# Patient Record
Sex: MALE | Race: BLACK OR AFRICAN AMERICAN | NOT HISPANIC OR LATINO | Employment: UNEMPLOYED | URBAN - METROPOLITAN AREA
[De-identification: names, ages, dates, MRNs, and addresses within clinical notes are randomized per-mention and may not be internally consistent; named-entity substitution may affect disease eponyms.]

---

## 2023-04-11 LAB
ALANINE AMINOTRANSFERASE (SGPT) (U/L) IN SER/PLAS: 18 U/L (ref 10–52)
ALBUMIN (G/DL) IN SER/PLAS: 4.4 G/DL (ref 3.4–5)
ALKALINE PHOSPHATASE (U/L) IN SER/PLAS: 49 U/L (ref 33–120)
ANION GAP IN SER/PLAS: 13 MMOL/L (ref 10–20)
ASPARTATE AMINOTRANSFERASE (SGOT) (U/L) IN SER/PLAS: 21 U/L (ref 9–39)
BASOPHILS (10*3/UL) IN BLOOD BY AUTOMATED COUNT: 0.03 X10E9/L (ref 0–0.1)
BASOPHILS/100 LEUKOCYTES IN BLOOD BY AUTOMATED COUNT: 0.5 % (ref 0–2)
BILIRUBIN TOTAL (MG/DL) IN SER/PLAS: 0.6 MG/DL (ref 0–1.2)
CALCIUM (MG/DL) IN SER/PLAS: 8.9 MG/DL (ref 8.6–10.3)
CARBON DIOXIDE, TOTAL (MMOL/L) IN SER/PLAS: 26 MMOL/L (ref 21–32)
CHLORIDE (MMOL/L) IN SER/PLAS: 108 MMOL/L (ref 98–107)
CHOLESTEROL (MG/DL) IN SER/PLAS: 152 MG/DL (ref 0–199)
CHOLESTEROL IN HDL (MG/DL) IN SER/PLAS: 33.6 MG/DL
CHOLESTEROL/HDL RATIO: 4.5
CREATININE (MG/DL) IN SER/PLAS: 2.65 MG/DL (ref 0.5–1.3)
ERYTHROCYTE DISTRIBUTION WIDTH (RATIO) BY AUTOMATED COUNT: 14.3 % (ref 11.5–14.5)
ERYTHROCYTE MEAN CORPUSCULAR HEMOGLOBIN CONCENTRATION (G/DL) BY AUTOMATED: 31.8 G/DL (ref 32–36)
ERYTHROCYTE MEAN CORPUSCULAR VOLUME (FL) BY AUTOMATED COUNT: 88 FL (ref 80–100)
ERYTHROCYTES (10*6/UL) IN BLOOD BY AUTOMATED COUNT: 3.73 X10E12/L (ref 4.5–5.9)
GFR MALE: 28 ML/MIN/1.73M2
GLUCOSE (MG/DL) IN SER/PLAS: 115 MG/DL (ref 74–99)
HEMATOCRIT (%) IN BLOOD BY AUTOMATED COUNT: 32.7 % (ref 41–52)
HEMOGLOBIN (G/DL) IN BLOOD: 10.4 G/DL (ref 13.5–17.5)
IMMATURE GRANULOCYTES/100 LEUKOCYTES IN BLOOD BY AUTOMATED COUNT: 0.5 % (ref 0–0.9)
LDL: 64 MG/DL (ref 0–99)
LEUKOCYTES (10*3/UL) IN BLOOD BY AUTOMATED COUNT: 5.6 X10E9/L (ref 4.4–11.3)
LYMPHOCYTES (10*3/UL) IN BLOOD BY AUTOMATED COUNT: 1.48 X10E9/L (ref 1.2–4.8)
LYMPHOCYTES/100 LEUKOCYTES IN BLOOD BY AUTOMATED COUNT: 26.3 % (ref 13–44)
MONOCYTES (10*3/UL) IN BLOOD BY AUTOMATED COUNT: 0.53 X10E9/L (ref 0.1–1)
MONOCYTES/100 LEUKOCYTES IN BLOOD BY AUTOMATED COUNT: 9.4 % (ref 2–10)
NEUTROPHILS (10*3/UL) IN BLOOD BY AUTOMATED COUNT: 3.56 X10E9/L (ref 1.2–7.7)
NEUTROPHILS/100 LEUKOCYTES IN BLOOD BY AUTOMATED COUNT: 63.3 % (ref 40–80)
NON HDL CHOLESTEROL: 118 MG/DL
PLATELETS (10*3/UL) IN BLOOD AUTOMATED COUNT: 232 X10E9/L (ref 150–450)
POTASSIUM (MMOL/L) IN SER/PLAS: 4 MMOL/L (ref 3.5–5.3)
PROTEIN TOTAL: 7.6 G/DL (ref 6.4–8.2)
SODIUM (MMOL/L) IN SER/PLAS: 143 MMOL/L (ref 136–145)
TRIGLYCERIDE (MG/DL) IN SER/PLAS: 270 MG/DL (ref 0–149)
UREA NITROGEN (MG/DL) IN SER/PLAS: 18 MG/DL (ref 6–23)
VLDL: 54 MG/DL (ref 0–40)

## 2023-04-14 LAB — FECAL OCCULT BLD IMMUNOASSAY: NEGATIVE

## 2023-08-25 LAB
ALANINE AMINOTRANSFERASE (SGPT) (U/L) IN SER/PLAS: 23 U/L (ref 10–52)
ALBUMIN (G/DL) IN SER/PLAS: 4.2 G/DL (ref 3.4–5)
ALBUMIN (MG/L) IN URINE: 525.8 MG/L
ALBUMIN (MG/L) IN URINE: NORMAL
ALBUMIN/CREATININE (UG/MG) IN URINE: 389.5 UG/MG CRT (ref 0–30)
ALBUMIN/CREATININE (UG/MG) IN URINE: NORMAL
ALKALINE PHOSPHATASE (U/L) IN SER/PLAS: 65 U/L (ref 33–120)
ANION GAP IN SER/PLAS: 13 MMOL/L (ref 10–20)
APPEARANCE, URINE: CLEAR
APPEARANCE, URINE: NORMAL
ASCORBIC ACID: NORMAL MG/DL
ASPARTATE AMINOTRANSFERASE (SGOT) (U/L) IN SER/PLAS: 25 U/L (ref 9–39)
BACTERIA, URINE: ABNORMAL /HPF
BILIRUBIN TOTAL (MG/DL) IN SER/PLAS: 0.5 MG/DL (ref 0–1.2)
BILIRUBIN, URINE: NEGATIVE
BILIRUBIN, URINE: NORMAL
BLOOD, URINE: NEGATIVE
BLOOD, URINE: NORMAL
CALCIDIOL (25 OH VITAMIN D3) (NG/ML) IN SER/PLAS: 31 NG/ML
CALCIUM (MG/DL) IN SER/PLAS: 8.9 MG/DL (ref 8.6–10.3)
CARBON DIOXIDE, TOTAL (MMOL/L) IN SER/PLAS: 21 MMOL/L (ref 21–32)
CHLORIDE (MMOL/L) IN SER/PLAS: 111 MMOL/L (ref 98–107)
COLOR, URINE: NORMAL
COLOR, URINE: YELLOW
CREATININE (MG/DL) IN SER/PLAS: 2.81 MG/DL (ref 0.5–1.3)
CREATININE (MG/DL) IN URINE: 135 MG/DL (ref 20–370)
CREATININE (MG/DL) IN URINE: 135 MG/DL (ref 20–370)
CREATININE (MG/DL) IN URINE: NORMAL
CREATININE (MG/DL) IN URINE: NORMAL
GFR MALE: 26 ML/MIN/1.73M2
GLUCOSE (MG/DL) IN SER/PLAS: 165 MG/DL (ref 74–99)
GLUCOSE, URINE: NEGATIVE MG/DL
GLUCOSE, URINE: NORMAL
HYALINE CASTS, URINE: ABNORMAL /LPF
KETONES, URINE: NEGATIVE MG/DL
KETONES, URINE: NORMAL
LEUKOCYTE ESTERASE, URINE: NEGATIVE
LEUKOCYTE ESTERASE, URINE: NORMAL
MUCUS, URINE: ABNORMAL /LPF
NITRITE, URINE: NEGATIVE
NITRITE, URINE: NORMAL
PARATHYRIN INTACT (PG/ML) IN SER/PLAS: 78.9 PG/ML (ref 18.5–88)
PH, URINE: 5 (ref 5–8)
PH, URINE: NORMAL
PHOSPHATE (MG/DL) IN SER/PLAS: 3.7 MG/DL (ref 2.5–4.9)
POTASSIUM (MMOL/L) IN SER/PLAS: 4 MMOL/L (ref 3.5–5.3)
PROTEIN (MG/DL) IN URINE: 140 MG/DL (ref 5–25)
PROTEIN (MG/DL) IN URINE: NORMAL
PROTEIN TOTAL: 7.1 G/DL (ref 6.4–8.2)
PROTEIN, URINE: ABNORMAL MG/DL
PROTEIN, URINE: NORMAL
PROTEIN/CREATININE (MG/MG) IN URINE: 1.04 MG/MG CREAT (ref 0–0.17)
PROTEIN/CREATININE (MG/MG) IN URINE: NORMAL
RBC, URINE: <1 /HPF (ref 0–5)
SODIUM (MMOL/L) IN SER/PLAS: 141 MMOL/L (ref 136–145)
SPECIFIC GRAVITY, URINE: 1.01 (ref 1–1.03)
SPECIFIC GRAVITY, URINE: NORMAL
SQUAMOUS EPITHELIAL CELLS, URINE: <1 /HPF
URATE (MG/DL) IN SER/PLAS: 5.7 MG/DL (ref 4–7.5)
UREA NITROGEN (MG/DL) IN SER/PLAS: 24 MG/DL (ref 6–23)
UROBILINOGEN, URINE: <2 MG/DL (ref 0–1.9)
UROBILINOGEN, URINE: NORMAL
WBC, URINE: 2 /HPF (ref 0–5)

## 2023-09-25 PROBLEM — M1A.0520: Status: ACTIVE | Noted: 2023-09-25

## 2023-09-25 PROBLEM — E11.69 HYPERLIPIDEMIA ASSOCIATED WITH TYPE 2 DIABETES MELLITUS (MULTI): Status: ACTIVE | Noted: 2023-09-25

## 2023-09-25 PROBLEM — J45.909 ASTHMA (HHS-HCC): Status: ACTIVE | Noted: 2023-09-25

## 2023-09-25 PROBLEM — Z91.199 NONCOMPLIANCE WITH TREATMENT: Status: ACTIVE | Noted: 2023-09-25

## 2023-09-25 PROBLEM — E53.8 VITAMIN B12 DEFICIENCY: Status: ACTIVE | Noted: 2023-09-25

## 2023-09-25 PROBLEM — E78.5 HYPERLIPIDEMIA ASSOCIATED WITH TYPE 2 DIABETES MELLITUS (MULTI): Status: ACTIVE | Noted: 2023-09-25

## 2023-09-25 PROBLEM — N28.1 CYST OF LEFT KIDNEY: Status: ACTIVE | Noted: 2023-09-25

## 2023-09-25 PROBLEM — G56.00 CTS (CARPAL TUNNEL SYNDROME): Status: ACTIVE | Noted: 2023-09-25

## 2023-09-25 PROBLEM — E55.9 VITAMIN D DEFICIENCY: Status: ACTIVE | Noted: 2023-09-25

## 2023-09-25 PROBLEM — D64.9 ANEMIA: Status: ACTIVE | Noted: 2023-09-25

## 2023-09-25 PROBLEM — E11.69 TYPE 2 DIABETES MELLITUS WITH OTHER SPECIFIED COMPLICATION (MULTI): Status: ACTIVE | Noted: 2023-09-25

## 2023-09-25 PROBLEM — N18.5 CKD (CHRONIC KIDNEY DISEASE), STAGE V (MULTI): Status: ACTIVE | Noted: 2023-09-25

## 2023-09-25 PROBLEM — I15.2 HYPERTENSION ASSOCIATED WITH DIABETES (MULTI): Status: ACTIVE | Noted: 2023-09-25

## 2023-09-25 PROBLEM — E11.59 HYPERTENSION ASSOCIATED WITH DIABETES (MULTI): Status: ACTIVE | Noted: 2023-09-25

## 2023-09-25 PROBLEM — F43.10 PTSD (POST-TRAUMATIC STRESS DISORDER): Status: ACTIVE | Noted: 2023-09-25

## 2023-09-25 RX ORDER — TERAZOSIN 5 MG/1
5 CAPSULE ORAL ONCE
COMMUNITY
Start: 2019-09-13 | End: 2024-05-30

## 2023-09-25 RX ORDER — HYDROXYZINE PAMOATE 25 MG/1
50 CAPSULE ORAL 2 TIMES DAILY PRN
COMMUNITY

## 2023-09-25 RX ORDER — ACETAMINOPHEN 500 MG
50 TABLET ORAL DAILY
COMMUNITY
Start: 2022-05-11 | End: 2023-11-02

## 2023-09-25 RX ORDER — CLONIDINE HYDROCHLORIDE 0.1 MG/1
0.1 TABLET ORAL DAILY
COMMUNITY
Start: 2022-11-18 | End: 2023-11-21 | Stop reason: SDUPTHER

## 2023-09-25 RX ORDER — CLOBETASOL PROPIONATE 0.5 MG/G
1 CREAM TOPICAL 2 TIMES DAILY PRN
COMMUNITY
Start: 2020-06-24 | End: 2023-11-02

## 2023-09-25 RX ORDER — SERTRALINE HYDROCHLORIDE 100 MG/1
1 TABLET, FILM COATED ORAL DAILY
COMMUNITY
Start: 2019-09-30

## 2023-09-25 RX ORDER — HYDROCORTISONE 25 MG/G
1 OINTMENT TOPICAL 2 TIMES DAILY
COMMUNITY

## 2023-09-25 RX ORDER — CLOTRIMAZOLE AND BETAMETHASONE DIPROPIONATE 10; .64 MG/G; MG/G
1 CREAM TOPICAL 2 TIMES DAILY
COMMUNITY
Start: 2019-10-14 | End: 2024-01-12

## 2023-09-25 RX ORDER — LOSARTAN POTASSIUM 25 MG/1
25 TABLET ORAL 2 TIMES DAILY
COMMUNITY
End: 2023-11-21 | Stop reason: SDUPTHER

## 2023-09-25 RX ORDER — POTASSIUM CHLORIDE 20 MEQ/1
1 TABLET, EXTENDED RELEASE ORAL DAILY
COMMUNITY
Start: 2022-02-23 | End: 2023-11-21 | Stop reason: SDUPTHER

## 2023-09-25 RX ORDER — BUSPIRONE HYDROCHLORIDE 15 MG/1
1 TABLET ORAL 3 TIMES DAILY
COMMUNITY
Start: 2019-12-22 | End: 2023-12-29 | Stop reason: HOSPADM

## 2023-09-25 RX ORDER — ALBUTEROL SULFATE 90 UG/1
2 AEROSOL, METERED RESPIRATORY (INHALATION) EVERY 4 HOURS PRN
COMMUNITY
Start: 2019-09-25 | End: 2023-11-21 | Stop reason: SDUPTHER

## 2023-09-25 RX ORDER — METOPROLOL TARTRATE 50 MG/1
1 TABLET ORAL 2 TIMES DAILY
COMMUNITY
Start: 2019-09-13 | End: 2023-11-21 | Stop reason: SDUPTHER

## 2023-09-25 RX ORDER — ASPIRIN 81 MG/1
1 TABLET ORAL DAILY
COMMUNITY

## 2023-09-25 RX ORDER — CALCIUM CARBONATE 300MG(750)
1 TABLET,CHEWABLE ORAL DAILY
COMMUNITY
Start: 2022-02-23 | End: 2023-11-21 | Stop reason: SDUPTHER

## 2023-09-25 RX ORDER — AMLODIPINE BESYLATE 10 MG/1
1 TABLET ORAL DAILY
COMMUNITY
Start: 2019-09-13 | End: 2023-11-21 | Stop reason: SDUPTHER

## 2023-09-25 RX ORDER — LANOLIN ALCOHOL/MO/W.PET/CERES
1 CREAM (GRAM) TOPICAL DAILY
COMMUNITY
Start: 2022-05-11 | End: 2023-11-21 | Stop reason: SDUPTHER

## 2023-09-25 RX ORDER — TRIAMCINOLONE ACETONIDE 1 MG/G
1 CREAM TOPICAL 2 TIMES DAILY
COMMUNITY

## 2023-09-25 RX ORDER — MOMETASONE FUROATE AND FORMOTEROL FUMARATE DIHYDRATE 200; 5 UG/1; UG/1
1 AEROSOL RESPIRATORY (INHALATION)
COMMUNITY
Start: 2019-09-13 | End: 2023-11-21 | Stop reason: SDUPTHER

## 2023-09-25 RX ORDER — GLIPIZIDE 5 MG/1
0.5 TABLET ORAL DAILY
COMMUNITY
Start: 2019-09-13 | End: 2023-11-21 | Stop reason: SDUPTHER

## 2023-09-25 RX ORDER — SIMVASTATIN 40 MG/1
1 TABLET, FILM COATED ORAL NIGHTLY
COMMUNITY
Start: 2019-09-13 | End: 2023-11-21 | Stop reason: SDUPTHER

## 2023-09-25 RX ORDER — ALLOPURINOL 300 MG/1
1 TABLET ORAL DAILY
COMMUNITY
Start: 2019-09-13 | End: 2023-11-21 | Stop reason: SDUPTHER

## 2023-09-29 PROBLEM — E66.812 CLASS 2 OBESITY WITH BODY MASS INDEX (BMI) OF 38.0 TO 38.9 IN ADULT: Status: ACTIVE | Noted: 2023-09-29

## 2023-09-29 PROBLEM — E66.9 CLASS 2 OBESITY WITH BODY MASS INDEX (BMI) OF 38.0 TO 38.9 IN ADULT: Status: ACTIVE | Noted: 2023-09-29

## 2023-09-29 PROBLEM — Z78.9 NEVER SMOKED ANY SUBSTANCE: Status: ACTIVE | Noted: 2023-09-29

## 2023-09-29 RX ORDER — SIMVASTATIN 20 MG/1
1 TABLET, FILM COATED ORAL NIGHTLY
COMMUNITY
Start: 2022-10-11 | End: 2023-11-21 | Stop reason: WASHOUT

## 2023-09-29 RX ORDER — TRAMADOL HYDROCHLORIDE 50 MG/1
50 TABLET ORAL EVERY 6 HOURS
COMMUNITY
Start: 2020-08-18 | End: 2023-12-29 | Stop reason: HOSPADM

## 2023-09-29 RX ORDER — HYDROCHLOROTHIAZIDE 25 MG/1
1 TABLET ORAL DAILY
COMMUNITY
Start: 2019-09-13 | End: 2023-11-21 | Stop reason: SDUPTHER

## 2023-09-29 RX ORDER — NABUMETONE 750 MG/1
1 TABLET, FILM COATED ORAL EVERY 12 HOURS
COMMUNITY
Start: 2020-09-22 | End: 2023-12-29 | Stop reason: HOSPADM

## 2023-09-29 RX ORDER — QUETIAPINE FUMARATE 50 MG/1
1 TABLET, FILM COATED ORAL NIGHTLY
COMMUNITY
Start: 2019-10-01

## 2023-10-03 ENCOUNTER — APPOINTMENT (OUTPATIENT)
Dept: PRIMARY CARE | Facility: CLINIC | Age: 53
End: 2023-10-03
Payer: COMMERCIAL

## 2023-10-31 DIAGNOSIS — E55.9 VITAMIN D DEFICIENCY, UNSPECIFIED: ICD-10-CM

## 2023-10-31 DIAGNOSIS — L30.9 DERMATITIS: ICD-10-CM

## 2023-11-02 RX ORDER — CLOBETASOL PROPIONATE 0.5 MG/G
CREAM TOPICAL
Qty: 60 G | Refills: 1 | Status: SHIPPED | OUTPATIENT
Start: 2023-11-02 | End: 2024-01-12

## 2023-11-02 RX ORDER — NICOTINE 11MG/24HR
2000 PATCH, TRANSDERMAL 24 HOURS TRANSDERMAL DAILY
Qty: 90 CAPSULE | Refills: 1 | Status: SHIPPED | OUTPATIENT
Start: 2023-11-02 | End: 2023-11-21 | Stop reason: SDUPTHER

## 2023-11-21 ENCOUNTER — OFFICE VISIT (OUTPATIENT)
Dept: PRIMARY CARE | Facility: CLINIC | Age: 53
End: 2023-11-21
Payer: COMMERCIAL

## 2023-11-21 VITALS
BODY MASS INDEX: 37.48 KG/M2 | OXYGEN SATURATION: 97 % | WEIGHT: 238.8 LBS | HEIGHT: 67 IN | TEMPERATURE: 98.3 F | HEART RATE: 63 BPM | DIASTOLIC BLOOD PRESSURE: 78 MMHG | SYSTOLIC BLOOD PRESSURE: 118 MMHG

## 2023-11-21 DIAGNOSIS — E53.8 VITAMIN B12 DEFICIENCY: ICD-10-CM

## 2023-11-21 DIAGNOSIS — E11.59 HYPERTENSION ASSOCIATED WITH DIABETES (MULTI): ICD-10-CM

## 2023-11-21 DIAGNOSIS — E66.01 CLASS 2 SEVERE OBESITY DUE TO EXCESS CALORIES WITH SERIOUS COMORBIDITY AND BODY MASS INDEX (BMI) OF 38.0 TO 38.9 IN ADULT (MULTI): Primary | ICD-10-CM

## 2023-11-21 DIAGNOSIS — N18.5 CKD (CHRONIC KIDNEY DISEASE), STAGE V (MULTI): ICD-10-CM

## 2023-11-21 DIAGNOSIS — E11.69 TYPE 2 DIABETES MELLITUS WITH OTHER SPECIFIED COMPLICATION, UNSPECIFIED WHETHER LONG TERM INSULIN USE (MULTI): ICD-10-CM

## 2023-11-21 DIAGNOSIS — E11.69 HYPERLIPIDEMIA ASSOCIATED WITH TYPE 2 DIABETES MELLITUS (MULTI): ICD-10-CM

## 2023-11-21 DIAGNOSIS — K21.9 GASTROESOPHAGEAL REFLUX DISEASE WITHOUT ESOPHAGITIS: ICD-10-CM

## 2023-11-21 DIAGNOSIS — E55.9 VITAMIN D DEFICIENCY, UNSPECIFIED: ICD-10-CM

## 2023-11-21 DIAGNOSIS — I15.2 HYPERTENSION ASSOCIATED WITH DIABETES (MULTI): ICD-10-CM

## 2023-11-21 DIAGNOSIS — E78.5 HYPERLIPIDEMIA ASSOCIATED WITH TYPE 2 DIABETES MELLITUS (MULTI): ICD-10-CM

## 2023-11-21 DIAGNOSIS — D50.9 IRON DEFICIENCY ANEMIA, UNSPECIFIED IRON DEFICIENCY ANEMIA TYPE: ICD-10-CM

## 2023-11-21 DIAGNOSIS — J45.909 ASTHMA, UNSPECIFIED ASTHMA SEVERITY, UNSPECIFIED WHETHER COMPLICATED, UNSPECIFIED WHETHER PERSISTENT (HHS-HCC): ICD-10-CM

## 2023-11-21 DIAGNOSIS — F43.10 PTSD (POST-TRAUMATIC STRESS DISORDER): ICD-10-CM

## 2023-11-21 PROCEDURE — 4010F ACE/ARB THERAPY RXD/TAKEN: CPT | Performed by: INTERNAL MEDICINE

## 2023-11-21 PROCEDURE — 1036F TOBACCO NON-USER: CPT | Performed by: INTERNAL MEDICINE

## 2023-11-21 PROCEDURE — 99214 OFFICE O/P EST MOD 30 MIN: CPT | Performed by: INTERNAL MEDICINE

## 2023-11-21 PROCEDURE — 3008F BODY MASS INDEX DOCD: CPT | Performed by: INTERNAL MEDICINE

## 2023-11-21 PROCEDURE — 3074F SYST BP LT 130 MM HG: CPT | Performed by: INTERNAL MEDICINE

## 2023-11-21 PROCEDURE — 3078F DIAST BP <80 MM HG: CPT | Performed by: INTERNAL MEDICINE

## 2023-11-21 RX ORDER — GLIPIZIDE 5 MG/1
2.5 TABLET ORAL DAILY
Qty: 90 TABLET | Refills: 1 | Status: SHIPPED | OUTPATIENT
Start: 2023-11-21 | End: 2023-12-29 | Stop reason: HOSPADM

## 2023-11-21 RX ORDER — BUSPIRONE HYDROCHLORIDE 15 MG/1
15 TABLET ORAL 3 TIMES DAILY
Qty: 90 TABLET | Refills: 1 | Status: CANCELLED | OUTPATIENT
Start: 2023-11-21

## 2023-11-21 RX ORDER — MOMETASONE FUROATE AND FORMOTEROL FUMARATE DIHYDRATE 200; 5 UG/1; UG/1
1 AEROSOL RESPIRATORY (INHALATION)
Qty: 13 G | Refills: 1 | Status: SHIPPED | OUTPATIENT
Start: 2023-11-21

## 2023-11-21 RX ORDER — TRAMADOL HYDROCHLORIDE 50 MG/1
50 TABLET ORAL EVERY 6 HOURS
Qty: 15 TABLET | Refills: 0 | Status: CANCELLED | OUTPATIENT
Start: 2023-11-21

## 2023-11-21 RX ORDER — SERTRALINE HYDROCHLORIDE 100 MG/1
100 TABLET, FILM COATED ORAL DAILY
Qty: 90 TABLET | Refills: 1 | Status: CANCELLED | OUTPATIENT
Start: 2023-11-21

## 2023-11-21 RX ORDER — HYDROXYZINE PAMOATE 25 MG/1
50 CAPSULE ORAL 2 TIMES DAILY PRN
Qty: 30 CAPSULE | Refills: 1 | Status: CANCELLED | OUTPATIENT
Start: 2023-11-21

## 2023-11-21 RX ORDER — CLONIDINE HYDROCHLORIDE 0.1 MG/1
0.1 TABLET ORAL DAILY
Qty: 90 TABLET | Refills: 1 | Status: SHIPPED | OUTPATIENT
Start: 2023-11-21 | End: 2023-12-29 | Stop reason: HOSPADM

## 2023-11-21 RX ORDER — LANOLIN ALCOHOL/MO/W.PET/CERES
1000 CREAM (GRAM) TOPICAL DAILY
Qty: 90 TABLET | Refills: 1 | Status: SHIPPED | OUTPATIENT
Start: 2023-11-21 | End: 2024-05-14

## 2023-11-21 RX ORDER — TERAZOSIN 5 MG/1
5 CAPSULE ORAL ONCE
Qty: 1 CAPSULE | Refills: 0 | Status: CANCELLED | OUTPATIENT
Start: 2023-11-21 | End: 2023-11-21

## 2023-11-21 RX ORDER — HYDROCHLOROTHIAZIDE 25 MG/1
25 TABLET ORAL DAILY
Qty: 90 TABLET | Refills: 1 | Status: SHIPPED | OUTPATIENT
Start: 2023-11-21 | End: 2023-12-29 | Stop reason: HOSPADM

## 2023-11-21 RX ORDER — SIMVASTATIN 40 MG/1
40 TABLET, FILM COATED ORAL NIGHTLY
Qty: 90 TABLET | Refills: 1 | Status: SHIPPED | OUTPATIENT
Start: 2023-11-21 | End: 2023-12-29 | Stop reason: HOSPADM

## 2023-11-21 RX ORDER — METOPROLOL TARTRATE 50 MG/1
50 TABLET ORAL 2 TIMES DAILY
Qty: 90 TABLET | Refills: 1 | Status: SHIPPED | OUTPATIENT
Start: 2023-11-21 | End: 2024-04-03

## 2023-11-21 RX ORDER — ALBUTEROL SULFATE 90 UG/1
2 AEROSOL, METERED RESPIRATORY (INHALATION) EVERY 4 HOURS PRN
Qty: 18 G | Refills: 1 | Status: SHIPPED | OUTPATIENT
Start: 2023-11-21 | End: 2024-01-12

## 2023-11-21 RX ORDER — LOSARTAN POTASSIUM 25 MG/1
25 TABLET ORAL 2 TIMES DAILY
Qty: 90 TABLET | Refills: 1 | Status: SHIPPED | OUTPATIENT
Start: 2023-11-21 | End: 2024-01-04

## 2023-11-21 RX ORDER — CALCIUM CARBONATE 300MG(750)
1 TABLET,CHEWABLE ORAL DAILY
Qty: 90 TABLET | Refills: 1 | Status: SHIPPED | OUTPATIENT
Start: 2023-11-21 | End: 2023-12-29 | Stop reason: HOSPADM

## 2023-11-21 RX ORDER — PANTOPRAZOLE SODIUM 40 MG/1
40 TABLET, DELAYED RELEASE ORAL DAILY
Qty: 30 TABLET | Refills: 1 | Status: SHIPPED | OUTPATIENT
Start: 2023-11-21 | End: 2023-12-05 | Stop reason: SDUPTHER

## 2023-11-21 RX ORDER — ACETAMINOPHEN 500 MG
2000 TABLET ORAL DAILY
Qty: 90 CAPSULE | Refills: 1 | Status: SHIPPED | OUTPATIENT
Start: 2023-11-21

## 2023-11-21 RX ORDER — AMLODIPINE BESYLATE 10 MG/1
10 TABLET ORAL DAILY
Qty: 90 TABLET | Refills: 1 | Status: SHIPPED | OUTPATIENT
Start: 2023-11-21

## 2023-11-21 RX ORDER — NABUMETONE 750 MG/1
750 TABLET, FILM COATED ORAL EVERY 12 HOURS
Qty: 90 TABLET | Refills: 1 | Status: CANCELLED | OUTPATIENT
Start: 2023-11-21

## 2023-11-21 RX ORDER — ALLOPURINOL 300 MG/1
300 TABLET ORAL DAILY
Qty: 90 TABLET | Refills: 1 | Status: SHIPPED | OUTPATIENT
Start: 2023-11-21 | End: 2023-12-29 | Stop reason: HOSPADM

## 2023-11-21 RX ORDER — QUETIAPINE FUMARATE 50 MG/1
50 TABLET, FILM COATED ORAL NIGHTLY
Qty: 90 TABLET | Refills: 1 | Status: CANCELLED | OUTPATIENT
Start: 2023-11-21

## 2023-11-21 RX ORDER — POTASSIUM CHLORIDE 20 MEQ/1
20 TABLET, EXTENDED RELEASE ORAL DAILY
Qty: 90 TABLET | Refills: 1 | Status: SHIPPED | OUTPATIENT
Start: 2023-11-21 | End: 2024-05-20

## 2023-11-21 ASSESSMENT — ENCOUNTER SYMPTOMS
LOSS OF SENSATION IN FEET: 0
OCCASIONAL FEELINGS OF UNSTEADINESS: 0
DEPRESSION: 0

## 2023-11-21 ASSESSMENT — PATIENT HEALTH QUESTIONNAIRE - PHQ9
1. LITTLE INTEREST OR PLEASURE IN DOING THINGS: NOT AT ALL
SUM OF ALL RESPONSES TO PHQ9 QUESTIONS 1 AND 2: 0
2. FEELING DOWN, DEPRESSED OR HOPELESS: NOT AT ALL

## 2023-11-21 NOTE — PROGRESS NOTES
"Subjective   Patient ID: Amanuel Riojas is a 53 y.o. male who presents for Med Management (Patient is in office today for a med refill) and GI Problem (Patient advises that he has been having bloating issues for the last few weeks. Patient advises that its been getting worse. Patient has been taking Pep-to-bismol, he advises that it works for a minute then it starts to bother him again.  ).    HPI   53-year-old male with a past medical history of diabetes chronic kidney disease hypertension asthma hyperlipidemia COPD here for refill on medication complaining of bloating sensation he is taking Pepto-Bismol for the last couple weeks no nausea vomiting he has heartburn  Review of Systems  REVIEW OF SYSTEMS:  General:  Denies significant weight changes, fever, chills or weakness.  SKIN: Denies any rash or change in moles.  HEENT:  No vision or hearing changes. No headache. No vertigo, No tinnitus.   GI:  No loss of appetite. No change in bowel habit. No abdominal pain. No blood in stool.  GUR: No dysuria. No hematoma, No fever. No incontinence.  Respiratory:  No cough or shortness of breath.  CNS:  No memory or mood changes. No gait disturbance. No focal weakness. No tremors. No tingling or number of extremities.   ENDO: No cold intolerance. No fatigue.  Bloating sensation  Objective   /78 (BP Location: Right arm, Patient Position: Sitting, BP Cuff Size: Large adult)   Pulse 63   Temp 36.8 °C (98.3 °F) (Temporal)   Ht 1.702 m (5' 7\")   Wt 108 kg (238 lb 12.8 oz)   SpO2 97% Comment: RA  BMI 37.40 kg/m²     Physical Exam  PHYSICAL EXAM LONG:  Vitals:  Per [employer].  General Appearance:  Normal-built, well-nourished  with no apparent distress.  Skin:  Normal turgor.  No rash.  Head:  Normocephalic, atraumatic.  Eyes:  Pupils are equal, round, and reactive to light and accommodation.  Extraocular movements are intact.  No pallor of conjunctivae.  Mouth has moist oral mucosa.  Pharynx appears normal.  No " erythema.  Nose:  Nasal mucosa normal.  Turbinates are within normal limits.  Ears:  Bilateral auditory ear canals are normal.  Bilateral tympanic membranes are normal and visible.  Neck:  Supple.  No JVD.  No carotid bruit.  No thyromegaly. No cervical lymphadenopathy.   Chest:  Bilaterally good air entry and bilaterally clear to auscultation.  No wheezing.  No crackles.  Heart:  Regular rate and rhythm.  S1, S2 positive.  No murmur.  Abdomen:  Soft and nontender.  Bowel sounds are positive.  No organomegaly.  Extremities:  Bilaterally no pedal pitting edema.  Bilaterally 2+ dorsalis pedis pulses.  Neuro Exam:  Cranial nerves from II to XII intact.  No facial droop.  Tongue at midline.  Facial sensation intact to light touch and pain sensation.  Motor strength 5/5 in upper and lower extremities.  Sensation is grossly intact to light touch and pain sensation.  Deep tendon reflexes are bilaterally symmetric in upper and lower extremities and within normal limits, 2+.  No cerebellar signs.  Finger-to-nose intact.        Labs reviewed:    Results for orders placed or performed in visit on 08/25/23   Parathyroid Hormone, Intact   Result Value Ref Range    PTH 78.9 18.5 - 88.0 pg/mL   Vitamin D, Total   Result Value Ref Range    Vitamin D, 25-Hydroxy 31 ng/mL   Uric Acid   Result Value Ref Range    Uric Acid 5.7 4.0 - 7.5 mg/dL   Phosphorus   Result Value Ref Range    Phosphorus 3.7 2.5 - 4.9 mg/dL   Comprehensive Metabolic Panel   Result Value Ref Range    Glucose 165 (H) 74 - 99 mg/dL    Sodium 141 136 - 145 mmol/L    Potassium 4.0 3.5 - 5.3 mmol/L    Chloride 111 (H) 98 - 107 mmol/L    Bicarbonate 21 21 - 32 mmol/L    Anion Gap 13 10 - 20 mmol/L    Urea Nitrogen 24 (H) 6 - 23 mg/dL    Creatinine 2.81 (H) 0.50 - 1.30 mg/dL    GFR MALE 26 (A) >90 mL/min/1.73m2    Calcium 8.9 8.6 - 10.3 mg/dL    Albumin 4.2 3.4 - 5.0 g/dL    Alkaline Phosphatase 65 33 - 120 U/L    Total Protein 7.1 6.4 - 8.2 g/dL    AST 25 9 - 39 U/L     Total Bilirubin 0.5 0.0 - 1.2 mg/dL    ALT (SGPT) 23 10 - 52 U/L   Albumin , Urine Random   Result Value Ref Range    ALBUMIN (MG/L) IN URINE CANCELED     Albumin/Creatine Ratio CANCELED     Creatinine, Urine CANCELED    Protein, Urine Random   Result Value Ref Range    Protein, Ur CANCELED     Creatinine, Urine CANCELED     Prot/Creat, Ur CANCELED    Urinalysis with Reflex Microscopic   Result Value Ref Range    Color, Urine CANCELED     Appearance, Urine CANCELED     Specific Gravity, Urine CANCELED     pH, Urine CANCELED     Protein, Urine CANCELED     Glucose, Urine CANCELED     Blood, Urine CANCELED     Ketones, Urine CANCELED     Bilirubin, Urine CANCELED     Urobilinogen, Urine CANCELED     Nitrite, Urine CANCELED     Leukocyte Esterase, Urine CANCELED     Ascorbic Acid CANCELED mg/dL   Albumin , Urine Random   Result Value Ref Range    ALBUMIN (MG/L) IN URINE 525.8 Not Established mg/L    Albumin/Creatine Ratio 389.5 (H) 0.0 - 30.0 ug/mg crt    Creatinine, Urine 135.0 20.0 - 370.0 mg/dL   Urinalysis with Reflex Microscopic   Result Value Ref Range    Color, Urine YELLOW STRAW,YELLOW    Appearance, Urine CLEAR CLEAR    Specific Gravity, Urine 1.015 1.005 - 1.035    pH, Urine 5.0 5.0 - 8.0    Protein, Urine 100 (2+) (A) NEGATIVE mg/dL    Glucose, Urine NEGATIVE NEGATIVE mg/dL    Blood, Urine NEGATIVE NEGATIVE    Ketones, Urine NEGATIVE NEGATIVE mg/dL    Bilirubin, Urine NEGATIVE NEGATIVE    Urobilinogen, Urine <2.0 0.0 - 1.9 mg/dL    Nitrite, Urine NEGATIVE NEGATIVE    Leukocyte Esterase, Urine NEGATIVE NEGATIVE   Protein, Urine Random   Result Value Ref Range    Protein, Ur 140 (H) 5 - 25 mg/dL    Creatinine, Urine 135.0 20.0 - 370.0 mg/dL    Prot/Creat, Ur 1.04 (H) 0.00 - 0.17 mg/mg Creat   Urinalysis Microscopic Only   Result Value Ref Range    WBC, Urine 2 0 - 5 /HPF    RBC, Urine <1 0 - 5 /HPF    Squamous Epithelial Cells, Urine <1 /HPF    Bacteria, Urine 1+ (A) /HPF    Mucus, Urine 2+ /LPF    Hyaline  Casts, Urine OCC (A) /LPF      Assessment/Plan        Bloating sensation and GERD I put him on pantoprazole 40 daily I will see him back in 2 weeks    Diabetes stable strongly advised lifestyle medicine diet exercise continue medication gave complete blood work to be done in April    Hypertension stable advised DASH diet continue medication    Chronic kidney disease follow-up with nephrology refill all of his medications

## 2023-11-21 NOTE — PATIENT INSTRUCTIONS
How can I help Amanuel do this?  ---------------------------------------------  -BE PATIENT WITH Amanuel, remember it may take 10 times before they start to like new food. So, start with small bites and just keep trying.  -Serve at least one vegetable or fruit at every meal. Even try two. Remember, portions do not have to be as big as you think.  -Encourage eating fruits and vegetables instead of drinking them..it's a better way to get fiber and vitamins..so limit the amount of juice to 1/2 cup per day for children 1-6 years and one cup per day for children 7-18 years of age. Try using 1/2 part water and 1/2 part juice.    Spend less than two hours per day watching television and other screen media. Screen media includes video games, movies and computer use for entertainment.    How can I help Amanuel do this?  -Turn off the TV at dinner. Dinner is the best time to hang out with your kids and just talk, learn about their day, and tell them about your day. Your kids have a lot to learn from you and dinner is a great time to share.

## 2023-12-05 ENCOUNTER — OFFICE VISIT (OUTPATIENT)
Dept: PRIMARY CARE | Facility: CLINIC | Age: 53
End: 2023-12-05
Payer: COMMERCIAL

## 2023-12-05 VITALS
WEIGHT: 234.2 LBS | TEMPERATURE: 98.4 F | HEART RATE: 102 BPM | HEIGHT: 67 IN | BODY MASS INDEX: 36.76 KG/M2 | SYSTOLIC BLOOD PRESSURE: 164 MMHG | DIASTOLIC BLOOD PRESSURE: 94 MMHG | OXYGEN SATURATION: 97 %

## 2023-12-05 DIAGNOSIS — E11.59 HYPERTENSION ASSOCIATED WITH DIABETES (MULTI): Primary | ICD-10-CM

## 2023-12-05 DIAGNOSIS — I15.2 HYPERTENSION ASSOCIATED WITH DIABETES (MULTI): Primary | ICD-10-CM

## 2023-12-05 DIAGNOSIS — K21.9 GASTROESOPHAGEAL REFLUX DISEASE WITHOUT ESOPHAGITIS: ICD-10-CM

## 2023-12-05 PROCEDURE — 1036F TOBACCO NON-USER: CPT | Performed by: INTERNAL MEDICINE

## 2023-12-05 PROCEDURE — 4010F ACE/ARB THERAPY RXD/TAKEN: CPT | Performed by: INTERNAL MEDICINE

## 2023-12-05 PROCEDURE — 3008F BODY MASS INDEX DOCD: CPT | Performed by: INTERNAL MEDICINE

## 2023-12-05 PROCEDURE — 3080F DIAST BP >= 90 MM HG: CPT | Performed by: INTERNAL MEDICINE

## 2023-12-05 PROCEDURE — 99213 OFFICE O/P EST LOW 20 MIN: CPT | Performed by: INTERNAL MEDICINE

## 2023-12-05 PROCEDURE — 3077F SYST BP >= 140 MM HG: CPT | Performed by: INTERNAL MEDICINE

## 2023-12-05 RX ORDER — PANTOPRAZOLE SODIUM 40 MG/1
40 TABLET, DELAYED RELEASE ORAL DAILY
Qty: 30 TABLET | Refills: 1 | Status: SHIPPED | OUTPATIENT
Start: 2023-12-05 | End: 2024-01-31 | Stop reason: ALTCHOICE

## 2023-12-05 ASSESSMENT — PATIENT HEALTH QUESTIONNAIRE - PHQ9
SUM OF ALL RESPONSES TO PHQ9 QUESTIONS 1 AND 2: 0
1. LITTLE INTEREST OR PLEASURE IN DOING THINGS: NOT AT ALL
2. FEELING DOWN, DEPRESSED OR HOPELESS: NOT AT ALL

## 2023-12-05 ASSESSMENT — ENCOUNTER SYMPTOMS
LOSS OF SENSATION IN FEET: 0
DEPRESSION: 0
OCCASIONAL FEELINGS OF UNSTEADINESS: 0

## 2023-12-05 NOTE — PROGRESS NOTES
"Subjective   Patient ID: Amanuel Riojas is a 53 y.o. male who presents for Follow-up (Patient is in office today for a 2 week follow-up on stomach medication. Patient advises that it is helping, ).    HPI   53-year-old male with a past medical history of hypertension GERD was complaining of bloating sensation I will put him on pantoprazole here for follow-up he feels much better  Review of Systems  REVIEW OF SYSTEMS:  General:  Denies significant weight changes, fever, chills or weakness.  SKIN: Denies any rash or change in moles.  HEENT:  No vision or hearing changes. No headache. No vertigo, No tinnitus.   GI:  No loss of appetite. No change in bowel habit. No abdominal pain. No blood in stool.  GUR: No dysuria. No hematoma, No fever. No incontinence.  Respiratory:  No cough or shortness of breath.  CNS:  No memory or mood changes. No gait disturbance. No focal weakness. No tremors. No tingling or number of extremities.   ENDO: No cold intolerance. No fatigue.    Objective   BP (!) 164/94 (BP Location: Right arm, Patient Position: Sitting, BP Cuff Size: Large adult)   Pulse 102   Temp 36.9 °C (98.4 °F) (Temporal)   Ht 1.702 m (5' 7\")   Wt 106 kg (234 lb 3.2 oz)   SpO2 97% Comment: RA  BMI 36.68 kg/m²     Physical Exam  OBJECTIVE:  Vital Signs:  Per [employer].  Alert, oriented x3, not in distress.  Neck:  Supple.  No JVD.  Respiratory System:  Diminished breath sounds.  No wheezing and no rales.  Cardiovascular:  S1 and S2 positive.  No murmur.  Regular rate and rhythm.  Abdomen:  Soft.  Bowel sounds positive.  Liver and spleen not palpable.  Extremities:  Peripheral pulses present.  No edema.    Assessment/Plan     GERD better advised to continue for 8 weeks I will see him back in 1 month    Hypertension stable advised DASH diet continue medications refill his medications     "

## 2023-12-21 ENCOUNTER — TELEPHONE (OUTPATIENT)
Dept: PRIMARY CARE | Facility: CLINIC | Age: 53
End: 2023-12-21
Payer: COMMERCIAL

## 2023-12-21 NOTE — TELEPHONE ENCOUNTER
Patient called office requesting an antibiotic for a possible tooth infection. I called patient back and left message advising that Dr. Hanson is currently out of the office and he will need to schedule an appointment with a covering physician to receive an antibiotic. Advised patient to call the office at [phone] option #3 to schedule.

## 2023-12-22 ENCOUNTER — APPOINTMENT (OUTPATIENT)
Dept: RADIOLOGY | Facility: HOSPITAL | Age: 53
End: 2023-12-22
Payer: COMMERCIAL

## 2023-12-22 ENCOUNTER — APPOINTMENT (OUTPATIENT)
Dept: CARDIOLOGY | Facility: HOSPITAL | Age: 53
End: 2023-12-22
Payer: COMMERCIAL

## 2023-12-22 ENCOUNTER — HOSPITAL ENCOUNTER (INPATIENT)
Facility: HOSPITAL | Age: 53
LOS: 7 days | Discharge: HOME | End: 2023-12-29
Attending: EMERGENCY MEDICINE | Admitting: INTERNAL MEDICINE
Payer: COMMERCIAL

## 2023-12-22 DIAGNOSIS — R79.89 PSEUDOHYPONATREMIA: ICD-10-CM

## 2023-12-22 DIAGNOSIS — E11.69 TYPE 2 DIABETES MELLITUS WITH OTHER SPECIFIED COMPLICATION, WITH LONG-TERM CURRENT USE OF INSULIN (MULTI): ICD-10-CM

## 2023-12-22 DIAGNOSIS — R42 DIZZINESS: ICD-10-CM

## 2023-12-22 DIAGNOSIS — N17.9 AKI (ACUTE KIDNEY INJURY) (CMS-HCC): ICD-10-CM

## 2023-12-22 DIAGNOSIS — E78.5 HYPERLIPIDEMIA ASSOCIATED WITH TYPE 2 DIABETES MELLITUS (MULTI): ICD-10-CM

## 2023-12-22 DIAGNOSIS — E79.0 HYPERURICEMIA WITHOUT SIGNS INFLAMMATORY ARTHRITIS/TOPHACEOUS DISEASE: ICD-10-CM

## 2023-12-22 DIAGNOSIS — Z79.4 TYPE 2 DIABETES MELLITUS WITH OTHER SPECIFIED COMPLICATION, WITH LONG-TERM CURRENT USE OF INSULIN (MULTI): ICD-10-CM

## 2023-12-22 DIAGNOSIS — R07.9 CHEST PAIN, UNSPECIFIED: ICD-10-CM

## 2023-12-22 DIAGNOSIS — N17.9 ACUTE RENAL FAILURE, UNSPECIFIED ACUTE RENAL FAILURE TYPE (CMS-HCC): ICD-10-CM

## 2023-12-22 DIAGNOSIS — E11.69 HYPERLIPIDEMIA ASSOCIATED WITH TYPE 2 DIABETES MELLITUS (MULTI): ICD-10-CM

## 2023-12-22 DIAGNOSIS — R56.9 SEIZURE-LIKE ACTIVITY (MULTI): ICD-10-CM

## 2023-12-22 DIAGNOSIS — E11.01: ICD-10-CM

## 2023-12-22 DIAGNOSIS — E11.01: Primary | ICD-10-CM

## 2023-12-22 DIAGNOSIS — I25.119 CHEST PAIN DUE TO CORONARY ARTERY DISEASE (CMS-HCC): ICD-10-CM

## 2023-12-22 LAB
ALBUMIN SERPL BCP-MCNC: 4.7 G/DL (ref 3.4–5)
ALBUMIN SERPL BCP-MCNC: 5 G/DL (ref 3.4–5)
ALP SERPL-CCNC: 101 U/L (ref 33–120)
ALP SERPL-CCNC: 92 U/L (ref 33–120)
ALT SERPL W P-5'-P-CCNC: 10 U/L (ref 10–52)
ALT SERPL W P-5'-P-CCNC: 8 U/L (ref 10–52)
ANION GAP BLDV CALCULATED.4IONS-SCNC: 16 MMOL/L (ref 10–25)
ANION GAP SERPL CALC-SCNC: 20 MMOL/L (ref 10–20)
ANION GAP SERPL CALC-SCNC: 22 MMOL/L (ref 10–20)
ANION GAP SERPL CALC-SCNC: 23 MMOL/L (ref 10–20)
APTT PPP: 27 SECONDS (ref 27–38)
AST SERPL W P-5'-P-CCNC: 10 U/L (ref 9–39)
AST SERPL W P-5'-P-CCNC: 22 U/L (ref 9–39)
B-OH-BUTYR SERPL-SCNC: 0.39 MMOL/L (ref 0.02–0.27)
BASE EXCESS BLDV CALC-SCNC: 0.1 MMOL/L (ref -2–3)
BASOPHILS # BLD AUTO: 0.04 X10*3/UL (ref 0–0.1)
BASOPHILS NFR BLD AUTO: 0.4 %
BILIRUB SERPL-MCNC: 0.8 MG/DL (ref 0–1.2)
BILIRUB SERPL-MCNC: 0.9 MG/DL (ref 0–1.2)
BNP SERPL-MCNC: 17 PG/ML (ref 0–99)
BODY TEMPERATURE: ABNORMAL
BUN SERPL-MCNC: 71 MG/DL (ref 6–23)
BUN SERPL-MCNC: 71 MG/DL (ref 6–23)
BUN SERPL-MCNC: 73 MG/DL (ref 6–23)
CA-I BLDV-SCNC: 1.2 MMOL/L (ref 1.1–1.33)
CALCIUM SERPL-MCNC: 8.6 MG/DL (ref 8.6–10.3)
CALCIUM SERPL-MCNC: 9.3 MG/DL (ref 8.6–10.3)
CALCIUM SERPL-MCNC: 9.5 MG/DL (ref 8.6–10.3)
CARDIAC TROPONIN I PNL SERPL HS: 10 NG/L (ref 0–20)
CARDIAC TROPONIN I PNL SERPL HS: 11 NG/L (ref 0–20)
CHLORIDE BLDV-SCNC: 90 MMOL/L (ref 98–107)
CHLORIDE SERPL-SCNC: 79 MMOL/L (ref 98–107)
CHLORIDE SERPL-SCNC: 83 MMOL/L (ref 98–107)
CHLORIDE SERPL-SCNC: 88 MMOL/L (ref 98–107)
CO2 SERPL-SCNC: 22 MMOL/L (ref 21–32)
CO2 SERPL-SCNC: 23 MMOL/L (ref 21–32)
CO2 SERPL-SCNC: 24 MMOL/L (ref 21–32)
CREAT SERPL-MCNC: 5.99 MG/DL (ref 0.5–1.3)
CREAT SERPL-MCNC: 6.51 MG/DL (ref 0.5–1.3)
CREAT SERPL-MCNC: 6.62 MG/DL (ref 0.5–1.3)
EOSINOPHIL # BLD AUTO: 0 X10*3/UL (ref 0–0.7)
EOSINOPHIL NFR BLD AUTO: 0 %
ERYTHROCYTE [DISTWIDTH] IN BLOOD BY AUTOMATED COUNT: 12.1 % (ref 11.5–14.5)
GFR SERPL CREATININE-BSD FRML MDRD: 10 ML/MIN/1.73M*2
GFR SERPL CREATININE-BSD FRML MDRD: 11 ML/MIN/1.73M*2
GFR SERPL CREATININE-BSD FRML MDRD: 9 ML/MIN/1.73M*2
GLUCOSE BLD MANUAL STRIP-MCNC: 522 MG/DL (ref 74–99)
GLUCOSE BLD MANUAL STRIP-MCNC: >600 MG/DL (ref 74–99)
GLUCOSE BLDV-MCNC: ABNORMAL MG/DL
GLUCOSE SERPL-MCNC: 749 MG/DL (ref 74–99)
GLUCOSE SERPL-MCNC: 898 MG/DL (ref 74–99)
GLUCOSE SERPL-MCNC: 932 MG/DL (ref 74–99)
HCO3 BLDV-SCNC: 27.6 MMOL/L (ref 22–26)
HCT VFR BLD AUTO: 36.2 % (ref 41–52)
HCT VFR BLD EST: 39 % (ref 41–52)
HGB BLD-MCNC: 12.6 G/DL (ref 13.5–17.5)
HGB BLDV-MCNC: 13.1 G/DL (ref 13.5–17.5)
HOLD SPECIMEN: NORMAL
HOLD SPECIMEN: NORMAL
IMM GRANULOCYTES # BLD AUTO: 0.07 X10*3/UL (ref 0–0.7)
IMM GRANULOCYTES NFR BLD AUTO: 0.6 % (ref 0–0.9)
INHALED O2 CONCENTRATION: 21 %
INR PPP: 1 (ref 0.9–1.1)
LACTATE BLDV-SCNC: 2.9 MMOL/L (ref 0.4–2)
LYMPHOCYTES # BLD AUTO: 1.33 X10*3/UL (ref 1.2–4.8)
LYMPHOCYTES NFR BLD AUTO: 12.2 %
MAGNESIUM SERPL-MCNC: 2.96 MG/DL (ref 1.6–2.4)
MAGNESIUM SERPL-MCNC: 3.05 MG/DL (ref 1.6–2.4)
MAGNESIUM SERPL-MCNC: 3.4 MG/DL (ref 1.6–2.4)
MCH RBC QN AUTO: 28 PG (ref 26–34)
MCHC RBC AUTO-ENTMCNC: 34.8 G/DL (ref 32–36)
MCV RBC AUTO: 80 FL (ref 80–100)
MONOCYTES # BLD AUTO: 0.69 X10*3/UL (ref 0.1–1)
MONOCYTES NFR BLD AUTO: 6.3 %
NEUTROPHILS # BLD AUTO: 8.75 X10*3/UL (ref 1.2–7.7)
NEUTROPHILS NFR BLD AUTO: 80.5 %
NRBC BLD-RTO: 0 /100 WBCS (ref 0–0)
OXYHGB MFR BLDV: 46.1 % (ref 45–75)
PCO2 BLDV: 56 MM HG (ref 41–51)
PH BLDV: 7.3 PH (ref 7.33–7.43)
PHOSPHATE SERPL-MCNC: 6.6 MG/DL (ref 2.5–4.9)
PLATELET # BLD AUTO: 284 X10*3/UL (ref 150–450)
PO2 BLDV: 30 MM HG (ref 35–45)
POTASSIUM BLDV-SCNC: 4.2 MMOL/L (ref 3.5–5.3)
POTASSIUM SERPL-SCNC: 4.5 MMOL/L (ref 3.5–5.3)
POTASSIUM SERPL-SCNC: 4.7 MMOL/L (ref 3.5–5.3)
POTASSIUM SERPL-SCNC: 6.9 MMOL/L (ref 3.5–5.3)
PROT SERPL-MCNC: 7.8 G/DL (ref 6.4–8.2)
PROT SERPL-MCNC: 8.5 G/DL (ref 6.4–8.2)
PROTHROMBIN TIME: 11.3 SECONDS (ref 9.8–12.8)
RBC # BLD AUTO: 4.5 X10*6/UL (ref 4.5–5.9)
SAO2 % BLDV: 47 % (ref 45–75)
SODIUM BLDV-SCNC: 129 MMOL/L (ref 136–145)
SODIUM SERPL-SCNC: 119 MMOL/L (ref 136–145)
SODIUM SERPL-SCNC: 123 MMOL/L (ref 136–145)
SODIUM SERPL-SCNC: 125 MMOL/L (ref 136–145)
WBC # BLD AUTO: 10.9 X10*3/UL (ref 4.4–11.3)

## 2023-12-22 PROCEDURE — 2500000004 HC RX 250 GENERAL PHARMACY W/ HCPCS (ALT 636 FOR OP/ED): Performed by: HOSPITALIST

## 2023-12-22 PROCEDURE — 36415 COLL VENOUS BLD VENIPUNCTURE: CPT

## 2023-12-22 PROCEDURE — 96374 THER/PROPH/DIAG INJ IV PUSH: CPT | Mod: 59

## 2023-12-22 PROCEDURE — 82947 ASSAY GLUCOSE BLOOD QUANT: CPT

## 2023-12-22 PROCEDURE — 84132 ASSAY OF SERUM POTASSIUM: CPT

## 2023-12-22 PROCEDURE — 83880 ASSAY OF NATRIURETIC PEPTIDE: CPT

## 2023-12-22 PROCEDURE — 76770 US EXAM ABDO BACK WALL COMP: CPT | Performed by: RADIOLOGY

## 2023-12-22 PROCEDURE — 85025 COMPLETE CBC W/AUTO DIFF WBC: CPT

## 2023-12-22 PROCEDURE — 71045 X-RAY EXAM CHEST 1 VIEW: CPT | Performed by: STUDENT IN AN ORGANIZED HEALTH CARE EDUCATION/TRAINING PROGRAM

## 2023-12-22 PROCEDURE — 83735 ASSAY OF MAGNESIUM: CPT

## 2023-12-22 PROCEDURE — 2500000001 HC RX 250 WO HCPCS SELF ADMINISTERED DRUGS (ALT 637 FOR MEDICARE OP)

## 2023-12-22 PROCEDURE — 2020000001 HC ICU ROOM DAILY

## 2023-12-22 PROCEDURE — 2500000004 HC RX 250 GENERAL PHARMACY W/ HCPCS (ALT 636 FOR OP/ED)

## 2023-12-22 PROCEDURE — 84484 ASSAY OF TROPONIN QUANT: CPT

## 2023-12-22 PROCEDURE — 84075 ASSAY ALKALINE PHOSPHATASE: CPT

## 2023-12-22 PROCEDURE — 96361 HYDRATE IV INFUSION ADD-ON: CPT

## 2023-12-22 PROCEDURE — 96372 THER/PROPH/DIAG INJ SC/IM: CPT

## 2023-12-22 PROCEDURE — 76770 US EXAM ABDO BACK WALL COMP: CPT

## 2023-12-22 PROCEDURE — 93005 ELECTROCARDIOGRAM TRACING: CPT

## 2023-12-22 PROCEDURE — 82010 KETONE BODYS QUAN: CPT | Performed by: EMERGENCY MEDICINE

## 2023-12-22 PROCEDURE — 2500000001 HC RX 250 WO HCPCS SELF ADMINISTERED DRUGS (ALT 637 FOR MEDICARE OP): Performed by: HOSPITALIST

## 2023-12-22 PROCEDURE — 70450 CT HEAD/BRAIN W/O DYE: CPT

## 2023-12-22 PROCEDURE — 84132 ASSAY OF SERUM POTASSIUM: CPT | Performed by: EMERGENCY MEDICINE

## 2023-12-22 PROCEDURE — 85610 PROTHROMBIN TIME: CPT

## 2023-12-22 PROCEDURE — 99291 CRITICAL CARE FIRST HOUR: CPT | Mod: 25 | Performed by: EMERGENCY MEDICINE

## 2023-12-22 PROCEDURE — 96375 TX/PRO/DX INJ NEW DRUG ADDON: CPT

## 2023-12-22 PROCEDURE — 70450 CT HEAD/BRAIN W/O DYE: CPT | Performed by: RADIOLOGY

## 2023-12-22 PROCEDURE — 99291 CRITICAL CARE FIRST HOUR: CPT

## 2023-12-22 PROCEDURE — 93010 ELECTROCARDIOGRAM REPORT: CPT | Performed by: INTERNAL MEDICINE

## 2023-12-22 PROCEDURE — 2500000004 HC RX 250 GENERAL PHARMACY W/ HCPCS (ALT 636 FOR OP/ED): Performed by: EMERGENCY MEDICINE

## 2023-12-22 PROCEDURE — 84100 ASSAY OF PHOSPHORUS: CPT | Performed by: HOSPITALIST

## 2023-12-22 PROCEDURE — 71045 X-RAY EXAM CHEST 1 VIEW: CPT

## 2023-12-22 RX ORDER — GLIPIZIDE 5 MG/1
2.5 TABLET ORAL DAILY
Status: DISCONTINUED | OUTPATIENT
Start: 2023-12-22 | End: 2023-12-26

## 2023-12-22 RX ORDER — SERTRALINE HYDROCHLORIDE 50 MG/1
100 TABLET, FILM COATED ORAL DAILY
Status: DISCONTINUED | OUTPATIENT
Start: 2023-12-22 | End: 2023-12-29 | Stop reason: HOSPADM

## 2023-12-22 RX ORDER — ONDANSETRON HYDROCHLORIDE 2 MG/ML
4 INJECTION, SOLUTION INTRAVENOUS EVERY 8 HOURS PRN
Status: DISCONTINUED | OUTPATIENT
Start: 2023-12-22 | End: 2023-12-29 | Stop reason: HOSPADM

## 2023-12-22 RX ORDER — MECLIZINE HCL 12.5 MG 12.5 MG/1
25 TABLET ORAL EVERY 6 HOURS PRN
Status: DISCONTINUED | OUTPATIENT
Start: 2023-12-22 | End: 2023-12-29 | Stop reason: HOSPADM

## 2023-12-22 RX ORDER — DEXTROSE 50 % IN WATER (D50W) INTRAVENOUS SYRINGE
50 AS NEEDED
Status: DISCONTINUED | OUTPATIENT
Start: 2023-12-22 | End: 2023-12-23

## 2023-12-22 RX ORDER — NAPROXEN SODIUM 220 MG/1
324 TABLET, FILM COATED ORAL ONCE
Status: DISCONTINUED | OUTPATIENT
Start: 2023-12-22 | End: 2023-12-25

## 2023-12-22 RX ORDER — NITROGLYCERIN 0.4 MG/1
0.4 TABLET SUBLINGUAL ONCE
Status: DISCONTINUED | OUTPATIENT
Start: 2023-12-22 | End: 2023-12-23

## 2023-12-22 RX ORDER — QUETIAPINE FUMARATE 25 MG/1
50 TABLET, FILM COATED ORAL NIGHTLY
Status: DISCONTINUED | OUTPATIENT
Start: 2023-12-22 | End: 2023-12-29 | Stop reason: HOSPADM

## 2023-12-22 RX ORDER — DEXTROSE MONOHYDRATE 100 MG/ML
150 INJECTION, SOLUTION INTRAVENOUS CONTINUOUS
Status: DISCONTINUED | OUTPATIENT
Start: 2023-12-22 | End: 2023-12-23

## 2023-12-22 RX ORDER — PANTOPRAZOLE SODIUM 40 MG/1
40 TABLET, DELAYED RELEASE ORAL DAILY
Status: DISCONTINUED | OUTPATIENT
Start: 2023-12-22 | End: 2023-12-29 | Stop reason: HOSPADM

## 2023-12-22 RX ORDER — AMOXICILLIN AND CLAVULANATE POTASSIUM 875; 125 MG/1; MG/1
1 TABLET, FILM COATED ORAL ONCE
Status: COMPLETED | OUTPATIENT
Start: 2023-12-22 | End: 2023-12-22

## 2023-12-22 RX ORDER — DEXTROSE MONOHYDRATE AND SODIUM CHLORIDE 5; .9 G/100ML; G/100ML
150 INJECTION, SOLUTION INTRAVENOUS CONTINUOUS
Status: DISCONTINUED | OUTPATIENT
Start: 2023-12-22 | End: 2023-12-22

## 2023-12-22 RX ORDER — ONDANSETRON HYDROCHLORIDE 2 MG/ML
4 INJECTION, SOLUTION INTRAVENOUS ONCE
Status: COMPLETED | OUTPATIENT
Start: 2023-12-22 | End: 2023-12-22

## 2023-12-22 RX ORDER — PANTOPRAZOLE SODIUM 40 MG/10ML
40 INJECTION, POWDER, LYOPHILIZED, FOR SOLUTION INTRAVENOUS ONCE
Status: DISCONTINUED | OUTPATIENT
Start: 2023-12-22 | End: 2023-12-23

## 2023-12-22 RX ORDER — CHOLECALCIFEROL (VITAMIN D3) 25 MCG
2000 TABLET ORAL DAILY
Status: DISCONTINUED | OUTPATIENT
Start: 2023-12-22 | End: 2023-12-24

## 2023-12-22 RX ORDER — LOSARTAN POTASSIUM 25 MG/1
25 TABLET ORAL 2 TIMES DAILY
Status: DISCONTINUED | OUTPATIENT
Start: 2023-12-22 | End: 2023-12-29 | Stop reason: HOSPADM

## 2023-12-22 RX ORDER — METOPROLOL TARTRATE 50 MG/1
50 TABLET ORAL 2 TIMES DAILY
Status: DISCONTINUED | OUTPATIENT
Start: 2023-12-22 | End: 2023-12-29 | Stop reason: HOSPADM

## 2023-12-22 RX ORDER — AMLODIPINE BESYLATE 5 MG/1
10 TABLET ORAL DAILY
Status: DISCONTINUED | OUTPATIENT
Start: 2023-12-22 | End: 2023-12-29 | Stop reason: HOSPADM

## 2023-12-22 RX ORDER — HYDROXYZINE PAMOATE 25 MG/1
50 CAPSULE ORAL 2 TIMES DAILY PRN
Status: DISCONTINUED | OUTPATIENT
Start: 2023-12-22 | End: 2023-12-29 | Stop reason: HOSPADM

## 2023-12-22 RX ORDER — AMOXICILLIN AND CLAVULANATE POTASSIUM 500; 125 MG/1; MG/1
500 TABLET, FILM COATED ORAL EVERY 12 HOURS SCHEDULED
Status: DISCONTINUED | OUTPATIENT
Start: 2023-12-22 | End: 2023-12-25

## 2023-12-22 RX ORDER — NITROGLYCERIN 0.4 MG/1
0.4 TABLET SUBLINGUAL EVERY 5 MIN PRN
Status: DISCONTINUED | OUTPATIENT
Start: 2023-12-22 | End: 2023-12-23

## 2023-12-22 RX ORDER — SODIUM CHLORIDE 450 MG/100ML
250 INJECTION, SOLUTION INTRAVENOUS CONTINUOUS
Status: DISCONTINUED | OUTPATIENT
Start: 2023-12-22 | End: 2023-12-23

## 2023-12-22 RX ORDER — CLONIDINE HYDROCHLORIDE 0.1 MG/1
0.1 TABLET ORAL DAILY
Status: DISCONTINUED | OUTPATIENT
Start: 2023-12-22 | End: 2023-12-28

## 2023-12-22 RX ORDER — MORPHINE SULFATE 4 MG/ML
4 INJECTION, SOLUTION INTRAMUSCULAR; INTRAVENOUS ONCE
Status: COMPLETED | OUTPATIENT
Start: 2023-12-22 | End: 2023-12-22

## 2023-12-22 RX ORDER — ASPIRIN 81 MG/1
81 TABLET ORAL DAILY
Status: DISCONTINUED | OUTPATIENT
Start: 2023-12-22 | End: 2023-12-29 | Stop reason: HOSPADM

## 2023-12-22 RX ORDER — TERAZOSIN 5 MG/1
5 CAPSULE ORAL ONCE
Status: DISCONTINUED | OUTPATIENT
Start: 2023-12-22 | End: 2023-12-29 | Stop reason: HOSPADM

## 2023-12-22 RX ORDER — MECLIZINE HCL 12.5 MG 12.5 MG/1
25 TABLET ORAL ONCE
Status: COMPLETED | OUTPATIENT
Start: 2023-12-22 | End: 2023-12-22

## 2023-12-22 RX ORDER — BUSPIRONE HYDROCHLORIDE 5 MG/1
15 TABLET ORAL 3 TIMES DAILY
Status: DISCONTINUED | OUTPATIENT
Start: 2023-12-22 | End: 2023-12-29 | Stop reason: HOSPADM

## 2023-12-22 RX ORDER — ACETAMINOPHEN 325 MG/1
650 TABLET ORAL EVERY 4 HOURS PRN
Status: DISCONTINUED | OUTPATIENT
Start: 2023-12-22 | End: 2023-12-25

## 2023-12-22 RX ORDER — CLINDAMYCIN HYDROCHLORIDE 150 MG/1
450 CAPSULE ORAL 4 TIMES DAILY
Status: DISCONTINUED | OUTPATIENT
Start: 2023-12-22 | End: 2023-12-22

## 2023-12-22 RX ORDER — DEXTROSE MONOHYDRATE 100 MG/ML
150 INJECTION, SOLUTION INTRAVENOUS CONTINUOUS
Status: DISCONTINUED | OUTPATIENT
Start: 2023-12-22 | End: 2023-12-22

## 2023-12-22 RX ORDER — LANOLIN ALCOHOL/MO/W.PET/CERES
400 CREAM (GRAM) TOPICAL DAILY
Status: DISCONTINUED | OUTPATIENT
Start: 2023-12-22 | End: 2023-12-29 | Stop reason: HOSPADM

## 2023-12-22 RX ORDER — ALBUTEROL SULFATE 90 UG/1
2 AEROSOL, METERED RESPIRATORY (INHALATION) EVERY 4 HOURS PRN
Status: DISCONTINUED | OUTPATIENT
Start: 2023-12-22 | End: 2023-12-29 | Stop reason: HOSPADM

## 2023-12-22 RX ORDER — SIMVASTATIN 40 MG/1
40 TABLET, FILM COATED ORAL NIGHTLY
Status: DISCONTINUED | OUTPATIENT
Start: 2023-12-22 | End: 2023-12-27

## 2023-12-22 RX ORDER — TRAMADOL HYDROCHLORIDE 50 MG/1
50 TABLET ORAL EVERY 6 HOURS
Status: DISCONTINUED | OUTPATIENT
Start: 2023-12-22 | End: 2023-12-26

## 2023-12-22 RX ORDER — ALLOPURINOL 300 MG/1
300 TABLET ORAL DAILY
Status: DISCONTINUED | OUTPATIENT
Start: 2023-12-22 | End: 2023-12-26

## 2023-12-22 RX ORDER — HEPARIN SODIUM 5000 [USP'U]/ML
5000 INJECTION, SOLUTION INTRAVENOUS; SUBCUTANEOUS EVERY 8 HOURS
Status: DISCONTINUED | OUTPATIENT
Start: 2023-12-22 | End: 2023-12-29 | Stop reason: HOSPADM

## 2023-12-22 RX ORDER — HYDROCHLOROTHIAZIDE 25 MG/1
25 TABLET ORAL DAILY
Status: DISCONTINUED | OUTPATIENT
Start: 2023-12-22 | End: 2023-12-26

## 2023-12-22 RX ORDER — UBIDECARENONE 75 MG
1000 CAPSULE ORAL DAILY
Status: DISCONTINUED | OUTPATIENT
Start: 2023-12-22 | End: 2023-12-29 | Stop reason: HOSPADM

## 2023-12-22 RX ORDER — ONDANSETRON 4 MG/1
4 TABLET, FILM COATED ORAL EVERY 8 HOURS PRN
Status: DISCONTINUED | OUTPATIENT
Start: 2023-12-22 | End: 2023-12-29 | Stop reason: HOSPADM

## 2023-12-22 RX ORDER — SODIUM CHLORIDE, SODIUM LACTATE, POTASSIUM CHLORIDE, CALCIUM CHLORIDE 600; 310; 30; 20 MG/100ML; MG/100ML; MG/100ML; MG/100ML
75 INJECTION, SOLUTION INTRAVENOUS CONTINUOUS
Status: DISCONTINUED | OUTPATIENT
Start: 2023-12-22 | End: 2023-12-22

## 2023-12-22 RX ORDER — NABUMETONE 500 MG/1
750 TABLET, FILM COATED ORAL EVERY 12 HOURS
Status: DISCONTINUED | OUTPATIENT
Start: 2023-12-22 | End: 2023-12-26

## 2023-12-22 RX ADMIN — SIMVASTATIN 40 MG: 40 TABLET, FILM COATED ORAL at 21:53

## 2023-12-22 RX ADMIN — ACETAMINOPHEN 650 MG: 325 TABLET ORAL at 22:33

## 2023-12-22 RX ADMIN — MECLIZINE HCL 12.5 MG 25 MG: 12.5 TABLET ORAL at 15:53

## 2023-12-22 RX ADMIN — INSULIN HUMAN 10 UNITS/HR: 1 INJECTION, SOLUTION INTRAVENOUS at 22:16

## 2023-12-22 RX ADMIN — ONDANSETRON 4 MG: 2 INJECTION INTRAMUSCULAR; INTRAVENOUS at 15:42

## 2023-12-22 RX ADMIN — INSULIN HUMAN 0.5 UNITS/HR: 1 INJECTION, SOLUTION INTRAVENOUS at 17:56

## 2023-12-22 RX ADMIN — HEPARIN SODIUM 5000 UNITS: 5000 INJECTION INTRAVENOUS; SUBCUTANEOUS at 21:54

## 2023-12-22 RX ADMIN — AMOXICILLIN AND CLAVULANATE POTASSIUM 875 MG: 875; 125 TABLET, FILM COATED ORAL at 15:53

## 2023-12-22 RX ADMIN — SODIUM CHLORIDE 1000 ML: 9 INJECTION, SOLUTION INTRAVENOUS at 15:41

## 2023-12-22 RX ADMIN — SODIUM CHLORIDE 250 ML/HR: 4.5 INJECTION, SOLUTION INTRAVENOUS at 22:01

## 2023-12-22 RX ADMIN — DEXTROSE AND SODIUM CHLORIDE 150 ML/HR: 5; 900 INJECTION, SOLUTION INTRAVENOUS at 18:28

## 2023-12-22 RX ADMIN — SODIUM CHLORIDE 250 ML/HR: 4.5 INJECTION, SOLUTION INTRAVENOUS at 18:17

## 2023-12-22 RX ADMIN — QUETIAPINE FUMARATE 50 MG: 100 TABLET, FILM COATED ORAL at 21:53

## 2023-12-22 RX ADMIN — NITROGLYCERIN 0.4 MG: 0.4 TABLET SUBLINGUAL at 22:33

## 2023-12-22 RX ADMIN — METOPROLOL TARTRATE 50 MG: 50 TABLET, FILM COATED ORAL at 21:53

## 2023-12-22 RX ADMIN — AMOXICILLIN AND CLAVULANATE POTASSIUM 500 MG: 500; 125 TABLET, FILM COATED ORAL at 21:53

## 2023-12-22 RX ADMIN — MORPHINE SULFATE 4 MG: 4 INJECTION, SOLUTION INTRAMUSCULAR; INTRAVENOUS at 17:58

## 2023-12-22 SDOH — SOCIAL STABILITY: SOCIAL INSECURITY: ARE THERE ANY APPARENT SIGNS OF INJURIES/BEHAVIORS THAT COULD BE RELATED TO ABUSE/NEGLECT?: NO

## 2023-12-22 SDOH — HEALTH STABILITY: MENTAL HEALTH: HOW OFTEN DO YOU HAVE 6 OR MORE DRINKS ON ONE OCCASION?: NEVER

## 2023-12-22 SDOH — SOCIAL STABILITY: SOCIAL INSECURITY: ABUSE: ADULT

## 2023-12-22 SDOH — HEALTH STABILITY: MENTAL HEALTH: HOW OFTEN DO YOU HAVE A DRINK CONTAINING ALCOHOL?: NEVER

## 2023-12-22 SDOH — SOCIAL STABILITY: SOCIAL INSECURITY: DOES ANYONE TRY TO KEEP YOU FROM HAVING/CONTACTING OTHER FRIENDS OR DOING THINGS OUTSIDE YOUR HOME?: NO

## 2023-12-22 SDOH — SOCIAL STABILITY: SOCIAL INSECURITY: DO YOU FEEL UNSAFE GOING BACK TO THE PLACE WHERE YOU ARE LIVING?: NO

## 2023-12-22 SDOH — HEALTH STABILITY: MENTAL HEALTH: HOW MANY STANDARD DRINKS CONTAINING ALCOHOL DO YOU HAVE ON A TYPICAL DAY?: PATIENT DOES NOT DRINK

## 2023-12-22 SDOH — SOCIAL STABILITY: SOCIAL INSECURITY: HAS ANYONE EVER THREATENED TO HURT YOUR FAMILY OR YOUR PETS?: NO

## 2023-12-22 SDOH — SOCIAL STABILITY: SOCIAL INSECURITY: ARE YOU OR HAVE YOU BEEN THREATENED OR ABUSED PHYSICALLY, EMOTIONALLY, OR SEXUALLY BY ANYONE?: NO

## 2023-12-22 SDOH — SOCIAL STABILITY: SOCIAL INSECURITY: HAVE YOU HAD THOUGHTS OF HARMING ANYONE ELSE?: NO

## 2023-12-22 SDOH — SOCIAL STABILITY: SOCIAL INSECURITY: DO YOU FEEL ANYONE HAS EXPLOITED OR TAKEN ADVANTAGE OF YOU FINANCIALLY OR OF YOUR PERSONAL PROPERTY?: NO

## 2023-12-22 ASSESSMENT — LIFESTYLE VARIABLES
HOW OFTEN DO YOU HAVE A DRINK CONTAINING ALCOHOL: NEVER
HOW OFTEN DO YOU HAVE 6 OR MORE DRINKS ON ONE OCCASION: NEVER
HOW MANY STANDARD DRINKS CONTAINING ALCOHOL DO YOU HAVE ON A TYPICAL DAY: PATIENT DOES NOT DRINK
AUDIT-C TOTAL SCORE: 0
AUDIT-C TOTAL SCORE: 0
HAVE PEOPLE ANNOYED YOU BY CRITICIZING YOUR DRINKING: NO
SKIP TO QUESTIONS 9-10: 1
HAVE YOU EVER FELT YOU SHOULD CUT DOWN ON YOUR DRINKING: NO
AUDIT-C TOTAL SCORE: 0
EVER HAD A DRINK FIRST THING IN THE MORNING TO STEADY YOUR NERVES TO GET RID OF A HANGOVER: NO
EVER FELT BAD OR GUILTY ABOUT YOUR DRINKING: NO
SKIP TO QUESTIONS 9-10: 1
REASON UNABLE TO ASSESS: NO

## 2023-12-22 ASSESSMENT — COLUMBIA-SUICIDE SEVERITY RATING SCALE - C-SSRS
1. IN THE PAST MONTH, HAVE YOU WISHED YOU WERE DEAD OR WISHED YOU COULD GO TO SLEEP AND NOT WAKE UP?: NO
2. HAVE YOU ACTUALLY HAD ANY THOUGHTS OF KILLING YOURSELF?: NO
6. HAVE YOU EVER DONE ANYTHING, STARTED TO DO ANYTHING, OR PREPARED TO DO ANYTHING TO END YOUR LIFE?: NO

## 2023-12-22 ASSESSMENT — PAIN SCALES - GENERAL
PAINLEVEL_OUTOF10: 10 - WORST POSSIBLE PAIN
PAINLEVEL_OUTOF10: 10 - WORST POSSIBLE PAIN

## 2023-12-22 ASSESSMENT — COGNITIVE AND FUNCTIONAL STATUS - GENERAL
MOBILITY SCORE: 24
PATIENT BASELINE BEDBOUND: NO
DAILY ACTIVITIY SCORE: 24

## 2023-12-22 ASSESSMENT — PAIN - FUNCTIONAL ASSESSMENT
PAIN_FUNCTIONAL_ASSESSMENT: 0-10
PAIN_FUNCTIONAL_ASSESSMENT: 0-10

## 2023-12-22 ASSESSMENT — ACTIVITIES OF DAILY LIVING (ADL)
DRESSING YOURSELF: INDEPENDENT
WALKS IN HOME: INDEPENDENT
PATIENT'S MEMORY ADEQUATE TO SAFELY COMPLETE DAILY ACTIVITIES?: YES
HEARING - LEFT EAR: FUNCTIONAL
FEEDING YOURSELF: INDEPENDENT
LACK_OF_TRANSPORTATION: YES
GROOMING: INDEPENDENT
HEARING - RIGHT EAR: FUNCTIONAL
JUDGMENT_ADEQUATE_SAFELY_COMPLETE_DAILY_ACTIVITIES: YES
ADEQUATE_TO_COMPLETE_ADL: NO
TOILETING: INDEPENDENT
ASSISTIVE_DEVICE: OTHER (COMMENT)
BATHING: INDEPENDENT

## 2023-12-22 ASSESSMENT — PAIN DESCRIPTION - LOCATION
LOCATION: TEETH
LOCATION: TEETH

## 2023-12-22 ASSESSMENT — PATIENT HEALTH QUESTIONNAIRE - PHQ9
2. FEELING DOWN, DEPRESSED OR HOPELESS: SEVERAL DAYS
SUM OF ALL RESPONSES TO PHQ9 QUESTIONS 1 & 2: 1
1. LITTLE INTEREST OR PLEASURE IN DOING THINGS: NOT AT ALL

## 2023-12-22 ASSESSMENT — PAIN DESCRIPTION - ORIENTATION: ORIENTATION: RIGHT

## 2023-12-22 NOTE — ED PROCEDURE NOTE
Procedure  Critical Care    Performed by: Hunter ROWLAND MD  Authorized by: Hunter ROWLAND MD    Critical care provider statement:     Critical care time (minutes):  32    Critical care time was exclusive of:  Separately billable procedures and treating other patients    Critical care was necessary to treat or prevent imminent or life-threatening deterioration of the following conditions:  Endocrine crisis    Critical care was time spent personally by me on the following activities:  Blood draw for specimens, discussions with primary provider, evaluation of patient's response to treatment, examination of patient, ordering and performing treatments and interventions, ordering and review of laboratory studies, ordering and review of radiographic studies, pulse oximetry, re-evaluation of patient's condition and review of old charts    Care discussed with: admitting provider                 Hunter ROWLAND MD  12/22/23 7873

## 2023-12-22 NOTE — ED PROVIDER NOTES
HPI   Chief Complaint   Patient presents with    Dizziness    Dental Pain       History provided by: Patient    Saunders none itations to history: None    CC: Dizziness, toothache    HPI: 53-year-old male presents emergency department to be evaluated for multiple medical complaints.  He is primarily here to be seen for dizziness.  He says that this is been present for almost a week.  Says it is constant and he does report it as a sensation that the room is spinning.  States that he feels off balance like he is on a boat and that it is causing him to have difficulty walking.  Does report bilateral blurred vision.  States that it is a complete eye and is not split in the halves or quarters.  He denies pain with extraocular movements or eye irritation or redness.  Denies any injuries, falls, head trauma.  Denies use of anticoagulants or history of intracranial hemorrhage.  He denies history of TIA or stroke.  States that dizziness is also associate with nausea and nonbloody vomiting.  He denies tinnitus.  He does have a history of CAD where he had 1 cardiac stent, is not sure when his last stress test was.  Denies history of heart failure, blood clots, arrhythmias, COPD/asthma.  He does have a history of CKD but it has not progressed to a point where he needs dialysis.  Patient is also here to be seen for dental pain.  States that he was at a [employer] party last night when he chewed on something hard causing pain in one of his right upper teeth.  He has not seen a dentist in quite some time but he does have an appointment to follow-up with one of the end of this month.  He denies taking anything for his pain prior to arrival.  He denies face, lip, neck, or tongue swelling.  Denies fever and chills.  Denies weakness and fatigue.  Denies all other systemic symptoms including shortness of breath, cough, mopped assist, pleuritic pain, headache, neck pain, nausea vomiting, diarrhea constipation, hematuria or blood in the stool,  urgency, frequency, dysuria.  Patient does report 1 brief episode of chest pain that occurred last night.  He is unable to describe the pain but states that it is near his sternum, completely resolved after 1 nitro.  Denies history of blood clots and denies recent plane flights, surgeries, history of cancer.    ROS: Negative unless mentioned in HPI    Social Hx: Former denies history of alcohol, tobacco, drug use.    Medical Hx: Medical history significant for CAD, obesity, gout, hypertension, hyperlipidemia, CKD.  Allergy to Bactrim.  Immunizations are up-to-date.    Physical exam:    Constitutional: Patient is well-nourished and well-developed.  Sitting comfortably in the room and in no distress.  Oriented to person, place, time, and situation.    HEENT: Head is normocephalic, atraumatic. Patient's airway is patent.  Tympanic membranes are clear bilaterally.  Nasal mucosa clear.  Mouth with normal mucosa.  Patient has overall fair dentition but he does have several dental caries.  Patient has a partially cracked right upper canine.  No obvious dental abscess development.  No face or neck swelling.  Throat is not erythematous and there are no oropharyngeal exudates, uvula is midline.  No obvious facial deformities.    Eyes: Clear bilaterally.  Pupils are equal round and reactive to light and accommodation.  Extraocular movements intact.      Cardiac: Regular rate, regular rhythm.  Heart sounds S1, S2.  No murmurs, rubs, or gallops.  PMI nondisplaced.  No JVD.    Respiratory: 96% on room air.  Regular respiratory rate and effort.  Breath sounds are clear and equal bilaterally, no adventitious lung sounds.  Patient is speaking in full sentences and is in no apparent respiratory distress. No use of accessory muscles.      Gastrointestinal: Abdomen is soft, nondistended, and nontender.  There are no obvious deformities.  No rebound tenderness or guarding.  Bowel sounds are normal active.    Genitourinary: No CVA or  flank tenderness.    Musculoskeletal: No reproducible tenderness.  No obvious skin or bony deformities.  Patient has equal range of motion in all extremities and no strength deficiencies.  No muscle or joint tenderness. No back or neck tenderness.  Capillary refill less than 3 seconds.  Strong peripheral pulses.  No sensory deficits.    Neurological: Patient is alert and oriented.  No focal deficits.  5/5 strength in all extremities.  Cranial nerves II through XII intact. GCS15.  No slurred speech or facial drooping.  Upper and lower extremity coordination intact.  No neurological deficits.  NIH is 0.    Skin: Skin is normal color for race and is warm, dry, and intact.  No evidence of trauma.  No lesions, rashes, bruising, jaundice, or masses.    Psych: Appropriate mood and affect.  No apparent risk to self or others.    Heme/lymph: No adenopathy, lymphadenopathy, or splenomegaly    Physical exam is otherwise negative unless stated above or in history of present illness.                              No data recorded                Patient History   Past Medical History:   Diagnosis Date    Cellulitis of unspecified finger 06/30/2022    Paronychia of thumb, unspecified laterality    COVID-19 01/17/2022    COVID-19 virus infection    Encounter for follow-up examination after completed treatment for conditions other than malignant neoplasm 01/17/2022    Hospital discharge follow-up    Encounter for general adult medical examination without abnormal findings 08/15/2022    Wellness examination    Encounter for screening for malignant neoplasm of colon 03/15/2022    Screen for colon cancer    Encounter for screening for malignant neoplasm of prostate 03/15/2022    Encounter for screening for malignant neoplasm of prostate    Obesity, unspecified 06/14/2022    Class 2 obesity with body mass index (BMI) of 36.0 to 36.9 in adult    Pain in unspecified hand 10/04/2022    Hand pain    Personal history of diseases of the skin  and subcutaneous tissue 06/15/2022    History of eczema    Personal history of other diseases of the musculoskeletal system and connective tissue     History of gout    Personal history of other endocrine, nutritional and metabolic disease     History of hypokalemia    Personal history of other endocrine, nutritional and metabolic disease     History of hypokalemia    Rash and other nonspecific skin eruption 03/15/2022    Rash    Stiffness of right hand, not elsewhere classified 07/06/2021    Stiffness of finger joint of right hand    Unspecified fracture of other metacarpal bone, initial encounter for closed fracture 07/06/2021    Fracture of third metacarpal bone    Unspecified fracture of third metacarpal bone, right hand, initial encounter for closed fracture 07/06/2021    Fracture of third metacarpal bone of right hand     Past Surgical History:   Procedure Laterality Date    OTHER SURGICAL HISTORY  11/07/2021    Cardiac catheterization with stent placement     Family History   Problem Relation Name Age of Onset    Hypertension Mother      Stroke Father       Social History     Tobacco Use    Smoking status: Never    Smokeless tobacco: Never   Substance Use Topics    Alcohol use: Not Currently    Drug use: Not Currently       Physical Exam   ED Triage Vitals [12/22/23 1454]   Temp Heart Rate Resp BP   36.4 °C (97.5 °F) 68 18 121/67      SpO2 Temp Source Heart Rate Source Patient Position   96 % Temporal -- --      BP Location FiO2 (%)     Right arm --       Physical Exam    ED Course & MDM   ED Course as of 12/22/23 1755   Fri Dec 22, 2023   1611 MAGNESIUM(!): 3.40 [[place]]      ED Course User Index  [[place]] Antwan Nolasco PA-C         Diagnoses as of 12/22/23 1755   Hyperosmolar (nonketotic) coma (CMS/HCC)   Pseudohyponatremia   LU (acute kidney injury) (CMS/HCC)   Dizziness   Acute renal failure, unspecified acute renal failure type (CMS/HCC)     Patient updated on plan for lab testing, IV insertion, radiology  imaging, and medications to be administered while in the ER (if indicated). Patient updated on expected wait times for testing and results. Patient provided my name and told to ask any staff member for questions or concerns if they should arise. Electronic medical record reviewed.     MDM    Upon initial assessment, patient was healthy non-toxic appearing and in no apparent distress.     Patient presented to the emergency department with the chief complaint of dizziness and dental pain.patient has no neurological deficits on his exam and his NIH is 0 however his history is concerning for central vertigo given that it is constant with no exacerbating factors with vision changes and nausea and vomiting.  Patient has a partially cracked right upper canine without signs of dental abscess.  Examination of his heart and lungs unremarkable.  On arrival to the emergency department, vital signs were within normal limits    Will get basic blood work, EKG and troponin, chest x-ray, BNP, coagulation screen, magnesium, CT of the head.  Will also give the patient IV normal saline and start him on Augmentin for his dental pain.  I staffed this patient my attending, agreeable plan of care.  Low suspicion for PE at this time.  Patient is not hypoxic or tachycardic.  He has no history of blood clots in his history physical exam is not consistent with a blood clot at this time.  Wells criteria 0.    My suspicion that the patient's dental pain is related to his dizziness is low given that the dizziness has been present for almost a week when the dental pain occurred last night after an injury.    This note was dictated using a speech recognition program.  While an attempt was made at proof-reading to minimize errors, minor errors in transcription may be present    Medical Decision Making    Patient's EKG was performed at 1542.  Normal sinus rhythm 60 beats minute.  Normal axis.  Patient has T wave inversion in lead III and aVF which is  seen in previous.  Beta Droxia butyrate is 0.39 magnesium is 3.0.  CMP shows hyperglycemia 932 with pseudohyponatremia 123.  Potassium was hemolyzed and repeat is within normal limits.  Patient is in acute renal failure with a BUN of 71, creatinine of 6.51, GFR of 10.  Patient was started on maintenance fluids and an insulin drip.  There are no findings of just DKA.  Troponin is 10.  BNP is 17.  CBC shows a normocytic anemia.  CT of head reveals no intracranial mass or hemorrhage and chest x-ray reveals no acute cardiopulmonary process.  At this time, I do believe the patient would benefit from ICU admission for uncontrolled hyperglycemia with acute renal failure and to get an MRI for potential posterior stroke given his persistent dizziness.  I spoke to the intensivist nurse practitioner and intensivist who is agreeable with this plan.  Recommended getting a ultrasound of the bilateral kidneys due to the patient's acute renal failure and then placing a Hernandez catheter so this will be done here in the ER.  He remained hemodynamically stable and he verbalized understanding and agreement with this plan.    7:37 PM: Patient is now complaining of left-sided chest pain.  Will give him sublingual nitro since his blood pressure is stable on oral aspirin.  Will get another troponin and give him get an EKG. patient's EKG was performed at 1942 interpreted by me.  It looks very similar to his previous.  Is a normal sinus rhythm 77 bpm.  No ST elevation or depression.          Shared JANETH Attestation:    This patient was seen by the advanced practice provider.  I personally saw the patient and made/approved the management plan and take responsibility for the patient management.    History: 53-year-old male presents with dizziness that he describes as room spinning.  He does also present have some issues with being off balance.    Exam: Regular rate and rhythm cardiac exam with clear breath sounds bilaterally.  Neurological exam  is grossly intact with NIH stroke scale at 0.  Abdomen is soft and nontender.  Negative Homans' sign bilaterally.      MDM: Stroke, intracranial bleed, vertigo, electrolyte normality, ACS, arrhythmia, Infection    Diagnostic evaluation was performed.  It is suspected the patient is likely suffering from central vertigo.  CT of the brain did not show any evidence of obvious stroke or intracranial bleed.  Patient's electrolytes showed a significantly elevated glucose and low sodium which is likely pseudohyponatremia.  Patient was treated with IV fluids.  Patient was also found to have acute on chronic kidney injury.  Potassium was in the normal range.  He will be hospitalized for further workup and evaluation.    I have seen and examined the patient, agree with the workup, evaluation, medical decision making, management and diagnosis.  The care plan has been discussed.    Hunter Adams MD        Procedure  Procedures     Antwan Nolasco PA-C  12/22/23 1753       Antwan Nolasco PA-C  12/22/23 1947       Hunter ROWLAND MD  12/23/23 1123       Antwan Nolasco PA-C  01/05/24 [id number]

## 2023-12-22 NOTE — H&P
"[facility] Critical Care Medicine       Date:  12/22/2023  Patient:  Amanuel Riojas  YOB: 1970  MRN:  [id number]   Admit Date:  12/22/2023  ========================================================================================================    Chief Complaint   Patient presents with    Dizziness    Dental Pain         History of Present Illness:  Amanuel Riojas is a 53 y.o. year old male patient with Past Medical History of  HTN, CAD s/p stents, T2DM, CKD IV presented to the ED endorsing a few days of general malaise, fevers, nausea, vomiting and dizziness. He states that he felt he was \"on a boat\" and the room was spinning and also had episode of chest pain that relieved with sublingual nitroglycerin. He also reports that he was at a rashaun party last night when he was chewing on something hard and now endorses 7/10 pain in right upper teeth.     ED course: CMP showed hyperglycemia 932 with pseudohyponatremia 123.  Beta hydroxy .39, Repeat potassium was WNL. BUN of 71 and CR of 6.51, GFR of 10. EKG shows NSR with rate of 60- had t wave inversion in lead III and AVF in which was present on previous.  Troponin is 10, BNP 17. CBC shows normocytic anemia. Patient was started on maintence fluids and an insulin gtt per protocol. CT of head was benign, CXR shows no cardiopulmonary process. Hernandez catheter placed for acurate Is and Os. Patient was admitted to ICU at this time for further medical management and monitoring.       Interval ICU Events:  12/22: Patient was admitted to ICU for management of HHS vs DKA and LU on CKD. He is endorsing general malaise, nausea and dizziness. He denies chest pain, shortness of breath, abd pain, flank pain, dysuria.     Medical History:  Past Medical History:   Diagnosis Date    Cellulitis of unspecified finger 06/30/2022    Paronychia of thumb, unspecified laterality    COVID-19 01/17/2022    COVID-19 virus infection    Encounter for follow-up examination after " completed treatment for conditions other than malignant neoplasm 01/17/2022    Hospital discharge follow-up    Encounter for general adult medical examination without abnormal findings 08/15/2022    Wellness examination    Encounter for screening for malignant neoplasm of colon 03/15/2022    Screen for colon cancer    Encounter for screening for malignant neoplasm of prostate 03/15/2022    Encounter for screening for malignant neoplasm of prostate    Obesity, unspecified 06/14/2022    Class 2 obesity with body mass index (BMI) of 36.0 to 36.9 in adult    Pain in unspecified hand 10/04/2022    Hand pain    Personal history of diseases of the skin and subcutaneous tissue 06/15/2022    History of eczema    Personal history of other diseases of the musculoskeletal system and connective tissue     History of gout    Personal history of other endocrine, nutritional and metabolic disease     History of hypokalemia    Personal history of other endocrine, nutritional and metabolic disease     History of hypokalemia    Rash and other nonspecific skin eruption 03/15/2022    Rash    Stiffness of right hand, not elsewhere classified 07/06/2021    Stiffness of finger joint of right hand    Unspecified fracture of other metacarpal bone, initial encounter for closed fracture 07/06/2021    Fracture of third metacarpal bone    Unspecified fracture of third metacarpal bone, right hand, initial encounter for closed fracture 07/06/2021    Fracture of third metacarpal bone of right hand     Past Surgical History:   Procedure Laterality Date    OTHER SURGICAL HISTORY  11/07/2021    Cardiac catheterization with stent placement     (Not in a hospital admission)    Sulfamethoxazole-trimethoprim  Social History     Tobacco Use    Smoking status: Never    Smokeless tobacco: Never   Substance Use Topics    Alcohol use: Not Currently    Drug use: Not Currently     Family History   Problem Relation Name Age of Onset    Hypertension Mother       Stroke [facility]         Hospital Medications:    D5 % and 0.9 % sodium chloride, 150 mL/hr  dextrose 10 % in water (D10W), 150 mL/hr  dextrose 10 % in water (D10W), 150 mL/hr  dextrose 10 % in water (D10W), 150 mL/hr  insulin regular, 0.5 Units/hr, Last Rate: 0.5 Units/hr (12/22/23 1756)  sodium chloride, 250 mL/hr          Current Facility-Administered Medications:     clindamycin (Cleocin) capsule 450 mg, 450 mg, oral, 4x daily, KENIA Baeza-CNP    D5 % and 0.9 % sodium chloride infusion, 150 mL/hr, intravenous, Continuous, Hunter ROWLAND MD    dextrose 10 % in water (D10W) infusion, 150 mL/hr, intravenous, Continuous, Hunter ROWLAND MD    dextrose 10 % in water (D10W) infusion, 150 mL/hr, intravenous, Continuous, Hunter ROWLAND MD    dextrose 10 % in water (D10W) infusion, 150 mL/hr, intravenous, Continuous, Hunter ROWLAND MD    dextrose 50 % injection 50 mL, 50 mL, intravenous, PRN, Hunter ROWLAND MD    insulin regular (HumuLIN, NovoLIN) bolus from bag 10 Units, 0.1 Units/kg, intravenous, PRN, Hunter ROWLAND MD, 10 Units at 12/22/23 1700    insulin regular 100 unit/100 mL (1 unit/mL) in 0.9 % NaCl infusion, 0.5 Units/hr, intravenous, Continuous, Hunter ROWLAND MD, Last Rate: 0.5 mL/hr at 12/22/23 1756, 0.5 Units/hr at 12/22/23 1756    sodium chloride 0.45 % infusion, 250 mL/hr, intravenous, Continuous, Hunter ROWLAND MD    sodium chloride 0.9 % bolus 1,000 mL, 1,000 mL, intravenous, Once, Hunter ROWLAND MD    Current Outpatient Medications:     albuterol 90 mcg/actuation inhaler, Inhale 2 puffs every 4 hours if needed for shortness of breath., Disp: 18 g, Rfl: 1    allopurinol (Zyloprim) 300 mg tablet, Take 1 tablet (300 mg) by mouth once daily., Disp: 90 tablet, Rfl: 1    amLODIPine (Norvasc) 10 mg tablet, Take 1 tablet (10 mg) by mouth once daily., Disp: 90 tablet, Rfl: 1    aspirin (Adult Low Dose Aspirin) 81 mg EC tablet, Take 1 tablet (81 mg) by mouth once daily.,  Disp: , Rfl:     busPIRone (Buspar) 15 mg tablet, Take 1 tablet (15 mg) by mouth 3 times a day., Disp: , Rfl:     cholecalciferol (Vitamin D3) 50 mcg (2,000 unit) capsule, Take 1 capsule (50 mcg) by mouth once daily., Disp: 90 capsule, Rfl: 1    clobetasol (Temovate) 0.05 % cream, APPLY SPARINGLY TO AFFECTED AREA(S) TWICE DAILY AS NEEDED, Disp: 60 g, Rfl: 1    cloNIDine (Catapres) 0.1 mg tablet, Take 1 tablet (0.1 mg) by mouth once daily., Disp: 90 tablet, Rfl: 1    clotrimazole-betamethasone (Lotrisone) cream, Apply 1 Application topically 2 times a day., Disp: , Rfl:     cyanocobalamin (Vitamin B-12) 1,000 mcg tablet, Take 1 tablet (1,000 mcg) by mouth once daily., Disp: 90 tablet, Rfl: 1    dupilumab (Dupixent) 300 mg/2 mL syringe injection, Inject 1 Syringe (300 mg) under the skin every 14 (fourteen) days., Disp: , Rfl:     glipiZIDE (Glucotrol) 5 mg tablet, Take 0.5 tablets (2.5 mg) by mouth once daily., Disp: 90 tablet, Rfl: 1    hydroCHLOROthiazide (HYDRODiuril) 25 mg tablet, Take 1 tablet (25 mg) by mouth once daily., Disp: 90 tablet, Rfl: 1    hydrocortisone 2.5 % ointment, Apply 1 Application topically 2 times a day., Disp: , Rfl:     hydrOXYzine pamoate (Vistaril) 25 mg capsule, Take 2 capsules (50 mg) by mouth 2 times a day as needed (at bedtime)., Disp: , Rfl:     losartan (Cozaar) 25 mg tablet, Take 1 tablet (25 mg) by mouth 2 times a day., Disp: 90 tablet, Rfl: 1    magnesium oxide (Mag-Ox) 400 mg tablet, Take 1 tablet (400 mg) by mouth once daily., Disp: 90 tablet, Rfl: 1    metoprolol tartrate (Lopressor) 50 mg tablet, Take 1 tablet by mouth 2 times a day., Disp: 90 tablet, Rfl: 1    mometasone-formoterol (Dulera) 200-5 mcg/actuation inhaler, Inhale 1 puff 2 times a day., Disp: 13 g, Rfl: 1    nabumetone (Relafen) 750 mg tablet, Take 1 tablet (750 mg) by mouth every 12 hours., Disp: , Rfl:     pantoprazole (ProtoNix) 40 mg EC tablet, Take 1 tablet (40 mg) by mouth once daily. Do not crush, chew, or  "split., Disp: 30 tablet, Rfl: 1    potassium chloride CR 20 mEq ER tablet, Take 1 tablet (20 mEq) by mouth once daily., Disp: 90 tablet, Rfl: 1    QUEtiapine (SEROquel) 50 mg tablet, Take 1 tablet (50 mg) by mouth once daily at bedtime., Disp: , Rfl:     sertraline (Zoloft) 100 mg tablet, Take 1 tablet (100 mg) by mouth once daily., Disp: , Rfl:     simvastatin (Zocor) 40 mg tablet, Take 1 tablet (40 mg) by mouth once daily at bedtime., Disp: 90 tablet, Rfl: 1    terazosin (Hytrin) 5 mg capsule, Take 1 capsule (5 mg) by mouth 1 time., Disp: , Rfl:     traMADol (Ultram) 50 mg tablet, Take 1 tablet (50 mg) by mouth every 6 hours., Disp: , Rfl:     triamcinolone (Kenalog) 0.1 % cream, Apply 1 Application topically 2 times a day., Disp: , Rfl:     Review of Systems:  14 point review of systems was completed and negative except for those specially mention in my HPI    Physical Exam:    Heart Rate:  [66-74]   Temp:  [36.4 °C (97.5 °F)]   Resp:  [18]   BP: (121-132)/(67-77)   Height:  [170.2 cm (5' 7\")]   Weight:  [104 kg (230 lb)]   SpO2:  [94 %-96 %]     Physical Exam  Vitals and nursing note reviewed.   Constitutional:       Appearance: He is obese.   HENT:      Head: Normocephalic and atraumatic.      Mouth/Throat:      Mouth: Mucous membranes are moist.   Cardiovascular:      Rate and Rhythm: Normal rate and regular rhythm.   Pulmonary:      Effort: Pulmonary effort is normal.   Abdominal:      General: Abdomen is flat. Bowel sounds are normal.   Musculoskeletal:         General: Normal range of motion.      Cervical back: Normal range of motion.   Skin:     Capillary Refill: Capillary refill takes less than 2 seconds.   Neurological:      General: No focal deficit present.      Mental Status: He is alert and oriented to person, place, and time.         Objective:    Results for orders placed or performed during the hospital encounter of 12/22/23 (from the past 24 hour(s))   CBC and Auto Differential   Result Value " Ref Range    WBC 10.9 4.4 - 11.3 x10*3/uL    nRBC 0.0 0.0 - 0.0 /100 WBCs    RBC 4.50 4.50 - 5.90 x10*6/uL    Hemoglobin 12.6 (L) 13.5 - 17.5 g/dL    Hematocrit 36.2 (L) 41.0 - 52.0 %    MCV 80 80 - 100 fL    MCH 28.0 26.0 - 34.0 pg    MCHC 34.8 32.0 - 36.0 g/dL    RDW 12.1 11.5 - 14.5 %    Platelets 284 150 - 450 x10*3/uL    Neutrophils % 80.5 40.0 - 80.0 %    Immature Granulocytes %, Automated 0.6 0.0 - 0.9 %    Lymphocytes % 12.2 13.0 - 44.0 %    Monocytes % 6.3 2.0 - 10.0 %    Eosinophils % 0.0 0.0 - 6.0 %    Basophils % 0.4 0.0 - 2.0 %    Neutrophils Absolute 8.75 (H) 1.20 - 7.70 x10*3/uL    Immature Granulocytes Absolute, Automated 0.07 0.00 - 0.70 x10*3/uL    Lymphocytes Absolute 1.33 1.20 - 4.80 x10*3/uL    Monocytes Absolute 0.69 0.10 - 1.00 x10*3/uL    Eosinophils Absolute 0.00 0.00 - 0.70 x10*3/uL    Basophils Absolute 0.04 0.00 - 0.10 x10*3/uL   Magnesium   Result Value Ref Range    Magnesium 3.40 (H) 1.60 - 2.40 mg/dL   Comprehensive metabolic panel   Result Value Ref Range    Glucose 898 (HH) 74 - 99 mg/dL    Sodium 119 (LL) 136 - 145 mmol/L    Potassium 6.9 (HH) 3.5 - 5.3 mmol/L    Chloride 79 (L) 98 - 107 mmol/L    Bicarbonate 24 21 - 32 mmol/L    Anion Gap 23 (H) 10 - 20 mmol/L    Urea Nitrogen 73 (H) 6 - 23 mg/dL    Creatinine 6.62 (H) 0.50 - 1.30 mg/dL    eGFR 9 (L) >60 mL/min/1.73m*2    Calcium 9.5 8.6 - 10.3 mg/dL    Albumin 5.0 3.4 - 5.0 g/dL    Alkaline Phosphatase 101 33 - 120 U/L    Total Protein 8.5 (H) 6.4 - 8.2 g/dL    AST 22 9 - 39 U/L    Bilirubin, Total 0.9 0.0 - 1.2 mg/dL    ALT 10 10 - 52 U/L   Troponin I, High Sensitivity   Result Value Ref Range    Troponin I, High Sensitivity 10 0 - 20 ng/L   B-Type Natriuretic Peptide   Result Value Ref Range    BNP 17 0 - 99 pg/mL   ECG 12 lead   Result Value Ref Range    Ventricular Rate 68 BPM    Atrial Rate 68 BPM    OR Interval 174 ms    QRS Duration 100 ms    QT Interval 438 ms    QTC Calculation(Bazett) 465 ms    P Axis 37 degrees    R  Axis 44 degrees    T Axis -16 degrees    QRS Count 11 beats    Q Onset 218 ms    P Onset 131 ms    P Offset 189 ms    T Offset 437 ms    QTC Fredericia 456 ms   POCT GLUCOSE   Result Value Ref Range    POCT Glucose >600 (H) 74 - 99 mg/dL   Coagulation Screen   Result Value Ref Range    Protime 11.3 9.8 - 12.8 seconds    INR 1.0 0.9 - 1.1    aPTT 27 27 - 38 seconds   Magnesium   Result Value Ref Range    Magnesium 3.05 (H) 1.60 - 2.40 mg/dL   Comprehensive metabolic panel   Result Value Ref Range    Glucose 932 (HH) 74 - 99 mg/dL    Sodium 123 (L) 136 - 145 mmol/L    Potassium 4.7 3.5 - 5.3 mmol/L    Chloride 83 (L) 98 - 107 mmol/L    Bicarbonate 23 21 - 32 mmol/L    Anion Gap 22 (H) 10 - 20 mmol/L    Urea Nitrogen 71 (H) 6 - 23 mg/dL    Creatinine 6.51 (H) 0.50 - 1.30 mg/dL    eGFR 10 (L) >60 mL/min/1.73m*2    Calcium 9.3 8.6 - 10.3 mg/dL    Albumin 4.7 3.4 - 5.0 g/dL    Alkaline Phosphatase 92 33 - 120 U/L    Total Protein 7.8 6.4 - 8.2 g/dL    AST 10 9 - 39 U/L    Bilirubin, Total 0.8 0.0 - 1.2 mg/dL    ALT 8 (L) 10 - 52 U/L   Light Blue Top   Result Value Ref Range    Extra Tube Hold for add-ons.    Beta Hydroxybutyrate   Result Value Ref Range    Beta-Hydroxybutyrate 0.39 (H) 0.02 - 0.27 mmol/L         CT head wo IV contrast    Result Date: 12/22/2023  Interpreted By:  Nilo Bates, STUDY: CT HEAD WO IV CONTRAST;  12/22/2023 3:51 pm   INDICATION: Signs/Symptoms:dizziness.   COMPARISON: 06/24/2019   ACCESSION NUMBER(S): [id number]   ORDERING CLINICIAN: ALEXANDER THOMPSON   TECHNIQUE: Noncontrast axial CT scan of head was performed. Angled reformats in brain and bone windows were generated. The images were reviewed in bone, brain, blood and soft tissue windows.   FINDINGS: There is no intra/extra-axial fluid collection, mass effect, or midline shift. The gray/white matter junction is preserved. The basal cisterns are patent. Visualized paranasal sinuses and mastoid air cells are clear. The calvarium is intact.        No acute intracranial finding. MRI may be obtained if clinically indicated   Signed by: Nilo Bates 12/22/2023 4:18 PM Dictation workstation:   [id number]    XR chest 1 view    Result Date: 12/22/2023  Interpreted By:  Reilly Gaaln, STUDY: XR CHEST 1 VIEW; 12/22/2023 3:37 pm   INDICATION: Dizziness   COMPARISON: Radiographs 01/07/2022.   ACCESSION NUMBER(S): [id number]   ORDERING CLINICIAN: ALEXANDER THOMPSON   TECHNIQUE: Single frontal view of the chest performed.   FINDINGS: LINES AND DEVICES: None.   LUNGS: Streaky bibasilar atelectasis. No focal consolidation, pulmonary edema, pleural effusion or pneumothorax.   CARDIOMEDIASTINAL SILHOUETTE: The cardiomediastinal silhouette is within normal limits.       No acute cardiopulmonary process.   MACRO None   Signed by: Reilly Galan 12/22/2023 3:53 PM Dictation workstation:   [id number]    ECG 12 lead    Result Date: 12/22/2023  Normal sinus rhythm ST & T wave abnormality, consider inferior ischemia Abnormal ECG When compared with ECG of 07-JAN-2022 10:24, No significant change was found See ED provider note for full interpretation and clinical correlation      No intake or output data in the 24 hours ending 12/22/23 1816      Assessment/Plan:    I am currently managing this critically ill patient for the following problems:    HHS vs DKA   Dizziness/Double vision  HX of T2DM  Hx of HTN  Hx of CAD s/p stents  LU on CKD IV  Psuedohyponatremia  Chronic Anemia      Neuro  CT of head negative, MRI pending    Continue home seroquel and zoloft  Delirium precautions  Sleep Wake Cycle  Neuro checks per ICU protocol      Respiratory/ENT:  NO Active issues, currently 98% SPO2 on RA  Keep SPO2 >92%  Continuous pulse ox  Pulm hygiene   CXR negative for acute cardiopulmonary processes    Cardiovascular:  Hx of CAD s/p stents  C/o chest pain in ED- EKG at baseline, trops WNL, relieved with sublingual nitroglycerin  Continue home ASA and statin   Continuous cardiac  monitoring per ICU protocol  maintain MAPS >65  daily EKG, EKG if chest pain or change in telemetry     GI:  Continue home Protonix   QTC WNL, PRN zofran for nausea  NPO for tonight-will advance diet tomorrow       Renal/Volume Status (Intra & Extravascular):  LU on CKD IV -prerenal vs intrarenal  Psuedohyponatremia  Baseline CR around 2.40, BUN 71, Cr 6.51  , corrected   Holding nephrotoxic medications (ARB, NSAID)  Hernandez for accurate Is and Os  Maintain urine output 0.5-1cc/kg/hr  LR 75/hr  Replete electrolytes as indicated  Q6 CMP  Renal ultrasound pending       Endocrine  HHS vs DKA  HX of T2DM  , beta hydroxy .39, anion gap of 22  Pending VBG and UA  Insulin gtt per protocol  Fluid gtt per protocol   Q6 CMP      Infectious Disease:  No leukocytosis (10.9)  Lactate of .8   Discontinued Augmentin due to CrCl, started clindamycin  Monitor for signs and sx of SIRS    Heme/Onc:  Chronic Anemia  HGB 12.6  Monitor CBC  Transfuse if HGB >7    OBGYN/MSK:  No active issues    Ethics/Code Status:  Full Code    :  DVT Prophylaxis: Heparin subcu  GI Prophylaxis: PPI home   Bowel Regimen: none  Diet: NPO  CVC: none  [place]: none  Hernandez: yes for Is and Os  Restraints: none  Dispo: Full Code     Critical Care Time:  ICU    Plan Discussed with Dr. Ata AQUINO, CNP  Critical Care Medicine   [facility]

## 2023-12-23 ENCOUNTER — APPOINTMENT (OUTPATIENT)
Dept: CARDIOLOGY | Facility: HOSPITAL | Age: 53
End: 2023-12-23
Payer: COMMERCIAL

## 2023-12-23 ENCOUNTER — APPOINTMENT (OUTPATIENT)
Dept: RADIOLOGY | Facility: HOSPITAL | Age: 53
End: 2023-12-23
Payer: COMMERCIAL

## 2023-12-23 LAB
ALBUMIN SERPL BCP-MCNC: 3.9 G/DL (ref 3.4–5)
ALBUMIN SERPL BCP-MCNC: 4 G/DL (ref 3.4–5)
ALBUMIN SERPL BCP-MCNC: 4.1 G/DL (ref 3.4–5)
ALP SERPL-CCNC: 69 U/L (ref 33–120)
ALP SERPL-CCNC: 75 U/L (ref 33–120)
ALP SERPL-CCNC: 80 U/L (ref 33–120)
ALT SERPL W P-5'-P-CCNC: 5 U/L (ref 10–52)
ALT SERPL W P-5'-P-CCNC: 6 U/L (ref 10–52)
ALT SERPL W P-5'-P-CCNC: 7 U/L (ref 10–52)
AMPHETAMINES UR QL SCN: ABNORMAL
ANION GAP SERPL CALC-SCNC: 16 MMOL/L (ref 10–20)
ANION GAP SERPL CALC-SCNC: 17 MMOL/L (ref 10–20)
ANION GAP SERPL CALC-SCNC: 18 MMOL/L (ref 10–20)
AORTIC VALVE MEAN GRADIENT: 5
AORTIC VALVE PEAK VELOCITY: 1.52
APPEARANCE UR: CLEAR
AST SERPL W P-5'-P-CCNC: 11 U/L (ref 9–39)
AST SERPL W P-5'-P-CCNC: 12 U/L (ref 9–39)
AST SERPL W P-5'-P-CCNC: 8 U/L (ref 9–39)
ATRIAL RATE: 68 BPM
AV PEAK GRADIENT: 9.2
AVA (PEAK VEL): 2.76
AVA (VTI): 2.69
B-OH-BUTYR SERPL-SCNC: 0.12 MMOL/L (ref 0.02–0.27)
BACTERIA #/AREA URNS AUTO: ABNORMAL /HPF
BARBITURATES UR QL SCN: ABNORMAL
BASOPHILS # BLD AUTO: 0.02 X10*3/UL (ref 0–0.1)
BASOPHILS NFR BLD AUTO: 0.3 %
BENZODIAZ UR QL SCN: ABNORMAL
BILIRUB SERPL-MCNC: 0.6 MG/DL (ref 0–1.2)
BILIRUB SERPL-MCNC: 0.7 MG/DL (ref 0–1.2)
BILIRUB SERPL-MCNC: 0.7 MG/DL (ref 0–1.2)
BILIRUB UR STRIP.AUTO-MCNC: NEGATIVE MG/DL
BUN SERPL-MCNC: 64 MG/DL (ref 6–23)
BUN SERPL-MCNC: 67 MG/DL (ref 6–23)
BUN SERPL-MCNC: 68 MG/DL (ref 6–23)
BUN SERPL-MCNC: 69 MG/DL (ref 6–23)
BUN SERPL-MCNC: 71 MG/DL (ref 6–23)
BZE UR QL SCN: ABNORMAL
CALCIUM SERPL-MCNC: 8.7 MG/DL (ref 8.6–10.3)
CALCIUM SERPL-MCNC: 8.8 MG/DL (ref 8.6–10.3)
CALCIUM SERPL-MCNC: 8.9 MG/DL (ref 8.6–10.3)
CANNABINOIDS UR QL SCN: ABNORMAL
CHLORIDE SERPL-SCNC: 93 MMOL/L (ref 98–107)
CHLORIDE SERPL-SCNC: 96 MMOL/L (ref 98–107)
CHLORIDE SERPL-SCNC: 97 MMOL/L (ref 98–107)
CO2 SERPL-SCNC: 21 MMOL/L (ref 21–32)
CO2 SERPL-SCNC: 22 MMOL/L (ref 21–32)
CO2 SERPL-SCNC: 23 MMOL/L (ref 21–32)
COLOR UR: YELLOW
CREAT SERPL-MCNC: 6.04 MG/DL (ref 0.5–1.3)
CREAT SERPL-MCNC: 6.12 MG/DL (ref 0.5–1.3)
CREAT SERPL-MCNC: 6.12 MG/DL (ref 0.5–1.3)
CREAT SERPL-MCNC: 6.32 MG/DL (ref 0.5–1.3)
CREAT SERPL-MCNC: 6.34 MG/DL (ref 0.5–1.3)
EJECTION FRACTION APICAL 4 CHAMBER: 53.2
EJECTION FRACTION: 56
EOSINOPHIL # BLD AUTO: 0 X10*3/UL (ref 0–0.7)
EOSINOPHIL NFR BLD AUTO: 0 %
EOSINOPHIL SMEAR: ABNORMAL
ERYTHROCYTE [DISTWIDTH] IN BLOOD BY AUTOMATED COUNT: 11.9 % (ref 11.5–14.5)
FENTANYL+NORFENTANYL UR QL SCN: ABNORMAL
GFR SERPL CREATININE-BSD FRML MDRD: 10 ML/MIN/1.73M*2
GLUCOSE BLD MANUAL STRIP-MCNC: 215 MG/DL (ref 74–99)
GLUCOSE BLD MANUAL STRIP-MCNC: 230 MG/DL (ref 74–99)
GLUCOSE BLD MANUAL STRIP-MCNC: 238 MG/DL (ref 74–99)
GLUCOSE BLD MANUAL STRIP-MCNC: 252 MG/DL (ref 74–99)
GLUCOSE BLD MANUAL STRIP-MCNC: 257 MG/DL (ref 74–99)
GLUCOSE BLD MANUAL STRIP-MCNC: 259 MG/DL (ref 74–99)
GLUCOSE BLD MANUAL STRIP-MCNC: 272 MG/DL (ref 74–99)
GLUCOSE BLD MANUAL STRIP-MCNC: 273 MG/DL (ref 74–99)
GLUCOSE BLD MANUAL STRIP-MCNC: 308 MG/DL (ref 74–99)
GLUCOSE BLD MANUAL STRIP-MCNC: 309 MG/DL (ref 74–99)
GLUCOSE BLD MANUAL STRIP-MCNC: 319 MG/DL (ref 74–99)
GLUCOSE BLD MANUAL STRIP-MCNC: 341 MG/DL (ref 74–99)
GLUCOSE BLD MANUAL STRIP-MCNC: 360 MG/DL (ref 74–99)
GLUCOSE BLD MANUAL STRIP-MCNC: 464 MG/DL (ref 74–99)
GLUCOSE SERPL-MCNC: 237 MG/DL (ref 74–99)
GLUCOSE SERPL-MCNC: 263 MG/DL (ref 74–99)
GLUCOSE SERPL-MCNC: 264 MG/DL (ref 74–99)
GLUCOSE SERPL-MCNC: 353 MG/DL (ref 74–99)
GLUCOSE SERPL-MCNC: 475 MG/DL (ref 74–99)
GLUCOSE UR STRIP.AUTO-MCNC: ABNORMAL MG/DL
HCT VFR BLD AUTO: 31.1 % (ref 41–52)
HGB BLD-MCNC: 10.7 G/DL (ref 13.5–17.5)
HOLD SPECIMEN: NORMAL
HYALINE CASTS #/AREA URNS AUTO: ABNORMAL /LPF
IMM GRANULOCYTES # BLD AUTO: 0.06 X10*3/UL (ref 0–0.7)
IMM GRANULOCYTES NFR BLD AUTO: 0.8 % (ref 0–0.9)
KETONES UR STRIP.AUTO-MCNC: NEGATIVE MG/DL
LACTATE SERPL-SCNC: 0.9 MMOL/L (ref 0.4–2)
LACTATE SERPL-SCNC: 1.9 MMOL/L (ref 0.4–2)
LDH SERPL L TO P-CCNC: 85 U/L (ref 84–246)
LEFT ATRIUM VOLUME AREA LENGTH INDEX BSA: 31.3
LEFT VENTRICULAR OUTFLOW TRACT DIAMETER: 2.1
LEUKOCYTE ESTERASE UR QL STRIP.AUTO: NEGATIVE
LYMPHOCYTES # BLD AUTO: 0.8 X10*3/UL (ref 1.2–4.8)
LYMPHOCYTES NFR BLD AUTO: 10.9 %
MAGNESIUM SERPL-MCNC: 2.59 MG/DL (ref 1.6–2.4)
MAGNESIUM SERPL-MCNC: 2.91 MG/DL (ref 1.6–2.4)
MAGNESIUM SERPL-MCNC: 2.96 MG/DL (ref 1.6–2.4)
MCH RBC QN AUTO: 27.9 PG (ref 26–34)
MCHC RBC AUTO-ENTMCNC: 34.4 G/DL (ref 32–36)
MCV RBC AUTO: 81 FL (ref 80–100)
MITRAL VALVE E/A RATIO: 1.2
MITRAL VALVE E/E' RATIO: 10.45
MONOCYTES # BLD AUTO: 0.72 X10*3/UL (ref 0.1–1)
MONOCYTES NFR BLD AUTO: 9.8 %
MUCOUS THREADS #/AREA URNS AUTO: ABNORMAL /LPF
NEUTROPHILS # BLD AUTO: 5.77 X10*3/UL (ref 1.2–7.7)
NEUTROPHILS NFR BLD AUTO: 78.2 %
NITRITE UR QL STRIP.AUTO: NEGATIVE
NRBC BLD-RTO: 0 /100 WBCS (ref 0–0)
OPIATES UR QL SCN: ABNORMAL
OXYCODONE+OXYMORPHONE UR QL SCN: ABNORMAL
P AXIS: 41 DEGREES
P OFFSET: 191 MS
P ONSET: 134 MS
PCP UR QL SCN: ABNORMAL
PH UR STRIP.AUTO: 5 [PH]
PHOSPHATE SERPL-MCNC: 4 MG/DL (ref 2.5–4.9)
PLATELET # BLD AUTO: 209 X10*3/UL (ref 150–450)
POTASSIUM SERPL-SCNC: 3.7 MMOL/L (ref 3.5–5.3)
POTASSIUM SERPL-SCNC: 3.8 MMOL/L (ref 3.5–5.3)
POTASSIUM SERPL-SCNC: 4 MMOL/L (ref 3.5–5.3)
POTASSIUM SERPL-SCNC: 4 MMOL/L (ref 3.5–5.3)
POTASSIUM SERPL-SCNC: 4.2 MMOL/L (ref 3.5–5.3)
PR INTERVAL: 166 MS
PROT SERPL-MCNC: 6.7 G/DL (ref 6.4–8.2)
PROT SERPL-MCNC: 6.8 G/DL (ref 6.4–8.2)
PROT SERPL-MCNC: 7 G/DL (ref 6.4–8.2)
PROT UR STRIP.AUTO-MCNC: ABNORMAL MG/DL
Q ONSET: 217 MS
QRS COUNT: 12 BEATS
QRS DURATION: 112 MS
QT INTERVAL: 456 MS
QTC CALCULATION(BAZETT): 484 MS
QTC FREDERICIA: 475 MS
R AXIS: 25 DEGREES
RBC # BLD AUTO: 3.84 X10*6/UL (ref 4.5–5.9)
RBC # UR STRIP.AUTO: ABNORMAL /UL
RBC #/AREA URNS AUTO: ABNORMAL /HPF
RIGHT VENTRICLE FREE WALL PEAK S': 15.2
RIGHT VENTRICLE PEAK SYSTOLIC PRESSURE: 16.5
SODIUM SERPL-SCNC: 129 MMOL/L (ref 136–145)
SODIUM SERPL-SCNC: 130 MMOL/L (ref 136–145)
SODIUM SERPL-SCNC: 132 MMOL/L (ref 136–145)
SODIUM SERPL-SCNC: 133 MMOL/L (ref 136–145)
SODIUM SERPL-SCNC: 133 MMOL/L (ref 136–145)
SP GR UR STRIP.AUTO: 1.02
SQUAMOUS #/AREA URNS AUTO: ABNORMAL /HPF
T AXIS: -10 DEGREES
T OFFSET: 445 MS
UROBILINOGEN UR STRIP.AUTO-MCNC: <2 MG/DL
VENTRICULAR RATE: 68 BPM
WBC # BLD AUTO: 7.4 X10*3/UL (ref 4.4–11.3)
WBC #/AREA URNS AUTO: ABNORMAL /HPF

## 2023-12-23 PROCEDURE — 80053 COMPREHEN METABOLIC PANEL: CPT

## 2023-12-23 PROCEDURE — 93306 TTE W/DOPPLER COMPLETE: CPT | Performed by: INTERNAL MEDICINE

## 2023-12-23 PROCEDURE — 83036 HEMOGLOBIN GLYCOSYLATED A1C: CPT | Mod: ELYLAB

## 2023-12-23 PROCEDURE — 2500000002 HC RX 250 W HCPCS SELF ADMINISTERED DRUGS (ALT 637 FOR MEDICARE OP, ALT 636 FOR OP/ED): Performed by: INTERNAL MEDICINE

## 2023-12-23 PROCEDURE — 85025 COMPLETE CBC W/AUTO DIFF WBC: CPT | Performed by: HOSPITALIST

## 2023-12-23 PROCEDURE — 2500000001 HC RX 250 WO HCPCS SELF ADMINISTERED DRUGS (ALT 637 FOR MEDICARE OP)

## 2023-12-23 PROCEDURE — 80048 BASIC METABOLIC PNL TOTAL CA: CPT | Mod: CCI

## 2023-12-23 PROCEDURE — 2500000004 HC RX 250 GENERAL PHARMACY W/ HCPCS (ALT 636 FOR OP/ED)

## 2023-12-23 PROCEDURE — 80307 DRUG TEST PRSMV CHEM ANLYZR: CPT

## 2023-12-23 PROCEDURE — 3E033XZ INTRODUCTION OF VASOPRESSOR INTO PERIPHERAL VEIN, PERCUTANEOUS APPROACH: ICD-10-PCS

## 2023-12-23 PROCEDURE — 36415 COLL VENOUS BLD VENIPUNCTURE: CPT | Performed by: HOSPITALIST

## 2023-12-23 PROCEDURE — 70551 MRI BRAIN STEM W/O DYE: CPT | Performed by: STUDENT IN AN ORGANIZED HEALTH CARE EDUCATION/TRAINING PROGRAM

## 2023-12-23 PROCEDURE — 2500000004 HC RX 250 GENERAL PHARMACY W/ HCPCS (ALT 636 FOR OP/ED): Performed by: HOSPITALIST

## 2023-12-23 PROCEDURE — 70551 MRI BRAIN STEM W/O DYE: CPT

## 2023-12-23 PROCEDURE — 83605 ASSAY OF LACTIC ACID: CPT

## 2023-12-23 PROCEDURE — 83615 LACTATE (LD) (LDH) ENZYME: CPT

## 2023-12-23 PROCEDURE — 93306 TTE W/DOPPLER COMPLETE: CPT

## 2023-12-23 PROCEDURE — 2020000001 HC ICU ROOM DAILY

## 2023-12-23 PROCEDURE — 2500000001 HC RX 250 WO HCPCS SELF ADMINISTERED DRUGS (ALT 637 FOR MEDICARE OP): Performed by: HOSPITALIST

## 2023-12-23 PROCEDURE — 2500000005 HC RX 250 GENERAL PHARMACY W/O HCPCS

## 2023-12-23 PROCEDURE — 84146 ASSAY OF PROLACTIN: CPT | Mod: ELYLAB

## 2023-12-23 PROCEDURE — 96372 THER/PROPH/DIAG INJ SC/IM: CPT

## 2023-12-23 PROCEDURE — 36415 COLL VENOUS BLD VENIPUNCTURE: CPT

## 2023-12-23 PROCEDURE — 93005 ELECTROCARDIOGRAM TRACING: CPT

## 2023-12-23 PROCEDURE — 84075 ASSAY ALKALINE PHOSPHATASE: CPT

## 2023-12-23 PROCEDURE — 83735 ASSAY OF MAGNESIUM: CPT

## 2023-12-23 PROCEDURE — 82947 ASSAY GLUCOSE BLOOD QUANT: CPT

## 2023-12-23 PROCEDURE — 2500000002 HC RX 250 W HCPCS SELF ADMINISTERED DRUGS (ALT 637 FOR MEDICARE OP, ALT 636 FOR OP/ED)

## 2023-12-23 PROCEDURE — 81001 URINALYSIS AUTO W/SCOPE: CPT

## 2023-12-23 PROCEDURE — 84100 ASSAY OF PHOSPHORUS: CPT | Performed by: HOSPITALIST

## 2023-12-23 PROCEDURE — 82010 KETONE BODYS QUAN: CPT

## 2023-12-23 PROCEDURE — 89190 NASAL SMEAR FOR EOSINOPHILS: CPT

## 2023-12-23 PROCEDURE — 99232 SBSQ HOSP IP/OBS MODERATE 35: CPT

## 2023-12-23 RX ORDER — LORAZEPAM 2 MG/ML
2 INJECTION INTRAMUSCULAR ONCE
Status: COMPLETED | OUTPATIENT
Start: 2023-12-23 | End: 2023-12-23

## 2023-12-23 RX ORDER — DEXTROSE MONOHYDRATE 100 MG/ML
0.3 INJECTION, SOLUTION INTRAVENOUS ONCE AS NEEDED
Status: DISCONTINUED | OUTPATIENT
Start: 2023-12-23 | End: 2023-12-29 | Stop reason: HOSPADM

## 2023-12-23 RX ORDER — DEXTROSE MONOHYDRATE 100 MG/ML
0.3 INJECTION, SOLUTION INTRAVENOUS ONCE AS NEEDED
Status: DISCONTINUED | OUTPATIENT
Start: 2023-12-23 | End: 2023-12-23

## 2023-12-23 RX ORDER — INSULIN GLARGINE 100 [IU]/ML
33 INJECTION, SOLUTION SUBCUTANEOUS NIGHTLY
Status: DISCONTINUED | OUTPATIENT
Start: 2023-12-23 | End: 2023-12-23

## 2023-12-23 RX ORDER — INSULIN GLARGINE 100 [IU]/ML
33 INJECTION, SOLUTION SUBCUTANEOUS DAILY
Status: DISCONTINUED | OUTPATIENT
Start: 2023-12-23 | End: 2023-12-23

## 2023-12-23 RX ORDER — DEXTROSE 50 % IN WATER (D50W) INTRAVENOUS SYRINGE
25
Status: DISCONTINUED | OUTPATIENT
Start: 2023-12-23 | End: 2023-12-29 | Stop reason: HOSPADM

## 2023-12-23 RX ORDER — NOREPINEPHRINE BITARTRATE/D5W 8 MG/250ML
.01-1 PLASTIC BAG, INJECTION (ML) INTRAVENOUS CONTINUOUS
Status: DISCONTINUED | OUTPATIENT
Start: 2023-12-23 | End: 2023-12-23

## 2023-12-23 RX ORDER — INSULIN LISPRO 100 [IU]/ML
0-15 INJECTION, SOLUTION INTRAVENOUS; SUBCUTANEOUS EVERY 4 HOURS
Status: DISCONTINUED | OUTPATIENT
Start: 2023-12-23 | End: 2023-12-24

## 2023-12-23 RX ORDER — LORAZEPAM 2 MG/ML
INJECTION INTRAMUSCULAR
Status: COMPLETED
Start: 2023-12-23 | End: 2023-12-23

## 2023-12-23 RX ORDER — INSULIN LISPRO 100 [IU]/ML
6 INJECTION, SOLUTION INTRAVENOUS; SUBCUTANEOUS
Status: DISCONTINUED | OUTPATIENT
Start: 2023-12-23 | End: 2023-12-24

## 2023-12-23 RX ORDER — NOREPINEPHRINE BITARTRATE/D5W 8 MG/250ML
PLASTIC BAG, INJECTION (ML) INTRAVENOUS
Status: COMPLETED
Start: 2023-12-23 | End: 2023-12-23

## 2023-12-23 RX ORDER — LORAZEPAM 2 MG/ML
2 INJECTION INTRAMUSCULAR EVERY 4 HOURS PRN
Status: DISCONTINUED | OUTPATIENT
Start: 2023-12-23 | End: 2023-12-24

## 2023-12-23 RX ORDER — INSULIN GLARGINE 100 [IU]/ML
33 INJECTION, SOLUTION SUBCUTANEOUS 2 TIMES DAILY
Status: DISCONTINUED | OUTPATIENT
Start: 2023-12-24 | End: 2023-12-24

## 2023-12-23 RX ORDER — DEXTROSE MONOHYDRATE AND SODIUM CHLORIDE 5; .45 G/100ML; G/100ML
150 INJECTION, SOLUTION INTRAVENOUS CONTINUOUS
Status: DISCONTINUED | OUTPATIENT
Start: 2023-12-23 | End: 2023-12-23

## 2023-12-23 RX ORDER — OXYCODONE HYDROCHLORIDE 5 MG/1
5 TABLET ORAL EVERY 6 HOURS PRN
Status: DISCONTINUED | OUTPATIENT
Start: 2023-12-23 | End: 2023-12-29 | Stop reason: HOSPADM

## 2023-12-23 RX ORDER — INSULIN LISPRO 100 [IU]/ML
0-15 INJECTION, SOLUTION INTRAVENOUS; SUBCUTANEOUS
Status: DISCONTINUED | OUTPATIENT
Start: 2023-12-23 | End: 2023-12-23

## 2023-12-23 RX ADMIN — INSULIN HUMAN 3 UNITS/HR: 1 INJECTION, SOLUTION INTRAVENOUS at 05:25

## 2023-12-23 RX ADMIN — SERTRALINE HYDROCHLORIDE 100 MG: 50 TABLET, FILM COATED ORAL at 09:01

## 2023-12-23 RX ADMIN — METOPROLOL TARTRATE 50 MG: 50 TABLET, FILM COATED ORAL at 09:01

## 2023-12-23 RX ADMIN — INSULIN LISPRO 15 UNITS: 100 INJECTION, SOLUTION INTRAVENOUS; SUBCUTANEOUS at 17:17

## 2023-12-23 RX ADMIN — ASPIRIN 81 MG: 81 TABLET, COATED ORAL at 09:02

## 2023-12-23 RX ADMIN — PANTOPRAZOLE SODIUM 40 MG: 40 TABLET, DELAYED RELEASE ORAL at 09:01

## 2023-12-23 RX ADMIN — ACETAMINOPHEN 650 MG: 325 TABLET ORAL at 09:08

## 2023-12-23 RX ADMIN — ACETAMINOPHEN 650 MG: 325 TABLET ORAL at 17:46

## 2023-12-23 RX ADMIN — MAGNESIUM OXIDE 400 MG (241.3 MG MAGNESIUM) TABLET 400 MG: TABLET at 09:01

## 2023-12-23 RX ADMIN — ACETAMINOPHEN 650 MG: 325 TABLET ORAL at 03:02

## 2023-12-23 RX ADMIN — Medication 0.01 MCG/KG/MIN: at 00:07

## 2023-12-23 RX ADMIN — CLONIDINE HYDROCHLORIDE 0.1 MG: 0.1 TABLET ORAL at 09:02

## 2023-12-23 RX ADMIN — METOPROLOL TARTRATE 50 MG: 50 TABLET, FILM COATED ORAL at 21:34

## 2023-12-23 RX ADMIN — NOREPINEPHRINE BITARTRATE 0.01 MCG/KG/MIN: 8 INJECTION, SOLUTION INTRAVENOUS at 00:07

## 2023-12-23 RX ADMIN — AMOXICILLIN AND CLAVULANATE POTASSIUM 500 MG: 500; 125 TABLET, FILM COATED ORAL at 21:34

## 2023-12-23 RX ADMIN — DEXTROSE AND SODIUM CHLORIDE 150 ML/HR: 5; 450 INJECTION, SOLUTION INTRAVENOUS at 09:03

## 2023-12-23 RX ADMIN — SIMVASTATIN 40 MG: 40 TABLET, FILM COATED ORAL at 21:34

## 2023-12-23 RX ADMIN — DEXTROSE AND SODIUM CHLORIDE 150 ML/HR: 5; 450 INJECTION, SOLUTION INTRAVENOUS at 03:28

## 2023-12-23 RX ADMIN — MECLIZINE HCL 12.5 MG 25 MG: 12.5 TABLET ORAL at 17:45

## 2023-12-23 RX ADMIN — AMLODIPINE BESYLATE 10 MG: 5 TABLET ORAL at 09:01

## 2023-12-23 RX ADMIN — SODIUM CHLORIDE 250 ML/HR: 4.5 INJECTION, SOLUTION INTRAVENOUS at 02:04

## 2023-12-23 RX ADMIN — INSULIN GLARGINE 33 UNITS: 100 INJECTION, SOLUTION SUBCUTANEOUS at 11:55

## 2023-12-23 RX ADMIN — CYANOCOBALAMIN TAB 500 MCG 1000 MCG: 500 TAB at 09:02

## 2023-12-23 RX ADMIN — Medication 2000 UNITS: at 09:01

## 2023-12-23 RX ADMIN — QUETIAPINE FUMARATE 50 MG: 100 TABLET, FILM COATED ORAL at 21:34

## 2023-12-23 RX ADMIN — HEPARIN SODIUM 5000 UNITS: 5000 INJECTION INTRAVENOUS; SUBCUTANEOUS at 07:24

## 2023-12-23 RX ADMIN — HEPARIN SODIUM 5000 UNITS: 5000 INJECTION INTRAVENOUS; SUBCUTANEOUS at 16:03

## 2023-12-23 RX ADMIN — LORAZEPAM 2 MG: 2 INJECTION INTRAMUSCULAR; INTRAVENOUS at 14:00

## 2023-12-23 RX ADMIN — SODIUM CHLORIDE, POTASSIUM CHLORIDE, SODIUM LACTATE AND CALCIUM CHLORIDE 1000 ML: 600; 310; 30; 20 INJECTION, SOLUTION INTRAVENOUS at 00:35

## 2023-12-23 RX ADMIN — LORAZEPAM 2 MG: 2 INJECTION INTRAMUSCULAR at 14:00

## 2023-12-23 RX ADMIN — HEPARIN SODIUM 5000 UNITS: 5000 INJECTION INTRAVENOUS; SUBCUTANEOUS at 23:56

## 2023-12-23 ASSESSMENT — PAIN - FUNCTIONAL ASSESSMENT
PAIN_FUNCTIONAL_ASSESSMENT: 0-10
PAIN_FUNCTIONAL_ASSESSMENT: 0-10

## 2023-12-23 ASSESSMENT — PAIN DESCRIPTION - LOCATION
LOCATION: JAW
LOCATION: HEAD

## 2023-12-23 ASSESSMENT — PAIN SCALES - GENERAL: PAINLEVEL_OUTOF10: 10 - WORST POSSIBLE PAIN

## 2023-12-23 NOTE — CONSULTS
[facility] Nephrology  Consult Note           Reason for Consult: LU on CKD  Requesting Physician:  Dr. Berumen    Chief Complaint:  dizziness, fevers, N/V  History Obtained From:  patient, electronic medical record    History of Present Ilness:    53 y.o. male with history s/f CKD stage IV, T2DM, CAD s/p SEAMUS who presented for malaise, fevers, N/V. Also had chest pain which improved w/ SL nitroglycerin. On presentation Bp stable however became hypotensive w/ BP as low as 52/33 after getting nitroglycerin. Need levophed and fluids to improve BP status. Now stable. Pt also found to be in DKA w/  and BHB  0.39. Na 123, now up to 133, Scr 6.5 now down to 6.2. BG now in the 200s. Nephrology consulted for LU on CKD. Baseline Scr ~2.5-2.8. On hydrochlorothiazide and losartan as outpatient.     Past Medical History:    Past Medical History:   Diagnosis Date    Cellulitis of unspecified finger 06/30/2022    Paronychia of thumb, unspecified laterality    COVID-19 01/17/2022    COVID-19 virus infection    Encounter for follow-up examination after completed treatment for conditions other than malignant neoplasm 01/17/2022    Hospital discharge follow-up    Encounter for general adult medical examination without abnormal findings 08/15/2022    Wellness examination    Encounter for screening for malignant neoplasm of colon 03/15/2022    Screen for colon cancer    Encounter for screening for malignant neoplasm of prostate 03/15/2022    Encounter for screening for malignant neoplasm of prostate    Obesity, unspecified 06/14/2022    Class 2 obesity with body mass index (BMI) of 36.0 to 36.9 in adult    Pain in unspecified hand 10/04/2022    Hand pain    Personal history of diseases of the skin and subcutaneous tissue 06/15/2022    History of eczema    Personal history of other diseases of the musculoskeletal system and connective tissue     History of gout    Personal history of other endocrine, nutritional and metabolic  disease     History of hypokalemia    Personal history of other endocrine, nutritional and metabolic disease     History of hypokalemia    Rash and other nonspecific skin eruption 03/15/2022    Rash    Stiffness of right hand, not elsewhere classified 07/06/2021    Stiffness of finger joint of right hand    Unspecified fracture of other metacarpal bone, initial encounter for closed fracture 07/06/2021    Fracture of third metacarpal bone    Unspecified fracture of third metacarpal bone, right hand, initial encounter for closed fracture 07/06/2021    Fracture of third metacarpal bone of right hand       Past Surgical History:    Past Surgical History:   Procedure Laterality Date    OTHER SURGICAL HISTORY  11/07/2021    Cardiac catheterization with stent placement       Home Medications:    No current facility-administered medications on file prior to encounter.     Current Outpatient Medications on File Prior to Encounter   Medication Sig Dispense Refill    albuterol 90 mcg/actuation inhaler Inhale 2 puffs every 4 hours if needed for shortness of breath. 18 g 1    allopurinol (Zyloprim) 300 mg tablet Take 1 tablet (300 mg) by mouth once daily. 90 tablet 1    amLODIPine (Norvasc) 10 mg tablet Take 1 tablet (10 mg) by mouth once daily. 90 tablet 1    aspirin (Adult Low Dose Aspirin) 81 mg EC tablet Take 1 tablet (81 mg) by mouth once daily.      busPIRone (Buspar) 15 mg tablet Take 1 tablet (15 mg) by mouth 3 times a day.      cholecalciferol (Vitamin D3) 50 mcg (2,000 unit) capsule Take 1 capsule (50 mcg) by mouth once daily. 90 capsule 1    clobetasol (Temovate) 0.05 % cream APPLY SPARINGLY TO AFFECTED AREA(S) TWICE DAILY AS NEEDED 60 g 1    cloNIDine (Catapres) 0.1 mg tablet Take 1 tablet (0.1 mg) by mouth once daily. 90 tablet 1    clotrimazole-betamethasone (Lotrisone) cream Apply 1 Application topically 2 times a day.      cyanocobalamin (Vitamin B-12) 1,000 mcg tablet Take 1 tablet (1,000 mcg) by mouth once  daily. 90 tablet 1    dupilumab (Dupixent) 300 mg/2 mL syringe injection Inject 1 Syringe (300 mg) under the skin every 14 (fourteen) days.      glipiZIDE (Glucotrol) 5 mg tablet Take 0.5 tablets (2.5 mg) by mouth once daily. 90 tablet 1    hydroCHLOROthiazide (HYDRODiuril) 25 mg tablet Take 1 tablet (25 mg) by mouth once daily. 90 tablet 1    hydrocortisone 2.5 % ointment Apply 1 Application topically 2 times a day.      hydrOXYzine pamoate (Vistaril) 25 mg capsule Take 2 capsules (50 mg) by mouth 2 times a day as needed (at bedtime).      losartan (Cozaar) 25 mg tablet Take 1 tablet (25 mg) by mouth 2 times a day. 90 tablet 1    magnesium oxide (Mag-Ox) 400 mg tablet Take 1 tablet (400 mg) by mouth once daily. 90 tablet 1    metoprolol tartrate (Lopressor) 50 mg tablet Take 1 tablet by mouth 2 times a day. 90 tablet 1    mometasone-formoterol (Dulera) 200-5 mcg/actuation inhaler Inhale 1 puff 2 times a day. 13 g 1    nabumetone (Relafen) 750 mg tablet Take 1 tablet (750 mg) by mouth every 12 hours.      pantoprazole (ProtoNix) 40 mg EC tablet Take 1 tablet (40 mg) by mouth once daily. Do not crush, chew, or split. 30 tablet 1    potassium chloride CR 20 mEq ER tablet Take 1 tablet (20 mEq) by mouth once daily. 90 tablet 1    QUEtiapine (SEROquel) 50 mg tablet Take 1 tablet (50 mg) by mouth once daily at bedtime.      sertraline (Zoloft) 100 mg tablet Take 1 tablet (100 mg) by mouth once daily.      simvastatin (Zocor) 40 mg tablet Take 1 tablet (40 mg) by mouth once daily at bedtime. 90 tablet 1    terazosin (Hytrin) 5 mg capsule Take 1 capsule (5 mg) by mouth 1 time.      traMADol (Ultram) 50 mg tablet Take 1 tablet (50 mg) by mouth every 6 hours.      triamcinolone (Kenalog) 0.1 % cream Apply 1 Application topically 2 times a day.         Allergies:  Sulfamethoxazole-trimethoprim    Social History:    Social History     Socioeconomic History    Marital status: Significant Other     Spouse name: Not on file     Number of children: Not on file    Years of education: Not on file    Highest education level: Not on file   Occupational History    Not on file   Tobacco Use    Smoking status: Never    Smokeless tobacco: Never   Substance and Sexual Activity    Alcohol use: Not Currently    Drug use: Not Currently    Sexual activity: Not on file   Other Topics Concern    Not on file   Social History Narrative    Not on file     Social Determinants of Health     Financial Resource Strain: Medium Risk (12/22/2023)    Overall Financial Resource Strain (CARDIA)     Difficulty of Paying Living Expenses: Somewhat hard   Food Insecurity: Not on file   Transportation Needs: Unmet Transportation Needs (12/22/2023)    PRAPARE - Transportation     Lack of Transportation (Medical): Yes     Lack of Transportation (Non-Medical): Yes   Physical Activity: Not on file   Stress: Not on file   Social Connections: Not on file   Intimate Partner Violence: Not on file   Housing Stability: High Risk (12/22/2023)    Housing Stability Vital Sign     Unable to Pay for Housing in the Last Year: Yes     Number of Places Lived in the Last Year: 1     Unstable Housing in the Last Year: No       Family History:   Family History   Problem Relation Name Age of Onset    Hypertension Mother      Stroke Father         Review of Systems:   Positives in bold  Constitutional: fever, chills, fatigue, malaise   HENT:  rhinorrhea, sinus pain, sore throat, epistaxis    Eyes:  photophobia, visual disturbance, eye redness  Respiratory: shortness of breath, cough, hemoptysis    Cardiovascular: chest pain, palpitations, orthopnea, leg swelling   Gastrointestinal: abdominal pain, nausea, vomiting, diarrhea, constipation   Endocrine: polydipsia, polyphagia, cold intolerance, heat intolerance  Genitourinary: dysuria, flank pain, frequency, urgency   Hematologic: easy bruising, easy bleeding  Musculoskeletal: back pain, neck pain, myalgias, arthalgias  Neurological: syncope,  "lightheadedness, dizziness, seizures, tremors, weakness  Psychiatric/Behavioral: anxiety, depression, hallucinations  Skin: rash, itching    Physical exam:   Constitutional:  VITALS:  /63   Pulse 76   Temp 36.4 °C (97.5 °F) (Temporal)   Resp 20   Ht 1.702 m (5' 7\")   Wt 96.3 kg (212 lb 4.9 oz)   SpO2 96%   BMI 33.25 kg/m²     General: alert, in no apparent distress  HEENT: normocephalic, atraumatic, anicteric  Neck: supple, no mass  Lungs: non-labored respirations, clear to auscultation bilaterally  Heart: regular rate and rhythm, no murmurs or rubs  Abdomen: soft, non-tender, non-distended  MSK: no joint swelling or tenderness  Ext: no cyanosis, no peripheral edema  Neuro: alert and oriented, no gross abnormalities  Psych: normal mood and affect    Data/  CBC:   Lab Results   Component Value Date    WBC 7.4 12/23/2023    RBC 3.84 (L) 12/23/2023    HGB 10.7 (L) 12/23/2023    HCT 31.1 (L) 12/23/2023    MCV 81 12/23/2023    MCH 27.9 12/23/2023    MCHC 34.4 12/23/2023    RDW 11.9 12/23/2023     12/23/2023     BMP:    Lab Results   Component Value Date     (L) 12/23/2023    K 3.8 12/23/2023    CL 97 (L) 12/23/2023    CO2 23 12/23/2023    BUN 67 (H) 12/23/2023    CREATININE 6.12 (H) 12/23/2023    CALCIUM 8.9 12/23/2023    GLUCOSE 264 (H) 12/23/2023     [unfilled]    Assessment:  53 y.o. male with history s/f CKD stage IV, T2DM, CAD s/p SEAMUS who presented for malaise, fevers, N/V.    LU on CKD stage IV: most likely 2/2 DKA, however ? Contribution of hypotension as well, Scr 6.5 on presentation, baseline 2.5-2.8, CKD risk factors T2DM, HTN, CAD  DKA  Hyponatremia: in setting of hyperglycemia, improved  Anemia  Hypotension     Plan:  - no indication for RRT   - monitor function and output, at risk for worsening however from hypotension   - no indication for FABIANA   - check bone mineral labs     Thank you for the consultation. Will continue to follow  Please do not hesitate to call with " questions.    Elizabeth La MD

## 2023-12-23 NOTE — CONSULTS
"Nutrition Initial Assessment:   Nutrition Assessment    Reason for Assessment: Provider consult order    Patient is a 53 y.o. male presenting with: hyperosmolar nonketotic coma. Family at bedside.       Nutrition History:  Energy Intake: Poor < 50 %  Food and Nutrient History: Pt with poor PO intake x 4-5 days. Appetite and intake were normal >5 days ago. Currently with breakfast and lunch tray at bedside, not much appetite for either. Does his own cooking, cooks mostly soul foods. Does not follow diabetic diet or check BG at home - does not have glucometer. Had some N/V PTA, reports it takes him a long time to have a BM. Pt admits he likely does not get enough fiber in his diet. Reports some issues swallowing (feeling like things are getting stuck in throat) that he attributes to possible heart burn.  Vitamin/Herbal Supplement Use: vitamin D3, vitamin B12, per home med list  Food Allergies/Intolerances:  None  GI Symptoms: Constipation, Nausea, and Vomiting  Oral Problems: Swallowing difficulty       Anthropometrics:  Height: 170.2 cm (5' 7\")   Weight: 96.3 kg (212 lb 4.9 oz)   BMI (Calculated): 33.24  IBW/kg (Dietitian Calculated): 67.3 kg          Weight History:   Wt Readings from Last 10 Encounters:   12/22/23 96.3 kg (212 lb 4.9 oz)   12/05/23 106 kg (234 lb 3.2 oz)   11/21/23 108 kg (238 lb 12.8 oz)   08/28/23 108 kg (238 lb 3.2 oz)   04/12/23 106 kg (234 lb 6 oz)   04/05/23 106 kg (234 lb)   12/05/22 108 kg (237 lb)   11/18/22 109 kg (240 lb)   08/15/22 101 kg (223 lb 4 oz)   06/30/22 104 kg (229 lb)     Weight Change %:  Weight History / % Weight Change: Pt reports 5# wt loss x 1 week. States UBW is 230#. Based on documented wt hx, pt with ~10 kg (9.4%) wt loss x 2-3 weeks - suspect partially related to hydration status as pt had been vomiting PTA.  Significant Weight Loss: Yes  Interpretation of Weight Loss: >5% in 1 month    Nutrition Focused Physical Exam Findings:  defer: Pt eating meal - will reattempt " at follow up.   Subcutaneous Fat Loss:   Orbital Fat Pads: Defer  Muscle Wasting:  Temporalis: Defer  Edema:  Edema: none  Physical Findings:  Skin: Negative    Nutrition Significant Labs:  A1C:  Lab Results   Component Value Date    HGBA1C 6.6 (A) 07/27/2022   , BG POCT trend:   Results from last 7 days   Lab Units 12/23/23  1031 12/23/23  0855 12/23/23  0752 12/23/23  0703 12/23/23  0615   POCT GLUCOSE mg/dL 215* 230* 252* 309* 259*      Nutrition Specific Medications:  Reviewed     I/O:    ;          Dietary Orders (From admission, onward)       Start     Ordered    12/23/23 0849  Adult diet Carb Controlled; 60 gram carb/meal, 30 gram Carb evening snack  Diet effective now        Question Answer Comment   Diet type Carb Controlled    Carb diet selection: 60 gram carb/meal, 30 gram Carb evening snack        12/23/23 0850    12/23/23 0658  May Participate in Room Service  Once        Question:  .  Answer:  Yes    12/23/23 0657                     Estimated Needs:   Total Energy Estimated Needs (kCal): 1926 kCal  Method for Estimating Needs: 20 kcal/kg ABW  Total Protein Estimated Needs (g): 58 g  Method for Estimating Needs: 0.6 g/kg ABW  Total Fluid Estimated Needs (mL): 1926 mL  Method for Estimating Needs: 20 ml/kg ABW        Nutrition Diagnosis   Malnutrition Diagnosis  Patient has Malnutrition Diagnosis:  (unable to determine at this time)    Nutrition Diagnosis  Patient has Nutrition Diagnosis: Yes  Diagnosis Status (1): New  Nutrition Diagnosis 1: Altered nutrition related to laboratory values  Related to (1): lack of diabetic diet, not checking BG levels at home  As Evidenced by (1): BG >200 mg/dl throughout admission       Nutrition Interventions/Recommendations         Nutrition Prescription:  Individualized Nutrition Prescription Provided for : 60 g CHO/meal diabetic diet        Nutrition Interventions:   Food and/or Nutrient Delivery Interventions  Interventions: Meals and snacks  Meals and Snacks:  Carbohydrate-modified diet  Goal: Consumes 3 meals per day  Additional Interventions: Obtain Hemoglobin A1c    Coordination of Nutrition Care by a Nutrition Professional  Collaboration and Referral of Nutrition Care: Collaboration by nutrition professional with other providers  Goal: Spoke with YUNG West.    Nutrition Education:   Education Documentation  Nutrition Care Manual, taught by Rufina Sanchez RD at 12/23/2023  1:06 PM.  Learner: Family, Patient  Readiness: Acceptance  Method: Explanation, Handout  Response: Verbalizes Understanding  Comment: Handout: CHO counting for diabetes; label reading. Discussed where to find carbohydrate content of foods on labels. Went over types of foods containing carbohydrates and recommended amt CHO per meal. Addressed specific questions and gave contact info.    Education Comments  No comments found.    Provided pt with glucometer - YUNG West, to complete education on using glucometer.   Also briefly discussed increasing fiber intake (and subsequently fluid intake) to help with constipation.        Nutrition Monitoring and Evaluation   Food/Nutrient Related History Monitoring  Monitoring and Evaluation Plan: Energy intake, Amount of food  Energy Intake: Estimated energy intake  Criteria: Pt meets >75% of estimated energy needs  Amount of Food: Estimated amout of food, Medical food intake  Criteria: Pt consumes >75% of meals and supplements    Body Composition/Growth/Weight History  Monitoring and Evaluation Plan: Weight  Weight: Measured weight  Criteria: Maintains stable weight    Biochemical Data, Medical Tests and Procedures  Monitoring and Evaluation Plan: Glucose/endocrine profile  Glucose/Endocrine Profile: Glucose, casual, Hemoglobin A1c (HgbA1c)  Criteria: BG within desirable range; HgbA1c WNL    Nutrition Focused Physical Findings  Monitoring and Evaluation Plan: Digestive System  Digestive System: Nausea, Vomiting, Constipation  Criteria: Improvement in GI  symptoms       Time Spent/Follow-up Reminder:   Time Spent (min): 60 minutes  Follow up: Provided information on outpatient nutrition therapy services, Provided inpatient RDN contact information  Last Date of Nutrition Visit: 12/23/23  Nutrition Follow-Up Needed?: 3-5 days

## 2023-12-23 NOTE — PROGRESS NOTES
"[facility]       Date:  12/23/2023  Patient:  Amanuel Riojas  YOB: 1970  MRN:  [id number]   Admit Date:  12/22/2023  ========================================================================================================    Chief Complaint   Patient presents with    Dizziness    Dental Pain         History of Present Illness:  Amanuel Riojas is a 53 y.o. year old male patient with Past Medical History of HTN, CAD s/p stents, T2DM, CKD IV presented to the ED endorsing a few days of general malaise, fevers, nausea, vomiting and dizziness. He states that he felt he was \"on a boat\" and the room was spinning and also had episode of chest pain that relieved with sublingual nitroglycerin. He also reports that he was at a rashaun party last night when he was chewing on something hard and now endorses 7/10 pain in right upper teeth.      ED course: CMP showed hyperglycemia 932 with pseudohyponatremia 123.  Beta hydroxy .39, Repeat potassium was WNL. BUN of 71 and CR of 6.51, GFR of 10. EKG shows NSR with rate of 60- had t wave inversion in lead III and AVF in which was present on previous.  Troponin is 10, BNP 17. CBC shows normocytic anemia. Patient was started on maintence fluids and an insulin gtt per protocol. CT of head was benign, CXR shows no cardiopulmonary process. Hernandez catheter placed for acurate Is and Os. Patient was admitted to ICU at this time for further medical management and monitoring.           Interval ICU Events:  12/22: Patient was admitted to ICU for management of HHS vs DKA and LU on CKD. He is endorsing general malaise, nausea and dizziness. He denies chest pain, shortness of breath, abd pain, flank pain, dysuria.     12/23: Overnight, patient arrived to ICU on .5units/hr of insulin and on a D5 gtt. On arrival to unit he was given an addition 10 unit bolus of insulin and started on LR gtt 75/hr. His BS improved after initiation of proper dose of insulin. He " also had developed hypotension in which he was started on levophed after he was given nitroglycerin. This was discontinued this am. He is now hemodynamically stable, NSR with no ectopy and 96% on RA. He is still endorsing nausea and vomiting. Denies flank pain, abd pain, chest pain and shortness of breath.         Medical History:  Past Medical History:   Diagnosis Date    Cellulitis of unspecified finger 06/30/2022    Paronychia of thumb, unspecified laterality    COVID-19 01/17/2022    COVID-19 virus infection    Encounter for follow-up examination after completed treatment for conditions other than malignant neoplasm 01/17/2022    Hospital discharge follow-up    Encounter for general adult medical examination without abnormal findings 08/15/2022    Wellness examination    Encounter for screening for malignant neoplasm of colon 03/15/2022    Screen for colon cancer    Encounter for screening for malignant neoplasm of prostate 03/15/2022    Encounter for screening for malignant neoplasm of prostate    Obesity, unspecified 06/14/2022    Class 2 obesity with body mass index (BMI) of 36.0 to 36.9 in adult    Pain in unspecified hand 10/04/2022    Hand pain    Personal history of diseases of the skin and subcutaneous tissue 06/15/2022    History of eczema    Personal history of other diseases of the musculoskeletal system and connective tissue     History of gout    Personal history of other endocrine, nutritional and metabolic disease     History of hypokalemia    Personal history of other endocrine, nutritional and metabolic disease     History of hypokalemia    Rash and other nonspecific skin eruption 03/15/2022    Rash    Stiffness of right hand, not elsewhere classified 07/06/2021    Stiffness of finger joint of right hand    Unspecified fracture of other metacarpal bone, initial encounter for closed fracture 07/06/2021    Fracture of third metacarpal bone    Unspecified fracture of third metacarpal bone, right  hand, initial encounter for closed fracture 07/06/2021    Fracture of third metacarpal bone of right hand     Past Surgical History:   Procedure Laterality Date    OTHER SURGICAL HISTORY  11/07/2021    Cardiac catheterization with stent placement     Medications Prior to Admission   Medication Sig Dispense Refill Last Dose    albuterol 90 mcg/actuation inhaler Inhale 2 puffs every 4 hours if needed for shortness of breath. 18 g 1 12/22/2023    allopurinol (Zyloprim) 300 mg tablet Take 1 tablet (300 mg) by mouth once daily. 90 tablet 1 12/22/2023    amLODIPine (Norvasc) 10 mg tablet Take 1 tablet (10 mg) by mouth once daily. 90 tablet 1 12/22/2023    aspirin (Adult Low Dose Aspirin) 81 mg EC tablet Take 1 tablet (81 mg) by mouth once daily.   12/22/2023    busPIRone (Buspar) 15 mg tablet Take 1 tablet (15 mg) by mouth 3 times a day.   12/22/2023    cholecalciferol (Vitamin D3) 50 mcg (2,000 unit) capsule Take 1 capsule (50 mcg) by mouth once daily. 90 capsule 1 12/22/2023    clobetasol (Temovate) 0.05 % cream APPLY SPARINGLY TO AFFECTED AREA(S) TWICE DAILY AS NEEDED 60 g 1 12/22/2023    cloNIDine (Catapres) 0.1 mg tablet Take 1 tablet (0.1 mg) by mouth once daily. 90 tablet 1 12/22/2023    clotrimazole-betamethasone (Lotrisone) cream Apply 1 Application topically 2 times a day.   12/22/2023    cyanocobalamin (Vitamin B-12) 1,000 mcg tablet Take 1 tablet (1,000 mcg) by mouth once daily. 90 tablet 1 12/22/2023    dupilumab (Dupixent) 300 mg/2 mL syringe injection Inject 1 Syringe (300 mg) under the skin every 14 (fourteen) days.   Unknown    glipiZIDE (Glucotrol) 5 mg tablet Take 0.5 tablets (2.5 mg) by mouth once daily. 90 tablet 1 12/22/2023    hydroCHLOROthiazide (HYDRODiuril) 25 mg tablet Take 1 tablet (25 mg) by mouth once daily. 90 tablet 1 12/22/2023    hydrocortisone 2.5 % ointment Apply 1 Application topically 2 times a day.   12/22/2023    hydrOXYzine pamoate (Vistaril) 25 mg capsule Take 2 capsules (50 mg)  by mouth 2 times a day as needed (at bedtime).   12/22/2023    losartan (Cozaar) 25 mg tablet Take 1 tablet (25 mg) by mouth 2 times a day. 90 tablet 1 12/22/2023    magnesium oxide (Mag-Ox) 400 mg tablet Take 1 tablet (400 mg) by mouth once daily. 90 tablet 1 12/22/2023    metoprolol tartrate (Lopressor) 50 mg tablet Take 1 tablet by mouth 2 times a day. 90 tablet 1 12/22/2023    mometasone-formoterol (Dulera) 200-5 mcg/actuation inhaler Inhale 1 puff 2 times a day. 13 g 1 12/22/2023    nabumetone (Relafen) 750 mg tablet Take 1 tablet (750 mg) by mouth every 12 hours.   12/22/2023    pantoprazole (ProtoNix) 40 mg EC tablet Take 1 tablet (40 mg) by mouth once daily. Do not crush, chew, or split. 30 tablet 1 12/22/2023    potassium chloride CR 20 mEq ER tablet Take 1 tablet (20 mEq) by mouth once daily. 90 tablet 1 12/22/2023    QUEtiapine (SEROquel) 50 mg tablet Take 1 tablet (50 mg) by mouth once daily at bedtime.   12/21/2023    sertraline (Zoloft) 100 mg tablet Take 1 tablet (100 mg) by mouth once daily.   12/22/2023    simvastatin (Zocor) 40 mg tablet Take 1 tablet (40 mg) by mouth once daily at bedtime. 90 tablet 1 12/21/2023    terazosin (Hytrin) 5 mg capsule Take 1 capsule (5 mg) by mouth 1 time.   12/22/2023    traMADol (Ultram) 50 mg tablet Take 1 tablet (50 mg) by mouth every 6 hours.   12/22/2023    triamcinolone (Kenalog) 0.1 % cream Apply 1 Application topically 2 times a day.   12/22/2023     Sulfamethoxazole-trimethoprim  Social History     Tobacco Use    Smoking status: Never    Smokeless tobacco: Never   Substance Use Topics    Alcohol use: Not Currently    Drug use: Not Currently     Family History   Problem Relation Name Age of Onset    Hypertension Mother      Stroke Father         Hospital Medications:    dextrose 10 % in water (D10W), 150 mL/hr  dextrose 10 % in water (D10W), 150 mL/hr  dextrose 5%-0.45 % sodium chloride, 150 mL/hr, Last Rate: 150 mL/hr (12/23/23 0328)  insulin regular  infusion, 0-20 Units/hr, Last Rate: 5.65 Units/hr (12/23/23 0722)          Current Facility-Administered Medications:     acetaminophen (Tylenol) tablet 650 mg, 650 mg, oral, q4h PRN, Gareth Ritter PA-C, 650 mg at 12/23/23 0302    albuterol 90 mcg/actuation inhaler 2 puff, 2 puff, inhalation, q4h PRN, Vicki Brewer APRN-CNP    [Held by provider] allopurinol (Zyloprim) tablet 300 mg, 300 mg, oral, Daily, Vicki R Daniella, APRN-CNP    amLODIPine (Norvasc) tablet 10 mg, 10 mg, oral, Daily, Vickialisha Brewer APRN-CNP    amoxicillin-pot clavulanate (Augmentin) 500-125 mg per tablet 500 mg, 500 mg, oral, q12h EILEEN, Shavonne Locke APRN-CNP, 500 mg at 12/22/23 2153    aspirin chewable tablet 324 mg, 324 mg, oral, Once, Antwan Nolasco PA-C    aspirin EC tablet 81 mg, 81 mg, oral, Daily, Vicki Brewer APRN-CNP    [Held by provider] busPIRone (Buspar) tablet 15 mg, 15 mg, oral, TID, Vicki Brewer, APRN-CNP    cholecalciferol (Vitamin D-3) tablet 2,000 Units, 2,000 Units, oral, Daily, Vicki Brewer, APRN-CNP    cloNIDine (Catapres) tablet 0.1 mg, 0.1 mg, oral, Daily, Vickialisha Brewer, APRN-CNP    cyanocobalamin (Vitamin B-12) tablet 1,000 mcg, 1,000 mcg, oral, Daily, Vicki Brewer, APRN-CNP    dextrose 10 % in water (D10W) infusion, 150 mL/hr, intravenous, Continuous, Vicki R Brewer, APRN-CNP    dextrose 10 % in water (D10W) infusion, 150 mL/hr, intravenous, Continuous, Vicki R Brewer, APRN-CNP    dextrose 10 % in water (D10W) infusion, 0.3 g/kg/hr, intravenous, Once PRN, Vicki Brewer, APRN-CNP    dextrose 5%-0.45 % sodium chloride infusion, 150 mL/hr, intravenous, Continuous, Gareth Ritter PA-C, Last Rate: 150 mL/hr at 12/23/23 0328, 150 mL/hr at 12/23/23 0328    dextrose 50 % injection 50 mL, 50 mL, intravenous, PRN, KINA Baeza    [Held by provider] glipiZIDE (Glucotrol) tablet 2.5 mg, 2.5 mg, oral, Daily, KENIA Baeza-CNP    glucagon (Glucagen) injection 1 mg, 1 mg,  intramuscular, q15 min PRN, Vicki Brewer APRN-CNP    heparin (porcine) injection 5,000 Units, 5,000 Units, subcutaneous, q8h, Vicki Brewer APRN-CNP, 5,000 Units at 12/23/23 0724    [Held by provider] hydroCHLOROthiazide (HYDRODiuril) tablet 25 mg, 25 mg, oral, Daily, Vicki Brewer APRN-CNP    [Held by provider] hydrOXYzine pamoate (Vistaril) capsule 50 mg, 50 mg, oral, BID PRN, Vicki Brewer APRN-CNP    insulin glargine (Lantus) injection 33 Units, 33 Units, subcutaneous, Nightly, KENIA Baeza-CNP    insulin regular (HumuLIN, NovoLIN) bolus from bag 10 Units, 0.1 Units/kg, intravenous, PRN, KENIA Baeza-CNP, 10 Units at 12/22/23 1700    insulin regular 100 unit/100 mL (1 unit/mL) in 0.9 % NaCl infusion, 0-20 Units/hr, intravenous, Continuous, Shavonne Locke, KENIA-CNP, Last Rate: 5.65 mL/hr at 12/23/23 0722, 5.65 Units/hr at 12/23/23 0722    [Held by provider] losartan (Cozaar) tablet 25 mg, 25 mg, oral, BID, Vicki Brewer APRN-CNP    magnesium oxide (Mag-Ox) tablet 400 mg, 400 mg, oral, Daily, Vicki Brewer APRN-CNP    meclizine (Antivert) tablet 25 mg, 25 mg, oral, q6h PRN, Gareth Ritter PA-C    metoprolol tartrate (Lopressor) tablet 50 mg, 50 mg, oral, BID, KENIA Baeza-CNP, 50 mg at 12/22/23 2153    [Held by provider] nabumetone (Relafen) tablet 750 mg, 750 mg, oral, q12h, Vicki Brewer APRN-CNP    ondansetron (Zofran) tablet 4 mg, 4 mg, oral, q8h PRN **OR** ondansetron (Zofran) injection 4 mg, 4 mg, intravenous, q8h PRN, KINA Baeza    pantoprazole (ProtoNix) EC tablet 40 mg, 40 mg, oral, Daily, KINA Baeza    pantoprazole (ProtoNix) injection 40 mg, 40 mg, intravenous, Once, Antwan Nolasco PA-C    QUEtiapine (SEROquel) tablet 50 mg, 50 mg, oral, Nightly, KINA Baeza, 50 mg at 12/22/23 2153    sertraline (Zoloft) tablet 100 mg, 100 mg, oral, Daily, KINA Baeza    simvastatin (Zocor)  "tablet 40 mg, 40 mg, oral, Nightly, KINA Baeza, 40 mg at 12/22/23 2153    terazosin (Hytrin) capsule 5 mg, 5 mg, oral, Once, KINA Baeza    [Held by provider] traMADol (Ultram) tablet 50 mg, 50 mg, oral, q6h, KINA Baeza    Review of Systems:  14 point review of systems was completed and negative except for those specially mention in my HPI    Physical Exam:    Heart Rate:  [54-76]   Temp:  [36.4 °C (97.5 °F)-36.5 °C (97.7 °F)]   Resp:  [15-22]   BP: ()/(33-86)   Height:  [170.2 cm (5' 7\")]   Weight:  [96.3 kg (212 lb 4.9 oz)-104 kg (230 lb)]   SpO2:  [94 %-100 %]     Physical Exam  Vitals and nursing note reviewed.   Constitutional:       Appearance: Normal appearance. He is obese.   HENT:      Head: Normocephalic and atraumatic.   Cardiovascular:      Rate and Rhythm: Normal rate and regular rhythm.      Pulses: Normal pulses.      Heart sounds: Normal heart sounds.   Pulmonary:      Effort: Pulmonary effort is normal.      Breath sounds: Normal breath sounds.   Abdominal:      General: Abdomen is flat. Bowel sounds are normal.      Palpations: Abdomen is soft.   Musculoskeletal:      Cervical back: Full passive range of motion without pain.   Skin:     General: Skin is warm.      Capillary Refill: Capillary refill takes less than 2 seconds.   Neurological:      Mental Status: He is alert and oriented to person, place, and time.   Psychiatric:         Attention and Perception: Attention and perception normal.         Mood and Affect: Mood normal.         Speech: Speech normal.         Objective:    Results for orders placed or performed during the hospital encounter of 12/22/23 (from the past 24 hour(s))   CBC and Auto Differential   Result Value Ref Range    WBC 10.9 4.4 - 11.3 x10*3/uL    nRBC 0.0 0.0 - 0.0 /100 WBCs    RBC 4.50 4.50 - 5.90 x10*6/uL    Hemoglobin 12.6 (L) 13.5 - 17.5 g/dL    Hematocrit 36.2 (L) 41.0 - 52.0 %    MCV 80 80 - 100 fL    MCH 28.0 " 26.0 - 34.0 pg    MCHC 34.8 32.0 - 36.0 g/dL    RDW 12.1 11.5 - 14.5 %    Platelets 284 150 - 450 x10*3/uL    Neutrophils % 80.5 40.0 - 80.0 %    Immature Granulocytes %, Automated 0.6 0.0 - 0.9 %    Lymphocytes % 12.2 13.0 - 44.0 %    Monocytes % 6.3 2.0 - 10.0 %    Eosinophils % 0.0 0.0 - 6.0 %    Basophils % 0.4 0.0 - 2.0 %    Neutrophils Absolute 8.75 (H) 1.20 - 7.70 x10*3/uL    Immature Granulocytes Absolute, Automated 0.07 0.00 - 0.70 x10*3/uL    Lymphocytes Absolute 1.33 1.20 - 4.80 x10*3/uL    Monocytes Absolute 0.69 0.10 - 1.00 x10*3/uL    Eosinophils Absolute 0.00 0.00 - 0.70 x10*3/uL    Basophils Absolute 0.04 0.00 - 0.10 x10*3/uL   Magnesium   Result Value Ref Range    Magnesium 3.40 (H) 1.60 - 2.40 mg/dL   Comprehensive metabolic panel   Result Value Ref Range    Glucose 898 (HH) 74 - 99 mg/dL    Sodium 119 (LL) 136 - 145 mmol/L    Potassium 6.9 (HH) 3.5 - 5.3 mmol/L    Chloride 79 (L) 98 - 107 mmol/L    Bicarbonate 24 21 - 32 mmol/L    Anion Gap 23 (H) 10 - 20 mmol/L    Urea Nitrogen 73 (H) 6 - 23 mg/dL    Creatinine 6.62 (H) 0.50 - 1.30 mg/dL    eGFR 9 (L) >60 mL/min/1.73m*2    Calcium 9.5 8.6 - 10.3 mg/dL    Albumin 5.0 3.4 - 5.0 g/dL    Alkaline Phosphatase 101 33 - 120 U/L    Total Protein 8.5 (H) 6.4 - 8.2 g/dL    AST 22 9 - 39 U/L    Bilirubin, Total 0.9 0.0 - 1.2 mg/dL    ALT 10 10 - 52 U/L   Troponin I, High Sensitivity   Result Value Ref Range    Troponin I, High Sensitivity 10 0 - 20 ng/L   B-Type Natriuretic Peptide   Result Value Ref Range    BNP 17 0 - 99 pg/mL   ECG 12 lead   Result Value Ref Range    Ventricular Rate 68 BPM    Atrial Rate 68 BPM    NE Interval 174 ms    QRS Duration 100 ms    QT Interval 438 ms    QTC Calculation(Bazett) 465 ms    P Axis 37 degrees    R Axis 44 degrees    T Axis -16 degrees    QRS Count 11 beats    Q Onset 218 ms    P Onset 131 ms    P Offset 189 ms    T Offset 437 ms    QTC Fredericia 456 ms   POCT GLUCOSE   Result Value Ref Range    POCT Glucose >600  (H) 74 - 99 mg/dL   Coagulation Screen   Result Value Ref Range    Protime 11.3 9.8 - 12.8 seconds    INR 1.0 0.9 - 1.1    aPTT 27 27 - 38 seconds   Magnesium   Result Value Ref Range    Magnesium 3.05 (H) 1.60 - 2.40 mg/dL   Comprehensive metabolic panel   Result Value Ref Range    Glucose 932 (HH) 74 - 99 mg/dL    Sodium 123 (L) 136 - 145 mmol/L    Potassium 4.7 3.5 - 5.3 mmol/L    Chloride 83 (L) 98 - 107 mmol/L    Bicarbonate 23 21 - 32 mmol/L    Anion Gap 22 (H) 10 - 20 mmol/L    Urea Nitrogen 71 (H) 6 - 23 mg/dL    Creatinine 6.51 (H) 0.50 - 1.30 mg/dL    eGFR 10 (L) >60 mL/min/1.73m*2    Calcium 9.3 8.6 - 10.3 mg/dL    Albumin 4.7 3.4 - 5.0 g/dL    Alkaline Phosphatase 92 33 - 120 U/L    Total Protein 7.8 6.4 - 8.2 g/dL    AST 10 9 - 39 U/L    Bilirubin, Total 0.8 0.0 - 1.2 mg/dL    ALT 8 (L) 10 - 52 U/L   Light Blue Top   Result Value Ref Range    Extra Tube Hold for add-ons.    Beta Hydroxybutyrate   Result Value Ref Range    Beta-Hydroxybutyrate 0.39 (H) 0.02 - 0.27 mmol/L   Phosphorus   Result Value Ref Range    Phosphorus 6.6 (H) 2.5 - 4.9 mg/dL   BLOOD GAS VENOUS FULL PANEL   Result Value Ref Range    POCT pH, Venous 7.30 (L) 7.33 - 7.43 pH    POCT pCO2, Venous 56 (H) 41 - 51 mm Hg    POCT pO2, Venous 30 (L) 35 - 45 mm Hg    POCT SO2, Venous 47 45 - 75 %    POCT Oxy Hemoglobin, Venous 46.1 45.0 - 75.0 %    POCT Hematocrit Calculated, Venous 39.0 (L) 41.0 - 52.0 %    POCT Sodium, Venous 129 (L) 136 - 145 mmol/L    POCT Potassium, Venous 4.2 3.5 - 5.3 mmol/L    POCT Chloride, Venous 90 (L) 98 - 107 mmol/L    POCT Ionized Calicum, Venous 1.20 1.10 - 1.33 mmol/L    POCT Glucose, Venous      POCT Lactate, Venous 2.9 (H) 0.4 - 2.0 mmol/L    POCT Base Excess, Venous 0.1 -2.0 - 3.0 mmol/L    POCT HCO3 Calculated, Venous 27.6 (H) 22.0 - 26.0 mmol/L    POCT Hemoglobin, Venous 13.1 (L) 13.5 - 17.5 g/dL    POCT Anion Gap, Venous 16.0 10.0 - 25.0 mmol/L    Patient Temperature      FiO2 21 %   POCT GLUCOSE   Result  Value Ref Range    POCT Glucose >600 (H) 74 - 99 mg/dL   POCT GLUCOSE   Result Value Ref Range    POCT Glucose >600 (H) 74 - 99 mg/dL   Basic metabolic panel   Result Value Ref Range    Glucose 749 (HH) 74 - 99 mg/dL    Sodium 125 (L) 136 - 145 mmol/L    Potassium 4.5 3.5 - 5.3 mmol/L    Chloride 88 (L) 98 - 107 mmol/L    Bicarbonate 22 21 - 32 mmol/L    Anion Gap 20 10 - 20 mmol/L    Urea Nitrogen 71 (H) 6 - 23 mg/dL    Creatinine 5.99 (H) 0.50 - 1.30 mg/dL    eGFR 11 (L) >60 mL/min/1.73m*2    Calcium 8.6 8.6 - 10.3 mg/dL   Magnesium   Result Value Ref Range    Magnesium 2.96 (H) 1.60 - 2.40 mg/dL   Troponin I, High Sensitivity   Result Value Ref Range    Troponin I, High Sensitivity 11 0 - 20 ng/L   SST TOP   Result Value Ref Range    Extra Tube Hold for add-ons.    POCT GLUCOSE   Result Value Ref Range    POCT Glucose >600 (H) 74 - 99 mg/dL   POCT GLUCOSE   Result Value Ref Range    POCT Glucose 522 (H) 74 - 99 mg/dL   POCT GLUCOSE   Result Value Ref Range    POCT Glucose 464 (H) 74 - 99 mg/dL   Comprehensive metabolic panel   Result Value Ref Range    Glucose 475 (HH) 74 - 99 mg/dL    Sodium 129 (L) 136 - 145 mmol/L    Potassium 4.0 3.5 - 5.3 mmol/L    Chloride 93 (L) 98 - 107 mmol/L    Bicarbonate 22 21 - 32 mmol/L    Anion Gap 18 10 - 20 mmol/L    Urea Nitrogen 71 (H) 6 - 23 mg/dL    Creatinine 6.12 (H) 0.50 - 1.30 mg/dL    eGFR 10 (L) >60 mL/min/1.73m*2    Calcium 8.8 8.6 - 10.3 mg/dL    Albumin 4.1 3.4 - 5.0 g/dL    Alkaline Phosphatase 80 33 - 120 U/L    Total Protein 7.0 6.4 - 8.2 g/dL    AST 12 9 - 39 U/L    Bilirubin, Total 0.6 0.0 - 1.2 mg/dL    ALT 5 (L) 10 - 52 U/L   Magnesium   Result Value Ref Range    Magnesium 2.96 (H) 1.60 - 2.40 mg/dL   Beta Hydroxybutyrate   Result Value Ref Range    Beta-Hydroxybutyrate 0.12 0.02 - 0.27 mmol/L   POCT GLUCOSE   Result Value Ref Range    POCT Glucose 341 (H) 74 - 99 mg/dL   POCT GLUCOSE   Result Value Ref Range    POCT Glucose 308 (H) 74 - 99 mg/dL   Urinalysis  with Reflex Microscopic   Result Value Ref Range    Color, Urine Yellow Straw, Yellow    Appearance, Urine Clear Clear    Specific Gravity, Urine 1.016 1.005 - 1.035    pH, Urine 5.0 5.0, 5.5, 6.0, 6.5, 7.0, 7.5, 8.0    Protein, Urine 100 (2+) (N) NEGATIVE mg/dL    Glucose, Urine >=500 (3+) (A) NEGATIVE mg/dL    Blood, Urine SMALL (1+) (A) NEGATIVE    Ketones, Urine NEGATIVE NEGATIVE mg/dL    Bilirubin, Urine NEGATIVE NEGATIVE    Urobilinogen, Urine <2.0 <2.0 mg/dL    Nitrite, Urine NEGATIVE NEGATIVE    Leukocyte Esterase, Urine NEGATIVE NEGATIVE   Eosinophil smear   Result Value Ref Range    Eosinophils None (N) (none)   Drug Screen, Urine   Result Value Ref Range    Amphetamine Screen, Urine Presumptive Negative Presumptive Negative    Barbiturate Screen, Urine Presumptive Negative Presumptive Negative    Benzodiazepines Screen, Urine Presumptive Negative Presumptive Negative    Cannabinoid Screen, Urine Presumptive Negative Presumptive Negative    Cocaine Metabolite Screen, Urine Presumptive Negative Presumptive Negative    Fentanyl Screen, Urine Presumptive Negative Presumptive Negative    Opiate Screen, Urine Presumptive Positive (A) Presumptive Negative    Oxycodone Screen, Urine Presumptive Negative Presumptive Negative    PCP Screen, Urine Presumptive Negative Presumptive Negative   Microscopic Only, Urine   Result Value Ref Range    WBC, Urine 1-5 1-5, NONE /HPF    RBC, Urine 1-2 NONE, 1-2, 3-5 /HPF    Squamous Epithelial Cells, Urine 1-9 (SPARSE) Reference range not established. /HPF    Bacteria, Urine 1+ (A) NONE SEEN /HPF    Mucus, Urine 1+ Reference range not established. /LPF    Hyaline Casts, Urine 2+ (A) NONE /LPF   POCT GLUCOSE   Result Value Ref Range    POCT Glucose 238 (H) 74 - 99 mg/dL   CBC and Auto Differential   Result Value Ref Range    WBC 7.4 4.4 - 11.3 x10*3/uL    nRBC 0.0 0.0 - 0.0 /100 WBCs    RBC 3.84 (L) 4.50 - 5.90 x10*6/uL    Hemoglobin 10.7 (L) 13.5 - 17.5 g/dL    Hematocrit 31.1  (L) 41.0 - 52.0 %    MCV 81 80 - 100 fL    MCH 27.9 26.0 - 34.0 pg    MCHC 34.4 32.0 - 36.0 g/dL    RDW 11.9 11.5 - 14.5 %    Platelets 209 150 - 450 x10*3/uL    Neutrophils % 78.2 40.0 - 80.0 %    Immature Granulocytes %, Automated 0.8 0.0 - 0.9 %    Lymphocytes % 10.9 13.0 - 44.0 %    Monocytes % 9.8 2.0 - 10.0 %    Eosinophils % 0.0 0.0 - 6.0 %    Basophils % 0.3 0.0 - 2.0 %    Neutrophils Absolute 5.77 1.20 - 7.70 x10*3/uL    Immature Granulocytes Absolute, Automated 0.06 0.00 - 0.70 x10*3/uL    Lymphocytes Absolute 0.80 (L) 1.20 - 4.80 x10*3/uL    Monocytes Absolute 0.72 0.10 - 1.00 x10*3/uL    Eosinophils Absolute 0.00 0.00 - 0.70 x10*3/uL    Basophils Absolute 0.02 0.00 - 0.10 x10*3/uL   Basic metabolic panel   Result Value Ref Range    Glucose 237 (H) 74 - 99 mg/dL    Sodium 132 (L) 136 - 145 mmol/L    Potassium 4.0 3.5 - 5.3 mmol/L    Chloride 96 (L) 98 - 107 mmol/L    Bicarbonate 23 21 - 32 mmol/L    Anion Gap 17 10 - 20 mmol/L    Urea Nitrogen 69 (H) 6 - 23 mg/dL    Creatinine 6.34 (H) 0.50 - 1.30 mg/dL    eGFR 10 (L) >60 mL/min/1.73m*2    Calcium 8.8 8.6 - 10.3 mg/dL   Magnesium   Result Value Ref Range    Magnesium 2.91 (H) 1.60 - 2.40 mg/dL   Phosphorus   Result Value Ref Range    Phosphorus 4.0 2.5 - 4.9 mg/dL   POCT GLUCOSE   Result Value Ref Range    POCT Glucose 257 (H) 74 - 99 mg/dL   Comprehensive metabolic panel   Result Value Ref Range    Glucose 263 (H) 74 - 99 mg/dL    Sodium 133 (L) 136 - 145 mmol/L    Potassium 3.7 3.5 - 5.3 mmol/L    Chloride 97 (L) 98 - 107 mmol/L    Bicarbonate 23 21 - 32 mmol/L    Anion Gap 17 10 - 20 mmol/L    Urea Nitrogen 68 (H) 6 - 23 mg/dL    Creatinine 6.32 (H) 0.50 - 1.30 mg/dL    eGFR 10 (L) >60 mL/min/1.73m*2    Calcium 8.8 8.6 - 10.3 mg/dL    Albumin 4.0 3.4 - 5.0 g/dL    Alkaline Phosphatase 75 33 - 120 U/L    Total Protein 6.8 6.4 - 8.2 g/dL    AST 8 (L) 9 - 39 U/L    Bilirubin, Total 0.7 0.0 - 1.2 mg/dL    ALT 6 (L) 10 - 52 U/L   POCT GLUCOSE   Result Value  Ref Range    POCT Glucose 273 (H) 74 - 99 mg/dL   POCT GLUCOSE   Result Value Ref Range    POCT Glucose 259 (H) 74 - 99 mg/dL   POCT GLUCOSE   Result Value Ref Range    POCT Glucose 309 (H) 74 - 99 mg/dL   Basic metabolic panel   Result Value Ref Range    Glucose 264 (H) 74 - 99 mg/dL    Sodium 133 (L) 136 - 145 mmol/L    Potassium 3.8 3.5 - 5.3 mmol/L    Chloride 97 (L) 98 - 107 mmol/L    Bicarbonate 23 21 - 32 mmol/L    Anion Gap 17 10 - 20 mmol/L    Urea Nitrogen 67 (H) 6 - 23 mg/dL    Creatinine 6.12 (H) 0.50 - 1.30 mg/dL    eGFR 10 (L) >60 mL/min/1.73m*2    Calcium 8.9 8.6 - 10.3 mg/dL   Magnesium   Result Value Ref Range    Magnesium 2.59 (H) 1.60 - 2.40 mg/dL   Lavender Top   Result Value Ref Range    Extra Tube Hold for add-ons.    SST TOP   Result Value Ref Range    Extra Tube Hold for add-ons.    POCT GLUCOSE   Result Value Ref Range    POCT Glucose 252 (H) 74 - 99 mg/dL   ECG 12 lead   Result Value Ref Range    Ventricular Rate 77 BPM    Atrial Rate 77 BPM    TX Interval 164 ms    QRS Duration 102 ms    QT Interval 392 ms    QTC Calculation(Bazett) 443 ms    P Axis 41 degrees    R Axis 51 degrees    T Axis -25 degrees    QRS Count 13 beats    Q Onset 216 ms    P Onset 134 ms    P Offset 191 ms    T Offset 412 ms    QTC Fredericia 425 ms   Lactate   Result Value Ref Range    Lactate 1.9 0.4 - 2.0 mmol/L   Lactate dehydrogenase   Result Value Ref Range    LDH 85 84 - 246 U/L   POCT GLUCOSE   Result Value Ref Range    POCT Glucose 230 (H) 74 - 99 mg/dL      ECG 12 lead    Result Date: 12/23/2023  Normal sinus rhythm T wave abnormality, consider inferior ischemia Abnormal ECG When compared with ECG of 22-DEC-2023 15:42, (unconfirmed) No significant change was found    US renal complete    Result Date: 12/22/2023  Interpreted By:  Blank Maki, STUDY: US RENAL COMPLETE;  12/22/2023 7:16 pm   INDICATION: Signs/Symptoms:acute renal failure. Elevated BUN and creatinine.   COMPARISON: CT abdomen and pelvis  01/07/2022. Right upper quadrant ultrasound 06/25/2021. Renal ultrasound 03/19/2021, 06/25/2021.   ACCESSION NUMBER(S): [id number]   ORDERING CLINICIAN: ALEXANDER THOMPSON   TECHNIQUE: Multiple images of the kidneys were obtained  .   FINDINGS: RIGHT KIDNEY: The right kidney measures 9.7 cm in length. There is increased echogenicity of the renal parenchyma and renal cortical thinning. No hydronephrosis is present.   LEFT KIDNEY: The left kidney measures 10.7 cm in length. There is increased echogenicity of the renal parenchyma and renal cortical thinning. No hydronephrosis is present. There is a 2.2 x 1.8 x 1.3 cm cyst with internal septations at the superior pole. There is a 2.3 x 2.9 x 1.9 cm cyst with internal septation at the interpolar region. There is a 1.7 x 1.9 x 1.7 cm cyst at the interpolar region with a peripheral 0.5 cm echogenic focus. There is a 2.1 x 1.4 x 1.5 cm cyst at the inferior pole.   BLADDER: The bilateral ureteral jets were visualized.       1. No hydronephrosis. Increased echogenicity of the renal parenchyma and renal cortical thinning, suggestive of medical renal disease. 2. Multiple left renal cysts, as above.   MACRO: None   Signed by: Blank Maki 12/22/2023 7:33 PM Dictation workstation:   [id number]    CT head wo IV contrast    Result Date: 12/22/2023  Interpreted By:  Nilo Bates, STUDY: CT HEAD WO IV CONTRAST;  12/22/2023 3:51 pm   INDICATION: Signs/Symptoms:dizziness.   COMPARISON: 06/24/2019   ACCESSION NUMBER(S): [id number]   ORDERING CLINICIAN: ALEXANDER THOMPSON   TECHNIQUE: Noncontrast axial CT scan of head was performed. Angled reformats in brain and bone windows were generated. The images were reviewed in bone, brain, blood and soft tissue windows.   FINDINGS: There is no intra/extra-axial fluid collection, mass effect, or midline shift. The gray/white matter junction is preserved. The basal cisterns are patent. Visualized paranasal sinuses and mastoid air cells are clear. The  calvarium is intact.       No acute intracranial finding. MRI may be obtained if clinically indicated   Signed by: Nilo Bates 12/22/2023 4:18 PM Dictation workstation:   [id number]    XR chest 1 view    Result Date: 12/22/2023  Interpreted By:  Reilly Galan, STUDY: XR CHEST 1 VIEW; 12/22/2023 3:37 pm   INDICATION: Dizziness   COMPARISON: Radiographs 01/07/2022.   ACCESSION NUMBER(S): [id number]   ORDERING CLINICIAN: ALEXANDER THOMPSON   TECHNIQUE: Single frontal view of the chest performed.   FINDINGS: LINES AND DEVICES: None.   LUNGS: Streaky bibasilar atelectasis. No focal consolidation, pulmonary edema, pleural effusion or pneumothorax.   CARDIOMEDIASTINAL SILHOUETTE: The cardiomediastinal silhouette is within normal limits.       No acute cardiopulmonary process.   MACRO None   Signed by: Reilly Galan 12/22/2023 3:53 PM Dictation workstation:   [id number]    ECG 12 lead    Result Date: 12/22/2023  Normal sinus rhythm ST & T wave abnormality, consider inferior ischemia Abnormal ECG When compared with ECG of 07-JAN-2022 10:24, No significant change was found See ED provider note for full interpretation and clinical correlation             Intake/Output Summary (Last 24 hours) at 12/23/2023 0854  Last data filed at 12/23/2023 0300  Gross per 24 hour   Intake --   Output 500 ml   Net -500 ml         Assessment/Plan:    I am currently managing this critically ill patient for the following problems:    HHS vs DKA   Dizziness/Double vision  HX of T2DM  Hx of HTN  Hx of CAD s/p stents  LU on CKD IV  Psuedohyponatremia  Chronic Anemia      Neuro/Psych/Pain Ctrl/Sedation:  Dizziness/Double vision  CT of head negative, MRI pending    Continue home seroquel and zoloft  PRN Antivert for dizziness   Delirium precautions  Sleep Wake Cycle  Neuro checks per ICU protocol    Respiratory/ENT:  No Active issues, currently 98% SPO2 on RA  Keep SPO2 >92%  Continuous pulse ox  Pulm hygiene   CXR negative for acute  cardiopulmonary processes    Cardiovascular:  Hx of CAD s/p stents  Had episode of chest pain overnight, was given nitroglycerin with subsequent hypotension. He was then started on levophed. He is currently hemodynamically stable. EKG stable  Echo pending   C/o chest pain in ED- EKG at baseline, trops WNL, relieved with sublingual nitroglycerin  Continue home ASA and statin   Continuous cardiac monitoring per ICU protocol  maintain MAPS >65  daily EKG, EKG if chest pain or change in telemetry      GI:  Continue home Protonix   QTC WNL, PRN zofran for nausea  NPO for tonight-will advance diet tomorrow        Renal/Volume Status (Intra & Extravascular):  LU on CKD IV -prerenal vs intrarenal  Psuedohyponatremia  Baseline CR around 2.40, BUN 71, Cr 6.51--> 67/ 6.12  , corrected 137  Holding nephrotoxic medications (ARB, NSAID)  Hernandez for accurate Is and Os  Maintain urine output 0.5-1cc/kg/hr  D5 .45% fluid infusion for BS less than 250  Replete electrolytes as indicated  Q6 CMP  Renal ultrasound shows left renal cyst with no hydronephrosis   Nephrology consulted     Endocrine  HHS vs DKA  HX of T2DM  Initally , beta hydroxy .39, anion gap of 22, today , anion gap of 17  Pending VBG and UA  Insulin gtt per protocol  Can discontinue insulin gtt and give scheduled insulin dose and lantus   Fluid gtt per protocol   Q6 CMP  Endocrinology consulted     Infectious Disease:  No leukocytosis (10.9)  Lactate of .8   Discontinued Augmentin due to CrCl, started clindamycin  Monitor for signs and sx of SIRS     Heme/Onc:  Chronic Anemia  HGB 12.6  Monitor CBC  Transfuse if HGB >7    OBGYN/MSK:  No active issues       Ethics/Code Status:  Full Code    :  DVT Prophylaxis: heparin subcu  GI Prophylaxis: home PPI  Bowel Regimen: none  Diet: Carb Controlled   CVC: none  Laurence: none  Hernandez: refused  Restraints: none  Dispo: Full Code    Critical Care Time:  ICU    Plan Discussed with Dr. Berumen      Vicki AQUINO, CNP  Critical Care Medicine   [facility]

## 2023-12-23 NOTE — SIGNIFICANT EVENT
[facility] SIGNIFICANT EVENT NOTE:    Date:  12/23/2023  Patient:  Amanuel Riojas  YOB: 1970  MRN:  [id number]   Admit Date:  12/22/2023  =============================================================================================    I was called to bedside by nursing at [facility] RN    Concerns: Patient in MRI appeared to have seizure like activity, he had RLE tremors with nystagmus. No O2 desaturations or incontinence noted. 2 mg IV ativan given to patient- pt remained what appeared to be in post ictal state through MRI. Patient then returned to baseline when he went back to ICU. Pt reports not remembering event. Neuro was consulted after event, stat CMP ordered with lactate and prolactin pending         Action Plan:  PRN Ativan, MRI, Neuro consult     Attending Physician Notified: Dr. Berumen

## 2023-12-23 NOTE — NURSING NOTE
While down in MRI, pt had  a what mimicked seizure like activity. Pt had uncontrollable jerking motion, was lethargic,, pupils were 5mm and sluggish. Was arousal to stimulation but was not oriented. Emmanuel NP was called down to assess patient immediatly and new orders were obtained and initiated. MRI was completed. Please see complex assessment for changes

## 2023-12-23 NOTE — NURSING NOTE
Spoke to NP, new orders received for insulin drip. 10 unit bolus given and rate adjusted to 10 units per hour.

## 2023-12-23 NOTE — CONSULTS
Inpatient consult to Endocrinology  Consult performed by: Daniel Colindres MD  Consult ordered by: Vicki Brewer, APRN-CNP      PCP Dr. Hanson.  [facility]    Assessment/Plan     Uncontrolled diabetes type 2,  Severe hyperglycemia with hyperosmolar state with confusion metabolic encephalopathy    The patient was admitted with severe hyperglycemia chest pain treated with IV insulin drip was discussed with nurse this morning the last drip rate was at 5 units/h has been taken off the drip put on Lantus 33 units daily by intensivist and has been put on a sliding scale he is also on D5 half-normal saline  It appears patient had a incident of seizure-like today.  It was electrolytes and LU CKD is also seen nephrology  At this time we will continue him on his insulins as to started have to be careful and not making his glucoses too low or too high.  Target range would be between 150 and 250.  I discussed with the nurse to call me if the glucose drops below 100 or goes above 300  Patient will need to learn about diabetes management be more compliant his Accu-Cheks at home and will definitely come off the glipizide and be on insulin when he goes home which she will have to learn.      Reason For Consult  Diabetes type 2 with severe hyperglycemia hyperosmolality admitted to ICU    History Of Present Illness  Amanuel Riojas is a 53 y.o. male with  has a past medical history of Cellulitis of unspecified finger (06/30/2022), COVID-19 (01/17/2022), Encounter for follow-up examination after completed treatment for conditions other than malignant neoplasm (01/17/2022), Encounter for general adult medical examination without abnormal findings (08/15/2022), Encounter for screening for malignant neoplasm of colon (03/15/2022), Encounter for screening for malignant neoplasm of prostate (03/15/2022), Obesity, unspecified (06/14/2022), Pain in unspecified hand (10/04/2022), Personal history of diseases of the skin and  "subcutaneous tissue (06/15/2022), Personal history of other diseases of the musculoskeletal system and connective tissue, Personal history of other endocrine, nutritional and metabolic disease, Personal history of other endocrine, nutritional and metabolic disease, Rash and other nonspecific skin eruption (03/15/2022), Stiffness of right hand, not elsewhere classified (07/06/2021), Unspecified fracture of other metacarpal bone, initial encounter for closed fracture (07/06/2021), and Unspecified fracture of third metacarpal bone, right hand, initial encounter for closed fracture (07/06/2021). presenting with chest pain and found to be severely hyperglycemic glucoses greater than 900 on admission.  He was admitted to the ICU started on IV insulin drip which was last going at 5 units/h this morning it was stopped and he was put on Lantus 33 units daily and a sliding scale.  He is still with some confusion and not eating at this time.  He has been put on Lantus 30 units in Humalog sliding scale.    Amanuel Riojas is a 53 y.o. year old male patient with Past Medical History of  HTN, CAD s/p stents, T2DM, CKD IV presented to the ED endorsing a few days of general malaise, fevers, nausea, vomiting and dizziness. He states that he felt he was \"on a boat\" and the room was spinning and also had episode of chest pain that relieved with sublingual nitroglycerin. He also reports that he was at a rashaun party last night when he was chewing on something hard and now endorses 7/10 pain in right upper teeth.      ED course: CMP showed hyperglycemia 932 with pseudohyponatremia 123.  Beta hydroxy .39, Repeat potassium was WNL. BUN of 71 and CR of 6.51, GFR of 10. EKG shows NSR with rate of 60- had t wave inversion in lead III and AVF in which was present on previous.  Troponin is 10, BNP 17. CBC shows normocytic anemia. Patient was started on maintence fluids and an insulin gtt per protocol. CT of head was benign, CXR shows no " cardiopulmonary process. Hernandez catheter placed for acurate Is and Os. Patient was admitted to ICU at this time for further medical management and monitoring.    .      Past Medical History  He has a past medical history of Cellulitis of unspecified finger (06/30/2022), COVID-19 (01/17/2022), Encounter for follow-up examination after completed treatment for conditions other than malignant neoplasm (01/17/2022), Encounter for general adult medical examination without abnormal findings (08/15/2022), Encounter for screening for malignant neoplasm of colon (03/15/2022), Encounter for screening for malignant neoplasm of prostate (03/15/2022), Obesity, unspecified (06/14/2022), Pain in unspecified hand (10/04/2022), Personal history of diseases of the skin and subcutaneous tissue (06/15/2022), Personal history of other diseases of the musculoskeletal system and connective tissue, Personal history of other endocrine, nutritional and metabolic disease, Personal history of other endocrine, nutritional and metabolic disease, Rash and other nonspecific skin eruption (03/15/2022), Stiffness of right hand, not elsewhere classified (07/06/2021), Unspecified fracture of other metacarpal bone, initial encounter for closed fracture (07/06/2021), and Unspecified fracture of third metacarpal bone, right hand, initial encounter for closed fracture (07/06/2021).    Surgical History  He has a past surgical history that includes Other surgical history (11/07/2021).     Social History  He reports that he has never smoked. He has never used smokeless tobacco. He reports that he does not currently use alcohol. He reports that he does not currently use drugs.    Family History  Family History   Problem Relation Name Age of Onset    Hypertension Mother      Stroke Father          Allergies  Sulfamethoxazole-trimethoprim    Review of Systems  Per HPI    Past Medical History:   Diagnosis Date    Cellulitis of unspecified finger 06/30/2022     Paronychia of thumb, unspecified laterality    COVID-19 01/17/2022    COVID-19 virus infection    Encounter for follow-up examination after completed treatment for conditions other than malignant neoplasm 01/17/2022    Hospital discharge follow-up    Encounter for general adult medical examination without abnormal findings 08/15/2022    Wellness examination    Encounter for screening for malignant neoplasm of colon 03/15/2022    Screen for colon cancer    Encounter for screening for malignant neoplasm of prostate 03/15/2022    Encounter for screening for malignant neoplasm of prostate    Obesity, unspecified 06/14/2022    Class 2 obesity with body mass index (BMI) of 36.0 to 36.9 in adult    Pain in unspecified hand 10/04/2022    Hand pain    Personal history of diseases of the skin and subcutaneous tissue 06/15/2022    History of eczema    Personal history of other diseases of the musculoskeletal system and connective tissue     History of gout    Personal history of other endocrine, nutritional and metabolic disease     History of hypokalemia    Personal history of other endocrine, nutritional and metabolic disease     History of hypokalemia    Rash and other nonspecific skin eruption 03/15/2022    Rash    Stiffness of right hand, not elsewhere classified 07/06/2021    Stiffness of finger joint of right hand    Unspecified fracture of other metacarpal bone, initial encounter for closed fracture 07/06/2021    Fracture of third metacarpal bone    Unspecified fracture of third metacarpal bone, right hand, initial encounter for closed fracture 07/06/2021    Fracture of third metacarpal bone of right hand       Past Surgical History:   Procedure Laterality Date    OTHER SURGICAL HISTORY  11/07/2021    Cardiac catheterization with stent placement       Social History     Socioeconomic History    Marital status: Significant Other     Spouse name: Not on file    Number of children: Not on file    Years of education: Not  "on file    Highest education level: Not on file   Occupational History    Not on file   Tobacco Use    Smoking status: Never    Smokeless tobacco: Never   Substance and Sexual Activity    Alcohol use: Not Currently    Drug use: Not Currently    Sexual activity: Not on file   Other Topics Concern    Not on file   Social History Narrative    Not on file     Social Determinants of Health     Financial Resource Strain: Medium Risk (12/22/2023)    Overall Financial Resource Strain (CARDIA)     Difficulty of Paying Living Expenses: Somewhat hard   Food Insecurity: Not on file   Transportation Needs: Unmet Transportation Needs (12/22/2023)    PRAPARE - Transportation     Lack of Transportation (Medical): Yes     Lack of Transportation (Non-Medical): Yes   Physical Activity: Not on file   Stress: Not on file   Social Connections: Not on file   Intimate Partner Violence: Not on file   Housing Stability: High Risk (12/22/2023)    Housing Stability Vital Sign     Unable to Pay for Housing in the Last Year: Yes     Number of Places Lived in the Last Year: 1     Unstable Housing in the Last Year: No        Physical Exam   DEFERRED  ROS, PMH, FH/SH, surgical history and allergies have been reviewed.    Last Recorded Vitals  Blood pressure 127/63, pulse 76, temperature 36.4 °C (97.5 °F), temperature source Temporal, resp. rate 20, height 1.702 m (5' 7\"), weight 96.3 kg (212 lb 4.9 oz), SpO2 96 %.    Relevant Results  Results from last 7 days   Lab Units 12/23/23  1402 12/23/23  1031 12/23/23  0855 12/23/23  0752 12/23/23  0740 12/23/23  0703 12/23/23  0519 12/23/23  0429 12/23/23  0412 12/23/23  0349 12/23/23  0101 12/23/23  0000 12/22/23  2121 12/22/23  2042   POCT GLUCOSE mg/dL 272* 215* 230* 252*  --  309*   < >  --    < >  --    < >  --    < >  --    GLUCOSE mg/dL  --   --   --   --  264*  --   --  263*  --  237*  --  475*  --  749*    < > = values in this interval not displayed.     Lab Results   Component Value Date    " HGBA1C 6.6 (A) 07/27/2022          Ariel Colindres MD FACE  Office phone - [phone]  Fax - [phone]  Address: [address] [facility] [address]  Address: [id number] [place] [id number]  12/23/2023  3:34 PM

## 2023-12-24 PROBLEM — R56.9 SEIZURE-LIKE ACTIVITY (MULTI): Status: ACTIVE | Noted: 2023-12-24

## 2023-12-24 LAB
25(OH)D3 SERPL-MCNC: 25 NG/ML (ref 30–100)
ALBUMIN SERPL BCP-MCNC: 4 G/DL (ref 3.4–5)
ALBUMIN SERPL BCP-MCNC: 4.1 G/DL (ref 3.4–5)
ALBUMIN SERPL BCP-MCNC: 4.1 G/DL (ref 3.4–5)
ALP SERPL-CCNC: 72 U/L (ref 33–120)
ALP SERPL-CCNC: 73 U/L (ref 33–120)
ALP SERPL-CCNC: 78 U/L (ref 33–120)
ALT SERPL W P-5'-P-CCNC: 5 U/L (ref 10–52)
ALT SERPL W P-5'-P-CCNC: 6 U/L (ref 10–52)
ALT SERPL W P-5'-P-CCNC: 7 U/L (ref 10–52)
ANION GAP SERPL CALC-SCNC: 14 MMOL/L (ref 10–20)
ANION GAP SERPL CALC-SCNC: 15 MMOL/L (ref 10–20)
ANION GAP SERPL CALC-SCNC: 17 MMOL/L (ref 10–20)
AST SERPL W P-5'-P-CCNC: 10 U/L (ref 9–39)
AST SERPL W P-5'-P-CCNC: 11 U/L (ref 9–39)
AST SERPL W P-5'-P-CCNC: 14 U/L (ref 9–39)
BASOPHILS # BLD AUTO: 0.02 X10*3/UL (ref 0–0.1)
BASOPHILS NFR BLD AUTO: 0.3 %
BILIRUB SERPL-MCNC: 0.5 MG/DL (ref 0–1.2)
BILIRUB SERPL-MCNC: 0.6 MG/DL (ref 0–1.2)
BILIRUB SERPL-MCNC: 0.6 MG/DL (ref 0–1.2)
BUN SERPL-MCNC: 63 MG/DL (ref 6–23)
BUN SERPL-MCNC: 64 MG/DL (ref 6–23)
BUN SERPL-MCNC: 64 MG/DL (ref 6–23)
CALCIUM SERPL-MCNC: 8.8 MG/DL (ref 8.6–10.3)
CALCIUM SERPL-MCNC: 9.3 MG/DL (ref 8.6–10.3)
CALCIUM SERPL-MCNC: 9.3 MG/DL (ref 8.6–10.3)
CHLORIDE SERPL-SCNC: 97 MMOL/L (ref 98–107)
CHLORIDE SERPL-SCNC: 97 MMOL/L (ref 98–107)
CHLORIDE SERPL-SCNC: 98 MMOL/L (ref 98–107)
CO2 SERPL-SCNC: 23 MMOL/L (ref 21–32)
CO2 SERPL-SCNC: 24 MMOL/L (ref 21–32)
CO2 SERPL-SCNC: 24 MMOL/L (ref 21–32)
CREAT SERPL-MCNC: 5.04 MG/DL (ref 0.5–1.3)
CREAT SERPL-MCNC: 5.29 MG/DL (ref 0.5–1.3)
CREAT SERPL-MCNC: 5.62 MG/DL (ref 0.5–1.3)
EOSINOPHIL # BLD AUTO: 0 X10*3/UL (ref 0–0.7)
EOSINOPHIL NFR BLD AUTO: 0 %
ERYTHROCYTE [DISTWIDTH] IN BLOOD BY AUTOMATED COUNT: 12 % (ref 11.5–14.5)
EST. AVERAGE GLUCOSE BLD GHB EST-MCNC: 387 MG/DL
GFR SERPL CREATININE-BSD FRML MDRD: 11 ML/MIN/1.73M*2
GFR SERPL CREATININE-BSD FRML MDRD: 12 ML/MIN/1.73M*2
GFR SERPL CREATININE-BSD FRML MDRD: 13 ML/MIN/1.73M*2
GLUCOSE BLD MANUAL STRIP-MCNC: 175 MG/DL (ref 74–99)
GLUCOSE BLD MANUAL STRIP-MCNC: 257 MG/DL (ref 74–99)
GLUCOSE BLD MANUAL STRIP-MCNC: 259 MG/DL (ref 74–99)
GLUCOSE BLD MANUAL STRIP-MCNC: 259 MG/DL (ref 74–99)
GLUCOSE BLD MANUAL STRIP-MCNC: 297 MG/DL (ref 74–99)
GLUCOSE BLD MANUAL STRIP-MCNC: 299 MG/DL (ref 74–99)
GLUCOSE BLD MANUAL STRIP-MCNC: 303 MG/DL (ref 74–99)
GLUCOSE SERPL-MCNC: 259 MG/DL (ref 74–99)
GLUCOSE SERPL-MCNC: 315 MG/DL (ref 74–99)
GLUCOSE SERPL-MCNC: 317 MG/DL (ref 74–99)
HBA1C MFR BLD: 15.1 %
HCT VFR BLD AUTO: 33.9 % (ref 41–52)
HGB BLD-MCNC: 11.2 G/DL (ref 13.5–17.5)
HOLD SPECIMEN: NORMAL
HOLD SPECIMEN: NORMAL
IMM GRANULOCYTES # BLD AUTO: 0.08 X10*3/UL (ref 0–0.7)
IMM GRANULOCYTES NFR BLD AUTO: 1.1 % (ref 0–0.9)
LYMPHOCYTES # BLD AUTO: 1.11 X10*3/UL (ref 1.2–4.8)
LYMPHOCYTES NFR BLD AUTO: 15.2 %
MAGNESIUM SERPL-MCNC: 2.75 MG/DL (ref 1.6–2.4)
MCH RBC QN AUTO: 27.2 PG (ref 26–34)
MCHC RBC AUTO-ENTMCNC: 33 G/DL (ref 32–36)
MCV RBC AUTO: 82 FL (ref 80–100)
MONOCYTES # BLD AUTO: 0.56 X10*3/UL (ref 0.1–1)
MONOCYTES NFR BLD AUTO: 7.7 %
NEUTROPHILS # BLD AUTO: 5.55 X10*3/UL (ref 1.2–7.7)
NEUTROPHILS NFR BLD AUTO: 75.7 %
NRBC BLD-RTO: 0 /100 WBCS (ref 0–0)
PHOSPHATE SERPL-MCNC: 4.3 MG/DL (ref 2.5–4.9)
PLATELET # BLD AUTO: 190 X10*3/UL (ref 150–450)
POTASSIUM SERPL-SCNC: 3.6 MMOL/L (ref 3.5–5.3)
POTASSIUM SERPL-SCNC: 3.8 MMOL/L (ref 3.5–5.3)
POTASSIUM SERPL-SCNC: 3.8 MMOL/L (ref 3.5–5.3)
PROLACTIN SERPL-MCNC: 7.8 UG/L (ref 2–18)
PROT SERPL-MCNC: 6.9 G/DL (ref 6.4–8.2)
PROT SERPL-MCNC: 7.2 G/DL (ref 6.4–8.2)
PROT SERPL-MCNC: 7.2 G/DL (ref 6.4–8.2)
PTH-INTACT SERPL-MCNC: 81.1 PG/ML (ref 18.5–88)
RBC # BLD AUTO: 4.12 X10*6/UL (ref 4.5–5.9)
SODIUM SERPL-SCNC: 131 MMOL/L (ref 136–145)
SODIUM SERPL-SCNC: 132 MMOL/L (ref 136–145)
SODIUM SERPL-SCNC: 134 MMOL/L (ref 136–145)
WBC # BLD AUTO: 7.3 X10*3/UL (ref 4.4–11.3)

## 2023-12-24 PROCEDURE — 2500000004 HC RX 250 GENERAL PHARMACY W/ HCPCS (ALT 636 FOR OP/ED)

## 2023-12-24 PROCEDURE — 96372 THER/PROPH/DIAG INJ SC/IM: CPT

## 2023-12-24 PROCEDURE — 36415 COLL VENOUS BLD VENIPUNCTURE: CPT

## 2023-12-24 PROCEDURE — 80069 RENAL FUNCTION PANEL: CPT | Performed by: HOSPITALIST

## 2023-12-24 PROCEDURE — 80053 COMPREHEN METABOLIC PANEL: CPT

## 2023-12-24 PROCEDURE — 83735 ASSAY OF MAGNESIUM: CPT

## 2023-12-24 PROCEDURE — 2500000001 HC RX 250 WO HCPCS SELF ADMINISTERED DRUGS (ALT 637 FOR MEDICARE OP)

## 2023-12-24 PROCEDURE — 2020000001 HC ICU ROOM DAILY

## 2023-12-24 PROCEDURE — 99222 1ST HOSP IP/OBS MODERATE 55: CPT | Performed by: SPECIALIST

## 2023-12-24 PROCEDURE — 83970 ASSAY OF PARATHORMONE: CPT | Mod: ELYLAB | Performed by: STUDENT IN AN ORGANIZED HEALTH CARE EDUCATION/TRAINING PROGRAM

## 2023-12-24 PROCEDURE — 2500000002 HC RX 250 W HCPCS SELF ADMINISTERED DRUGS (ALT 637 FOR MEDICARE OP, ALT 636 FOR OP/ED)

## 2023-12-24 PROCEDURE — 82306 VITAMIN D 25 HYDROXY: CPT | Performed by: STUDENT IN AN ORGANIZED HEALTH CARE EDUCATION/TRAINING PROGRAM

## 2023-12-24 PROCEDURE — 2500000001 HC RX 250 WO HCPCS SELF ADMINISTERED DRUGS (ALT 637 FOR MEDICARE OP): Performed by: SPECIALIST

## 2023-12-24 PROCEDURE — 2500000002 HC RX 250 W HCPCS SELF ADMINISTERED DRUGS (ALT 637 FOR MEDICARE OP, ALT 636 FOR OP/ED): Performed by: INTERNAL MEDICINE

## 2023-12-24 PROCEDURE — 82947 ASSAY GLUCOSE BLOOD QUANT: CPT

## 2023-12-24 PROCEDURE — 84146 ASSAY OF PROLACTIN: CPT | Mod: ELYLAB

## 2023-12-24 PROCEDURE — 2500000001 HC RX 250 WO HCPCS SELF ADMINISTERED DRUGS (ALT 637 FOR MEDICARE OP): Performed by: HOSPITALIST

## 2023-12-24 PROCEDURE — 99291 CRITICAL CARE FIRST HOUR: CPT | Performed by: INTERNAL MEDICINE

## 2023-12-24 PROCEDURE — 85025 COMPLETE CBC W/AUTO DIFF WBC: CPT | Performed by: HOSPITALIST

## 2023-12-24 RX ORDER — DIVALPROEX SODIUM 250 MG/1
250 TABLET, DELAYED RELEASE ORAL EVERY 12 HOURS SCHEDULED
Status: DISCONTINUED | OUTPATIENT
Start: 2023-12-24 | End: 2023-12-29 | Stop reason: HOSPADM

## 2023-12-24 RX ORDER — INSULIN LISPRO 100 [IU]/ML
0-15 INJECTION, SOLUTION INTRAVENOUS; SUBCUTANEOUS
Status: DISCONTINUED | OUTPATIENT
Start: 2023-12-24 | End: 2023-12-29 | Stop reason: HOSPADM

## 2023-12-24 RX ORDER — CHOLECALCIFEROL (VITAMIN D3) 125 MCG
5000 CAPSULE ORAL DAILY
Status: DISCONTINUED | OUTPATIENT
Start: 2023-12-25 | End: 2023-12-29 | Stop reason: HOSPADM

## 2023-12-24 RX ORDER — INSULIN LISPRO 100 [IU]/ML
14 INJECTION, SOLUTION INTRAVENOUS; SUBCUTANEOUS
Status: DISCONTINUED | OUTPATIENT
Start: 2023-12-24 | End: 2023-12-29 | Stop reason: HOSPADM

## 2023-12-24 RX ORDER — INSULIN GLARGINE 100 [IU]/ML
45 INJECTION, SOLUTION SUBCUTANEOUS 2 TIMES DAILY
Status: DISCONTINUED | OUTPATIENT
Start: 2023-12-24 | End: 2023-12-27

## 2023-12-24 RX ORDER — POTASSIUM CHLORIDE 20 MEQ/1
20 TABLET, EXTENDED RELEASE ORAL ONCE
Status: COMPLETED | OUTPATIENT
Start: 2023-12-24 | End: 2023-12-24

## 2023-12-24 RX ORDER — INSULIN LISPRO 100 [IU]/ML
0-15 INJECTION, SOLUTION INTRAVENOUS; SUBCUTANEOUS EVERY 4 HOURS
Status: DISCONTINUED | OUTPATIENT
Start: 2023-12-24 | End: 2023-12-24

## 2023-12-24 RX ADMIN — PANTOPRAZOLE SODIUM 40 MG: 40 TABLET, DELAYED RELEASE ORAL at 08:45

## 2023-12-24 RX ADMIN — INSULIN LISPRO 6 UNITS: 100 INJECTION, SOLUTION INTRAVENOUS; SUBCUTANEOUS at 07:50

## 2023-12-24 RX ADMIN — AMOXICILLIN AND CLAVULANATE POTASSIUM 500 MG: 500; 125 TABLET, FILM COATED ORAL at 08:45

## 2023-12-24 RX ADMIN — POTASSIUM CHLORIDE 20 MEQ: 1500 TABLET, EXTENDED RELEASE ORAL at 06:44

## 2023-12-24 RX ADMIN — METOPROLOL TARTRATE 50 MG: 50 TABLET, FILM COATED ORAL at 08:52

## 2023-12-24 RX ADMIN — HEPARIN SODIUM 5000 UNITS: 5000 INJECTION INTRAVENOUS; SUBCUTANEOUS at 06:44

## 2023-12-24 RX ADMIN — MECLIZINE HCL 12.5 MG 25 MG: 12.5 TABLET ORAL at 16:59

## 2023-12-24 RX ADMIN — SIMVASTATIN 40 MG: 40 TABLET, FILM COATED ORAL at 22:46

## 2023-12-24 RX ADMIN — INSULIN GLARGINE 45 UNITS: 100 INJECTION, SOLUTION SUBCUTANEOUS at 22:30

## 2023-12-24 RX ADMIN — INSULIN LISPRO 4 UNITS: 100 INJECTION, SOLUTION INTRAVENOUS; SUBCUTANEOUS at 16:51

## 2023-12-24 RX ADMIN — ACETAMINOPHEN 650 MG: 325 TABLET ORAL at 16:59

## 2023-12-24 RX ADMIN — CYANOCOBALAMIN TAB 500 MCG 1000 MCG: 500 TAB at 08:45

## 2023-12-24 RX ADMIN — DIVALPROEX SODIUM 250 MG: 250 TABLET, DELAYED RELEASE ORAL at 22:45

## 2023-12-24 RX ADMIN — INSULIN LISPRO 12 UNITS: 100 INJECTION, SOLUTION INTRAVENOUS; SUBCUTANEOUS at 11:30

## 2023-12-24 RX ADMIN — Medication 2000 UNITS: at 08:45

## 2023-12-24 RX ADMIN — AMOXICILLIN AND CLAVULANATE POTASSIUM 500 MG: 500; 125 TABLET, FILM COATED ORAL at 22:45

## 2023-12-24 RX ADMIN — SERTRALINE HYDROCHLORIDE 100 MG: 50 TABLET, FILM COATED ORAL at 08:45

## 2023-12-24 RX ADMIN — MAGNESIUM OXIDE 400 MG (241.3 MG MAGNESIUM) TABLET 400 MG: TABLET at 08:45

## 2023-12-24 RX ADMIN — CLONIDINE HYDROCHLORIDE 0.1 MG: 0.1 TABLET ORAL at 08:45

## 2023-12-24 RX ADMIN — INSULIN LISPRO 12 UNITS: 100 INJECTION, SOLUTION INTRAVENOUS; SUBCUTANEOUS at 22:30

## 2023-12-24 RX ADMIN — ASPIRIN 81 MG: 81 TABLET, COATED ORAL at 08:45

## 2023-12-24 RX ADMIN — HEPARIN SODIUM 5000 UNITS: 5000 INJECTION INTRAVENOUS; SUBCUTANEOUS at 14:17

## 2023-12-24 RX ADMIN — INSULIN LISPRO 9 UNITS: 100 INJECTION, SOLUTION INTRAVENOUS; SUBCUTANEOUS at 04:57

## 2023-12-24 RX ADMIN — METOPROLOL TARTRATE 50 MG: 50 TABLET, FILM COATED ORAL at 22:45

## 2023-12-24 RX ADMIN — ACETAMINOPHEN 650 MG: 325 TABLET ORAL at 06:51

## 2023-12-24 RX ADMIN — OXYCODONE 5 MG: 5 TABLET ORAL at 22:21

## 2023-12-24 RX ADMIN — QUETIAPINE FUMARATE 50 MG: 100 TABLET, FILM COATED ORAL at 22:47

## 2023-12-24 RX ADMIN — HEPARIN SODIUM 5000 UNITS: 5000 INJECTION INTRAVENOUS; SUBCUTANEOUS at 22:21

## 2023-12-24 RX ADMIN — INSULIN LISPRO 9 UNITS: 100 INJECTION, SOLUTION INTRAVENOUS; SUBCUTANEOUS at 08:00

## 2023-12-24 RX ADMIN — INSULIN GLARGINE 33 UNITS: 100 INJECTION, SOLUTION SUBCUTANEOUS at 08:41

## 2023-12-24 RX ADMIN — INSULIN LISPRO 12 UNITS: 100 INJECTION, SOLUTION INTRAVENOUS; SUBCUTANEOUS at 01:07

## 2023-12-24 RX ADMIN — MECLIZINE HCL 12.5 MG 25 MG: 12.5 TABLET ORAL at 06:51

## 2023-12-24 RX ADMIN — DIVALPROEX SODIUM 250 MG: 250 TABLET, DELAYED RELEASE ORAL at 14:17

## 2023-12-24 RX ADMIN — AMLODIPINE BESYLATE 10 MG: 5 TABLET ORAL at 08:46

## 2023-12-24 ASSESSMENT — PAIN DESCRIPTION - DESCRIPTORS
DESCRIPTORS: ACHING
DESCRIPTORS: ACHING

## 2023-12-24 ASSESSMENT — PAIN DESCRIPTION - LOCATION
LOCATION: JAW
LOCATION: JAW

## 2023-12-24 ASSESSMENT — PAIN - FUNCTIONAL ASSESSMENT
PAIN_FUNCTIONAL_ASSESSMENT: 0-10

## 2023-12-24 ASSESSMENT — PAIN SCALES - GENERAL
PAINLEVEL_OUTOF10: 5 - MODERATE PAIN
PAINLEVEL_OUTOF10: 1
PAINLEVEL_OUTOF10: 10 - WORST POSSIBLE PAIN
PAINLEVEL_OUTOF10: 4
PAINLEVEL_OUTOF10: 6
PAINLEVEL_OUTOF10: 8

## 2023-12-24 ASSESSMENT — PAIN DESCRIPTION - ORIENTATION: ORIENTATION: RIGHT

## 2023-12-24 NOTE — CONSULTS
"Consults    History Of Present Illness  Information were mainly retrieved by reviewing medical records and discussing with the medical staff as patient is unable to provide a history.  Amanuel Riojas is a 53 y.o. year old male patient with Past Medical History of  HTN, CAD s/p stents, T2DM, CKD IV presented to the ED endorsing a few days of general malaise, fevers, nausea, vomiting and dizziness. He states that he felt he was \"on a boat\" and the room was spinning and also had episode of chest pain that relieved with sublingual nitroglycerin. He also reports that he was at a [employer] party last night when he was chewing on something hard and now endorses 7/10 pain in right upper teeth.      ED course: CMP showed hyperglycemia 932 with pseudohyponatremia 123.  Beta hydroxy .39, Repeat potassium was WNL. BUN of 71 and CR of 6.51, GFR of 10. EKG shows NSR with rate of 60- had t wave inversion in lead III and AVF in which was present on previous.  Troponin is 10, BNP 17. CBC shows normocytic anemia. Patient was started on maintence fluids and an insulin gtt per protocol. CT of head was benign, CXR shows no cardiopulmonary process. Hernandez catheter placed for acurate Is and Os. Patient was admitted to ICU at this time for further medical management and monitoring.     He was complaining of blurry vision and lethargy and experienced a witnessed  episode of jerky movements involving the right arm and legs, nystagmus of the eyes, and jerking of the right arm and right leg yesterday. Since then, he has not had any further episodes and was almost post-ictal upon returning to the floor. The patient denies any prior history of seizures.    The patient's A1C was measured at 15, and his blood sugar levels have been consistently between 200s to 300s.     Prior work up:   TTE:    1. Left ventricular systolic function is normal with a 55-60% estimated ejection fraction.   2. There is no evidence of mitral valve stenosis.   3. Trace " mitral valve regurgitation.   4. Trace tricuspid regurgitation is visualized.   5. Aortic valve stenosis is not present.   6. The main pulmonary artery is normal in size, and position, with normal bifurcation into the left and right pulmonary arteries.     MRI brain:   Within limitations of motion degraded study, no evidence of acute infarct, or other emergent intracranial abnormality.           Past Medical History  Past Medical History:   Diagnosis Date    Cellulitis of unspecified finger 06/30/2022    Paronychia of thumb, unspecified laterality    COVID-19 01/17/2022    COVID-19 virus infection    Encounter for follow-up examination after completed treatment for conditions other than malignant neoplasm 01/17/2022    Hospital discharge follow-up    Encounter for general adult medical examination without abnormal findings 08/15/2022    Wellness examination    Encounter for screening for malignant neoplasm of colon 03/15/2022    Screen for colon cancer    Encounter for screening for malignant neoplasm of prostate 03/15/2022    Encounter for screening for malignant neoplasm of prostate    Obesity, unspecified 06/14/2022    Class 2 obesity with body mass index (BMI) of 36.0 to 36.9 in adult    Pain in unspecified hand 10/04/2022    Hand pain    Personal history of diseases of the skin and subcutaneous tissue 06/15/2022    History of eczema    Personal history of other diseases of the musculoskeletal system and connective tissue     History of gout    Personal history of other endocrine, nutritional and metabolic disease     History of hypokalemia    Personal history of other endocrine, nutritional and metabolic disease     History of hypokalemia    Rash and other nonspecific skin eruption 03/15/2022    Rash    Stiffness of right hand, not elsewhere classified 07/06/2021    Stiffness of finger joint of right hand    Unspecified fracture of other metacarpal bone, initial encounter for closed fracture 07/06/2021     Fracture of third metacarpal bone    Unspecified fracture of third metacarpal bone, right hand, initial encounter for closed fracture 07/06/2021    Fracture of third metacarpal bone of right hand     Surgical History  Past Surgical History:   Procedure Laterality Date    OTHER SURGICAL HISTORY  11/07/2021    Cardiac catheterization with stent placement     Social History  Social History     Tobacco Use    Smoking status: Never    Smokeless tobacco: Never   Substance Use Topics    Alcohol use: Not Currently    Drug use: Not Currently     Allergies  Sulfamethoxazole-trimethoprim  Home Medications  Medications Prior to Admission   Medication Sig Dispense Refill Last Dose    albuterol 90 mcg/actuation inhaler Inhale 2 puffs every 4 hours if needed for shortness of breath. 18 g 1 12/22/2023    allopurinol (Zyloprim) 300 mg tablet Take 1 tablet (300 mg) by mouth once daily. 90 tablet 1 12/22/2023    amLODIPine (Norvasc) 10 mg tablet Take 1 tablet (10 mg) by mouth once daily. 90 tablet 1 12/22/2023    aspirin (Adult Low Dose Aspirin) 81 mg EC tablet Take 1 tablet (81 mg) by mouth once daily.   12/22/2023    busPIRone (Buspar) 15 mg tablet Take 1 tablet (15 mg) by mouth 3 times a day.   12/22/2023    cholecalciferol (Vitamin D3) 50 mcg (2,000 unit) capsule Take 1 capsule (50 mcg) by mouth once daily. 90 capsule 1 12/22/2023    clobetasol (Temovate) 0.05 % cream APPLY SPARINGLY TO AFFECTED AREA(S) TWICE DAILY AS NEEDED 60 g 1 12/22/2023    cloNIDine (Catapres) 0.1 mg tablet Take 1 tablet (0.1 mg) by mouth once daily. 90 tablet 1 12/22/2023    clotrimazole-betamethasone (Lotrisone) cream Apply 1 Application topically 2 times a day.   12/22/2023    cyanocobalamin (Vitamin B-12) 1,000 mcg tablet Take 1 tablet (1,000 mcg) by mouth once daily. 90 tablet 1 12/22/2023    dupilumab (Dupixent) 300 mg/2 mL syringe injection Inject 1 Syringe (300 mg) under the skin every 14 (fourteen) days.   Unknown    glipiZIDE (Glucotrol) 5 mg  tablet Take 0.5 tablets (2.5 mg) by mouth once daily. 90 tablet 1 12/22/2023    hydroCHLOROthiazide (HYDRODiuril) 25 mg tablet Take 1 tablet (25 mg) by mouth once daily. 90 tablet 1 12/22/2023    hydrocortisone 2.5 % ointment Apply 1 Application topically 2 times a day.   12/22/2023    hydrOXYzine pamoate (Vistaril) 25 mg capsule Take 2 capsules (50 mg) by mouth 2 times a day as needed (at bedtime).   12/22/2023    losartan (Cozaar) 25 mg tablet Take 1 tablet (25 mg) by mouth 2 times a day. 90 tablet 1 12/22/2023    magnesium oxide (Mag-Ox) 400 mg tablet Take 1 tablet (400 mg) by mouth once daily. 90 tablet 1 12/22/2023    metoprolol tartrate (Lopressor) 50 mg tablet Take 1 tablet by mouth 2 times a day. 90 tablet 1 12/22/2023    mometasone-formoterol (Dulera) 200-5 mcg/actuation inhaler Inhale 1 puff 2 times a day. 13 g 1 12/22/2023    nabumetone (Relafen) 750 mg tablet Take 1 tablet (750 mg) by mouth every 12 hours.   12/22/2023    pantoprazole (ProtoNix) 40 mg EC tablet Take 1 tablet (40 mg) by mouth once daily. Do not crush, chew, or split. 30 tablet 1 12/22/2023    potassium chloride CR 20 mEq ER tablet Take 1 tablet (20 mEq) by mouth once daily. 90 tablet 1 12/22/2023    QUEtiapine (SEROquel) 50 mg tablet Take 1 tablet (50 mg) by mouth once daily at bedtime.   12/21/2023    sertraline (Zoloft) 100 mg tablet Take 1 tablet (100 mg) by mouth once daily.   12/22/2023    simvastatin (Zocor) 40 mg tablet Take 1 tablet (40 mg) by mouth once daily at bedtime. 90 tablet 1 12/21/2023    terazosin (Hytrin) 5 mg capsule Take 1 capsule (5 mg) by mouth 1 time.   12/22/2023    traMADol (Ultram) 50 mg tablet Take 1 tablet (50 mg) by mouth every 6 hours.   12/22/2023    triamcinolone (Kenalog) 0.1 % cream Apply 1 Application topically 2 times a day.   12/22/2023     Scheduled medications  [Held by provider] allopurinol, 300 mg, oral, Daily  amLODIPine, 10 mg, oral, Daily  amoxicillin-pot clavulanate, 500 mg, oral, q12h  [facility]  aspirin, 324 mg, oral, Once  aspirin, 81 mg, oral, Daily  [Held by provider] busPIRone, 15 mg, oral, TID  cholecalciferol, 2,000 Units, oral, Daily  cloNIDine, 0.1 mg, oral, Daily  cyanocobalamin, 1,000 mcg, oral, Daily  [Held by provider] glipiZIDE, 2.5 mg, oral, Daily  heparin (porcine), 5,000 Units, subcutaneous, q8h  [Held by provider] hydroCHLOROthiazide, 25 mg, oral, Daily  insulin glargine, 33 Units, subcutaneous, BID  insulin lispro, 0-15 Units, subcutaneous, q4h  insulin lispro, 6 Units, subcutaneous, TID with meals  [Held by provider] losartan, 25 mg, oral, BID  magnesium oxide, 400 mg, oral, Daily  metoprolol tartrate, 50 mg, oral, BID  [Held by provider] nabumetone, 750 mg, oral, q12h  pantoprazole, 40 mg, oral, Daily  QUEtiapine, 50 mg, oral, Nightly  sertraline, 100 mg, oral, Daily  simvastatin, 40 mg, oral, Nightly  terazosin, 5 mg, oral, Once  [Held by provider] traMADol, 50 mg, oral, q6h      Continuous medications     PRN medications  PRN medications: acetaminophen, albuterol, dextrose 10 % in water (D10W), dextrose, glucagon, [Held by provider] hydrOXYzine pamoate, insulin regular, LORazepam, meclizine, ondansetron **OR** ondansetron, oxyCODONE    Results for orders placed or performed during the hospital encounter of 12/22/23 (from the past 96 hour(s))   CBC and Auto Differential   Result Value Ref Range    WBC 10.9 4.4 - 11.3 x10*3/uL    nRBC 0.0 0.0 - 0.0 /100 WBCs    RBC 4.50 4.50 - 5.90 x10*6/uL    Hemoglobin 12.6 (L) 13.5 - 17.5 g/dL    Hematocrit 36.2 (L) 41.0 - 52.0 %    MCV 80 80 - 100 fL    MCH 28.0 26.0 - 34.0 pg    MCHC 34.8 32.0 - 36.0 g/dL    RDW 12.1 11.5 - 14.5 %    Platelets 284 150 - 450 x10*3/uL    Neutrophils % 80.5 40.0 - 80.0 %    Immature Granulocytes %, Automated 0.6 0.0 - 0.9 %    Lymphocytes % 12.2 13.0 - 44.0 %    Monocytes % 6.3 2.0 - 10.0 %    Eosinophils % 0.0 0.0 - 6.0 %    Basophils % 0.4 0.0 - 2.0 %    Neutrophils Absolute 8.75 (H) 1.20 - 7.70 x10*3/uL     Immature Granulocytes Absolute, Automated 0.07 0.00 - 0.70 x10*3/uL    Lymphocytes Absolute 1.33 1.20 - 4.80 x10*3/uL    Monocytes Absolute 0.69 0.10 - 1.00 x10*3/uL    Eosinophils Absolute 0.00 0.00 - 0.70 x10*3/uL    Basophils Absolute 0.04 0.00 - 0.10 x10*3/uL   Magnesium   Result Value Ref Range    Magnesium 3.40 (H) 1.60 - 2.40 mg/dL   Comprehensive metabolic panel   Result Value Ref Range    Glucose 898 (HH) 74 - 99 mg/dL    Sodium 119 (LL) 136 - 145 mmol/L    Potassium 6.9 (HH) 3.5 - 5.3 mmol/L    Chloride 79 (L) 98 - 107 mmol/L    Bicarbonate 24 21 - 32 mmol/L    Anion Gap 23 (H) 10 - 20 mmol/L    Urea Nitrogen 73 (H) 6 - 23 mg/dL    Creatinine 6.62 (H) 0.50 - 1.30 mg/dL    eGFR 9 (L) >60 mL/min/1.73m*2    Calcium 9.5 8.6 - 10.3 mg/dL    Albumin 5.0 3.4 - 5.0 g/dL    Alkaline Phosphatase 101 33 - 120 U/L    Total Protein 8.5 (H) 6.4 - 8.2 g/dL    AST 22 9 - 39 U/L    Bilirubin, Total 0.9 0.0 - 1.2 mg/dL    ALT 10 10 - 52 U/L   Troponin I, High Sensitivity   Result Value Ref Range    Troponin I, High Sensitivity 10 0 - 20 ng/L   B-Type Natriuretic Peptide   Result Value Ref Range    BNP 17 0 - 99 pg/mL   ECG 12 lead   Result Value Ref Range    Ventricular Rate 68 BPM    Atrial Rate 68 BPM    [place] Interval 174 ms    QRS Duration 100 ms    QT Interval 438 ms    QTC Calculation(Bazett) 465 ms    P Axis 37 degrees    R Axis 44 degrees    T Axis -16 degrees    QRS Count 11 beats    Q Onset 218 ms    P Onset 131 ms    P Offset 189 ms    T Offset 437 ms    QTC Fredericia 456 ms   POCT GLUCOSE   Result Value Ref Range    POCT Glucose >600 (H) 74 - 99 mg/dL   Coagulation Screen   Result Value Ref Range    Protime 11.3 9.8 - 12.8 seconds    INR 1.0 0.9 - 1.1    aPTT 27 27 - 38 seconds   Magnesium   Result Value Ref Range    Magnesium 3.05 (H) 1.60 - 2.40 mg/dL   Comprehensive metabolic panel   Result Value Ref Range    Glucose 932 (HH) 74 - 99 mg/dL    Sodium 123 (L) 136 - 145 mmol/L    Potassium 4.7 3.5 - 5.3 mmol/L     Chloride 83 (L) 98 - 107 mmol/L    Bicarbonate 23 21 - 32 mmol/L    Anion Gap 22 (H) 10 - 20 mmol/L    Urea Nitrogen 71 (H) 6 - 23 mg/dL    Creatinine 6.51 (H) 0.50 - 1.30 mg/dL    eGFR 10 (L) >60 mL/min/1.73m*2    Calcium 9.3 8.6 - 10.3 mg/dL    Albumin 4.7 3.4 - 5.0 g/dL    Alkaline Phosphatase 92 33 - 120 U/L    Total Protein 7.8 6.4 - 8.2 g/dL    AST 10 9 - 39 U/L    Bilirubin, Total 0.8 0.0 - 1.2 mg/dL    ALT 8 (L) 10 - 52 U/L   Light Blue Top   Result Value Ref Range    Extra Tube Hold for add-ons.    Beta Hydroxybutyrate   Result Value Ref Range    Beta-Hydroxybutyrate 0.39 (H) 0.02 - 0.27 mmol/L   Phosphorus   Result Value Ref Range    Phosphorus 6.6 (H) 2.5 - 4.9 mg/dL   BLOOD GAS VENOUS FULL PANEL   Result Value Ref Range    POCT pH, Venous 7.30 (L) 7.33 - 7.43 pH    POCT pCO2, Venous 56 (H) 41 - 51 mm Hg    POCT pO2, Venous 30 (L) 35 - 45 mm Hg    POCT SO2, Venous 47 45 - 75 %    POCT Oxy Hemoglobin, Venous 46.1 45.0 - 75.0 %    POCT Hematocrit Calculated, Venous 39.0 (L) 41.0 - 52.0 %    POCT Sodium, Venous 129 (L) 136 - 145 mmol/L    POCT Potassium, Venous 4.2 3.5 - 5.3 mmol/L    POCT Chloride, Venous 90 (L) 98 - 107 mmol/L    POCT Ionized Calicum, Venous 1.20 1.10 - 1.33 mmol/L    POCT Glucose, Venous      POCT Lactate, Venous 2.9 (H) 0.4 - 2.0 mmol/L    POCT Base Excess, Venous 0.1 -2.0 - 3.0 mmol/L    POCT HCO3 Calculated, Venous 27.6 (H) 22.0 - 26.0 mmol/L    POCT Hemoglobin, Venous 13.1 (L) 13.5 - 17.5 g/dL    POCT Anion Gap, Venous 16.0 10.0 - 25.0 mmol/L    Patient Temperature      FiO2 21 %   POCT GLUCOSE   Result Value Ref Range    POCT Glucose >600 (H) 74 - 99 mg/dL   POCT GLUCOSE   Result Value Ref Range    POCT Glucose >600 (H) 74 - 99 mg/dL   Basic metabolic panel   Result Value Ref Range    Glucose 749 (HH) 74 - 99 mg/dL    Sodium 125 (L) 136 - 145 mmol/L    Potassium 4.5 3.5 - 5.3 mmol/L    Chloride 88 (L) 98 - 107 mmol/L    Bicarbonate 22 21 - 32 mmol/L    Anion Gap 20 10 - 20 mmol/L     Urea Nitrogen 71 (H) 6 - 23 mg/dL    Creatinine 5.99 (H) 0.50 - 1.30 mg/dL    eGFR 11 (L) >60 mL/min/1.73m*2    Calcium 8.6 8.6 - 10.3 mg/dL   Magnesium   Result Value Ref Range    Magnesium 2.96 (H) 1.60 - 2.40 mg/dL   Troponin I, High Sensitivity   Result Value Ref Range    Troponin I, High Sensitivity 11 0 - 20 ng/L   SST TOP   Result Value Ref Range    Extra Tube Hold for add-ons.    POCT GLUCOSE   Result Value Ref Range    POCT Glucose >600 (H) 74 - 99 mg/dL   POCT GLUCOSE   Result Value Ref Range    POCT Glucose 522 (H) 74 - 99 mg/dL   POCT GLUCOSE   Result Value Ref Range    POCT Glucose 464 (H) 74 - 99 mg/dL   Comprehensive metabolic panel   Result Value Ref Range    Glucose 475 (HH) 74 - 99 mg/dL    Sodium 129 (L) 136 - 145 mmol/L    Potassium 4.0 3.5 - 5.3 mmol/L    Chloride 93 (L) 98 - 107 mmol/L    Bicarbonate 22 21 - 32 mmol/L    Anion Gap 18 10 - 20 mmol/L    Urea Nitrogen 71 (H) 6 - 23 mg/dL    Creatinine 6.12 (H) 0.50 - 1.30 mg/dL    eGFR 10 (L) >60 mL/min/1.73m*2    Calcium 8.8 8.6 - 10.3 mg/dL    Albumin 4.1 3.4 - 5.0 g/dL    Alkaline Phosphatase 80 33 - 120 U/L    Total Protein 7.0 6.4 - 8.2 g/dL    AST 12 9 - 39 U/L    Bilirubin, Total 0.6 0.0 - 1.2 mg/dL    ALT 5 (L) 10 - 52 U/L   Magnesium   Result Value Ref Range    Magnesium 2.96 (H) 1.60 - 2.40 mg/dL   Beta Hydroxybutyrate   Result Value Ref Range    Beta-Hydroxybutyrate 0.12 0.02 - 0.27 mmol/L   POCT GLUCOSE   Result Value Ref Range    POCT Glucose 341 (H) 74 - 99 mg/dL   POCT GLUCOSE   Result Value Ref Range    POCT Glucose 308 (H) 74 - 99 mg/dL   Urinalysis with Reflex Microscopic   Result Value Ref Range    Color, Urine Yellow Straw, Yellow    Appearance, Urine Clear Clear    Specific Gravity, Urine 1.016 1.005 - 1.035    pH, Urine 5.0 5.0, 5.5, 6.0, 6.5, 7.0, 7.5, 8.0    Protein, Urine 100 (2+) (N) NEGATIVE mg/dL    Glucose, Urine >=500 (3+) (A) NEGATIVE mg/dL    Blood, Urine SMALL (1+) (A) NEGATIVE    Ketones, Urine NEGATIVE NEGATIVE  mg/dL    Bilirubin, Urine NEGATIVE NEGATIVE    Urobilinogen, Urine <2.0 <2.0 mg/dL    Nitrite, Urine NEGATIVE NEGATIVE    Leukocyte Esterase, Urine NEGATIVE NEGATIVE   Eosinophil smear   Result Value Ref Range    Eosinophils None (N) (none)   Drug Screen, Urine   Result Value Ref Range    Amphetamine Screen, Urine Presumptive Negative Presumptive Negative    Barbiturate Screen, Urine Presumptive Negative Presumptive Negative    Benzodiazepines Screen, Urine Presumptive Negative Presumptive Negative    Cannabinoid Screen, Urine Presumptive Negative Presumptive Negative    Cocaine Metabolite Screen, Urine Presumptive Negative Presumptive Negative    Fentanyl Screen, Urine Presumptive Negative Presumptive Negative    Opiate Screen, Urine Presumptive Positive (A) Presumptive Negative    Oxycodone Screen, Urine Presumptive Negative Presumptive Negative    PCP Screen, Urine Presumptive Negative Presumptive Negative   Microscopic Only, Urine   Result Value Ref Range    WBC, Urine 1-5 1-5, NONE /HPF    RBC, Urine 1-2 NONE, 1-2, 3-5 /HPF    Squamous Epithelial Cells, Urine 1-9 (SPARSE) Reference range not established. /HPF    Bacteria, Urine 1+ (A) NONE SEEN /HPF    Mucus, Urine 1+ Reference range not established. /LPF    Hyaline Casts, Urine 2+ (A) NONE /LPF   POCT GLUCOSE   Result Value Ref Range    POCT Glucose 238 (H) 74 - 99 mg/dL   CBC and Auto Differential   Result Value Ref Range    WBC 7.4 4.4 - 11.3 x10*3/uL    nRBC 0.0 0.0 - 0.0 /100 WBCs    RBC 3.84 (L) 4.50 - 5.90 x10*6/uL    Hemoglobin 10.7 (L) 13.5 - 17.5 g/dL    Hematocrit 31.1 (L) 41.0 - 52.0 %    MCV 81 80 - 100 fL    MCH 27.9 26.0 - 34.0 pg    MCHC 34.4 32.0 - 36.0 g/dL    RDW 11.9 11.5 - 14.5 %    Platelets 209 150 - 450 x10*3/uL    Neutrophils % 78.2 40.0 - 80.0 %    Immature Granulocytes %, Automated 0.8 0.0 - 0.9 %    Lymphocytes % 10.9 13.0 - 44.0 %    Monocytes % 9.8 2.0 - 10.0 %    Eosinophils % 0.0 0.0 - 6.0 %    Basophils % 0.3 0.0 - 2.0 %     Neutrophils Absolute 5.77 1.20 - 7.70 x10*3/uL    Immature Granulocytes Absolute, Automated 0.06 0.00 - 0.70 x10*3/uL    Lymphocytes Absolute 0.80 (L) 1.20 - 4.80 x10*3/uL    Monocytes Absolute 0.72 0.10 - 1.00 x10*3/uL    Eosinophils Absolute 0.00 0.00 - 0.70 x10*3/uL    Basophils Absolute 0.02 0.00 - 0.10 x10*3/uL   Basic metabolic panel   Result Value Ref Range    Glucose 237 (H) 74 - 99 mg/dL    Sodium 132 (L) 136 - 145 mmol/L    Potassium 4.0 3.5 - 5.3 mmol/L    Chloride 96 (L) 98 - 107 mmol/L    Bicarbonate 23 21 - 32 mmol/L    Anion Gap 17 10 - 20 mmol/L    Urea Nitrogen 69 (H) 6 - 23 mg/dL    Creatinine 6.34 (H) 0.50 - 1.30 mg/dL    eGFR 10 (L) >60 mL/min/1.73m*2    Calcium 8.8 8.6 - 10.3 mg/dL   Magnesium   Result Value Ref Range    Magnesium 2.91 (H) 1.60 - 2.40 mg/dL   Phosphorus   Result Value Ref Range    Phosphorus 4.0 2.5 - 4.9 mg/dL   POCT GLUCOSE   Result Value Ref Range    POCT Glucose 257 (H) 74 - 99 mg/dL   Comprehensive metabolic panel   Result Value Ref Range    Glucose 263 (H) 74 - 99 mg/dL    Sodium 133 (L) 136 - 145 mmol/L    Potassium 3.7 3.5 - 5.3 mmol/L    Chloride 97 (L) 98 - 107 mmol/L    Bicarbonate 23 21 - 32 mmol/L    Anion Gap 17 10 - 20 mmol/L    Urea Nitrogen 68 (H) 6 - 23 mg/dL    Creatinine 6.32 (H) 0.50 - 1.30 mg/dL    eGFR 10 (L) >60 mL/min/1.73m*2    Calcium 8.8 8.6 - 10.3 mg/dL    Albumin 4.0 3.4 - 5.0 g/dL    Alkaline Phosphatase 75 33 - 120 U/L    Total Protein 6.8 6.4 - 8.2 g/dL    AST 8 (L) 9 - 39 U/L    Bilirubin, Total 0.7 0.0 - 1.2 mg/dL    ALT 6 (L) 10 - 52 U/L   POCT GLUCOSE   Result Value Ref Range    POCT Glucose 273 (H) 74 - 99 mg/dL   POCT GLUCOSE   Result Value Ref Range    POCT Glucose 259 (H) 74 - 99 mg/dL   POCT GLUCOSE   Result Value Ref Range    POCT Glucose 309 (H) 74 - 99 mg/dL   Basic metabolic panel   Result Value Ref Range    Glucose 264 (H) 74 - 99 mg/dL    Sodium 133 (L) 136 - 145 mmol/L    Potassium 3.8 3.5 - 5.3 mmol/L    Chloride 97 (L) 98 - 107  mmol/L    Bicarbonate 23 21 - 32 mmol/L    Anion Gap 17 10 - 20 mmol/L    Urea Nitrogen 67 (H) 6 - 23 mg/dL    Creatinine 6.12 (H) 0.50 - 1.30 mg/dL    eGFR 10 (L) >60 mL/min/1.73m*2    Calcium 8.9 8.6 - 10.3 mg/dL   Magnesium   Result Value Ref Range    Magnesium 2.59 (H) 1.60 - 2.40 mg/dL   Lavender Top   Result Value Ref Range    Extra Tube Hold for add-ons.    SST TOP   Result Value Ref Range    Extra Tube Hold for add-ons.    Hemoglobin A1c   Result Value Ref Range    Hemoglobin A1C 15.1 (H) see below %    Estimated Average Glucose 387 Not Established mg/dL   POCT GLUCOSE   Result Value Ref Range    POCT Glucose 252 (H) 74 - 99 mg/dL   ECG 12 lead   Result Value Ref Range    Ventricular Rate 77 BPM    Atrial Rate 77 BPM    [place] Interval 164 ms    QRS Duration 102 ms    QT Interval 392 ms    QTC Calculation(Bazett) 443 ms    P Axis 41 degrees    R Axis 51 degrees    T Axis -25 degrees    QRS Count 13 beats    Q Onset 216 ms    P Onset 134 ms    P Offset 191 ms    T Offset 412 ms    QTC Fredericia 425 ms   Lactate   Result Value Ref Range    Lactate 1.9 0.4 - 2.0 mmol/L   Lactate dehydrogenase   Result Value Ref Range    LDH 85 84 - 246 U/L   Lavender Top   Result Value Ref Range    Extra Tube Hold for add-ons.    SST TOP   Result Value Ref Range    Extra Tube Hold for add-ons.    POCT GLUCOSE   Result Value Ref Range    POCT Glucose 230 (H) 74 - 99 mg/dL   POCT GLUCOSE   Result Value Ref Range    POCT Glucose 215 (H) 74 - 99 mg/dL   Transthoracic Echo (TTE) Complete   Result Value Ref Range    AV mn grad 5.0     AV pk jim 1.52     LV biplane EF 56     LVOT diam 2.10     MV E/A ratio 1.20     MV avg E/e' ratio 10.45     LA vol index A/L 31.3     RV free wall pk S' 15.20     RVSP 16.5     Aortic Valve Area by Continuity of Peak Velocity 2.76     AV pk grad 9.2     Aortic Valve Area by Continuity of VTI 2.69     LV A4C EF 53.2    ECG 12 Lead   Result Value Ref Range    Ventricular Rate 68 BPM    Atrial Rate 68 BPM     [place] Interval 166 ms    QRS Duration 112 ms    QT Interval 456 ms    QTC Calculation(Bazett) 484 ms    P Axis 41 degrees    R Axis 25 degrees    T Axis -10 degrees    QRS Count 12 beats    Q Onset 217 ms    P Onset 134 ms    P Offset 191 ms    T Offset 445 ms    QTC Fredericia 475 ms   POCT GLUCOSE   Result Value Ref Range    POCT Glucose 272 (H) 74 - 99 mg/dL   POCT GLUCOSE   Result Value Ref Range    POCT Glucose 360 (H) 74 - 99 mg/dL   Prolactin   Result Value Ref Range    Prolactin 7.8 2.0 - 18.0 ug/L   Comprehensive metabolic panel   Result Value Ref Range    Glucose 353 (H) 74 - 99 mg/dL    Sodium 130 (L) 136 - 145 mmol/L    Potassium 4.2 3.5 - 5.3 mmol/L    Chloride 97 (L) 98 - 107 mmol/L    Bicarbonate 21 21 - 32 mmol/L    Anion Gap 16 10 - 20 mmol/L    Urea Nitrogen 64 (H) 6 - 23 mg/dL    Creatinine 6.04 (H) 0.50 - 1.30 mg/dL    eGFR 10 (L) >60 mL/min/1.73m*2    Calcium 8.7 8.6 - 10.3 mg/dL    Albumin 3.9 3.4 - 5.0 g/dL    Alkaline Phosphatase 69 33 - 120 U/L    Total Protein 6.7 6.4 - 8.2 g/dL    AST 11 9 - 39 U/L    Bilirubin, Total 0.7 0.0 - 1.2 mg/dL    ALT 7 (L) 10 - 52 U/L   Lactate   Result Value Ref Range    Lactate 0.9 0.4 - 2.0 mmol/L   POCT GLUCOSE   Result Value Ref Range    POCT Glucose 319 (H) 74 - 99 mg/dL   Comprehensive metabolic panel   Result Value Ref Range    Glucose 317 (H) 74 - 99 mg/dL    Sodium 131 (L) 136 - 145 mmol/L    Potassium 3.8 3.5 - 5.3 mmol/L    Chloride 97 (L) 98 - 107 mmol/L    Bicarbonate 23 21 - 32 mmol/L    Anion Gap 15 10 - 20 mmol/L    Urea Nitrogen 64 (H) 6 - 23 mg/dL    Creatinine 5.62 (H) 0.50 - 1.30 mg/dL    eGFR 11 (L) >60 mL/min/1.73m*2    Calcium 8.8 8.6 - 10.3 mg/dL    Albumin 4.0 3.4 - 5.0 g/dL    Alkaline Phosphatase 73 33 - 120 U/L    Total Protein 6.9 6.4 - 8.2 g/dL    AST 11 9 - 39 U/L    Bilirubin, Total 0.6 0.0 - 1.2 mg/dL    ALT 5 (L) 10 - 52 U/L   POCT GLUCOSE   Result Value Ref Range    POCT Glucose 303 (H) 74 - 99 mg/dL   POCT GLUCOSE   Result Value  "Ref Range    POCT Glucose 299 (H) 74 - 99 mg/dL         Review of Systems  Neurological Exam  Physical Exam  Last Recorded Vitals  Blood pressure 116/69, pulse 58, temperature 36.3 °C (97.3 °F), temperature source Temporal, resp. rate 16, height 1.702 m (5' 7\"), weight 96.3 kg (212 lb 4.9 oz), SpO2 97 %.  GENERAL APPEARANCE:  lethargic, sleepy.           MENTAL STATE:  Orientation was normal to time, place and person. Decreased attention span.           CRANIAL NERVES:  Cranial nerves were normal.      CN 2- Visual  fields full to confrontation.      CN 3, 4, 6-  Pupils round, 4 mm in diameter, equally reactive to light. No ptosis. EOMs normal alignment, full range of movement, no nystagmus     CN 5- Facial sensation intact bilaterally. Normal corneal reflexes.      CN 7- Normal and symmetric facial strength. Nasolabial folds symmetric.     CN 8- Hearing intact to finger rub, whisper.      CN 9- Palate elevates symmetrically. Normal gag reflex.      CN 11- Normal strength of shoulder shrug and neck turning      CN 12- Tongue midline, with normal bulk and strength; no fasciculations.     MOTOR:  Mild weakness on the right side ( upper motor neuron distribution, likely postictal Minesh' paralysis).       REFLEXES:  suppressed throughout.      SENSORY:  no asymmetrical sensory loss.      COORDINATION/GAIT:  not tested.  ( The patient is lethargic).                                MR brain wo IV contrast    Result Date: 12/23/2023  Interpreted By:  Tigre Urrutia, STUDY: MR BRAIN WO IV CONTRAST;  12/23/2023 2:44 pm   INDICATION: Signs/Symptoms:dizziness/double vision.   COMPARISON: CT head dated 12/22/2023;   ACCESSION NUMBER(S): [id number]   ORDERING CLINICIAN: MALVIN CHRISTIAN   TECHNIQUE: Axial T2, FLAIR, DWI, gradient echo T2 and sagittal and coronal T1 weighted images of brain were acquired.   FINDINGS: Exam is degraded by motion.   Within limitations of the study, no definite area of diffusion restriction is " identified to suggest an acute infarct.   There is no evidence of intracranial hemorrhage. No mass effect or midline shift. No abnormal hemosiderin staining is identified within limitations of the study.   No ventricular dilatation is present. Basal cisterns are patent. No extra-axial fluid collections are identified.   Several subcentimeter foci of hyperintense FLAIR and T2 signal are present in the subcortical white matter of bilateral cerebral hemispheres, nonspecific findings favored to represent changes of microvascular disease. Otherwise no significant parenchymal signal abnormality is noted.   Within limitations of motion degraded study, visualized large arterial flow voids, including intracranial carotid and basilar arteries are preserved.   Paranasal sinuses and mastoid air cells are unremarkable in appearance.       Within limitations of motion degraded study, no evidence of acute infarct, or other emergent intracranial abnormality.   MACRO: None   Signed by: Tigre Urrutia 12/23/2023 2:48 PM Dictation workstation:   [id number]   CT head wo IV contrast    Result Date: 12/22/2023  Interpreted By:  Nilo Bates, STUDY: CT HEAD WO IV CONTRAST;  12/22/2023 3:51 pm   INDICATION: Signs/Symptoms:dizziness.   COMPARISON: 06/24/2019   ACCESSION NUMBER(S): [id number]   ORDERING CLINICIAN: ALEXANDER THOMPSON   TECHNIQUE: Noncontrast axial CT scan of head was performed. Angled reformats in brain and bone windows were generated. The images were reviewed in bone, brain, blood and soft tissue windows.   FINDINGS: There is no intra/extra-axial fluid collection, mass effect, or midline shift. The gray/white matter junction is preserved. The basal cisterns are patent. Visualized paranasal sinuses and mastoid air cells are clear. The calvarium is intact.       No acute intracranial finding. MRI may be obtained if clinically indicated   Signed by: Nilo Bates 12/22/2023 4:18 PM Dictation workstation:   [id number]    Transthoracic Echo (TTE) Complete    Result Date: 12/23/2023          [place] [address]  Tel [phone] Fax [phone] TRANSTHORACIC ECHOCARDIOGRAM REPORT  Patient Name:      AGUILA URBINA MARY Leon Physician:    [id number] Ricki Simental DO Study Date:        12/23/2023           Ordering Provider:    [id number] MALVIN CHRISTIAN MRN/PID:           [id number]             Fellow: Accession#:        [id number]         Nurse: Date of Birth/Age: 1970 / 53 years Sonographer:          Ellen Goodson RDBRANDIN Gender:            M                    Additional Staff: Height:            170.18 cm            Admit Date:           12/22/2023 Weight:            96.16 kg             Admission Status:     Inpatient - STAT BSA:               2.07 m2              Department Location:  [facility]                                                               Echo Lab Blood Pressure: 127 /63 mmHg Study Type:    TRANSTHORACIC ECHO (TTE) COMPLETE Diagnosis/ICD: Chest pain, unspecified-R07.9 Indication:    CP CPT Codes:     Echo Complete w Full [id number] Patient History: Diabetes:          Yes Pertinent History: Hyperlipidemia and HTN. Study Detail: The following Echo studies were performed: 2D, M-Mode, Doppler and               color flow. Technically challenging study due to the patient's               lack of cooperation, patient lying in supine position and body               habitus.  PHYSICIAN INTERPRETATION: Left Ventricle: Left ventricular systolic function is normal, with an estimated ejection fraction of 55-60%. There are no regional wall motion abnormalities. The left ventricular cavity size is normal. There is borderline concentric left ventricular hypertrophy. Spectral Doppler shows a  normal pattern of left ventricular diastolic filling. LV Wall Scoring: All segments are normal. Left Atrium: The left atrium is normal in size. Right Ventricle: The right ventricle is normal in size. There is normal right ventricular global systolic function. Right Atrium: The right atrium is normal in size. Aortic Valve: The aortic valve appears structurally normal. The aortic valve appears tricuspid. There is no evidence of aortic valve stenosis. There is no evidence of aortic valve regurgitation. The peak instantaneous gradient of the aortic valve is 9.2 mmHg. The mean gradient of the aortic valve is 5.0 mmHg. Mitral Valve: The mitral valve is normal in structure. There is no evidence of mitral valve stenosis. There is normal mitral valve leaflet mobility. There is trace mitral valve regurgitation. Tricuspid Valve: The tricuspid valve is structurally normal. There is normal tricuspid valve leaflet mobility. There is trace tricuspid regurgitation. Pulmonic Valve: The pulmonic valve is structurally normal. There is no indication of pulmonic valve regurgitation. Pericardium: There is a trivial pericardial effusion. Aorta: The aortic root is normal. Pulmonary Artery: The main pulmonary artery is normal in size, and position, with normal bifurcation into the left and right pulmonary arteries. Systemic Veins: The inferior vena cava appears to be of normal size. In comparison to the previous echocardiogram(s): The left ventricular function is unchanged. The left ventricular diastolic function is unchanged.  CONCLUSIONS:  1. Left ventricular systolic function is normal with a 55-60% estimated ejection fraction.  2. There is no evidence of mitral valve stenosis.  3. Trace mitral valve regurgitation.  4. Trace tricuspid regurgitation is visualized.  5. Aortic valve stenosis is not present.  6. The main pulmonary artery is normal in size, and position, with normal bifurcation into the left and right pulmonary arteries.  QUANTITATIVE DATA SUMMARY: 2D MEASUREMENTS:                    Normal Ranges: Ao Root d: 2.40 cm (2.0-3.7cm) LA VOLUME:                               Normal Ranges: LA Vol A4C:        148.0 ml   (22+/-6mL/m2) LA Vol A2C:        27.8 ml LA Vol BP:         64.8 ml LA Vol Index A4C:  71.4ml/m2 LA Vol Index A2C:  13.4 ml/m2 LA Vol Index BP:   31.3 ml/m2 LA Area A4C:       28.3 cm2 LA Area A2C:       12.4 cm2 LA Major Axis A4C: 4.6 cm LA Major Axis A2C: 4.7 cm LA Volume Index:   13.2 ml/m2 RA VOLUME BY A/L METHOD:                               Normal Ranges: RA Vol A4C:        25.4 ml    (8.3-19.5ml) RA Vol Index A4C:  12.3 ml/m2 RA Area A4C:       11.6 cm2 RA Major Axis A4C: 4.5 cm LV SYSTOLIC FUNCTION BY 2D PLANIMETRY (MOD):                     Normal Ranges: EF-A4C View: 53.2 % (>=55%) EF-A2C View: 56.9 % EF-Biplane:  56.2 % LV DIASTOLIC FUNCTION:                        Normal Ranges: MV Peak E:    0.60 m/s (0.7-1.2 m/s) MV Peak A:    0.50 m/s (0.42-0.7 m/s) E/A Ratio:    1.20     (1.0-2.2) MV e'         0.06 m/s (>8.0) MV lateral e' 0.06 m/s MV medial e'  0.06 m/s E/e' Ratio:   10.45    (<8.0) MITRAL VALVE:                 Normal Ranges: MV DT: 256 msec (150-240msec) AORTIC VALVE:                                   Normal Ranges: AoV Vmax:                1.52 m/s (<=1.7m/s) AoV Peak P.2 mmHg (<20mmHg) AoV Mean P.0 mmHg (1.7-11.5mmHg) LVOT Max Mau:            1.21 m/s (<=1.1m/s) AoV VTI:                 22.90 cm (18-25cm) LVOT VTI:                17.80 cm LVOT Diameter:           2.10 cm  (1.8-2.4cm) AoV Area, VTI:           2.69 cm2 (2.5-5.5cm2) AoV Area,Vmax:           2.76 cm2 (2.5-4.5cm2) AoV Dimensionless Index: 0.78  RIGHT VENTRICLE: RV Basal 3.43 cm RV Mid   2.54 cm RV Major 7.8 cm RV s'    0.15 m/s TRICUSPID VALVE/RVSP:                             Normal Ranges: Peak TR Velocity: 1.84 m/s RV Syst Pressure: 16.5 mmHg (< 30mmHg) PULMONIC VALVE:                         Normal  Ranges: PV Accel Time: 48 msec  (>120ms) PV Max Mau:    1.3 m/s  (0.6-0.9m/s) PV Max P.1 mmHg  [id number] Ricki Simental DO Electronically signed on 2023 at 12:16:55 PM  Wall Scoring  ** Final **        Results from last 7 days   Lab Units 23  0740   HEMOGLOBIN A1C % 15.1*     BNP   Date/Time Value Ref Range Status   2023 03:29 PM 17 0 - 99 pg/mL Final        I have personally reviewed the following imaging results MR brain wo IV contrast    Result Date: 2023  Interpreted By:  Tigre Urrutia, STUDY: MR BRAIN WO IV CONTRAST;  2023 2:44 pm   INDICATION: Signs/Symptoms:dizziness/double vision.   COMPARISON: CT head dated 2023;   ACCESSION NUMBER(S): [id number]   ORDERING CLINICIAN: MALVIN CHRISTIAN   TECHNIQUE: Axial T2, FLAIR, DWI, gradient echo T2 and sagittal and coronal T1 weighted images of brain were acquired.   FINDINGS: Exam is degraded by motion.   Within limitations of the study, no definite area of diffusion restriction is identified to suggest an acute infarct.   There is no evidence of intracranial hemorrhage. No mass effect or midline shift. No abnormal hemosiderin staining is identified within limitations of the study.   No ventricular dilatation is present. Basal cisterns are patent. No extra-axial fluid collections are identified.   Several subcentimeter foci of hyperintense FLAIR and T2 signal are present in the subcortical white matter of bilateral cerebral hemispheres, nonspecific findings favored to represent changes of microvascular disease. Otherwise no significant parenchymal signal abnormality is noted.   Within limitations of motion degraded study, visualized large arterial flow voids, including intracranial carotid and basilar arteries are preserved.   Paranasal sinuses and mastoid air cells are unremarkable in appearance.       Within limitations of motion degraded study, no evidence of acute infarct, or other emergent intracranial abnormality.    MACRO: None   Signed by: Tigre Urrutia 12/23/2023 2:48 PM Dictation workstation:   [id number]    ECG 12 Lead    Result Date: 12/23/2023  Normal sinus rhythm Nonspecific T wave abnormality Prolonged QT Abnormal ECG When compared with ECG of 22-DEC-2023 19:42, (unconfirmed) No significant change was found Confirmed by Zeke Carbajal ([id number]) on 12/23/2023 2:15:21 PM    Transthoracic Echo (TTE) Complete    Result Date: 12/23/2023          [place] [address]  Tel [phone] Fax [phone] TRANSTHORACIC ECHOCARDIOGRAM REPORT  Patient Name:      AGUILA Leon Physician:    [id number] Ricki Simental DO Study Date:        12/23/2023           Ordering Provider:    [id number] MALVIN CHRISTIAN MRN/PID:           [id number]             Fellow: Accession#:        [id number]         Nurse: Date of Birth/Age: 1970 / 53 years Sonographer:          Ellen Goodson RDCS Gender:            M                    Additional Staff: Height:            170.18 cm            Admit Date:           12/22/2023 Weight:            96.16 kg             Admission Status:     Inpatient - STAT BSA:               2.07 m2              Department Location:  [facility]                                                               Echo Lab Blood Pressure: 127 /63 mmHg Study Type:    TRANSTHORACIC ECHO (TTE) COMPLETE Diagnosis/ICD: Chest pain, unspecified-R07.9 Indication:    CP CPT Codes:     Echo Complete w Full Doppler-[id number] Patient History: Diabetes:          Yes Pertinent History: Hyperlipidemia and HTN. Study Detail: The following Echo studies were performed: 2D, M-Mode, Doppler and               color flow. Technically challenging study due to the patient's               lack of cooperation,  patient lying in supine position and body               habitus.  PHYSICIAN INTERPRETATION: Left Ventricle: Left ventricular systolic function is normal, with an estimated ejection fraction of 55-60%. There are no regional wall motion abnormalities. The left ventricular cavity size is normal. There is borderline concentric left ventricular hypertrophy. Spectral Doppler shows a normal pattern of left ventricular diastolic filling. LV Wall Scoring: All segments are normal. Left Atrium: The left atrium is normal in size. Right Ventricle: The right ventricle is normal in size. There is normal right ventricular global systolic function. Right Atrium: The right atrium is normal in size. Aortic Valve: The aortic valve appears structurally normal. The aortic valve appears tricuspid. There is no evidence of aortic valve stenosis. There is no evidence of aortic valve regurgitation. The peak instantaneous gradient of the aortic valve is 9.2 mmHg. The mean gradient of the aortic valve is 5.0 mmHg. Mitral Valve: The mitral valve is normal in structure. There is no evidence of mitral valve stenosis. There is normal mitral valve leaflet mobility. There is trace mitral valve regurgitation. Tricuspid Valve: The tricuspid valve is structurally normal. There is normal tricuspid valve leaflet mobility. There is trace tricuspid regurgitation. Pulmonic Valve: The pulmonic valve is structurally normal. There is no indication of pulmonic valve regurgitation. Pericardium: There is a trivial pericardial effusion. Aorta: The aortic root is normal. Pulmonary Artery: The main pulmonary artery is normal in size, and position, with normal bifurcation into the left and right pulmonary arteries. Systemic Veins: The inferior vena cava appears to be of normal size. In comparison to the previous echocardiogram(s): The left ventricular function is unchanged. The left ventricular diastolic function is unchanged.  CONCLUSIONS:  1. Left ventricular  systolic function is normal with a 55-60% estimated ejection fraction.  2. There is no evidence of mitral valve stenosis.  3. Trace mitral valve regurgitation.  4. Trace tricuspid regurgitation is visualized.  5. Aortic valve stenosis is not present.  6. The main pulmonary artery is normal in size, and position, with normal bifurcation into the left and right pulmonary arteries. QUANTITATIVE DATA SUMMARY: 2D MEASUREMENTS:                    Normal Ranges: Ao Root d: 2.40 cm (2.0-3.7cm) LA VOLUME:                               Normal Ranges: LA Vol A4C:        148.0 ml   (22+/-6mL/m2) LA Vol A2C:        27.8 ml LA Vol BP:         64.8 ml LA Vol Index A4C:  71.4ml/m2 LA Vol Index A2C:  13.4 ml/m2 LA Vol Index BP:   31.3 ml/m2 LA Area A4C:       28.3 cm2 LA Area A2C:       12.4 cm2 LA Major Axis A4C: 4.6 cm LA Major Axis A2C: 4.7 cm LA Volume Index:   13.2 ml/m2 RA VOLUME BY A/L METHOD:                               Normal Ranges: RA Vol A4C:        25.4 ml    (8.3-19.5ml) RA Vol Index A4C:  12.3 ml/m2 RA Area A4C:       11.6 cm2 RA Major Axis A4C: 4.5 cm LV SYSTOLIC FUNCTION BY 2D PLANIMETRY (MOD):                     Normal Ranges: EF-A4C View: 53.2 % (>=55%) EF-A2C View: 56.9 % EF-Biplane:  56.2 % LV DIASTOLIC FUNCTION:                        Normal Ranges: MV Peak E:    0.60 m/s (0.7-1.2 m/s) MV Peak A:    0.50 m/s (0.42-0.7 m/s) E/A Ratio:    1.20     (1.0-2.2) MV e'         0.06 m/s (>8.0) MV lateral e' 0.06 m/s MV medial e'  0.06 m/s E/e' Ratio:   10.45    (<8.0) MITRAL VALVE:                 Normal Ranges: MV DT: 256 msec (150-240msec) AORTIC VALVE:                                   Normal Ranges: AoV Vmax:                1.52 m/s (<=1.7m/s) AoV Peak P.2 mmHg (<20mmHg) AoV Mean P.0 mmHg (1.7-11.5mmHg) LVOT Max Mau:            1.21 m/s (<=1.1m/s) AoV VTI:                 22.90 cm (18-25cm) LVOT VTI:                17.80 cm LVOT Diameter:           2.10 cm  (1.8-2.4cm) AoV Area,  VTI:           2.69 cm2 (2.5-5.5cm2) AoV Area,Vmax:           2.76 cm2 (2.5-4.5cm2) AoV Dimensionless Index: 0.78  RIGHT VENTRICLE: RV Basal 3.43 cm RV Mid   2.54 cm RV Major 7.8 cm RV s'    0.15 m/s TRICUSPID VALVE/RVSP:                             Normal Ranges: Peak TR Velocity: 1.84 m/s RV Syst Pressure: 16.5 mmHg (< 30mmHg) PULMONIC VALVE:                         Normal Ranges: PV Accel Time: 48 msec  (>120ms) PV Max Mau:    1.3 m/s  (0.6-0.9m/s) PV Max P.1 mmHg  [id number] Ricki Simental DO Electronically signed on 2023 at 12:16:55 PM  Wall Scoring  ** Final **     ECG 12 lead    Result Date: 2023  Normal sinus rhythm T wave abnormality, consider inferior ischemia Abnormal ECG When compared with ECG of 22-DEC-2023 15:42, (unconfirmed) No significant change was found    US renal complete    Result Date: 2023  Interpreted By:  Blank Maki, STUDY: US RENAL COMPLETE;  2023 7:16 pm   INDICATION: Signs/Symptoms:acute renal failure. Elevated BUN and creatinine.   COMPARISON: CT abdomen and pelvis 2022. Right upper quadrant ultrasound 2021. Renal ultrasound 2021, 2021.   ACCESSION NUMBER(S): [id number]   ORDERING CLINICIAN: ALEXANDER THOMPSON   TECHNIQUE: Multiple images of the kidneys were obtained  .   FINDINGS: RIGHT KIDNEY: The right kidney measures 9.7 cm in length. There is increased echogenicity of the renal parenchyma and renal cortical thinning. No hydronephrosis is present.   LEFT KIDNEY: The left kidney measures 10.7 cm in length. There is increased echogenicity of the renal parenchyma and renal cortical thinning. No hydronephrosis is present. There is a 2.2 x 1.8 x 1.3 cm cyst with internal septations at the superior pole. There is a 2.3 x 2.9 x 1.9 cm cyst with internal septation at the interpolar region. There is a 1.7 x 1.9 x 1.7 cm cyst at the interpolar region with a peripheral 0.5 cm echogenic focus. There is a 2.1 x 1.4 x 1.5 cm cyst at the  inferior pole.   BLADDER: The bilateral ureteral jets were visualized.       1. No hydronephrosis. Increased echogenicity of the renal parenchyma and renal cortical thinning, suggestive of medical renal disease. 2. Multiple left renal cysts, as above.   MACRO: None   Signed by: Blank Maki 12/22/2023 7:33 PM Dictation workstation:   [id number]    CT head wo IV contrast    Result Date: 12/22/2023  Interpreted By:  Nilo Bates, STUDY: CT HEAD WO IV CONTRAST;  12/22/2023 3:51 pm   INDICATION: Signs/Symptoms:dizziness.   COMPARISON: 06/24/2019   ACCESSION NUMBER(S): [id number]   ORDERING CLINICIAN: ALEXANDER THOMPSON   TECHNIQUE: Noncontrast axial CT scan of head was performed. Angled reformats in brain and bone windows were generated. The images were reviewed in bone, brain, blood and soft tissue windows.   FINDINGS: There is no intra/extra-axial fluid collection, mass effect, or midline shift. The gray/white matter junction is preserved. The basal cisterns are patent. Visualized paranasal sinuses and mastoid air cells are clear. The calvarium is intact.       No acute intracranial finding. MRI may be obtained if clinically indicated   Signed by: Nilo Bates 12/22/2023 4:18 PM Dictation workstation:   [id number]    XR chest 1 view    Result Date: 12/22/2023  Interpreted By:  Reilly Galan, STUDY: XR CHEST 1 VIEW; 12/22/2023 3:37 pm   INDICATION: Dizziness   COMPARISON: Radiographs 01/07/2022.   ACCESSION NUMBER(S): [id number]   ORDERING CLINICIAN: ALEXANDER THOMPSON   TECHNIQUE: Single frontal view of the chest performed.   FINDINGS: LINES AND DEVICES: None.   LUNGS: Streaky bibasilar atelectasis. No focal consolidation, pulmonary edema, pleural effusion or pneumothorax.   CARDIOMEDIASTINAL SILHOUETTE: The cardiomediastinal silhouette is within normal limits.       No acute cardiopulmonary process.   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   - Patient experienced jerky movements of the right arm and leg, nystagmus of the eyes, and post-ictal state likely related to blood sugar fluctuation, sodium fluctuation, and changes in osmolality, with no evidence of an acute focal CNS pathology by brain MRI  - No prior history of seizures  - Plan:  Agree with medical work up as already initiated ( in stabilizing blood sugar and electrolytes)               EEG  evaluation           The patient needs to avoid driving, operating heavy machines, or being in any condition, should he  have altered mentation may put him or others in danger ( swimming, climbing stairs, behind locked doors,  etc...)  for at least 6 months of being spell free.              Depakote 250 mg po bid                Follow up by Neurology ( may be gradually tapered off in 4-6 weeks).                To avoid future use of  home meds: Tramadol and Bupsar ( due to seizure side effects)               Will benefit from psychiatry and ophthalmology evaluation as outpatient        Time spent 60 minutes including reviewing medical records includes examining, consulting the patient and discussing with the medical staff members.         Terence Garrison MD

## 2023-12-24 NOTE — NURSING NOTE
Pt's girlfriend came out and stated that patient was shaking. RN walked into patient's room to find patient moaning. Right arm shaking. Bilat legs twitching and Right and left pupil showing nystagmus. Pt not responsive to verbal stimulation but pt does respond to painful stimuli.  SHANTE Kaba NP and ELISA BORJAS at bedside to assess pt. Checked BS results 259. Labs' drawn. VS stable.

## 2023-12-24 NOTE — PROGRESS NOTES
"[facility] Critical Care Medicine       Date:  12/24/2023  Patient:  Amanuel Riojas  YOB: 1970  MRN:  [id number]   Admit Date:  12/22/2023    Chief Complaint   Patient presents with    Dizziness    Dental Pain         History of Present Illness:  Amanuel Riojas is a 53 y.o. year old male patient with Past Medical History of HTN, CAD s/p stents, T2DM, CKD IV presented to the ED endorsing a few days of general malaise, fevers, nausea, vomiting and dizziness. He states that he felt he was \"on a boat\" and the room was spinning and also had episode of chest pain that relieved with sublingual nitroglycerin. He also reports that he was at a [employer] party last night when he was chewing on something hard and now endorses 7/10 pain in right upper teeth.      ED course: CMP showed hyperglycemia 932 with pseudohyponatremia 123.  Beta hydroxy .39, Repeat potassium was WNL. BUN of 71 and CR of 6.51, GFR of 10. EKG shows NSR with rate of 60- had t wave inversion in lead III and AVF in which was present on previous.  Troponin is 10, BNP 17. CBC shows normocytic anemia. Patient was started on maintence fluids and an insulin gtt per protocol. CT of head was benign, CXR shows no cardiopulmonary process. Hernandez catheter placed for acurate Is and Os. Patient was admitted to ICU at this time for further medical management and monitoring.       Interval ICU Events:  12/22: Patient was admitted to ICU for management of HHS vs DKA and LU on CKD. He is endorsing general malaise, nausea and dizziness. He denies chest pain, shortness of breath, abd pain, flank pain, dysuria.      12/23: Overnight, patient arrived to ICU on .5units/hr of insulin and on a D5 gtt. On arrival to unit he was given an addition 10 unit bolus of insulin and started on LR gtt 75/hr. His BS improved after initiation of proper dose of insulin. He also had developed hypotension in which he was started on levophed after he was given nitroglycerin. This " was discontinued this am. He is now hemodynamically stable, NSR with no ectopy and 96% on RA. He is still endorsing nausea and vomiting. Denies flank pain, abd pain, chest pain and shortness of breath.      12/24: Patient resting comfortably in bed this morning alert and oriented, but had trouble staying awake.  Patient mentions blurry vision and vertigo-like symptoms. patient's insulin regimen changed to 45 units twice a day, 14 units 3 times a day with meals, and SSI before meals.  Patient had seizure-like activity while at MRI yesterday was given 2 mg of Ativan.  Prolactin procedure came back at 7.8.     Medical History:  Past Medical History:   Diagnosis Date    Cellulitis of unspecified finger 06/30/2022    Paronychia of thumb, unspecified laterality    COVID-19 01/17/2022    COVID-19 virus infection    Encounter for follow-up examination after completed treatment for conditions other than malignant neoplasm 01/17/2022    Hospital discharge follow-up    Encounter for general adult medical examination without abnormal findings 08/15/2022    Wellness examination    Encounter for screening for malignant neoplasm of colon 03/15/2022    Screen for colon cancer    Encounter for screening for malignant neoplasm of prostate 03/15/2022    Encounter for screening for malignant neoplasm of prostate    Obesity, unspecified 06/14/2022    Class 2 obesity with body mass index (BMI) of 36.0 to 36.9 in adult    Pain in unspecified hand 10/04/2022    Hand pain    Personal history of diseases of the skin and subcutaneous tissue 06/15/2022    History of eczema    Personal history of other diseases of the musculoskeletal system and connective tissue     History of gout    Personal history of other endocrine, nutritional and metabolic disease     History of hypokalemia    Personal history of other endocrine, nutritional and metabolic disease     History of hypokalemia    Rash and other nonspecific skin eruption 03/15/2022    Rash     Stiffness of right hand, not elsewhere classified 07/06/2021    Stiffness of finger joint of right hand    Unspecified fracture of other metacarpal bone, initial encounter for closed fracture 07/06/2021    Fracture of third metacarpal bone    Unspecified fracture of third metacarpal bone, right hand, initial encounter for closed fracture 07/06/2021    Fracture of third metacarpal bone of right hand     Past Surgical History:   Procedure Laterality Date    OTHER SURGICAL HISTORY  11/07/2021    Cardiac catheterization with stent placement     Medications Prior to Admission   Medication Sig Dispense Refill Last Dose    albuterol 90 mcg/actuation inhaler Inhale 2 puffs every 4 hours if needed for shortness of breath. 18 g 1 12/22/2023    allopurinol (Zyloprim) 300 mg tablet Take 1 tablet (300 mg) by mouth once daily. 90 tablet 1 12/22/2023    amLODIPine (Norvasc) 10 mg tablet Take 1 tablet (10 mg) by mouth once daily. 90 tablet 1 12/22/2023    aspirin (Adult Low Dose Aspirin) 81 mg EC tablet Take 1 tablet (81 mg) by mouth once daily.   12/22/2023    busPIRone (Buspar) 15 mg tablet Take 1 tablet (15 mg) by mouth 3 times a day.   12/22/2023    cholecalciferol (Vitamin D3) 50 mcg (2,000 unit) capsule Take 1 capsule (50 mcg) by mouth once daily. 90 capsule 1 12/22/2023    clobetasol (Temovate) 0.05 % cream APPLY SPARINGLY TO AFFECTED AREA(S) TWICE DAILY AS NEEDED 60 g 1 12/22/2023    cloNIDine (Catapres) 0.1 mg tablet Take 1 tablet (0.1 mg) by mouth once daily. 90 tablet 1 12/22/2023    clotrimazole-betamethasone (Lotrisone) cream Apply 1 Application topically 2 times a day.   12/22/2023    cyanocobalamin (Vitamin B-12) 1,000 mcg tablet Take 1 tablet (1,000 mcg) by mouth once daily. 90 tablet 1 12/22/2023    dupilumab (Dupixent) 300 mg/2 mL syringe injection Inject 1 Syringe (300 mg) under the skin every 14 (fourteen) days.   Unknown    glipiZIDE (Glucotrol) 5 mg tablet Take 0.5 tablets (2.5 mg) by mouth once daily. 90  tablet 1 12/22/2023    hydroCHLOROthiazide (HYDRODiuril) 25 mg tablet Take 1 tablet (25 mg) by mouth once daily. 90 tablet 1 12/22/2023    hydrocortisone 2.5 % ointment Apply 1 Application topically 2 times a day.   12/22/2023    hydrOXYzine pamoate (Vistaril) 25 mg capsule Take 2 capsules (50 mg) by mouth 2 times a day as needed (at bedtime).   12/22/2023    losartan (Cozaar) 25 mg tablet Take 1 tablet (25 mg) by mouth 2 times a day. 90 tablet 1 12/22/2023    magnesium oxide (Mag-Ox) 400 mg tablet Take 1 tablet (400 mg) by mouth once daily. 90 tablet 1 12/22/2023    metoprolol tartrate (Lopressor) 50 mg tablet Take 1 tablet by mouth 2 times a day. 90 tablet 1 12/22/2023    mometasone-formoterol (Dulera) 200-5 mcg/actuation inhaler Inhale 1 puff 2 times a day. 13 g 1 12/22/2023    nabumetone (Relafen) 750 mg tablet Take 1 tablet (750 mg) by mouth every 12 hours.   12/22/2023    pantoprazole (ProtoNix) 40 mg EC tablet Take 1 tablet (40 mg) by mouth once daily. Do not crush, chew, or split. 30 tablet 1 12/22/2023    potassium chloride CR 20 mEq ER tablet Take 1 tablet (20 mEq) by mouth once daily. 90 tablet 1 12/22/2023    QUEtiapine (SEROquel) 50 mg tablet Take 1 tablet (50 mg) by mouth once daily at bedtime.   12/21/2023    sertraline (Zoloft) 100 mg tablet Take 1 tablet (100 mg) by mouth once daily.   12/22/2023    simvastatin (Zocor) 40 mg tablet Take 1 tablet (40 mg) by mouth once daily at bedtime. 90 tablet 1 12/21/2023    terazosin (Hytrin) 5 mg capsule Take 1 capsule (5 mg) by mouth 1 time.   12/22/2023    traMADol (Ultram) 50 mg tablet Take 1 tablet (50 mg) by mouth every 6 hours.   12/22/2023    triamcinolone (Kenalog) 0.1 % cream Apply 1 Application topically 2 times a day.   12/22/2023     Sulfamethoxazole-trimethoprim  Social History     Tobacco Use    Smoking status: Never    Smokeless tobacco: Never   Substance Use Topics    Alcohol use: Not Currently    Drug use: Not Currently     Family History    Problem Relation Name Age of Onset    Hypertension Mother      Stroke Father         Hospital Medications:           Current Facility-Administered Medications:     acetaminophen (Tylenol) tablet 650 mg, 650 mg, oral, q4h PRN, Gareth Ritter PA-C, 650 mg at 12/24/23 0651    albuterol 90 mcg/actuation inhaler 2 puff, 2 puff, inhalation, q4h PRN, Vicki Brewer APRN-CNP    [Held by provider] allopurinol (Zyloprim) tablet 300 mg, 300 mg, oral, Daily, Vicki Brewer APRN-CNP    amLODIPine (Norvasc) tablet 10 mg, 10 mg, oral, Daily, Vicki Brewer APRN-CNP, 10 mg at 12/24/23 0846    amoxicillin-pot clavulanate (Augmentin) 500-125 mg per tablet 500 mg, 500 mg, oral, q12h EILEEN, Shavonne Locke, APRN-CNP, 500 mg at 12/24/23 0845    aspirin chewable tablet 324 mg, 324 mg, oral, Once, Antwan Nolasco PA-C    aspirin EC tablet 81 mg, 81 mg, oral, Daily, Vicki Brewer APRN-CNP, 81 mg at 12/24/23 0845    [Held by provider] busPIRone (Buspar) tablet 15 mg, 15 mg, oral, TID, Vicki Brewer APRN-CNP    [START ON 12/25/2023] cholecalciferol (Vitamin D-3) capsule 125 mcg, 5,000 Units, oral, Daily, Elizabeth La MD    cloNIDine (Catapres) tablet 0.1 mg, 0.1 mg, oral, Daily, Vicki Brewer APRN-CNP, 0.1 mg at 12/24/23 0845    cyanocobalamin (Vitamin B-12) tablet 1,000 mcg, 1,000 mcg, oral, Daily, Vicki Brewer APRN-CNP, 1,000 mcg at 12/24/23 0845    dextrose 10 % in water (D10W) infusion, 0.3 g/kg/hr, intravenous, Once PRN, Den Berumen MD    dextrose 50 % injection 25 g, 25 g, intravenous, q15 min PRN, Den Berumen MD    divalproex (Depakote) EC tablet 250 mg, 250 mg, oral, q12h [facility], Terence Garrison MD    [Held by provider] glipiZIDE (Glucotrol) tablet 2.5 mg, 2.5 mg, oral, Daily, Vicki Brewer, APRN-CNP    glucagon (Glucagen) injection 1 mg, 1 mg, intramuscular, q15 min PRN, Den Berumen MD    heparin (porcine) injection 5,000 Units, 5,000 Units, subcutaneous, q8h, Vicki Brewer,  APRN-CNP, 5,000 Units at 12/24/23 0644    [Held by provider] hydroCHLOROthiazide (HYDRODiuril) tablet 25 mg, 25 mg, oral, Daily, KINA Baeza    [Held by provider] hydrOXYzine pamoate (Vistaril) capsule 50 mg, 50 mg, oral, BID PRN, KINA Baeza    insulin glargine (Lantus) injection 45 Units, 45 Units, subcutaneous, BID, Daniel Colindres MD    insulin lispro (HumaLOG) injection 0-15 Units, 0-15 Units, subcutaneous, TID with meals, nightly, & 0300, Daniel Colindres MD, 12 Units at 12/24/23 1130    insulin lispro (HumaLOG) injection 14 Units, 14 Units, subcutaneous, TID with meals, Daniel Colindres MD    insulin regular (HumuLIN, NovoLIN) bolus from bag 10 Units, 0.1 Units/kg, intravenous, PRN, KINA Baeza, 10 Units at 12/22/23 1700    LORazepam (Ativan) injection 2 mg, 2 mg, intravenous, q4h PRN, KINA Baeza    [Held by provider] losartan (Cozaar) tablet 25 mg, 25 mg, oral, BID, KINA Baeza    magnesium oxide (Mag-Ox) tablet 400 mg, 400 mg, oral, Daily, KINA Baeza, 400 mg at 12/24/23 0845    meclizine (Antivert) tablet 25 mg, 25 mg, oral, q6h PRN, Gareth Ritter PA-C, 25 mg at 12/24/23 0651    metoprolol tartrate (Lopressor) tablet 50 mg, 50 mg, oral, BID, KINA Baeza, 50 mg at 12/24/23 0852    [Held by provider] nabumetone (Relafen) tablet 750 mg, 750 mg, oral, q12h, KINA Baeza    ondansetron (Zofran) tablet 4 mg, 4 mg, oral, q8h PRN **OR** ondansetron (Zofran) injection 4 mg, 4 mg, intravenous, q8h PRN, KINA Baeza    oxyCODONE (Roxicodone) immediate release tablet 5 mg, 5 mg, oral, q6h PRN, KINA Baeza    pantoprazole (ProtoNix) EC tablet 40 mg, 40 mg, oral, Daily, KENIA Baeza-CNP, 40 mg at 12/24/23 0881    QUEtiapine (SEROquel) tablet 50 mg, 50 mg, oral, Nightly, KENIA Baeza-CNP, 50 mg at 12/23/23 4453    sertraline (Zoloft) tablet 100 mg,  100 mg, oral, Daily, Vicki Brewer, APRN-CNP, 100 mg at 12/24/23 0845    simvastatin (Zocor) tablet 40 mg, 40 mg, oral, Nightly, Vickialisha Brewre, APRN-CNP, 40 mg at 12/23/23 2134    terazosin (Hytrin) capsule 5 mg, 5 mg, oral, Once, Vicki MONTALVO Brewer, APRN-CNP    [Held by provider] traMADol (Ultram) tablet 50 mg, 50 mg, oral, q6h, Vicki KATIA Brewer, APRN-CNP    Physical Exam:    Heart Rate:  [54-80]   Temp:  [36.2 °C (97.2 °F)-36.4 °C (97.5 °F)]   Resp:  [16-24]   BP: (103-145)/(55-81)   Weight:  [97.5 kg (214 lb 15.2 oz)]   SpO2:  [94 %-99 %]          Physical Exam  Vitals and nursing note reviewed.   Constitutional:       General: He is not in acute distress.     Appearance: Normal appearance. He is not ill-appearing or toxic-appearing.   HENT:      Head: Normocephalic and atraumatic.      Nose: Nose normal. No rhinorrhea.      Mouth/Throat:      Mouth: Mucous membranes are moist.      Pharynx: Oropharynx is clear. No oropharyngeal exudate.   Eyes:      General: No scleral icterus.     Conjunctiva/sclera: Conjunctivae normal.      Pupils: Pupils are equal, round, and reactive to light.   Cardiovascular:      Rate and Rhythm: Normal rate and regular rhythm.      Pulses: Normal pulses.           Radial pulses are 2+ on the right side and 2+ on the left side.        Dorsalis pedis pulses are 2+ on the right side and 2+ on the left side.      Heart sounds: No murmur heard.  Pulmonary:      Effort: Pulmonary effort is normal.      Breath sounds: No wheezing, rhonchi or rales.   Abdominal:      General: Abdomen is flat. Bowel sounds are normal.      Tenderness: There is no abdominal tenderness. There is no guarding or rebound.   Musculoskeletal:         General: No swelling. Normal range of motion.      Cervical back: Normal range of motion and neck supple.   Skin:     General: Skin is warm.      Capillary Refill: Capillary refill takes less than 2 seconds.      Coloration: Skin is not jaundiced.      Findings: No  bruising.   Neurological:      General: No focal deficit present.      Mental Status: He is alert, oriented to person, place, and time and easily aroused.      Cranial Nerves: No cranial nerve deficit.      Motor: Weakness present.         Objective:      Intake/Output Summary (Last 24 hours) at 12/24/2023 1331  Last data filed at 12/24/2023 0455  Gross per 24 hour   Intake --   Output 2325 ml   Net -2325 ml       Results for orders placed or performed during the hospital encounter of 12/22/23 (from the past 24 hour(s))   POCT GLUCOSE   Result Value Ref Range    POCT Glucose 272 (H) 74 - 99 mg/dL   POCT GLUCOSE   Result Value Ref Range    POCT Glucose 360 (H) 74 - 99 mg/dL   Prolactin   Result Value Ref Range    Prolactin 7.8 2.0 - 18.0 ug/L   Comprehensive metabolic panel   Result Value Ref Range    Glucose 353 (H) 74 - 99 mg/dL    Sodium 130 (L) 136 - 145 mmol/L    Potassium 4.2 3.5 - 5.3 mmol/L    Chloride 97 (L) 98 - 107 mmol/L    Bicarbonate 21 21 - 32 mmol/L    Anion Gap 16 10 - 20 mmol/L    Urea Nitrogen 64 (H) 6 - 23 mg/dL    Creatinine 6.04 (H) 0.50 - 1.30 mg/dL    eGFR 10 (L) >60 mL/min/1.73m*2    Calcium 8.7 8.6 - 10.3 mg/dL    Albumin 3.9 3.4 - 5.0 g/dL    Alkaline Phosphatase 69 33 - 120 U/L    Total Protein 6.7 6.4 - 8.2 g/dL    AST 11 9 - 39 U/L    Bilirubin, Total 0.7 0.0 - 1.2 mg/dL    ALT 7 (L) 10 - 52 U/L   Lactate   Result Value Ref Range    Lactate 0.9 0.4 - 2.0 mmol/L   POCT GLUCOSE   Result Value Ref Range    POCT Glucose 319 (H) 74 - 99 mg/dL   Comprehensive metabolic panel   Result Value Ref Range    Glucose 317 (H) 74 - 99 mg/dL    Sodium 131 (L) 136 - 145 mmol/L    Potassium 3.8 3.5 - 5.3 mmol/L    Chloride 97 (L) 98 - 107 mmol/L    Bicarbonate 23 21 - 32 mmol/L    Anion Gap 15 10 - 20 mmol/L    Urea Nitrogen 64 (H) 6 - 23 mg/dL    Creatinine 5.62 (H) 0.50 - 1.30 mg/dL    eGFR 11 (L) >60 mL/min/1.73m*2    Calcium 8.8 8.6 - 10.3 mg/dL    Albumin 4.0 3.4 - 5.0 g/dL    Alkaline Phosphatase 73  33 - 120 U/L    Total Protein 6.9 6.4 - 8.2 g/dL    AST 11 9 - 39 U/L    Bilirubin, Total 0.6 0.0 - 1.2 mg/dL    ALT 5 (L) 10 - 52 U/L   POCT GLUCOSE   Result Value Ref Range    POCT Glucose 303 (H) 74 - 99 mg/dL   POCT GLUCOSE   Result Value Ref Range    POCT Glucose 299 (H) 74 - 99 mg/dL   CBC and Auto Differential   Result Value Ref Range    WBC 7.3 4.4 - 11.3 x10*3/uL    nRBC 0.0 0.0 - 0.0 /100 WBCs    RBC 4.12 (L) 4.50 - 5.90 x10*6/uL    Hemoglobin 11.2 (L) 13.5 - 17.5 g/dL    Hematocrit 33.9 (L) 41.0 - 52.0 %    MCV 82 80 - 100 fL    MCH 27.2 26.0 - 34.0 pg    MCHC 33.0 32.0 - 36.0 g/dL    RDW 12.0 11.5 - 14.5 %    Platelets 190 150 - 450 x10*3/uL    Neutrophils % 75.7 40.0 - 80.0 %    Immature Granulocytes %, Automated 1.1 (H) 0.0 - 0.9 %    Lymphocytes % 15.2 13.0 - 44.0 %    Monocytes % 7.7 2.0 - 10.0 %    Eosinophils % 0.0 0.0 - 6.0 %    Basophils % 0.3 0.0 - 2.0 %    Neutrophils Absolute 5.55 1.20 - 7.70 x10*3/uL    Immature Granulocytes Absolute, Automated 0.08 0.00 - 0.70 x10*3/uL    Lymphocytes Absolute 1.11 (L) 1.20 - 4.80 x10*3/uL    Monocytes Absolute 0.56 0.10 - 1.00 x10*3/uL    Eosinophils Absolute 0.00 0.00 - 0.70 x10*3/uL    Basophils Absolute 0.02 0.00 - 0.10 x10*3/uL   Phosphorus   Result Value Ref Range    Phosphorus 4.3 2.5 - 4.9 mg/dL   Parathyroid Hormone, Intact   Result Value Ref Range    Parathyroid Hormone, Intact 81.1 18.5 - 88.0 pg/mL   Vitamin D 25-Hydroxy,Total (for eval of Vitamin D levels)   Result Value Ref Range    Vitamin D, 25-Hydroxy, Total 25 (L) 30 - 100 ng/mL   Magnesium   Result Value Ref Range    Magnesium 2.75 (H) 1.60 - 2.40 mg/dL   Comprehensive metabolic panel   Result Value Ref Range    Glucose 315 (H) 74 - 99 mg/dL    Sodium 134 (L) 136 - 145 mmol/L    Potassium 3.8 3.5 - 5.3 mmol/L    Chloride 97 (L) 98 - 107 mmol/L    Bicarbonate 24 21 - 32 mmol/L    Anion Gap 17 10 - 20 mmol/L    Urea Nitrogen 63 (H) 6 - 23 mg/dL    Creatinine 5.29 (H) 0.50 - 1.30 mg/dL    eGFR  12 (L) >60 mL/min/1.73m*2    Calcium 9.3 8.6 - 10.3 mg/dL    Albumin 4.1 3.4 - 5.0 g/dL    Alkaline Phosphatase 78 33 - 120 U/L    Total Protein 7.2 6.4 - 8.2 g/dL    AST 10 9 - 39 U/L    Bilirubin, Total 0.6 0.0 - 1.2 mg/dL    ALT 6 (L) 10 - 52 U/L   POCT GLUCOSE   Result Value Ref Range    POCT Glucose 259 (H) 74 - 99 mg/dL   POCT GLUCOSE   Result Value Ref Range    POCT Glucose 257 (H) 74 - 99 mg/dL   POCT GLUCOSE   Result Value Ref Range    POCT Glucose 259 (H) 74 - 99 mg/dL   Comprehensive metabolic panel   Result Value Ref Range    Glucose 259 (H) 74 - 99 mg/dL    Sodium 132 (L) 136 - 145 mmol/L    Potassium 3.6 3.5 - 5.3 mmol/L    Chloride 98 98 - 107 mmol/L    Bicarbonate 24 21 - 32 mmol/L    Anion Gap 14 10 - 20 mmol/L    Urea Nitrogen 64 (H) 6 - 23 mg/dL    Creatinine 5.04 (H) 0.50 - 1.30 mg/dL    eGFR 13 (L) >60 mL/min/1.73m*2    Calcium 9.3 8.6 - 10.3 mg/dL    Albumin 4.1 3.4 - 5.0 g/dL    Alkaline Phosphatase 72 33 - 120 U/L    Total Protein 7.2 6.4 - 8.2 g/dL    AST 14 9 - 39 U/L    Bilirubin, Total 0.5 0.0 - 1.2 mg/dL    ALT 7 (L) 10 - 52 U/L       Recent Imaging  MR brain wo IV contrast   Final Result   Within limitations of motion degraded study, no evidence of acute   infarct, or other emergent intracranial abnormality.        MACRO:   None        Signed by: Tigre Urrutia 12/23/2023 2:48 PM   Dictation workstation:   [id number]      Transthoracic Echo (TTE) Complete   Final Result      US renal complete   Final Result   1. No hydronephrosis. Increased echogenicity of the renal parenchyma   and renal cortical thinning, suggestive of medical renal disease.   2. Multiple left renal cysts, as above.        MACRO:   None        Signed by: Blank Maki 12/22/2023 7:33 PM   Dictation workstation:   [id number]      CT head wo IV contrast   Final Result   No acute intracranial finding. MRI may be obtained if clinically   indicated        Signed by: Nilo Bates 12/22/2023 4:18 PM   Dictation  workstation:   [id number]      XR chest 1 view   Final Result   No acute cardiopulmonary process.        MACRO   None        Signed by: Reilly Galan 12/22/2023 3:53 PM   Dictation workstation:   [id number]            Transthoracic Echo (TTE) Complete    Result Date: 12/23/2023          [place] [address]  Tel [phone] Fax [phone] TRANSTHORACIC ECHOCARDIOGRAM REPORT  Patient Name:      AGUILA Leon Physician:    [id number] Ricki Simental DO Study Date:        12/23/2023           Ordering Provider:    [id number] MALVIN CHRISTIAN MRN/PID:           [id number]             Fellow: Accession#:        [id number]         Nurse: Date of Birth/Age: 1970 / 53 years Sonographer:          Ellen Goodson RDCS Gender:            M                    Additional Staff: Height:            170.18 cm            Admit Date:           12/22/2023 Weight:            96.16 kg             Admission Status:     Inpatient - STAT BSA:               2.07 m2              Department Location:  [facility]                                                               Echo Lab Blood Pressure: 127 /63 mmHg Study Type:    TRANSTHORACIC ECHO (TTE) COMPLETE Diagnosis/ICD: Chest pain, unspecified-R07.9 Indication:    CP CPT Codes:     Echo Complete w Full [id number] Patient History: Diabetes:          Yes Pertinent History: Hyperlipidemia and HTN. Study Detail: The following Echo studies were performed: 2D, M-Mode, Doppler and               color flow. Technically challenging study due to the patient's               lack of cooperation, patient lying in supine position and body               habitus.  PHYSICIAN INTERPRETATION: Left Ventricle: Left ventricular systolic function is normal, with  an estimated ejection fraction of 55-60%. There are no regional wall motion abnormalities. The left ventricular cavity size is normal. There is borderline concentric left ventricular hypertrophy. Spectral Doppler shows a normal pattern of left ventricular diastolic filling. LV Wall Scoring: All segments are normal. Left Atrium: The left atrium is normal in size. Right Ventricle: The right ventricle is normal in size. There is normal right ventricular global systolic function. Right Atrium: The right atrium is normal in size. Aortic Valve: The aortic valve appears structurally normal. The aortic valve appears tricuspid. There is no evidence of aortic valve stenosis. There is no evidence of aortic valve regurgitation. The peak instantaneous gradient of the aortic valve is 9.2 mmHg. The mean gradient of the aortic valve is 5.0 mmHg. Mitral Valve: The mitral valve is normal in structure. There is no evidence of mitral valve stenosis. There is normal mitral valve leaflet mobility. There is trace mitral valve regurgitation. Tricuspid Valve: The tricuspid valve is structurally normal. There is normal tricuspid valve leaflet mobility. There is trace tricuspid regurgitation. Pulmonic Valve: The pulmonic valve is structurally normal. There is no indication of pulmonic valve regurgitation. Pericardium: There is a trivial pericardial effusion. Aorta: The aortic root is normal. Pulmonary Artery: The main pulmonary artery is normal in size, and position, with normal bifurcation into the left and right pulmonary arteries. Systemic Veins: The inferior vena cava appears to be of normal size. In comparison to the previous echocardiogram(s): The left ventricular function is unchanged. The left ventricular diastolic function is unchanged.  CONCLUSIONS:  1. Left ventricular systolic function is normal with a 55-60% estimated ejection fraction.  2. There is no evidence of mitral valve stenosis.  3. Trace mitral valve regurgitation.  4.  Trace tricuspid regurgitation is visualized.  5. Aortic valve stenosis is not present.  6. The main pulmonary artery is normal in size, and position, with normal bifurcation into the left and right pulmonary arteries. QUANTITATIVE DATA SUMMARY: 2D MEASUREMENTS:                    Normal Ranges: Ao Root d: 2.40 cm (2.0-3.7cm) LA VOLUME:                               Normal Ranges: LA Vol A4C:        148.0 ml   (22+/-6mL/m2) LA Vol A2C:        27.8 ml LA Vol BP:         64.8 ml LA Vol Index A4C:  71.4ml/m2 LA Vol Index A2C:  13.4 ml/m2 LA Vol Index BP:   31.3 ml/m2 LA Area A4C:       28.3 cm2 LA Area A2C:       12.4 cm2 LA Major Axis A4C: 4.6 cm LA Major Axis A2C: 4.7 cm LA Volume Index:   13.2 ml/m2 RA VOLUME BY A/L METHOD:                               Normal Ranges: RA Vol A4C:        25.4 ml    (8.3-19.5ml) RA Vol Index A4C:  12.3 ml/m2 RA Area A4C:       11.6 cm2 RA Major Axis A4C: 4.5 cm LV SYSTOLIC FUNCTION BY 2D PLANIMETRY (MOD):                     Normal Ranges: EF-A4C View: 53.2 % (>=55%) EF-A2C View: 56.9 % EF-Biplane:  56.2 % LV DIASTOLIC FUNCTION:                        Normal Ranges: MV Peak E:    0.60 m/s (0.7-1.2 m/s) MV Peak A:    0.50 m/s (0.42-0.7 m/s) E/A Ratio:    1.20     (1.0-2.2) MV e'         0.06 m/s (>8.0) MV lateral e' 0.06 m/s MV medial e'  0.06 m/s E/e' Ratio:   10.45    (<8.0) MITRAL VALVE:                 Normal Ranges: MV DT: 256 msec (150-240msec) AORTIC VALVE:                                   Normal Ranges: AoV Vmax:                1.52 m/s (<=1.7m/s) AoV Peak P.2 mmHg (<20mmHg) AoV Mean P.0 mmHg (1.7-11.5mmHg) LVOT Max Mau:            1.21 m/s (<=1.1m/s) AoV VTI:                 22.90 cm (18-25cm) LVOT VTI:                17.80 cm LVOT Diameter:           2.10 cm  (1.8-2.4cm) AoV Area, VTI:           2.69 cm2 (2.5-5.5cm2) AoV Area,Vmax:           2.76 cm2 (2.5-4.5cm2) AoV Dimensionless Index: 0.78  RIGHT VENTRICLE: RV Basal 3.43 cm RV Mid   2.54 cm  RV Major 7.8 cm RV s'    0.15 m/s TRICUSPID VALVE/RVSP:                             Normal Ranges: Peak TR Velocity: 1.84 m/s RV Syst Pressure: 16.5 mmHg (< 30mmHg) PULMONIC VALVE:                         Normal Ranges: PV Accel Time: 48 msec  (>120ms) PV Max Mau:    1.3 m/s  (0.6-0.9m/s) PV Max P.1 mmHg  [id number] Ricki Simental DO Electronically signed on 2023 at 12:16:55 PM  Wall Scoring  ** Final **      Assessment/Plan:    I am currently managing this critically ill patient for:  HHS vs DKA   Dizziness/Double vision  HX of T2DM  Hx of HTN  Hx of CAD s/p stents  LU on CKD IV  Psuedohyponatremia  Chronic Anemia  Seizure like activity         Neuro/Psych/Pain Ctrl/Sedation:  Dizziness/Double vision  Seizure like activity   CT of head negative, MRI negative  Continue home seroquel and zoloft  PRN Antivert for dizziness   Delirium precautions  Neurology consulted for seizure-like activity, per recommendations EEG evaluation and Depakote 250 mg twice daily  Sleep Wake Cycle  Neuro checks per ICU protocol     Respiratory/ENT:  No Active issues, currently 98% SPO2 on RA  Keep SPO2 >92%  Continuous pulse ox  Pulm hygiene   CXR negative for acute cardiopulmonary processes     Cardiovascular:  Hx of CAD s/p stents  EKG stable  Echo revealed LVEF 55-60%, trace tricuspid regurg  C/o chest pain in ED- EKG at baseline, trops WNL, relieved with sublingual nitroglycerin  Continue home ASA and statin   Continuous cardiac monitoring per ICU protocol  maintain MAPS >65  PRN EKG, EKG if chest pain or change in telemetry      GI:  Continue home Protonix   QTC WNL, PRN zofran for nausea     Renal/Volume Status (Intra & Extravascular):  LU on CKD IV -prerenal vs intrarenal  Pseudohyponatremia resolved   Baseline CR around 2.40, BUN 71, Cr 5.62->5.04  Holding nephrotoxic medications (ARB, NSAID)  Maintain urine output 0.5-1cc/kg/hr  Replete electrolytes as indicated  Renal ultrasound shows left renal cyst with no  hydronephrosis   Nephrology consulted   Daily BMP, replete electrolytes as indicated     Endocrine  HHS vs Hyperglycemia   HX of T2DM  A1C - 15.1  Continue Glucose monitoring AC/HS + PRN   Endocrinology consulted, per endocrine 45 units Lantus BID, 14 unit TID with meals, SSI before meals     Infectious Disease:  No leukocytosis (10.9), Lactate < 2   Discontinue Augmentin   Monitor for signs and sx of SIRS     Heme/Onc:  Chronic Anemia  HGB 12.6  Monitor CBC  Transfuse if HGB >7     OBGYN/MSK:  No active issues        Ethics/Code Status:  Full Code     :  DVT Prophylaxis: heparin subcu  GI Prophylaxis: home PPI  Bowel Regimen: none  Diet: Carb Controlled   CVC: none  Laurence: none  Hernandez: refused  Restraints: none  Dispo: Ready for Transfer     This note was prepared using voice recognition software. The details of this note are correct and have been reviewed, and corrected to the best of my ability. Some grammatical areas may persist related to the Dragon software.     I have reviewed all medications, laboratory results, and imaging pertinent for today's encounter.  Plan Discussed with Dr. Berumen  Critical Care Time: 45 minutes  Critical Care Hafsa Esteves PA-C     I was present with the PA who participated in the documentation of this note. I have personally seen and re-examined the patient and performed the medical decision-making components (assessment and plan of care). I have reviewed the PA documentation and verified the findings in the note as written with additions or exceptions as stated in the body of this note.    Amanuel Riojas is a 53 y.o. year old male patient with Past Medical History of HTN, CAD s/p stents, T2DM, CKD IV presented to the ED endorsing a few days of general malaise, fevers, nausea, vomiting and dizziness.   Hyperosmolar hyperglycemic state is resolved, currently on subcu long-acting insulin and scheduled short acting and insulin sliding scale.  Maintain  glucose less than 180.  Patient had a first episode of seizures at the MRI yesterday.  Today he had questionable seizures in the ICU as the patient was responding in the middle of seizures, prolactin immediately after seizures were within normal range.  Neurology on board, started on Depakote.  Ordered as needed Ativan for seizures.     I spent 45 minutes of cumulative critical care time with the patient.  Greater than 50% of that time was spent in the direct collaboration and or coordination of care of the patient.     Dragon dictation software was used to dictate this note and thus there may be minor errors in translation/transcription including garbled speech or misspellings. Please contact for clarification if needed.

## 2023-12-24 NOTE — PROGRESS NOTES
Nephrology Progress Note    Assessment:  53 y.o. male with history s/f CKD stage IV, T2DM, CAD s/p SEAMUS who presented for malaise, fevers, N/V.     LU on CKD stage IV: most likely 2/2 DKA, however ? Contribution of hypotension as well, Scr 6.5 on presentation, baseline 2.5-2.8, CKD risk factors T2DM, HTN, CAD  DKA  Hyponatremia: in setting of hyperglycemia, improved  Anemia  Hypotension   Vitamin D insufficiency     Plan:  - Scr trending down though slowly, no acute indication to start RRT   - monitor function and output  - no indication for FABIANA   - increase cholecalciferol to 5000 units, can decrease to 2000 units daily on discharge     Subjective:  Admit Date: 12/22/2023    Interval History: had HD yesterday, Bp stable, remains in ICU     Medications:  Scheduled Meds:[Held by provider] allopurinol, 300 mg, oral, Daily  amLODIPine, 10 mg, oral, Daily  amoxicillin-pot clavulanate, 500 mg, oral, q12h EILEEN  aspirin, 324 mg, oral, Once  aspirin, 81 mg, oral, Daily  [Held by provider] busPIRone, 15 mg, oral, TID  cholecalciferol, 2,000 Units, oral, Daily  cloNIDine, 0.1 mg, oral, Daily  cyanocobalamin, 1,000 mcg, oral, Daily  divalproex, 250 mg, oral, q12h EILEEN  [Held by provider] glipiZIDE, 2.5 mg, oral, Daily  heparin (porcine), 5,000 Units, subcutaneous, q8h  [Held by provider] hydroCHLOROthiazide, 25 mg, oral, Daily  insulin glargine, 45 Units, subcutaneous, BID  insulin lispro, 0-15 Units, subcutaneous, TID with meals, nightly, & 0300  insulin lispro, 14 Units, subcutaneous, TID with meals  [Held by provider] losartan, 25 mg, oral, BID  magnesium oxide, 400 mg, oral, Daily  metoprolol tartrate, 50 mg, oral, BID  [Held by provider] nabumetone, 750 mg, oral, q12h  pantoprazole, 40 mg, oral, Daily  QUEtiapine, 50 mg, oral, Nightly  sertraline, 100 mg, oral, Daily  simvastatin, 40 mg, oral, Nightly  terazosin, 5 mg, oral, Once  [Held by provider] traMADol, 50 mg, oral, q6h      Continuous Infusions:     CBC:   Lab  "Results   Component Value Date    WBC 7.3 12/24/2023    RBC 4.12 (L) 12/24/2023    HGB 11.2 (L) 12/24/2023    HCT 33.9 (L) 12/24/2023    MCV 82 12/24/2023    MCH 27.2 12/24/2023    MCHC 33.0 12/24/2023    RDW 12.0 12/24/2023     12/24/2023     BMP:    Lab Results   Component Value Date     (L) 12/24/2023    K 3.6 12/24/2023    CL 98 12/24/2023    CO2 24 12/24/2023    BUN 64 (H) 12/24/2023    CREATININE 5.04 (H) 12/24/2023    CALCIUM 9.3 12/24/2023    GLUCOSE 259 (H) 12/24/2023       Objective:  Vitals: /75 (BP Location: Left arm)   Pulse 80   Temp 36.4 °C (97.5 °F) (Temporal)   Resp 18   Ht 1.702 m (5' 7\")   Wt 97.5 kg (214 lb 15.2 oz)   SpO2 96%   BMI 33.67 kg/m²    Wt Readings from Last 3 Encounters:   12/24/23 97.5 kg (214 lb 15.2 oz)   12/05/23 106 kg (234 lb 3.2 oz)   11/21/23 108 kg (238 lb 12.8 oz)      24HR INTAKE/OUTPUT:    Intake/Output Summary (Last 24 hours) at 12/24/2023 1311  Last data filed at 12/24/2023 0455  Gross per 24 hour   Intake --   Output 2325 ml   Net -2325 ml       General: alert, in no apparent distress  HEENT: normocephalic, atraumatic, anicteric  Lungs: non-labored respirations, clear to auscultation bilaterally  Heart: regular rate and rhythm, no murmurs or rubs  Abdomen: soft, non-tender, non-distended  Ext: no cyanosis, no peripheral edema  Neuro: alert and oriented, no gross abnormalities      Electronically signed by Elizabeth La MD, MD              "

## 2023-12-24 NOTE — NURSING NOTE
Dr Gordon called and given update. Dr Gordon verbalized insulin requirements based on blood glucose levels. Dr gordon verbalized orders to be modified. Dr Gordon verbalized 14units with each meals. Adjusted insulin coverage and RN verbalized per read back. As well as adjusted lantus bid per Dr Gordon with read back.

## 2023-12-24 NOTE — NURSING NOTE
Patient no longer shaking. Pt more arousable but still lethargic. VS stable. Will continue to monitor pt closely. When patient more awake will give medications due.

## 2023-12-24 NOTE — NURSING NOTE
Attempted to assist pt to dangle side of bed. Pt sitting up with nurse assist, pt c/o severe dizziness. Nurse assisted pt to lay down. Medication given.

## 2023-12-24 NOTE — CARE PLAN
The patient's goals for the shift include      The clinical goals for the shift include Blood glucose wnl    Over the shift, the patient did not make progress toward the following goals. Barriers to progression include pt sleepy. Blood glucose levels.Recommendations to address these barriers include monitoring blood glucose as ordered. Continue to monitor vital signs.

## 2023-12-25 LAB
ANION GAP SERPL CALC-SCNC: 17 MMOL/L (ref 10–20)
ATRIAL RATE: 68 BPM
ATRIAL RATE: 77 BPM
BASOPHILS # BLD AUTO: 0.02 X10*3/UL (ref 0–0.1)
BASOPHILS NFR BLD AUTO: 0.4 %
BUN SERPL-MCNC: 60 MG/DL (ref 6–23)
CALCIUM SERPL-MCNC: 9.3 MG/DL (ref 8.6–10.3)
CHLORIDE SERPL-SCNC: 99 MMOL/L (ref 98–107)
CO2 SERPL-SCNC: 24 MMOL/L (ref 21–32)
CREAT SERPL-MCNC: 4.72 MG/DL (ref 0.5–1.3)
EOSINOPHIL # BLD AUTO: 0 X10*3/UL (ref 0–0.7)
EOSINOPHIL NFR BLD AUTO: 0 %
ERYTHROCYTE [DISTWIDTH] IN BLOOD BY AUTOMATED COUNT: 12 % (ref 11.5–14.5)
GFR SERPL CREATININE-BSD FRML MDRD: 14 ML/MIN/1.73M*2
GLUCOSE BLD MANUAL STRIP-MCNC: 156 MG/DL (ref 74–99)
GLUCOSE BLD MANUAL STRIP-MCNC: 186 MG/DL (ref 74–99)
GLUCOSE BLD MANUAL STRIP-MCNC: 202 MG/DL (ref 74–99)
GLUCOSE BLD MANUAL STRIP-MCNC: 210 MG/DL (ref 74–99)
GLUCOSE BLD MANUAL STRIP-MCNC: 82 MG/DL (ref 74–99)
GLUCOSE SERPL-MCNC: 192 MG/DL (ref 74–99)
HCT VFR BLD AUTO: 32.1 % (ref 41–52)
HGB BLD-MCNC: 10.9 G/DL (ref 13.5–17.5)
IMM GRANULOCYTES # BLD AUTO: 0.18 X10*3/UL (ref 0–0.7)
IMM GRANULOCYTES NFR BLD AUTO: 3.2 % (ref 0–0.9)
LYMPHOCYTES # BLD AUTO: 1.35 X10*3/UL (ref 1.2–4.8)
LYMPHOCYTES NFR BLD AUTO: 24 %
MAGNESIUM SERPL-MCNC: 2.7 MG/DL (ref 1.6–2.4)
MCH RBC QN AUTO: 27.7 PG (ref 26–34)
MCHC RBC AUTO-ENTMCNC: 34 G/DL (ref 32–36)
MCV RBC AUTO: 82 FL (ref 80–100)
MONOCYTES # BLD AUTO: 0.55 X10*3/UL (ref 0.1–1)
MONOCYTES NFR BLD AUTO: 9.8 %
NEUTROPHILS # BLD AUTO: 3.52 X10*3/UL (ref 1.2–7.7)
NEUTROPHILS NFR BLD AUTO: 62.6 %
NRBC BLD-RTO: 0 /100 WBCS (ref 0–0)
P AXIS: 37 DEGREES
P AXIS: 41 DEGREES
P OFFSET: 189 MS
P OFFSET: 191 MS
P ONSET: 131 MS
P ONSET: 134 MS
PHOSPHATE SERPL-MCNC: 4.1 MG/DL (ref 2.5–4.9)
PLATELET # BLD AUTO: 198 X10*3/UL (ref 150–450)
POTASSIUM SERPL-SCNC: 3.3 MMOL/L (ref 3.5–5.3)
PR INTERVAL: 164 MS
PR INTERVAL: 174 MS
PROLACTIN SERPL-MCNC: 6.6 UG/L (ref 2–18)
Q ONSET: 216 MS
Q ONSET: 218 MS
QRS COUNT: 11 BEATS
QRS COUNT: 13 BEATS
QRS DURATION: 100 MS
QRS DURATION: 102 MS
QT INTERVAL: 392 MS
QT INTERVAL: 438 MS
QTC CALCULATION(BAZETT): 443 MS
QTC CALCULATION(BAZETT): 465 MS
QTC FREDERICIA: 425 MS
QTC FREDERICIA: 456 MS
R AXIS: 44 DEGREES
R AXIS: 51 DEGREES
RBC # BLD AUTO: 3.93 X10*6/UL (ref 4.5–5.9)
SODIUM SERPL-SCNC: 137 MMOL/L (ref 136–145)
T AXIS: -16 DEGREES
T AXIS: -25 DEGREES
T OFFSET: 412 MS
T OFFSET: 437 MS
VENTRICULAR RATE: 68 BPM
VENTRICULAR RATE: 77 BPM
WBC # BLD AUTO: 5.6 X10*3/UL (ref 4.4–11.3)

## 2023-12-25 PROCEDURE — 36415 COLL VENOUS BLD VENIPUNCTURE: CPT

## 2023-12-25 PROCEDURE — 2500000001 HC RX 250 WO HCPCS SELF ADMINISTERED DRUGS (ALT 637 FOR MEDICARE OP): Performed by: SPECIALIST

## 2023-12-25 PROCEDURE — 99232 SBSQ HOSP IP/OBS MODERATE 35: CPT | Performed by: SPECIALIST

## 2023-12-25 PROCEDURE — 2500000004 HC RX 250 GENERAL PHARMACY W/ HCPCS (ALT 636 FOR OP/ED)

## 2023-12-25 PROCEDURE — 82947 ASSAY GLUCOSE BLOOD QUANT: CPT

## 2023-12-25 PROCEDURE — 85025 COMPLETE CBC W/AUTO DIFF WBC: CPT

## 2023-12-25 PROCEDURE — 96372 THER/PROPH/DIAG INJ SC/IM: CPT

## 2023-12-25 PROCEDURE — 99232 SBSQ HOSP IP/OBS MODERATE 35: CPT | Performed by: STUDENT IN AN ORGANIZED HEALTH CARE EDUCATION/TRAINING PROGRAM

## 2023-12-25 PROCEDURE — 2500000001 HC RX 250 WO HCPCS SELF ADMINISTERED DRUGS (ALT 637 FOR MEDICARE OP)

## 2023-12-25 PROCEDURE — 2500000001 HC RX 250 WO HCPCS SELF ADMINISTERED DRUGS (ALT 637 FOR MEDICARE OP): Performed by: STUDENT IN AN ORGANIZED HEALTH CARE EDUCATION/TRAINING PROGRAM

## 2023-12-25 PROCEDURE — 80048 BASIC METABOLIC PNL TOTAL CA: CPT

## 2023-12-25 PROCEDURE — 2500000002 HC RX 250 W HCPCS SELF ADMINISTERED DRUGS (ALT 637 FOR MEDICARE OP, ALT 636 FOR OP/ED)

## 2023-12-25 PROCEDURE — 83735 ASSAY OF MAGNESIUM: CPT

## 2023-12-25 PROCEDURE — 2500000001 HC RX 250 WO HCPCS SELF ADMINISTERED DRUGS (ALT 637 FOR MEDICARE OP): Performed by: HOSPITALIST

## 2023-12-25 PROCEDURE — 84100 ASSAY OF PHOSPHORUS: CPT

## 2023-12-25 PROCEDURE — 2500000002 HC RX 250 W HCPCS SELF ADMINISTERED DRUGS (ALT 637 FOR MEDICARE OP, ALT 636 FOR OP/ED): Performed by: INTERNAL MEDICINE

## 2023-12-25 PROCEDURE — 1210000001 HC SEMI-PRIVATE ROOM DAILY

## 2023-12-25 RX ORDER — AMOXICILLIN 250 MG
1 CAPSULE ORAL NIGHTLY
Status: DISCONTINUED | OUTPATIENT
Start: 2023-12-25 | End: 2023-12-29 | Stop reason: HOSPADM

## 2023-12-25 RX ORDER — POLYETHYLENE GLYCOL 3350 17 G/17G
17 POWDER, FOR SOLUTION ORAL DAILY
Status: DISCONTINUED | OUTPATIENT
Start: 2023-12-25 | End: 2023-12-29 | Stop reason: HOSPADM

## 2023-12-25 RX ORDER — DEXTROSE MONOHYDRATE 50 MG/ML
100 INJECTION, SOLUTION INTRAVENOUS CONTINUOUS
Status: DISCONTINUED | OUTPATIENT
Start: 2023-12-25 | End: 2023-12-25

## 2023-12-25 RX ORDER — POTASSIUM CHLORIDE 20 MEQ/1
40 TABLET, EXTENDED RELEASE ORAL ONCE
Status: COMPLETED | OUTPATIENT
Start: 2023-12-25 | End: 2023-12-25

## 2023-12-25 RX ORDER — ACETAMINOPHEN 325 MG/1
650 TABLET ORAL EVERY 4 HOURS PRN
Status: DISCONTINUED | OUTPATIENT
Start: 2023-12-25 | End: 2023-12-29 | Stop reason: HOSPADM

## 2023-12-25 RX ORDER — ACETAMINOPHEN 160 MG/5ML
650 SOLUTION ORAL EVERY 6 HOURS PRN
Status: DISCONTINUED | OUTPATIENT
Start: 2023-12-25 | End: 2023-12-26

## 2023-12-25 RX ADMIN — AMOXICILLIN AND CLAVULANATE POTASSIUM 500 MG: 500; 125 TABLET, FILM COATED ORAL at 08:41

## 2023-12-25 RX ADMIN — INSULIN GLARGINE 45 UNITS: 100 INJECTION, SOLUTION SUBCUTANEOUS at 08:42

## 2023-12-25 RX ADMIN — HEPARIN SODIUM 5000 UNITS: 5000 INJECTION INTRAVENOUS; SUBCUTANEOUS at 21:33

## 2023-12-25 RX ADMIN — Medication 125 MCG: at 08:41

## 2023-12-25 RX ADMIN — HEPARIN SODIUM 5000 UNITS: 5000 INJECTION INTRAVENOUS; SUBCUTANEOUS at 13:21

## 2023-12-25 RX ADMIN — ACETAMINOPHEN 650 MG: 325 TABLET ORAL at 21:40

## 2023-12-25 RX ADMIN — DOCUSATE SODIUM 50MG AND SENNOSIDES 8.6MG 1 TABLET: 8.6; 5 TABLET, FILM COATED ORAL at 21:32

## 2023-12-25 RX ADMIN — DIVALPROEX SODIUM 250 MG: 250 TABLET, DELAYED RELEASE ORAL at 21:32

## 2023-12-25 RX ADMIN — SERTRALINE HYDROCHLORIDE 100 MG: 50 TABLET, FILM COATED ORAL at 08:42

## 2023-12-25 RX ADMIN — METOPROLOL TARTRATE 50 MG: 50 TABLET, FILM COATED ORAL at 08:42

## 2023-12-25 RX ADMIN — MAGNESIUM OXIDE 400 MG (241.3 MG MAGNESIUM) TABLET 400 MG: TABLET at 08:42

## 2023-12-25 RX ADMIN — INSULIN LISPRO 14 UNITS: 100 INJECTION, SOLUTION INTRAVENOUS; SUBCUTANEOUS at 07:51

## 2023-12-25 RX ADMIN — INSULIN LISPRO 4 UNITS: 100 INJECTION, SOLUTION INTRAVENOUS; SUBCUTANEOUS at 21:33

## 2023-12-25 RX ADMIN — INSULIN LISPRO 14 UNITS: 100 INJECTION, SOLUTION INTRAVENOUS; SUBCUTANEOUS at 17:07

## 2023-12-25 RX ADMIN — OXYCODONE 5 MG: 5 TABLET ORAL at 07:54

## 2023-12-25 RX ADMIN — QUETIAPINE FUMARATE 50 MG: 100 TABLET, FILM COATED ORAL at 21:32

## 2023-12-25 RX ADMIN — METOPROLOL TARTRATE 50 MG: 50 TABLET, FILM COATED ORAL at 21:32

## 2023-12-25 RX ADMIN — ACETAMINOPHEN 650 MG: 325 TABLET ORAL at 17:11

## 2023-12-25 RX ADMIN — AMLODIPINE BESYLATE 10 MG: 5 TABLET ORAL at 08:42

## 2023-12-25 RX ADMIN — INSULIN LISPRO 8 UNITS: 100 INJECTION, SOLUTION INTRAVENOUS; SUBCUTANEOUS at 11:25

## 2023-12-25 RX ADMIN — ASPIRIN 81 MG: 81 TABLET, COATED ORAL at 08:41

## 2023-12-25 RX ADMIN — INSULIN LISPRO 4 UNITS: 100 INJECTION, SOLUTION INTRAVENOUS; SUBCUTANEOUS at 07:52

## 2023-12-25 RX ADMIN — POTASSIUM CHLORIDE 40 MEQ: 1500 TABLET, EXTENDED RELEASE ORAL at 08:49

## 2023-12-25 RX ADMIN — INSULIN LISPRO 14 UNITS: 100 INJECTION, SOLUTION INTRAVENOUS; SUBCUTANEOUS at 11:23

## 2023-12-25 RX ADMIN — CLONIDINE HYDROCHLORIDE 0.1 MG: 0.1 TABLET ORAL at 08:42

## 2023-12-25 RX ADMIN — DIVALPROEX SODIUM 250 MG: 250 TABLET, DELAYED RELEASE ORAL at 08:41

## 2023-12-25 RX ADMIN — POLYETHYLENE GLYCOL 3350 17 G: 17 POWDER, FOR SOLUTION ORAL at 09:45

## 2023-12-25 RX ADMIN — INSULIN GLARGINE 45 UNITS: 100 INJECTION, SOLUTION SUBCUTANEOUS at 22:28

## 2023-12-25 RX ADMIN — HEPARIN SODIUM 5000 UNITS: 5000 INJECTION INTRAVENOUS; SUBCUTANEOUS at 07:00

## 2023-12-25 RX ADMIN — INSULIN LISPRO 8 UNITS: 100 INJECTION, SOLUTION INTRAVENOUS; SUBCUTANEOUS at 03:11

## 2023-12-25 RX ADMIN — PANTOPRAZOLE SODIUM 40 MG: 40 TABLET, DELAYED RELEASE ORAL at 08:42

## 2023-12-25 RX ADMIN — SIMVASTATIN 40 MG: 40 TABLET, FILM COATED ORAL at 21:33

## 2023-12-25 RX ADMIN — CYANOCOBALAMIN TAB 500 MCG 1000 MCG: 500 TAB at 08:41

## 2023-12-25 ASSESSMENT — PAIN SCALES - GENERAL
PAINLEVEL_OUTOF10: 5 - MODERATE PAIN
PAINLEVEL_OUTOF10: 0 - NO PAIN
PAINLEVEL_OUTOF10: 3
PAINLEVEL_OUTOF10: 3
PAINLEVEL_OUTOF10: 0 - NO PAIN

## 2023-12-25 ASSESSMENT — PAIN - FUNCTIONAL ASSESSMENT
PAIN_FUNCTIONAL_ASSESSMENT: 0-10

## 2023-12-25 NOTE — PROGRESS NOTES
Subjective:     No active neuro issues. He continues to complain of blurred vision. No seizure activities were witnessed. On Depakote 250 mg po bid, tolerating it well with no side effects.     Results for orders placed or performed during the hospital encounter of 12/22/23 (from the past 96 hour(s))   CBC and Auto Differential   Result Value Ref Range    WBC 10.9 4.4 - 11.3 x10*3/uL    nRBC 0.0 0.0 - 0.0 /100 WBCs    RBC 4.50 4.50 - 5.90 x10*6/uL    Hemoglobin 12.6 (L) 13.5 - 17.5 g/dL    Hematocrit 36.2 (L) 41.0 - 52.0 %    MCV 80 80 - 100 fL    MCH 28.0 26.0 - 34.0 pg    MCHC 34.8 32.0 - 36.0 g/dL    RDW 12.1 11.5 - 14.5 %    Platelets 284 150 - 450 x10*3/uL    Neutrophils % 80.5 40.0 - 80.0 %    Immature Granulocytes %, Automated 0.6 0.0 - 0.9 %    Lymphocytes % 12.2 13.0 - 44.0 %    Monocytes % 6.3 2.0 - 10.0 %    Eosinophils % 0.0 0.0 - 6.0 %    Basophils % 0.4 0.0 - 2.0 %    Neutrophils Absolute 8.75 (H) 1.20 - 7.70 x10*3/uL    Immature Granulocytes Absolute, Automated 0.07 0.00 - 0.70 x10*3/uL    Lymphocytes Absolute 1.33 1.20 - 4.80 x10*3/uL    Monocytes Absolute 0.69 0.10 - 1.00 x10*3/uL    Eosinophils Absolute 0.00 0.00 - 0.70 x10*3/uL    Basophils Absolute 0.04 0.00 - 0.10 x10*3/uL   Magnesium   Result Value Ref Range    Magnesium 3.40 (H) 1.60 - 2.40 mg/dL   Comprehensive metabolic panel   Result Value Ref Range    Glucose 898 (HH) 74 - 99 mg/dL    Sodium 119 (LL) 136 - 145 mmol/L    Potassium 6.9 (HH) 3.5 - 5.3 mmol/L    Chloride 79 (L) 98 - 107 mmol/L    Bicarbonate 24 21 - 32 mmol/L    Anion Gap 23 (H) 10 - 20 mmol/L    Urea Nitrogen 73 (H) 6 - 23 mg/dL    Creatinine 6.62 (H) 0.50 - 1.30 mg/dL    eGFR 9 (L) >60 mL/min/1.73m*2    Calcium 9.5 8.6 - 10.3 mg/dL    Albumin 5.0 3.4 - 5.0 g/dL    Alkaline Phosphatase 101 33 - 120 U/L    Total Protein 8.5 (H) 6.4 - 8.2 g/dL    AST 22 9 - 39 U/L    Bilirubin, Total 0.9 0.0 - 1.2 mg/dL    ALT 10 10 - 52 U/L   Troponin I, High Sensitivity   Result Value Ref  Range    Troponin I, High Sensitivity 10 0 - 20 ng/L   B-Type Natriuretic Peptide   Result Value Ref Range    BNP 17 0 - 99 pg/mL   ECG 12 lead   Result Value Ref Range    Ventricular Rate 68 BPM    Atrial Rate 68 BPM    MO Interval 174 ms    QRS Duration 100 ms    QT Interval 438 ms    QTC Calculation(Bazett) 465 ms    P Axis 37 degrees    R Axis 44 degrees    T Axis -16 degrees    QRS Count 11 beats    Q Onset 218 ms    P Onset 131 ms    P Offset 189 ms    T Offset 437 ms    QTC Fredericia 456 ms   POCT GLUCOSE   Result Value Ref Range    POCT Glucose >600 (H) 74 - 99 mg/dL   Coagulation Screen   Result Value Ref Range    Protime 11.3 9.8 - 12.8 seconds    INR 1.0 0.9 - 1.1    aPTT 27 27 - 38 seconds   Magnesium   Result Value Ref Range    Magnesium 3.05 (H) 1.60 - 2.40 mg/dL   Comprehensive metabolic panel   Result Value Ref Range    Glucose 932 (HH) 74 - 99 mg/dL    Sodium 123 (L) 136 - 145 mmol/L    Potassium 4.7 3.5 - 5.3 mmol/L    Chloride 83 (L) 98 - 107 mmol/L    Bicarbonate 23 21 - 32 mmol/L    Anion Gap 22 (H) 10 - 20 mmol/L    Urea Nitrogen 71 (H) 6 - 23 mg/dL    Creatinine 6.51 (H) 0.50 - 1.30 mg/dL    eGFR 10 (L) >60 mL/min/1.73m*2    Calcium 9.3 8.6 - 10.3 mg/dL    Albumin 4.7 3.4 - 5.0 g/dL    Alkaline Phosphatase 92 33 - 120 U/L    Total Protein 7.8 6.4 - 8.2 g/dL    AST 10 9 - 39 U/L    Bilirubin, Total 0.8 0.0 - 1.2 mg/dL    ALT 8 (L) 10 - 52 U/L   Light Blue Top   Result Value Ref Range    Extra Tube Hold for add-ons.    Beta Hydroxybutyrate   Result Value Ref Range    Beta-Hydroxybutyrate 0.39 (H) 0.02 - 0.27 mmol/L   Phosphorus   Result Value Ref Range    Phosphorus 6.6 (H) 2.5 - 4.9 mg/dL   BLOOD GAS VENOUS FULL PANEL   Result Value Ref Range    POCT pH, Venous 7.30 (L) 7.33 - 7.43 pH    POCT pCO2, Venous 56 (H) 41 - 51 mm Hg    POCT pO2, Venous 30 (L) 35 - 45 mm Hg    POCT SO2, Venous 47 45 - 75 %    POCT Oxy Hemoglobin, Venous 46.1 45.0 - 75.0 %    POCT Hematocrit Calculated, Venous 39.0 (L)  41.0 - 52.0 %    POCT Sodium, Venous 129 (L) 136 - 145 mmol/L    POCT Potassium, Venous 4.2 3.5 - 5.3 mmol/L    POCT Chloride, Venous 90 (L) 98 - 107 mmol/L    POCT Ionized Calicum, Venous 1.20 1.10 - 1.33 mmol/L    POCT Glucose, Venous      POCT Lactate, Venous 2.9 (H) 0.4 - 2.0 mmol/L    POCT Base Excess, Venous 0.1 -2.0 - 3.0 mmol/L    POCT HCO3 Calculated, Venous 27.6 (H) 22.0 - 26.0 mmol/L    POCT Hemoglobin, Venous 13.1 (L) 13.5 - 17.5 g/dL    POCT Anion Gap, Venous 16.0 10.0 - 25.0 mmol/L    Patient Temperature      FiO2 21 %   POCT GLUCOSE   Result Value Ref Range    POCT Glucose >600 (H) 74 - 99 mg/dL   POCT GLUCOSE   Result Value Ref Range    POCT Glucose >600 (H) 74 - 99 mg/dL   Basic metabolic panel   Result Value Ref Range    Glucose 749 (HH) 74 - 99 mg/dL    Sodium 125 (L) 136 - 145 mmol/L    Potassium 4.5 3.5 - 5.3 mmol/L    Chloride 88 (L) 98 - 107 mmol/L    Bicarbonate 22 21 - 32 mmol/L    Anion Gap 20 10 - 20 mmol/L    Urea Nitrogen 71 (H) 6 - 23 mg/dL    Creatinine 5.99 (H) 0.50 - 1.30 mg/dL    eGFR 11 (L) >60 mL/min/1.73m*2    Calcium 8.6 8.6 - 10.3 mg/dL   Magnesium   Result Value Ref Range    Magnesium 2.96 (H) 1.60 - 2.40 mg/dL   Troponin I, High Sensitivity   Result Value Ref Range    Troponin I, High Sensitivity 11 0 - 20 ng/L   SST TOP   Result Value Ref Range    Extra Tube Hold for add-ons.    POCT GLUCOSE   Result Value Ref Range    POCT Glucose >600 (H) 74 - 99 mg/dL   POCT GLUCOSE   Result Value Ref Range    POCT Glucose 522 (H) 74 - 99 mg/dL   POCT GLUCOSE   Result Value Ref Range    POCT Glucose 464 (H) 74 - 99 mg/dL   Comprehensive metabolic panel   Result Value Ref Range    Glucose 475 (HH) 74 - 99 mg/dL    Sodium 129 (L) 136 - 145 mmol/L    Potassium 4.0 3.5 - 5.3 mmol/L    Chloride 93 (L) 98 - 107 mmol/L    Bicarbonate 22 21 - 32 mmol/L    Anion Gap 18 10 - 20 mmol/L    Urea Nitrogen 71 (H) 6 - 23 mg/dL    Creatinine 6.12 (H) 0.50 - 1.30 mg/dL    eGFR 10 (L) >60 mL/min/1.73m*2     Calcium 8.8 8.6 - 10.3 mg/dL    Albumin 4.1 3.4 - 5.0 g/dL    Alkaline Phosphatase 80 33 - 120 U/L    Total Protein 7.0 6.4 - 8.2 g/dL    AST 12 9 - 39 U/L    Bilirubin, Total 0.6 0.0 - 1.2 mg/dL    ALT 5 (L) 10 - 52 U/L   Magnesium   Result Value Ref Range    Magnesium 2.96 (H) 1.60 - 2.40 mg/dL   Beta Hydroxybutyrate   Result Value Ref Range    Beta-Hydroxybutyrate 0.12 0.02 - 0.27 mmol/L   POCT GLUCOSE   Result Value Ref Range    POCT Glucose 341 (H) 74 - 99 mg/dL   POCT GLUCOSE   Result Value Ref Range    POCT Glucose 308 (H) 74 - 99 mg/dL   Urinalysis with Reflex Microscopic   Result Value Ref Range    Color, Urine Yellow Straw, Yellow    Appearance, Urine Clear Clear    Specific Gravity, Urine 1.016 1.005 - 1.035    pH, Urine 5.0 5.0, 5.5, 6.0, 6.5, 7.0, 7.5, 8.0    Protein, Urine 100 (2+) (N) NEGATIVE mg/dL    Glucose, Urine >=500 (3+) (A) NEGATIVE mg/dL    Blood, Urine SMALL (1+) (A) NEGATIVE    Ketones, Urine NEGATIVE NEGATIVE mg/dL    Bilirubin, Urine NEGATIVE NEGATIVE    Urobilinogen, Urine <2.0 <2.0 mg/dL    Nitrite, Urine NEGATIVE NEGATIVE    Leukocyte Esterase, Urine NEGATIVE NEGATIVE   Eosinophil smear   Result Value Ref Range    Eosinophils None (N) (none)   Drug Screen, Urine   Result Value Ref Range    Amphetamine Screen, Urine Presumptive Negative Presumptive Negative    Barbiturate Screen, Urine Presumptive Negative Presumptive Negative    Benzodiazepines Screen, Urine Presumptive Negative Presumptive Negative    Cannabinoid Screen, Urine Presumptive Negative Presumptive Negative    Cocaine Metabolite Screen, Urine Presumptive Negative Presumptive Negative    Fentanyl Screen, Urine Presumptive Negative Presumptive Negative    Opiate Screen, Urine Presumptive Positive (A) Presumptive Negative    Oxycodone Screen, Urine Presumptive Negative Presumptive Negative    PCP Screen, Urine Presumptive Negative Presumptive Negative   Microscopic Only, Urine   Result Value Ref Range    WBC, Urine 1-5 1-5,  NONE /HPF    RBC, Urine 1-2 NONE, 1-2, 3-5 /HPF    Squamous Epithelial Cells, Urine 1-9 (SPARSE) Reference range not established. /HPF    Bacteria, Urine 1+ (A) NONE SEEN /HPF    Mucus, Urine 1+ Reference range not established. /LPF    Hyaline Casts, Urine 2+ (A) NONE /LPF   POCT GLUCOSE   Result Value Ref Range    POCT Glucose 238 (H) 74 - 99 mg/dL   CBC and Auto Differential   Result Value Ref Range    WBC 7.4 4.4 - 11.3 x10*3/uL    nRBC 0.0 0.0 - 0.0 /100 WBCs    RBC 3.84 (L) 4.50 - 5.90 x10*6/uL    Hemoglobin 10.7 (L) 13.5 - 17.5 g/dL    Hematocrit 31.1 (L) 41.0 - 52.0 %    MCV 81 80 - 100 fL    MCH 27.9 26.0 - 34.0 pg    MCHC 34.4 32.0 - 36.0 g/dL    RDW 11.9 11.5 - 14.5 %    Platelets 209 150 - 450 x10*3/uL    Neutrophils % 78.2 40.0 - 80.0 %    Immature Granulocytes %, Automated 0.8 0.0 - 0.9 %    Lymphocytes % 10.9 13.0 - 44.0 %    Monocytes % 9.8 2.0 - 10.0 %    Eosinophils % 0.0 0.0 - 6.0 %    Basophils % 0.3 0.0 - 2.0 %    Neutrophils Absolute 5.77 1.20 - 7.70 x10*3/uL    Immature Granulocytes Absolute, Automated 0.06 0.00 - 0.70 x10*3/uL    Lymphocytes Absolute 0.80 (L) 1.20 - 4.80 x10*3/uL    Monocytes Absolute 0.72 0.10 - 1.00 x10*3/uL    Eosinophils Absolute 0.00 0.00 - 0.70 x10*3/uL    Basophils Absolute 0.02 0.00 - 0.10 x10*3/uL   Basic metabolic panel   Result Value Ref Range    Glucose 237 (H) 74 - 99 mg/dL    Sodium 132 (L) 136 - 145 mmol/L    Potassium 4.0 3.5 - 5.3 mmol/L    Chloride 96 (L) 98 - 107 mmol/L    Bicarbonate 23 21 - 32 mmol/L    Anion Gap 17 10 - 20 mmol/L    Urea Nitrogen 69 (H) 6 - 23 mg/dL    Creatinine 6.34 (H) 0.50 - 1.30 mg/dL    eGFR 10 (L) >60 mL/min/1.73m*2    Calcium 8.8 8.6 - 10.3 mg/dL   Magnesium   Result Value Ref Range    Magnesium 2.91 (H) 1.60 - 2.40 mg/dL   Phosphorus   Result Value Ref Range    Phosphorus 4.0 2.5 - 4.9 mg/dL   POCT GLUCOSE   Result Value Ref Range    POCT Glucose 257 (H) 74 - 99 mg/dL   Comprehensive metabolic panel   Result Value Ref Range     Glucose 263 (H) 74 - 99 mg/dL    Sodium 133 (L) 136 - 145 mmol/L    Potassium 3.7 3.5 - 5.3 mmol/L    Chloride 97 (L) 98 - 107 mmol/L    Bicarbonate 23 21 - 32 mmol/L    Anion Gap 17 10 - 20 mmol/L    Urea Nitrogen 68 (H) 6 - 23 mg/dL    Creatinine 6.32 (H) 0.50 - 1.30 mg/dL    eGFR 10 (L) >60 mL/min/1.73m*2    Calcium 8.8 8.6 - 10.3 mg/dL    Albumin 4.0 3.4 - 5.0 g/dL    Alkaline Phosphatase 75 33 - 120 U/L    Total Protein 6.8 6.4 - 8.2 g/dL    AST 8 (L) 9 - 39 U/L    Bilirubin, Total 0.7 0.0 - 1.2 mg/dL    ALT 6 (L) 10 - 52 U/L   POCT GLUCOSE   Result Value Ref Range    POCT Glucose 273 (H) 74 - 99 mg/dL   POCT GLUCOSE   Result Value Ref Range    POCT Glucose 259 (H) 74 - 99 mg/dL   POCT GLUCOSE   Result Value Ref Range    POCT Glucose 309 (H) 74 - 99 mg/dL   Basic metabolic panel   Result Value Ref Range    Glucose 264 (H) 74 - 99 mg/dL    Sodium 133 (L) 136 - 145 mmol/L    Potassium 3.8 3.5 - 5.3 mmol/L    Chloride 97 (L) 98 - 107 mmol/L    Bicarbonate 23 21 - 32 mmol/L    Anion Gap 17 10 - 20 mmol/L    Urea Nitrogen 67 (H) 6 - 23 mg/dL    Creatinine 6.12 (H) 0.50 - 1.30 mg/dL    eGFR 10 (L) >60 mL/min/1.73m*2    Calcium 8.9 8.6 - 10.3 mg/dL   Magnesium   Result Value Ref Range    Magnesium 2.59 (H) 1.60 - 2.40 mg/dL   Lavender Top   Result Value Ref Range    Extra Tube Hold for add-ons.    SST TOP   Result Value Ref Range    Extra Tube Hold for add-ons.    Hemoglobin A1c   Result Value Ref Range    Hemoglobin A1C 15.1 (H) see below %    Estimated Average Glucose 387 Not Established mg/dL   POCT GLUCOSE   Result Value Ref Range    POCT Glucose 252 (H) 74 - 99 mg/dL   ECG 12 lead   Result Value Ref Range    Ventricular Rate 77 BPM    Atrial Rate 77 BPM    CO Interval 164 ms    QRS Duration 102 ms    QT Interval 392 ms    QTC Calculation(Bazett) 443 ms    P Axis 41 degrees    R Axis 51 degrees    T Axis -25 degrees    QRS Count 13 beats    Q Onset 216 ms    P Onset 134 ms    P Offset 191 ms    T Offset 412 ms     QTC Fredericia 425 ms   Lactate   Result Value Ref Range    Lactate 1.9 0.4 - 2.0 mmol/L   Lactate dehydrogenase   Result Value Ref Range    LDH 85 84 - 246 U/L   Lavender Top   Result Value Ref Range    Extra Tube Hold for add-ons.    SST TOP   Result Value Ref Range    Extra Tube Hold for add-ons.    POCT GLUCOSE   Result Value Ref Range    POCT Glucose 230 (H) 74 - 99 mg/dL   POCT GLUCOSE   Result Value Ref Range    POCT Glucose 215 (H) 74 - 99 mg/dL   Transthoracic Echo (TTE) Complete   Result Value Ref Range    AV mn grad 5.0     AV pk jim 1.52     LV biplane EF 56     LVOT diam 2.10     MV E/A ratio 1.20     MV avg E/e' ratio 10.45     LA vol index A/L 31.3     RV free wall pk S' 15.20     RVSP 16.5     Aortic Valve Area by Continuity of Peak Velocity 2.76     AV pk grad 9.2     Aortic Valve Area by Continuity of VTI 2.69     LV A4C EF 53.2    ECG 12 Lead   Result Value Ref Range    Ventricular Rate 68 BPM    Atrial Rate 68 BPM    RI Interval 166 ms    QRS Duration 112 ms    QT Interval 456 ms    QTC Calculation(Bazett) 484 ms    P Axis 41 degrees    R Axis 25 degrees    T Axis -10 degrees    QRS Count 12 beats    Q Onset 217 ms    P Onset 134 ms    P Offset 191 ms    T Offset 445 ms    QTC Fredericia 475 ms   POCT GLUCOSE   Result Value Ref Range    POCT Glucose 272 (H) 74 - 99 mg/dL   POCT GLUCOSE   Result Value Ref Range    POCT Glucose 360 (H) 74 - 99 mg/dL   Prolactin   Result Value Ref Range    Prolactin 7.8 2.0 - 18.0 ug/L   Comprehensive metabolic panel   Result Value Ref Range    Glucose 353 (H) 74 - 99 mg/dL    Sodium 130 (L) 136 - 145 mmol/L    Potassium 4.2 3.5 - 5.3 mmol/L    Chloride 97 (L) 98 - 107 mmol/L    Bicarbonate 21 21 - 32 mmol/L    Anion Gap 16 10 - 20 mmol/L    Urea Nitrogen 64 (H) 6 - 23 mg/dL    Creatinine 6.04 (H) 0.50 - 1.30 mg/dL    eGFR 10 (L) >60 mL/min/1.73m*2    Calcium 8.7 8.6 - 10.3 mg/dL    Albumin 3.9 3.4 - 5.0 g/dL    Alkaline Phosphatase 69 33 - 120 U/L    Total Protein  6.7 6.4 - 8.2 g/dL    AST 11 9 - 39 U/L    Bilirubin, Total 0.7 0.0 - 1.2 mg/dL    ALT 7 (L) 10 - 52 U/L   Lactate   Result Value Ref Range    Lactate 0.9 0.4 - 2.0 mmol/L   POCT GLUCOSE   Result Value Ref Range    POCT Glucose 319 (H) 74 - 99 mg/dL   Comprehensive metabolic panel   Result Value Ref Range    Glucose 317 (H) 74 - 99 mg/dL    Sodium 131 (L) 136 - 145 mmol/L    Potassium 3.8 3.5 - 5.3 mmol/L    Chloride 97 (L) 98 - 107 mmol/L    Bicarbonate 23 21 - 32 mmol/L    Anion Gap 15 10 - 20 mmol/L    Urea Nitrogen 64 (H) 6 - 23 mg/dL    Creatinine 5.62 (H) 0.50 - 1.30 mg/dL    eGFR 11 (L) >60 mL/min/1.73m*2    Calcium 8.8 8.6 - 10.3 mg/dL    Albumin 4.0 3.4 - 5.0 g/dL    Alkaline Phosphatase 73 33 - 120 U/L    Total Protein 6.9 6.4 - 8.2 g/dL    AST 11 9 - 39 U/L    Bilirubin, Total 0.6 0.0 - 1.2 mg/dL    ALT 5 (L) 10 - 52 U/L   POCT GLUCOSE   Result Value Ref Range    POCT Glucose 303 (H) 74 - 99 mg/dL   POCT GLUCOSE   Result Value Ref Range    POCT Glucose 299 (H) 74 - 99 mg/dL   CBC and Auto Differential   Result Value Ref Range    WBC 7.3 4.4 - 11.3 x10*3/uL    nRBC 0.0 0.0 - 0.0 /100 WBCs    RBC 4.12 (L) 4.50 - 5.90 x10*6/uL    Hemoglobin 11.2 (L) 13.5 - 17.5 g/dL    Hematocrit 33.9 (L) 41.0 - 52.0 %    MCV 82 80 - 100 fL    MCH 27.2 26.0 - 34.0 pg    MCHC 33.0 32.0 - 36.0 g/dL    RDW 12.0 11.5 - 14.5 %    Platelets 190 150 - 450 x10*3/uL    Neutrophils % 75.7 40.0 - 80.0 %    Immature Granulocytes %, Automated 1.1 (H) 0.0 - 0.9 %    Lymphocytes % 15.2 13.0 - 44.0 %    Monocytes % 7.7 2.0 - 10.0 %    Eosinophils % 0.0 0.0 - 6.0 %    Basophils % 0.3 0.0 - 2.0 %    Neutrophils Absolute 5.55 1.20 - 7.70 x10*3/uL    Immature Granulocytes Absolute, Automated 0.08 0.00 - 0.70 x10*3/uL    Lymphocytes Absolute 1.11 (L) 1.20 - 4.80 x10*3/uL    Monocytes Absolute 0.56 0.10 - 1.00 x10*3/uL    Eosinophils Absolute 0.00 0.00 - 0.70 x10*3/uL    Basophils Absolute 0.02 0.00 - 0.10 x10*3/uL   Phosphorus   Result Value Ref  Range    Phosphorus 4.3 2.5 - 4.9 mg/dL   Parathyroid Hormone, Intact   Result Value Ref Range    Parathyroid Hormone, Intact 81.1 18.5 - 88.0 pg/mL   Vitamin D 25-Hydroxy,Total (for eval of Vitamin D levels)   Result Value Ref Range    Vitamin D, 25-Hydroxy, Total 25 (L) 30 - 100 ng/mL   Magnesium   Result Value Ref Range    Magnesium 2.75 (H) 1.60 - 2.40 mg/dL   Comprehensive metabolic panel   Result Value Ref Range    Glucose 315 (H) 74 - 99 mg/dL    Sodium 134 (L) 136 - 145 mmol/L    Potassium 3.8 3.5 - 5.3 mmol/L    Chloride 97 (L) 98 - 107 mmol/L    Bicarbonate 24 21 - 32 mmol/L    Anion Gap 17 10 - 20 mmol/L    Urea Nitrogen 63 (H) 6 - 23 mg/dL    Creatinine 5.29 (H) 0.50 - 1.30 mg/dL    eGFR 12 (L) >60 mL/min/1.73m*2    Calcium 9.3 8.6 - 10.3 mg/dL    Albumin 4.1 3.4 - 5.0 g/dL    Alkaline Phosphatase 78 33 - 120 U/L    Total Protein 7.2 6.4 - 8.2 g/dL    AST 10 9 - 39 U/L    Bilirubin, Total 0.6 0.0 - 1.2 mg/dL    ALT 6 (L) 10 - 52 U/L   POCT GLUCOSE   Result Value Ref Range    POCT Glucose 259 (H) 74 - 99 mg/dL   POCT GLUCOSE   Result Value Ref Range    POCT Glucose 257 (H) 74 - 99 mg/dL   POCT GLUCOSE   Result Value Ref Range    POCT Glucose 259 (H) 74 - 99 mg/dL   Comprehensive metabolic panel   Result Value Ref Range    Glucose 259 (H) 74 - 99 mg/dL    Sodium 132 (L) 136 - 145 mmol/L    Potassium 3.6 3.5 - 5.3 mmol/L    Chloride 98 98 - 107 mmol/L    Bicarbonate 24 21 - 32 mmol/L    Anion Gap 14 10 - 20 mmol/L    Urea Nitrogen 64 (H) 6 - 23 mg/dL    Creatinine 5.04 (H) 0.50 - 1.30 mg/dL    eGFR 13 (L) >60 mL/min/1.73m*2    Calcium 9.3 8.6 - 10.3 mg/dL    Albumin 4.1 3.4 - 5.0 g/dL    Alkaline Phosphatase 72 33 - 120 U/L    Total Protein 7.2 6.4 - 8.2 g/dL    AST 14 9 - 39 U/L    Bilirubin, Total 0.5 0.0 - 1.2 mg/dL    ALT 7 (L) 10 - 52 U/L   Prolactin   Result Value Ref Range    Prolactin 6.6 2.0 - 18.0 ug/L   Light Blue Top   Result Value Ref Range    Extra Tube Hold for add-ons.    Green Top   Result  Value Ref Range    Extra Tube Hold for add-ons.    POCT GLUCOSE   Result Value Ref Range    POCT Glucose 175 (H) 74 - 99 mg/dL   POCT GLUCOSE   Result Value Ref Range    POCT Glucose 297 (H) 74 - 99 mg/dL   POCT GLUCOSE   Result Value Ref Range    POCT Glucose 210 (H) 74 - 99 mg/dL   Magnesium   Result Value Ref Range    Magnesium 2.70 (H) 1.60 - 2.40 mg/dL   Phosphorus   Result Value Ref Range    Phosphorus 4.1 2.5 - 4.9 mg/dL   Basic metabolic panel   Result Value Ref Range    Glucose 192 (H) 74 - 99 mg/dL    Sodium 137 136 - 145 mmol/L    Potassium 3.3 (L) 3.5 - 5.3 mmol/L    Chloride 99 98 - 107 mmol/L    Bicarbonate 24 21 - 32 mmol/L    Anion Gap 17 10 - 20 mmol/L    Urea Nitrogen 60 (H) 6 - 23 mg/dL    Creatinine 4.72 (H) 0.50 - 1.30 mg/dL    eGFR 14 (L) >60 mL/min/1.73m*2    Calcium 9.3 8.6 - 10.3 mg/dL   CBC and Auto Differential   Result Value Ref Range    WBC 5.6 4.4 - 11.3 x10*3/uL    nRBC 0.0 0.0 - 0.0 /100 WBCs    RBC 3.93 (L) 4.50 - 5.90 x10*6/uL    Hemoglobin 10.9 (L) 13.5 - 17.5 g/dL    Hematocrit 32.1 (L) 41.0 - 52.0 %    MCV 82 80 - 100 fL    MCH 27.7 26.0 - 34.0 pg    MCHC 34.0 32.0 - 36.0 g/dL    RDW 12.0 11.5 - 14.5 %    Platelets 198 150 - 450 x10*3/uL    Neutrophils % 62.6 40.0 - 80.0 %    Immature Granulocytes %, Automated 3.2 (H) 0.0 - 0.9 %    Lymphocytes % 24.0 13.0 - 44.0 %    Monocytes % 9.8 2.0 - 10.0 %    Eosinophils % 0.0 0.0 - 6.0 %    Basophils % 0.4 0.0 - 2.0 %    Neutrophils Absolute 3.52 1.20 - 7.70 x10*3/uL    Immature Granulocytes Absolute, Automated 0.18 0.00 - 0.70 x10*3/uL    Lymphocytes Absolute 1.35 1.20 - 4.80 x10*3/uL    Monocytes Absolute 0.55 0.10 - 1.00 x10*3/uL    Eosinophils Absolute 0.00 0.00 - 0.70 x10*3/uL    Basophils Absolute 0.02 0.00 - 0.10 x10*3/uL   POCT GLUCOSE   Result Value Ref Range    POCT Glucose 186 (H) 74 - 99 mg/dL   POCT GLUCOSE   Result Value Ref Range    POCT Glucose 202 (H) 74 - 99 mg/dL         Scheduled medications  [Held by provider]  "allopurinol, 300 mg, oral, Daily  amLODIPine, 10 mg, oral, Daily  amoxicillin-pot clavulanate, 500 mg, oral, q12h EILEEN  aspirin, 324 mg, oral, Once  aspirin, 81 mg, oral, Daily  [Held by provider] busPIRone, 15 mg, oral, TID  cholecalciferol, 5,000 Units, oral, Daily  cloNIDine, 0.1 mg, oral, Daily  cyanocobalamin, 1,000 mcg, oral, Daily  divalproex, 250 mg, oral, q12h EILEEN  [Held by provider] glipiZIDE, 2.5 mg, oral, Daily  heparin (porcine), 5,000 Units, subcutaneous, q8h  [Held by provider] hydroCHLOROthiazide, 25 mg, oral, Daily  insulin glargine, 45 Units, subcutaneous, BID  insulin lispro, 0-15 Units, subcutaneous, TID with meals, nightly, & 0300  insulin lispro, 14 Units, subcutaneous, TID with meals  [Held by provider] losartan, 25 mg, oral, BID  magnesium oxide, 400 mg, oral, Daily  metoprolol tartrate, 50 mg, oral, BID  [Held by provider] nabumetone, 750 mg, oral, q12h  pantoprazole, 40 mg, oral, Daily  polyethylene glycol, 17 g, oral, Daily  QUEtiapine, 50 mg, oral, Nightly  sennosides-docusate sodium, 1 tablet, oral, Nightly  sertraline, 100 mg, oral, Daily  simvastatin, 40 mg, oral, Nightly  terazosin, 5 mg, oral, Once  [Held by provider] traMADol, 50 mg, oral, q6h      Continuous medications     PRN medications  PRN medications: acetaminophen **OR** acetaminophen, albuterol, dextrose 10 % in water (D10W), dextrose, glucagon, [Held by provider] hydrOXYzine pamoate, insulin regular, meclizine, ondansetron **OR** ondansetron, oxyCODONE    Physical exam:  /68 (BP Location: Right arm, Patient Position: Sitting)   Pulse 58   Temp 36.3 °C (97.4 °F) (Temporal)   Resp 20   Ht 1.702 m (5' 7\")   Wt 97.5 kg (214 lb 15.2 oz)   SpO2 97%   BMI 33.67 kg/m²   Awake, alert, c/o blurred vision  Cranial nerves intact.   Mild right hemiparesis ( Deltoid, Triceps, Wrist extensor, Hip flexor, knee flexor, foot dosi flexor)  Coordination intact  Gait no tested.       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               The patient needs to avoid driving, operating heavy machines, or being in any condition, should he  have altered mentation may put him or others in danger ( swimming, climbing stairs, behind locked doors,  etc...)  for at least 6 months of being spell free.                             Follow up by Neurology ( may be gradually tapered off in 4-6 weeks).                To avoid future use of  home meds: Tramadol and Bupsar ( due to seizure side effects)               Will benefit from  ophthalmology evaluation as in/outpatient ( may have diabetic retinopathy, last Ophthalmologic evaluation was years ago) .    The patient to be followed up by Neurology team on service as of tomorrow ( Dr. Ford and Vikki Hernandez, CNP.      Time spent 30 minutes including reviewing medical records includes examining, consulting the patient and discussing with the medical staff members.         Terence Garrison M.D.

## 2023-12-25 NOTE — PROGRESS NOTES
Nephrology Progress Note    Assessment:  53 y.o. male with history s/f CKD stage IV, T2DM, CAD s/p SEAMUS who presented for malaise, fevers, N/V.     LU on CKD stage IV: most likely 2/2 DKA, however ? Contribution of hypotension as well, Scr 6.5 on presentation, baseline 2.5-2.8, CKD risk factors T2DM, HTN, CAD: improving   DKA: resolved   Hyponatremia: in setting of hyperglycemia, improved  Anemia  Hypotension: improving   Vitamin D insufficiency     Plan:  - Scr trending down though slowly, no acute indication to start RRT   - monitor function and output  - no indication for FABIANA   - continue cholecalciferol 5000 units, can decrease to 2000 units daily on discharge     Subjective:  Admit Date: 12/22/2023    Interval History: function continues to improve, BP slightly better, pt c/o visual blurriness     Medications:  Scheduled Meds:[Held by provider] allopurinol, 300 mg, oral, Daily  amLODIPine, 10 mg, oral, Daily  amoxicillin-pot clavulanate, 500 mg, oral, q12h EILEEN  aspirin, 324 mg, oral, Once  aspirin, 81 mg, oral, Daily  [Held by provider] busPIRone, 15 mg, oral, TID  cholecalciferol, 5,000 Units, oral, Daily  cloNIDine, 0.1 mg, oral, Daily  cyanocobalamin, 1,000 mcg, oral, Daily  divalproex, 250 mg, oral, q12h EILEEN  [Held by provider] glipiZIDE, 2.5 mg, oral, Daily  heparin (porcine), 5,000 Units, subcutaneous, q8h  [Held by provider] hydroCHLOROthiazide, 25 mg, oral, Daily  insulin glargine, 45 Units, subcutaneous, BID  insulin lispro, 0-15 Units, subcutaneous, TID with meals, nightly, & 0300  insulin lispro, 14 Units, subcutaneous, TID with meals  [Held by provider] losartan, 25 mg, oral, BID  magnesium oxide, 400 mg, oral, Daily  metoprolol tartrate, 50 mg, oral, BID  [Held by provider] nabumetone, 750 mg, oral, q12h  pantoprazole, 40 mg, oral, Daily  polyethylene glycol, 17 g, oral, Daily  QUEtiapine, 50 mg, oral, Nightly  sennosides-docusate sodium, 1 tablet, oral, Nightly  sertraline, 100 mg, oral,  "Daily  simvastatin, 40 mg, oral, Nightly  terazosin, 5 mg, oral, Once  [Held by provider] traMADol, 50 mg, oral, q6h      Continuous Infusions:     CBC:   Lab Results   Component Value Date    WBC 5.6 12/25/2023    RBC 3.93 (L) 12/25/2023    HGB 10.9 (L) 12/25/2023    HCT 32.1 (L) 12/25/2023    MCV 82 12/25/2023    MCH 27.7 12/25/2023    MCHC 34.0 12/25/2023    RDW 12.0 12/25/2023     12/25/2023     BMP:    Lab Results   Component Value Date     12/25/2023    K 3.3 (L) 12/25/2023    CL 99 12/25/2023    CO2 24 12/25/2023    BUN 60 (H) 12/25/2023    CREATININE 4.72 (H) 12/25/2023    CALCIUM 9.3 12/25/2023    GLUCOSE 192 (H) 12/25/2023       Objective:  Vitals: /68 (BP Location: Right arm, Patient Position: Sitting)   Pulse 58   Temp 36.3 °C (97.4 °F) (Temporal)   Resp 20   Ht 1.702 m (5' 7\")   Wt 97.5 kg (214 lb 15.2 oz)   SpO2 97%   BMI 33.67 kg/m²    Wt Readings from Last 3 Encounters:   12/24/23 97.5 kg (214 lb 15.2 oz)   12/05/23 106 kg (234 lb 3.2 oz)   11/21/23 108 kg (238 lb 12.8 oz)      24HR INTAKE/OUTPUT:    Intake/Output Summary (Last 24 hours) at 12/25/2023 1213  Last data filed at 12/25/2023 0500  Gross per 24 hour   Intake 480 ml   Output 1150 ml   Net -670 ml         General: alert, in no apparent distress  HEENT: normocephalic, atraumatic, anicteric  Lungs: non-labored respirations, clear to auscultation bilaterally  Heart: regular rate and rhythm, no murmurs or rubs  Abdomen: soft, non-tender, non-distended  Ext: no cyanosis, no peripheral edema  Neuro: alert and oriented, no gross abnormalities      Electronically signed by Elizabeth La MD, MD              "

## 2023-12-25 NOTE — PROGRESS NOTES
"[facility] Critical Care Medicine       Date:  12/25/2023  Patient:  Amanuel Riojas  YOB: 1970  MRN:  [id number]   Admit Date:  12/22/2023    Chief Complaint   Patient presents with    Dizziness    Dental Pain         History of Present Illness:  Amanuel Riojas is a 53 y.o. year old male patient with Past Medical History of HTN, CAD s/p stents, T2DM, CKD IV presented to the ED endorsing a few days of general malaise, fevers, nausea, vomiting and dizziness. He states that he felt he was \"on a boat\" and the room was spinning and also had episode of chest pain that relieved with sublingual nitroglycerin. He also reports that he was at a [email or web address] party last night when he was chewing on something hard and now endorses 7/10 pain in right upper teeth.      ED course: CMP showed hyperglycemia 932 with pseudohyponatremia 123.  Beta hydroxy .39, Repeat potassium was WNL. BUN of 71 and CR of 6.51, GFR of 10. EKG shows NSR with rate of 60- had t wave inversion in lead III and AVF in which was present on previous.  Troponin is 10, BNP 17. CBC shows normocytic anemia. Patient was started on maintence fluids and an insulin gtt per protocol. CT of head was benign, CXR shows no cardiopulmonary process. Hernandez catheter placed for acurate Is and Os. Patient was admitted to ICU at this time for further medical management and monitoring.       Interval ICU Events:  12/22: Patient was admitted to ICU for management of HHS vs DKA and LU on CKD. He is endorsing general malaise, nausea and dizziness. He denies chest pain, shortness of breath, abd pain, flank pain, dysuria.      12/23: Overnight, patient arrived to ICU on .5units/hr of insulin and on a D5 gtt. On arrival to unit he was given an addition 10 unit bolus of insulin and started on LR gtt 75/hr. His BS improved after initiation of proper dose of insulin. He also had developed hypotension in which he was started on levophed after he was given nitroglycerin. This " was discontinued this am. He is now hemodynamically stable, NSR with no ectopy and 96% on RA. He is still endorsing nausea and vomiting. Denies flank pain, abd pain, chest pain and shortness of breath.       12/24: Patient resting comfortably in bed this morning alert and oriented, but had trouble staying awake.  Patient mentions blurry vision and vertigo-like symptoms. patient's insulin regimen changed to 45 units twice a day, 14 units 3 times a day with meals, and SSI before meals.  Patient had seizure-like activity while at MRI yesterday was given 2 mg of Ativan.  Prolactin post seizure came back at 7.8.     12/25: No acute events occurred overnight.  Patient's new insulin regimen runs glucoses between 150 and 225.  Patient complains of blurry vision this morning.  Patient had seizure-like activity yesterday but was not given Ativan since patient withdrew to pain.  Stat prolactin drawn postevent came back with a value of 6.6.    Medical History:  Past Medical History:   Diagnosis Date    Cellulitis of unspecified finger 06/30/2022    Paronychia of thumb, unspecified laterality    COVID-19 01/17/2022    COVID-19 virus infection    Encounter for follow-up examination after completed treatment for conditions other than malignant neoplasm 01/17/2022    Hospital discharge follow-up    Encounter for general adult medical examination without abnormal findings 08/15/2022    Wellness examination    Encounter for screening for malignant neoplasm of colon 03/15/2022    Screen for colon cancer    Encounter for screening for malignant neoplasm of prostate 03/15/2022    Encounter for screening for malignant neoplasm of prostate    Obesity, unspecified 06/14/2022    Class 2 obesity with body mass index (BMI) of 36.0 to 36.9 in adult    Pain in unspecified hand 10/04/2022    Hand pain    Personal history of diseases of the skin and subcutaneous tissue 06/15/2022    History of eczema    Personal history of other diseases of the  musculoskeletal system and connective tissue     History of gout    Personal history of other endocrine, nutritional and metabolic disease     History of hypokalemia    Personal history of other endocrine, nutritional and metabolic disease     History of hypokalemia    Rash and other nonspecific skin eruption 03/15/2022    Rash    Stiffness of right hand, not elsewhere classified 07/06/2021    Stiffness of finger joint of right hand    Unspecified fracture of other metacarpal bone, initial encounter for closed fracture 07/06/2021    Fracture of third metacarpal bone    Unspecified fracture of third metacarpal bone, right hand, initial encounter for closed fracture 07/06/2021    Fracture of third metacarpal bone of right hand     Past Surgical History:   Procedure Laterality Date    OTHER SURGICAL HISTORY  11/07/2021    Cardiac catheterization with stent placement     Medications Prior to Admission   Medication Sig Dispense Refill Last Dose    albuterol 90 mcg/actuation inhaler Inhale 2 puffs every 4 hours if needed for shortness of breath. 18 g 1 12/22/2023    allopurinol (Zyloprim) 300 mg tablet Take 1 tablet (300 mg) by mouth once daily. 90 tablet 1 12/22/2023    amLODIPine (Norvasc) 10 mg tablet Take 1 tablet (10 mg) by mouth once daily. 90 tablet 1 12/22/2023    aspirin (Adult Low Dose Aspirin) 81 mg EC tablet Take 1 tablet (81 mg) by mouth once daily.   12/22/2023    busPIRone (Buspar) 15 mg tablet Take 1 tablet (15 mg) by mouth 3 times a day.   12/22/2023    cholecalciferol (Vitamin D3) 50 mcg (2,000 unit) capsule Take 1 capsule (50 mcg) by mouth once daily. 90 capsule 1 12/22/2023    clobetasol (Temovate) 0.05 % cream APPLY SPARINGLY TO AFFECTED AREA(S) TWICE DAILY AS NEEDED 60 g 1 12/22/2023    cloNIDine (Catapres) 0.1 mg tablet Take 1 tablet (0.1 mg) by mouth once daily. 90 tablet 1 12/22/2023    clotrimazole-betamethasone (Lotrisone) cream Apply 1 Application topically 2 times a day.   12/22/2023     cyanocobalamin (Vitamin B-12) 1,000 mcg tablet Take 1 tablet (1,000 mcg) by mouth once daily. 90 tablet 1 12/22/2023    dupilumab (Dupixent) 300 mg/2 mL syringe injection Inject 1 Syringe (300 mg) under the skin every 14 (fourteen) days.   Unknown    glipiZIDE (Glucotrol) 5 mg tablet Take 0.5 tablets (2.5 mg) by mouth once daily. 90 tablet 1 12/22/2023    hydroCHLOROthiazide (HYDRODiuril) 25 mg tablet Take 1 tablet (25 mg) by mouth once daily. 90 tablet 1 12/22/2023    hydrocortisone 2.5 % ointment Apply 1 Application topically 2 times a day.   12/22/2023    hydrOXYzine pamoate (Vistaril) 25 mg capsule Take 2 capsules (50 mg) by mouth 2 times a day as needed (at bedtime).   12/22/2023    losartan (Cozaar) 25 mg tablet Take 1 tablet (25 mg) by mouth 2 times a day. 90 tablet 1 12/22/2023    magnesium oxide (Mag-Ox) 400 mg tablet Take 1 tablet (400 mg) by mouth once daily. 90 tablet 1 12/22/2023    metoprolol tartrate (Lopressor) 50 mg tablet Take 1 tablet by mouth 2 times a day. 90 tablet 1 12/22/2023    mometasone-formoterol (Dulera) 200-5 mcg/actuation inhaler Inhale 1 puff 2 times a day. 13 g 1 12/22/2023    nabumetone (Relafen) 750 mg tablet Take 1 tablet (750 mg) by mouth every 12 hours.   12/22/2023    pantoprazole (ProtoNix) 40 mg EC tablet Take 1 tablet (40 mg) by mouth once daily. Do not crush, chew, or split. 30 tablet 1 12/22/2023    potassium chloride CR 20 mEq ER tablet Take 1 tablet (20 mEq) by mouth once daily. 90 tablet 1 12/22/2023    QUEtiapine (SEROquel) 50 mg tablet Take 1 tablet (50 mg) by mouth once daily at bedtime.   12/21/2023    sertraline (Zoloft) 100 mg tablet Take 1 tablet (100 mg) by mouth once daily.   12/22/2023    simvastatin (Zocor) 40 mg tablet Take 1 tablet (40 mg) by mouth once daily at bedtime. 90 tablet 1 12/21/2023    terazosin (Hytrin) 5 mg capsule Take 1 capsule (5 mg) by mouth 1 time.   12/22/2023    traMADol (Ultram) 50 mg tablet Take 1 tablet (50 mg) by mouth every 6 hours.    12/22/2023    triamcinolone (Kenalog) 0.1 % cream Apply 1 Application topically 2 times a day.   12/22/2023     Sulfamethoxazole-trimethoprim  Social History     Tobacco Use    Smoking status: Never    Smokeless tobacco: Never   Substance Use Topics    Alcohol use: Not Currently    Drug use: Not Currently     Family History   Problem Relation Name Age of Onset    Hypertension Mother      Stroke Father         Hospital Medications:           Current Facility-Administered Medications:     acetaminophen (Tylenol) tablet 650 mg, 650 mg, oral, q4h PRN, Gareth Ritter PA-C, 650 mg at 12/24/23 1659    albuterol 90 mcg/actuation inhaler 2 puff, 2 puff, inhalation, q4h PRN, KENIA Baeza-CNP    [Held by provider] allopurinol (Zyloprim) tablet 300 mg, 300 mg, oral, Daily, Vicki Brewer APRN-CNP    amLODIPine (Norvasc) tablet 10 mg, 10 mg, oral, Daily, KENIA Baeza-CNP, 10 mg at 12/25/23 0842    amoxicillin-pot clavulanate (Augmentin) 500-125 mg per tablet 500 mg, 500 mg, oral, q12h EILEEN, Shavonne Locke, APRN-CNP, 500 mg at 12/25/23 0841    aspirin chewable tablet 324 mg, 324 mg, oral, Once, Antwan Nolasco PA-C    aspirin EC tablet 81 mg, 81 mg, oral, Daily, Vicki Brewer APRN-CNP, 81 mg at 12/25/23 0841    [Held by provider] busPIRone (Buspar) tablet 15 mg, 15 mg, oral, TID, Vicki Brewer APRN-CNP    cholecalciferol (Vitamin D-3) capsule 125 mcg, 5,000 Units, oral, Daily, Elizabeth La MD, 125 mcg at 12/25/23 0841    cloNIDine (Catapres) tablet 0.1 mg, 0.1 mg, oral, Daily, Vicki Brewer APRHAYLEY-CNP, 0.1 mg at 12/25/23 0842    cyanocobalamin (Vitamin B-12) tablet 1,000 mcg, 1,000 mcg, oral, Daily, KENIA Baeza-CNP, 1,000 mcg at 12/25/23 0841    dextrose 10 % in water (D10W) infusion, 0.3 g/kg/hr, intravenous, Once PRN, Den Berumen MD    dextrose 50 % injection 25 g, 25 g, intravenous, q15 min PRN, Den Berumen MD    divalproex (Depakote) EC tablet 250 mg, 250  mg, oral, q12h EILEEN, Terence Garrison MD, 250 mg at 12/25/23 0841    [Held by provider] glipiZIDE (Glucotrol) tablet 2.5 mg, 2.5 mg, oral, Daily, KINA Baeza    glucagon (Glucagen) injection 1 mg, 1 mg, intramuscular, q15 min PRN, Den Berumen MD    heparin (porcine) injection 5,000 Units, 5,000 Units, subcutaneous, q8h, KINA Baeza, 5,000 Units at 12/25/23 0700    [Held by provider] hydroCHLOROthiazide (HYDRODiuril) tablet 25 mg, 25 mg, oral, Daily, KINA Baeza    [Held by provider] hydrOXYzine pamoate (Vistaril) capsule 50 mg, 50 mg, oral, BID PRN, KINA Baeza    insulin glargine (Lantus) injection 45 Units, 45 Units, subcutaneous, BID, Daniel Colindres MD, 45 Units at 12/25/23 0842    insulin lispro (HumaLOG) injection 0-15 Units, 0-15 Units, subcutaneous, TID with meals, nightly, & 0300, Daniel Colindres MD, 4 Units at 12/25/23 0752    insulin lispro (HumaLOG) injection 14 Units, 14 Units, subcutaneous, TID with meals, Daniel Colindres MD, 14 Units at 12/25/23 0751    insulin regular (HumuLIN, NovoLIN) bolus from bag 10 Units, 0.1 Units/kg, intravenous, PRN, KINA Baeza, 10 Units at 12/22/23 1700    [Held by provider] losartan (Cozaar) tablet 25 mg, 25 mg, oral, BID, KINA Baeza    magnesium oxide (Mag-Ox) tablet 400 mg, 400 mg, oral, Daily, KINA Baeza, 400 mg at 12/25/23 0842    meclizine (Antivert) tablet 25 mg, 25 mg, oral, q6h PRN, Gareth Ritter PA-C, 25 mg at 12/24/23 1659    metoprolol tartrate (Lopressor) tablet 50 mg, 50 mg, oral, BID, KINA Baeza, 50 mg at 12/25/23 0842    [Held by provider] nabumetone (Relafen) tablet 750 mg, 750 mg, oral, q12h, KINA Baeza    ondansetron (Zofran) tablet 4 mg, 4 mg, oral, q8h PRN **OR** ondansetron (Zofran) injection 4 mg, 4 mg, intravenous, q8h PRN, KINA Baeza    oxyCODONE (Roxicodone) immediate release tablet  5 mg, 5 mg, oral, q6h PRN, Vicki R Brewer, APRN-CNP, 5 mg at 12/25/23 0754    pantoprazole (ProtoNix) EC tablet 40 mg, 40 mg, oral, Daily, Vicki R Brewer, APRN-CNP, 40 mg at 12/25/23 0842    QUEtiapine (SEROquel) tablet 50 mg, 50 mg, oral, Nightly, Vicki R Brewer, APRN-CNP, 50 mg at 12/24/23 2247    sertraline (Zoloft) tablet 100 mg, 100 mg, oral, Daily, Vicki R Brewer, APRN-CNP, 100 mg at 12/25/23 0842    simvastatin (Zocor) tablet 40 mg, 40 mg, oral, Nightly, Vicki R Brewer, APRN-CNP, 40 mg at 12/24/23 2246    terazosin (Hytrin) capsule 5 mg, 5 mg, oral, Once, Vicki R Brewer, APRN-CNP    [Held by provider] traMADol (Ultram) tablet 50 mg, 50 mg, oral, q6h, Vicki R Brewer, APRN-CNP    Physical Exam:    Heart Rate:  [54-80]   Temp:  [36 °C (96.8 °F)-36.6 °C (97.9 °F)]   Resp:  [7-29]   BP: ()/(46-84)   SpO2:  [94 %-97 %]          Physical Exam  Vitals and nursing note reviewed.   Constitutional:       General: He is awake. He is not in acute distress.     Appearance: Normal appearance. He is normal weight. He is not ill-appearing or diaphoretic.   HENT:      Head: Normocephalic and atraumatic.      Nose: Nose normal. No congestion or rhinorrhea.      Mouth/Throat:      Mouth: Mucous membranes are moist.      Pharynx: No oropharyngeal exudate or posterior oropharyngeal erythema.   Eyes:      General: No scleral icterus.     Extraocular Movements: Extraocular movements intact.      Conjunctiva/sclera: Conjunctivae normal.      Pupils: Pupils are equal, round, and reactive to light.   Cardiovascular:      Rate and Rhythm: Normal rate and regular rhythm.      Pulses: Normal pulses.           Radial pulses are 2+ on the right side and 2+ on the left side.        Posterior tibial pulses are 2+ on the right side and 2+ on the left side.      Heart sounds: Normal heart sounds. No murmur heard.  Pulmonary:      Effort: Pulmonary effort is normal.      Breath sounds: Normal breath sounds. No wheezing,  rhonchi or rales.   Abdominal:      General: Bowel sounds are decreased. There is distension.      Palpations: There is no shifting dullness.      Tenderness: There is no abdominal tenderness. There is no guarding or rebound.   Musculoskeletal:         General: No swelling or tenderness. Normal range of motion.      Cervical back: Full passive range of motion without pain, normal range of motion and neck supple.   Skin:     General: Skin is warm and dry.      Capillary Refill: Capillary refill takes less than 2 seconds.      Coloration: Skin is not jaundiced.      Findings: No bruising.   Neurological:      General: No focal deficit present.      Mental Status: He is alert and oriented to person, place, and time. Mental status is at baseline.      GCS: GCS eye subscore is 4. GCS verbal subscore is 5. GCS motor subscore is 6.      Cranial Nerves: Cranial nerves 2-12 are intact. No cranial nerve deficit.      Sensory: Sensory deficit present.      Comments: Two point discrimination not present on hands   Psychiatric:         Mood and Affect: Mood normal.         Behavior: Behavior is cooperative.         Thought Content: Thought content normal.         Judgment: Judgment normal.         Objective:      Intake/Output Summary (Last 24 hours) at 12/25/2023 0931  Last data filed at 12/25/2023 0500  Gross per 24 hour   Intake 480 ml   Output 1150 ml   Net -670 ml       Results for orders placed or performed during the hospital encounter of 12/22/23 (from the past 24 hour(s))   POCT GLUCOSE   Result Value Ref Range    POCT Glucose 257 (H) 74 - 99 mg/dL   POCT GLUCOSE   Result Value Ref Range    POCT Glucose 259 (H) 74 - 99 mg/dL   Comprehensive metabolic panel   Result Value Ref Range    Glucose 259 (H) 74 - 99 mg/dL    Sodium 132 (L) 136 - 145 mmol/L    Potassium 3.6 3.5 - 5.3 mmol/L    Chloride 98 98 - 107 mmol/L    Bicarbonate 24 21 - 32 mmol/L    Anion Gap 14 10 - 20 mmol/L    Urea Nitrogen 64 (H) 6 - 23 mg/dL     Creatinine 5.04 (H) 0.50 - 1.30 mg/dL    eGFR 13 (L) >60 mL/min/1.73m*2    Calcium 9.3 8.6 - 10.3 mg/dL    Albumin 4.1 3.4 - 5.0 g/dL    Alkaline Phosphatase 72 33 - 120 U/L    Total Protein 7.2 6.4 - 8.2 g/dL    AST 14 9 - 39 U/L    Bilirubin, Total 0.5 0.0 - 1.2 mg/dL    ALT 7 (L) 10 - 52 U/L   Prolactin   Result Value Ref Range    Prolactin 6.6 2.0 - 18.0 ug/L   Light Blue Top   Result Value Ref Range    Extra Tube Hold for add-ons.    Green Top   Result Value Ref Range    Extra Tube Hold for add-ons.    POCT GLUCOSE   Result Value Ref Range    POCT Glucose 175 (H) 74 - 99 mg/dL   POCT GLUCOSE   Result Value Ref Range    POCT Glucose 297 (H) 74 - 99 mg/dL   POCT GLUCOSE   Result Value Ref Range    POCT Glucose 210 (H) 74 - 99 mg/dL   Magnesium   Result Value Ref Range    Magnesium 2.70 (H) 1.60 - 2.40 mg/dL   Phosphorus   Result Value Ref Range    Phosphorus 4.1 2.5 - 4.9 mg/dL   Basic metabolic panel   Result Value Ref Range    Glucose 192 (H) 74 - 99 mg/dL    Sodium 137 136 - 145 mmol/L    Potassium 3.3 (L) 3.5 - 5.3 mmol/L    Chloride 99 98 - 107 mmol/L    Bicarbonate 24 21 - 32 mmol/L    Anion Gap 17 10 - 20 mmol/L    Urea Nitrogen 60 (H) 6 - 23 mg/dL    Creatinine 4.72 (H) 0.50 - 1.30 mg/dL    eGFR 14 (L) >60 mL/min/1.73m*2    Calcium 9.3 8.6 - 10.3 mg/dL   CBC and Auto Differential   Result Value Ref Range    WBC 5.6 4.4 - 11.3 x10*3/uL    nRBC 0.0 0.0 - 0.0 /100 WBCs    RBC 3.93 (L) 4.50 - 5.90 x10*6/uL    Hemoglobin 10.9 (L) 13.5 - 17.5 g/dL    Hematocrit 32.1 (L) 41.0 - 52.0 %    MCV 82 80 - 100 fL    MCH 27.7 26.0 - 34.0 pg    MCHC 34.0 32.0 - 36.0 g/dL    RDW 12.0 11.5 - 14.5 %    Platelets 198 150 - 450 x10*3/uL    Neutrophils % 62.6 40.0 - 80.0 %    Immature Granulocytes %, Automated 3.2 (H) 0.0 - 0.9 %    Lymphocytes % 24.0 13.0 - 44.0 %    Monocytes % 9.8 2.0 - 10.0 %    Eosinophils % 0.0 0.0 - 6.0 %    Basophils % 0.4 0.0 - 2.0 %    Neutrophils Absolute 3.52 1.20 - 7.70 x10*3/uL    Immature  Granulocytes Absolute, Automated 0.18 0.00 - 0.70 x10*3/uL    Lymphocytes Absolute 1.35 1.20 - 4.80 x10*3/uL    Monocytes Absolute 0.55 0.10 - 1.00 x10*3/uL    Eosinophils Absolute 0.00 0.00 - 0.70 x10*3/uL    Basophils Absolute 0.02 0.00 - 0.10 x10*3/uL   POCT GLUCOSE   Result Value Ref Range    POCT Glucose 186 (H) 74 - 99 mg/dL       Recent Imaging  MR brain wo IV contrast   Final Result   Within limitations of motion degraded study, no evidence of acute   infarct, or other emergent intracranial abnormality.        MACRO:   None        Signed by: Tigre Urrutia 12/23/2023 2:48 PM   Dictation workstation:   [id number]      Transthoracic Echo (TTE) Complete   Final Result      US renal complete   Final Result   1. No hydronephrosis. Increased echogenicity of the renal parenchyma   and renal cortical thinning, suggestive of medical renal disease.   2. Multiple left renal cysts, as above.        MACRO:   None        Signed by: Blank Maki 12/22/2023 7:33 PM   Dictation workstation:   [id number]      CT head wo IV contrast   Final Result   No acute intracranial finding. MRI may be obtained if clinically   indicated        Signed by: Nilo Bates 12/22/2023 4:18 PM   Dictation workstation:   [id number]      XR chest 1 view   Final Result   No acute cardiopulmonary process.        MACRO   None        Signed by: Reilly Galan 12/22/2023 3:53 PM   Dictation workstation:   [id number]            Transthoracic Echo (TTE) Complete    Result Date: 12/23/2023          [place] [address]  Tel [phone] Fax [phone] TRANSTHORACIC ECHOCARDIOGRAM REPORT  Patient Name:      AGUILA Leon Physician:    [id number] Ricki Simental DO Study Date:        12/23/2023           Ordering Provider:    [id number] CHRISTIAN MRN/PID:            [id number]             Fellow: Accession#:        [id number]         Nurse: Date of Birth/Age: 1970 / 53 years Sonographer:          Ellen Goodson RDCS Gender:            M                    Additional Staff: Height:            170.18 cm            Admit Date:           12/22/2023 Weight:            96.16 kg             Admission Status:     Inpatient - STAT BSA:               2.07 m2              Department Location:  [facility]                                                               Echo Lab Blood Pressure: 127 /63 mmHg Study Type:    TRANSTHORACIC ECHO (TTE) COMPLETE Diagnosis/ICD: Chest pain, unspecified-R07.9 Indication:    CP CPT Codes:     Echo Complete w Full [id number] Patient History: Diabetes:          Yes Pertinent History: Hyperlipidemia and HTN. Study Detail: The following Echo studies were performed: 2D, M-Mode, Doppler and               color flow. Technically challenging study due to the patient's               lack of cooperation, patient lying in supine position and body               habitus.  PHYSICIAN INTERPRETATION: Left Ventricle: Left ventricular systolic function is normal, with an estimated ejection fraction of 55-60%. There are no regional wall motion abnormalities. The left ventricular cavity size is normal. There is borderline concentric left ventricular hypertrophy. Spectral Doppler shows a normal pattern of left ventricular diastolic filling. LV Wall Scoring: All segments are normal. Left Atrium: The left atrium is normal in size. Right Ventricle: The right ventricle is normal in size. There is normal right ventricular global systolic function. Right Atrium: The right atrium is normal in size. Aortic Valve: The aortic valve appears structurally normal. The aortic valve appears tricuspid. There is no evidence of aortic valve stenosis. There is no evidence of aortic valve regurgitation. The peak  instantaneous gradient of the aortic valve is 9.2 mmHg. The mean gradient of the aortic valve is 5.0 mmHg. Mitral Valve: The mitral valve is normal in structure. There is no evidence of mitral valve stenosis. There is normal mitral valve leaflet mobility. There is trace mitral valve regurgitation. Tricuspid Valve: The tricuspid valve is structurally normal. There is normal tricuspid valve leaflet mobility. There is trace tricuspid regurgitation. Pulmonic Valve: The pulmonic valve is structurally normal. There is no indication of pulmonic valve regurgitation. Pericardium: There is a trivial pericardial effusion. Aorta: The aortic root is normal. Pulmonary Artery: The main pulmonary artery is normal in size, and position, with normal bifurcation into the left and right pulmonary arteries. Systemic Veins: The inferior vena cava appears to be of normal size. In comparison to the previous echocardiogram(s): The left ventricular function is unchanged. The left ventricular diastolic function is unchanged.  CONCLUSIONS:  1. Left ventricular systolic function is normal with a 55-60% estimated ejection fraction.  2. There is no evidence of mitral valve stenosis.  3. Trace mitral valve regurgitation.  4. Trace tricuspid regurgitation is visualized.  5. Aortic valve stenosis is not present.  6. The main pulmonary artery is normal in size, and position, with normal bifurcation into the left and right pulmonary arteries. QUANTITATIVE DATA SUMMARY: 2D MEASUREMENTS:                    Normal Ranges: Ao Root d: 2.40 cm (2.0-3.7cm) LA VOLUME:                               Normal Ranges: LA Vol A4C:        148.0 ml   (22+/-6mL/m2) LA Vol A2C:        27.8 ml LA Vol BP:         64.8 ml LA Vol Index A4C:  71.4ml/m2 LA Vol Index A2C:  13.4 ml/m2 LA Vol Index BP:   31.3 ml/m2 LA Area A4C:       28.3 cm2 LA Area A2C:       12.4 cm2 LA Major Axis A4C: 4.6 cm LA Major Axis A2C: 4.7 cm LA Volume Index:   13.2 ml/m2 RA VOLUME BY A/L METHOD:                                Normal Ranges: RA Vol A4C:        25.4 ml    (8.3-19.5ml) RA Vol Index A4C:  12.3 ml/m2 RA Area A4C:       11.6 cm2 RA Major Axis A4C: 4.5 cm LV SYSTOLIC FUNCTION BY 2D PLANIMETRY (MOD):                     Normal Ranges: EF-A4C View: 53.2 % (>=55%) EF-A2C View: 56.9 % EF-Biplane:  56.2 % LV DIASTOLIC FUNCTION:                        Normal Ranges: MV Peak E:    0.60 m/s (0.7-1.2 m/s) MV Peak A:    0.50 m/s (0.42-0.7 m/s) E/A Ratio:    1.20     (1.0-2.2) MV e'         0.06 m/s (>8.0) MV lateral e' 0.06 m/s MV medial e'  0.06 m/s E/e' Ratio:   10.45    (<8.0) MITRAL VALVE:                 Normal Ranges: MV DT: 256 msec (150-240msec) AORTIC VALVE:                                   Normal Ranges: AoV Vmax:                1.52 m/s (<=1.7m/s) AoV Peak P.2 mmHg (<20mmHg) AoV Mean P.0 mmHg (1.7-11.5mmHg) LVOT Max Mau:            1.21 m/s (<=1.1m/s) AoV VTI:                 22.90 cm (18-25cm) LVOT VTI:                17.80 cm LVOT Diameter:           2.10 cm  (1.8-2.4cm) AoV Area, VTI:           2.69 cm2 (2.5-5.5cm2) AoV Area,Vmax:           2.76 cm2 (2.5-4.5cm2) AoV Dimensionless Index: 0.78  RIGHT VENTRICLE: RV Basal 3.43 cm RV Mid   2.54 cm RV Major 7.8 cm RV s'    0.15 m/s TRICUSPID VALVE/RVSP:                             Normal Ranges: Peak TR Velocity: 1.84 m/s RV Syst Pressure: 16.5 mmHg (< 30mmHg) PULMONIC VALVE:                         Normal Ranges: PV Accel Time: 48 msec  (>120ms) PV Max Mau:    1.3 m/s  (0.6-0.9m/s) PV Max P.1 mmHg  [id number] Ricki Simental DO Electronically signed on 2023 at 12:16:55 PM  Wall Scoring  ** Final **      Assessment/Plan:     I am currently managing this critically ill patient for:  HHS vs DKA   Dizziness/Double vision  HX of T2DM  Hx of HTN  Hx of CAD s/p stents  LU on CKD IV  Psuedohyponatremia  Chronic Anemia  Seizure like activity         Neuro/Psych/Pain Ctrl/Sedation:  Dizziness/Double vision  Seizure like  activity   CT of head negative, MRI negative  Continue home seroquel and zoloft, PRN Antivert for dizziness   Neurology consulted for seizure-like activity, per recommendations EEG evaluation and Depakote 250 mg twice daily  CAM assessment/Sleep Wake Cycle  Neuro checks per ICU protocol     Respiratory/ENT:  No Active issues, currently 98% SPO2 on RA  Keep SPO2 >92%  Continuous pulse ox  Pulm hygiene   CXR negative for acute cardiopulmonary processes     Cardiovascular:  Hx of CAD s/p stents  EKG stable  Echo revealed LVEF 55-60%, trace tricuspid regurg  C/o chest pain in ED- EKG at baseline, trops WNL, relieved with sublingual nitroglycerin  Continue home ASA and statin   Continuous cardiac monitoring per ICU protocol  maintain MAPS >65  PRN EKG, EKG if chest pain or change in telemetry      GI:  Bowel Distention   Started on miralax+senna  Continue home Protonix   QTC WNL, PRN zofran for nausea     Renal/Volume Status (Intra & Extravascular):  LU on CKD IV -prerenal vs intrarenal  Pseudohyponatremia resolved   Baseline CR around 2.40, BUN 71, Cr 5.62->5.04->4.72  Holding nephrotoxic medications (ARB, NSAID)  Maintain urine output 0.5-1cc/kg/hr  Replete electrolytes as indicated  Renal ultrasound shows left renal cyst with no hydronephrosis   Nephrology consulted   Daily BMP, replete electrolytes as indicated     Endocrine  HHS vs Hyperglycemia   HX of T2DM  A1C - 15.1  Continue Glucose monitoring AC/HS + PRN   Endocrinology consulted, per endocrine 45 units Lantus BID, 14 unit TID with meals, SSI before meals     Infectious Disease:  No leukocytosis (10.9), Lactate < 2   Monitor for signs and sx of SIRS     Heme/Onc:  Chronic Anemia  Monitor CBC  Transfuse if HGB >7     OBGYN/MSK:  No active issues        Ethics/Code Status:  Full Code     :  DVT Prophylaxis: heparin subcu  GI Prophylaxis: home PPI  Bowel Regimen: Miralax + Senna  Diet: Carb Controlled   CVC: none  Laurence: none  Hernandez:  none  Restraints: none  Dispo: Ready for Transfer     This note was prepared using voice recognition software. The details of this note are correct and have been reviewed, and corrected to the best of my ability. Some grammatical areas may persist related to the Dragon software.      I have reviewed all medications, laboratory results, and imaging pertinent for today's encounter.  Plan Discussed with Dr. Parrish  Critical Care Time: 45 minutes  Critical Care SAMANTHA Leyva, Faustino Parrish M.D., attest that I have physically seen and examined the patient with the JANETH and have conducted at least 50% or more of the above afformentioned in the care of the patient. I reviewed John Esteves's note and verified and agree with the components of the history of present illness, physical exam, the assessment, and plan documented with the additions of that documented below which was discussed with the JANETH.    HAYDEE Mendoza-MS, MD  Internal/Emergency/Critical Care Medicine Attending

## 2023-12-26 ENCOUNTER — APPOINTMENT (OUTPATIENT)
Dept: NEUROLOGY | Facility: HOSPITAL | Age: 53
End: 2023-12-26
Payer: COMMERCIAL

## 2023-12-26 PROBLEM — N17.9 ACUTE RENAL FAILURE (CMS-HCC): Status: ACTIVE | Noted: 2023-12-26

## 2023-12-26 PROBLEM — N18.4 CKD (CHRONIC KIDNEY DISEASE), STAGE IV (MULTI): Status: ACTIVE | Noted: 2023-12-26

## 2023-12-26 PROBLEM — R79.89 PSEUDOHYPONATREMIA: Status: ACTIVE | Noted: 2023-12-26

## 2023-12-26 LAB
ANION GAP SERPL CALC-SCNC: 15 MMOL/L (ref 10–20)
BASOPHILS # BLD AUTO: 0.03 X10*3/UL (ref 0–0.1)
BASOPHILS NFR BLD AUTO: 0.6 %
BUN SERPL-MCNC: 61 MG/DL (ref 6–23)
CALCIUM SERPL-MCNC: 9.4 MG/DL (ref 8.6–10.3)
CHLORIDE SERPL-SCNC: 101 MMOL/L (ref 98–107)
CO2 SERPL-SCNC: 25 MMOL/L (ref 21–32)
CREAT SERPL-MCNC: 4.73 MG/DL (ref 0.5–1.3)
EOSINOPHIL # BLD AUTO: 0 X10*3/UL (ref 0–0.7)
EOSINOPHIL NFR BLD AUTO: 0 %
ERYTHROCYTE [DISTWIDTH] IN BLOOD BY AUTOMATED COUNT: 12.1 % (ref 11.5–14.5)
GFR SERPL CREATININE-BSD FRML MDRD: 14 ML/MIN/1.73M*2
GLUCOSE BLD MANUAL STRIP-MCNC: 165 MG/DL (ref 74–99)
GLUCOSE BLD MANUAL STRIP-MCNC: 166 MG/DL (ref 74–99)
GLUCOSE BLD MANUAL STRIP-MCNC: 181 MG/DL (ref 74–99)
GLUCOSE BLD MANUAL STRIP-MCNC: 220 MG/DL (ref 74–99)
GLUCOSE BLD MANUAL STRIP-MCNC: 338 MG/DL (ref 74–99)
GLUCOSE SERPL-MCNC: 184 MG/DL (ref 74–99)
HCT VFR BLD AUTO: 33.8 % (ref 41–52)
HGB BLD-MCNC: 11.2 G/DL (ref 13.5–17.5)
HOLD SPECIMEN: NORMAL
IMM GRANULOCYTES # BLD AUTO: 0.23 X10*3/UL (ref 0–0.7)
IMM GRANULOCYTES NFR BLD AUTO: 4.3 % (ref 0–0.9)
LYMPHOCYTES # BLD AUTO: 1.44 X10*3/UL (ref 1.2–4.8)
LYMPHOCYTES NFR BLD AUTO: 26.9 %
MAGNESIUM SERPL-MCNC: 2.33 MG/DL (ref 1.6–2.4)
MCH RBC QN AUTO: 27.3 PG (ref 26–34)
MCHC RBC AUTO-ENTMCNC: 33.1 G/DL (ref 32–36)
MCV RBC AUTO: 82 FL (ref 80–100)
MONOCYTES # BLD AUTO: 0.54 X10*3/UL (ref 0.1–1)
MONOCYTES NFR BLD AUTO: 10.1 %
NEUTROPHILS # BLD AUTO: 3.11 X10*3/UL (ref 1.2–7.7)
NEUTROPHILS NFR BLD AUTO: 58.1 %
NRBC BLD-RTO: 0 /100 WBCS (ref 0–0)
PHOSPHATE SERPL-MCNC: 4.2 MG/DL (ref 2.5–4.9)
PLATELET # BLD AUTO: 215 X10*3/UL (ref 150–450)
POTASSIUM SERPL-SCNC: 3.1 MMOL/L (ref 3.5–5.3)
RBC # BLD AUTO: 4.1 X10*6/UL (ref 4.5–5.9)
SODIUM SERPL-SCNC: 138 MMOL/L (ref 136–145)
WBC # BLD AUTO: 5.4 X10*3/UL (ref 4.4–11.3)

## 2023-12-26 PROCEDURE — 85025 COMPLETE CBC W/AUTO DIFF WBC: CPT

## 2023-12-26 PROCEDURE — RXMED WILLOW AMBULATORY MEDICATION CHARGE

## 2023-12-26 PROCEDURE — 84100 ASSAY OF PHOSPHORUS: CPT

## 2023-12-26 PROCEDURE — 2500000001 HC RX 250 WO HCPCS SELF ADMINISTERED DRUGS (ALT 637 FOR MEDICARE OP)

## 2023-12-26 PROCEDURE — 2500000004 HC RX 250 GENERAL PHARMACY W/ HCPCS (ALT 636 FOR OP/ED)

## 2023-12-26 PROCEDURE — 2500000002 HC RX 250 W HCPCS SELF ADMINISTERED DRUGS (ALT 637 FOR MEDICARE OP, ALT 636 FOR OP/ED)

## 2023-12-26 PROCEDURE — 80048 BASIC METABOLIC PNL TOTAL CA: CPT

## 2023-12-26 PROCEDURE — 2500000004 HC RX 250 GENERAL PHARMACY W/ HCPCS (ALT 636 FOR OP/ED): Performed by: INTERNAL MEDICINE

## 2023-12-26 PROCEDURE — 99233 SBSQ HOSP IP/OBS HIGH 50: CPT | Performed by: INTERNAL MEDICINE

## 2023-12-26 PROCEDURE — 82947 ASSAY GLUCOSE BLOOD QUANT: CPT

## 2023-12-26 PROCEDURE — 95816 EEG AWAKE AND DROWSY: CPT

## 2023-12-26 PROCEDURE — 2500000005 HC RX 250 GENERAL PHARMACY W/O HCPCS: Performed by: STUDENT IN AN ORGANIZED HEALTH CARE EDUCATION/TRAINING PROGRAM

## 2023-12-26 PROCEDURE — 36415 COLL VENOUS BLD VENIPUNCTURE: CPT

## 2023-12-26 PROCEDURE — 96372 THER/PROPH/DIAG INJ SC/IM: CPT

## 2023-12-26 PROCEDURE — 95816 EEG AWAKE AND DROWSY: CPT | Performed by: PSYCHIATRY & NEUROLOGY

## 2023-12-26 PROCEDURE — 1210000001 HC SEMI-PRIVATE ROOM DAILY

## 2023-12-26 PROCEDURE — 83735 ASSAY OF MAGNESIUM: CPT

## 2023-12-26 PROCEDURE — 99232 SBSQ HOSP IP/OBS MODERATE 35: CPT | Performed by: STUDENT IN AN ORGANIZED HEALTH CARE EDUCATION/TRAINING PROGRAM

## 2023-12-26 PROCEDURE — 2500000004 HC RX 250 GENERAL PHARMACY W/ HCPCS (ALT 636 FOR OP/ED): Performed by: STUDENT IN AN ORGANIZED HEALTH CARE EDUCATION/TRAINING PROGRAM

## 2023-12-26 RX ORDER — INSULIN GLARGINE 100 [IU]/ML
45 INJECTION, SOLUTION SUBCUTANEOUS 2 TIMES DAILY
Status: DISCONTINUED | OUTPATIENT
Start: 2023-12-26 | End: 2023-12-27

## 2023-12-26 RX ORDER — METOCLOPRAMIDE HYDROCHLORIDE 5 MG/ML
10 INJECTION INTRAMUSCULAR; INTRAVENOUS ONCE
Status: COMPLETED | OUTPATIENT
Start: 2023-12-26 | End: 2023-12-26

## 2023-12-26 RX ORDER — INSULIN LISPRO 100 [IU]/ML
INJECTION, SOLUTION INTRAVENOUS; SUBCUTANEOUS
Qty: 15 ML | Refills: 11 | Status: SHIPPED | OUTPATIENT
Start: 2023-12-26 | End: 2024-01-03 | Stop reason: WASHOUT

## 2023-12-26 RX ORDER — FLASH GLUCOSE SENSOR
KIT MISCELLANEOUS DAILY
Qty: 2 EACH | Refills: 2 | Status: SHIPPED | OUTPATIENT
Start: 2023-12-26 | End: 2024-02-02 | Stop reason: SDUPTHER

## 2023-12-26 RX ORDER — INSULIN GLARGINE 100 [IU]/ML
45 INJECTION, SOLUTION SUBCUTANEOUS 2 TIMES DAILY
Qty: 15 ML | Refills: 3 | Status: SHIPPED | OUTPATIENT
Start: 2023-12-26 | End: 2023-12-27 | Stop reason: DRUGHIGH

## 2023-12-26 RX ORDER — BLOOD-GLUCOSE SENSOR
1 EACH MISCELLANEOUS DAILY
Qty: 1 EACH | Refills: 2 | Status: SHIPPED | OUTPATIENT
Start: 2023-12-26 | End: 2023-12-26 | Stop reason: SDUPTHER

## 2023-12-26 RX ORDER — DEXTROSE 4 G
TABLET,CHEWABLE ORAL
Qty: 1 EACH | Refills: 0 | Status: SHIPPED | OUTPATIENT
Start: 2023-12-26 | End: 2024-01-03 | Stop reason: WASHOUT

## 2023-12-26 RX ORDER — INSULIN LISPRO 100 [IU]/ML
15-20 INJECTION, SOLUTION INTRAVENOUS; SUBCUTANEOUS
Qty: 30 ML | Refills: 11 | Status: SHIPPED | OUTPATIENT
Start: 2023-12-26

## 2023-12-26 RX ORDER — DEXTROSE 4 G
TABLET,CHEWABLE ORAL
Qty: 1 EACH | Refills: 0 | Status: SHIPPED | OUTPATIENT
Start: 2023-12-26 | End: 2024-12-25

## 2023-12-26 RX ORDER — LANCETS 33 GAUGE
EACH MISCELLANEOUS
Qty: 100 EACH | Refills: 1 | Status: SHIPPED | OUTPATIENT
Start: 2023-12-26

## 2023-12-26 RX ORDER — DIVALPROEX SODIUM 250 MG/1
250 TABLET, DELAYED RELEASE ORAL EVERY 12 HOURS SCHEDULED
Qty: 60 TABLET | Refills: 0 | Status: SHIPPED | OUTPATIENT
Start: 2023-12-26 | End: 2024-01-31

## 2023-12-26 RX ORDER — PEN NEEDLE, DIABETIC 30 GX3/16"
NEEDLE, DISPOSABLE MISCELLANEOUS
Qty: 100 EACH | Refills: 11 | Status: SHIPPED | OUTPATIENT
Start: 2023-12-26

## 2023-12-26 RX ORDER — DIPHENHYDRAMINE HYDROCHLORIDE 50 MG/ML
25 INJECTION INTRAMUSCULAR; INTRAVENOUS ONCE
Status: COMPLETED | OUTPATIENT
Start: 2023-12-26 | End: 2023-12-26

## 2023-12-26 RX ORDER — POTASSIUM CHLORIDE 20 MEQ/1
40 TABLET, EXTENDED RELEASE ORAL ONCE
Status: COMPLETED | OUTPATIENT
Start: 2023-12-26 | End: 2023-12-26

## 2023-12-26 RX ADMIN — OXYCODONE 5 MG: 5 TABLET ORAL at 08:32

## 2023-12-26 RX ADMIN — INSULIN GLARGINE 45 UNITS: 100 INJECTION, SOLUTION SUBCUTANEOUS at 12:32

## 2023-12-26 RX ADMIN — DIVALPROEX SODIUM 250 MG: 250 TABLET, DELAYED RELEASE ORAL at 08:18

## 2023-12-26 RX ADMIN — MAGNESIUM OXIDE 400 MG (241.3 MG MAGNESIUM) TABLET 400 MG: TABLET at 08:18

## 2023-12-26 RX ADMIN — SERTRALINE HYDROCHLORIDE 100 MG: 50 TABLET, FILM COATED ORAL at 08:18

## 2023-12-26 RX ADMIN — HEPARIN SODIUM 5000 UNITS: 5000 INJECTION INTRAVENOUS; SUBCUTANEOUS at 14:42

## 2023-12-26 RX ADMIN — HEPARIN SODIUM 5000 UNITS: 5000 INJECTION INTRAVENOUS; SUBCUTANEOUS at 23:15

## 2023-12-26 RX ADMIN — SODIUM CHLORIDE 500 ML: 9 INJECTION, SOLUTION INTRAVENOUS at 14:41

## 2023-12-26 RX ADMIN — POTASSIUM CHLORIDE 40 MEQ: 1500 TABLET, EXTENDED RELEASE ORAL at 12:18

## 2023-12-26 RX ADMIN — INSULIN LISPRO 14 UNITS: 100 INJECTION, SOLUTION INTRAVENOUS; SUBCUTANEOUS at 08:33

## 2023-12-26 RX ADMIN — QUETIAPINE FUMARATE 50 MG: 100 TABLET, FILM COATED ORAL at 20:09

## 2023-12-26 RX ADMIN — INSULIN LISPRO 15 UNITS: 100 INJECTION, SOLUTION INTRAVENOUS; SUBCUTANEOUS at 12:30

## 2023-12-26 RX ADMIN — ASPIRIN 81 MG: 81 TABLET, COATED ORAL at 08:18

## 2023-12-26 RX ADMIN — Medication 125 MCG: at 08:18

## 2023-12-26 RX ADMIN — INSULIN LISPRO 4 UNITS: 100 INJECTION, SOLUTION INTRAVENOUS; SUBCUTANEOUS at 20:09

## 2023-12-26 RX ADMIN — PANTOPRAZOLE SODIUM 40 MG: 40 TABLET, DELAYED RELEASE ORAL at 08:18

## 2023-12-26 RX ADMIN — INSULIN LISPRO 14 UNITS: 100 INJECTION, SOLUTION INTRAVENOUS; SUBCUTANEOUS at 12:34

## 2023-12-26 RX ADMIN — DIVALPROEX SODIUM 250 MG: 250 TABLET, DELAYED RELEASE ORAL at 20:09

## 2023-12-26 RX ADMIN — INSULIN LISPRO 4 UNITS: 100 INJECTION, SOLUTION INTRAVENOUS; SUBCUTANEOUS at 17:11

## 2023-12-26 RX ADMIN — CYANOCOBALAMIN TAB 500 MCG 1000 MCG: 500 TAB at 08:18

## 2023-12-26 RX ADMIN — CLONIDINE HYDROCHLORIDE 0.1 MG: 0.1 TABLET ORAL at 08:18

## 2023-12-26 RX ADMIN — INSULIN LISPRO 8 UNITS: 100 INJECTION, SOLUTION INTRAVENOUS; SUBCUTANEOUS at 03:07

## 2023-12-26 RX ADMIN — DIPHENHYDRAMINE HYDROCHLORIDE 25 MG: 50 INJECTION, SOLUTION INTRAMUSCULAR; INTRAVENOUS at 12:17

## 2023-12-26 RX ADMIN — POLYETHYLENE GLYCOL 3350 17 G: 17 POWDER, FOR SOLUTION ORAL at 08:30

## 2023-12-26 RX ADMIN — HEPARIN SODIUM 5000 UNITS: 5000 INJECTION INTRAVENOUS; SUBCUTANEOUS at 06:03

## 2023-12-26 RX ADMIN — METOCLOPRAMIDE HYDROCHLORIDE 10 MG: 5 INJECTION INTRAMUSCULAR; INTRAVENOUS at 12:17

## 2023-12-26 RX ADMIN — INSULIN LISPRO 4 UNITS: 100 INJECTION, SOLUTION INTRAVENOUS; SUBCUTANEOUS at 08:18

## 2023-12-26 RX ADMIN — VALPROATE SODIUM 750 MG: 100 INJECTION, SOLUTION INTRAVENOUS at 14:41

## 2023-12-26 RX ADMIN — AMLODIPINE BESYLATE 10 MG: 5 TABLET ORAL at 08:18

## 2023-12-26 RX ADMIN — METOPROLOL TARTRATE 50 MG: 50 TABLET, FILM COATED ORAL at 08:18

## 2023-12-26 RX ADMIN — INSULIN LISPRO 14 UNITS: 100 INJECTION, SOLUTION INTRAVENOUS; SUBCUTANEOUS at 17:14

## 2023-12-26 RX ADMIN — OXYCODONE 5 MG: 5 TABLET ORAL at 20:17

## 2023-12-26 RX ADMIN — METOPROLOL TARTRATE 50 MG: 50 TABLET, FILM COATED ORAL at 20:08

## 2023-12-26 RX ADMIN — INSULIN GLARGINE 45 UNITS: 100 INJECTION, SOLUTION SUBCUTANEOUS at 20:09

## 2023-12-26 RX ADMIN — SIMVASTATIN 40 MG: 40 TABLET, FILM COATED ORAL at 20:08

## 2023-12-26 ASSESSMENT — COGNITIVE AND FUNCTIONAL STATUS - GENERAL
DAILY ACTIVITIY SCORE: 24
MOBILITY SCORE: 24
MOBILITY SCORE: 24
DAILY ACTIVITIY SCORE: 24
DAILY ACTIVITIY SCORE: 24
MOBILITY SCORE: 24

## 2023-12-26 ASSESSMENT — PAIN SCALES - GENERAL
PAINLEVEL_OUTOF10: 0 - NO PAIN
PAINLEVEL_OUTOF10: 7

## 2023-12-26 ASSESSMENT — PAIN DESCRIPTION - LOCATION: LOCATION: TEETH

## 2023-12-26 NOTE — PROGRESS NOTES
Amanuel Riojas is a 53 y.o. male on day 4 of admission presenting with Hyperosmolar (nonketotic) coma (CMS/HCC).      ASSESSMENT/PLAN   Principal Problem:    Hyperosmolar (nonketotic) coma (CMS/HCC)-resolved, HbA1c on admission 15.1. Blood sugars significantly improved, presently on glargine twice daily along with Humalog with meals  SSI.  May need increased Humalog due to his diet after discharge.  Have arranged for diabetic educator to see patient.  Active Problems:  -Acute renal failure with LU on CKD stage IV.  Creatinine gradually improving, down to 4.73 today.  Nephrology is following.  Losartan on hold, HCTZ held, NSAIDs held.  -Hypokalemia-replacing orally, will continue to follow.  -Seizure-like activity (CMS/HCC)-EEG pending, neurology note appreciated.  Presently on Depakote twice daily.  -Blurred vision-due to uncontrolled DM PTA.  -Possible migraine headaches-Per neurology, most likely aggravated with his hyperosmolar state and uncontrolled blood sugars.  -Hypertension-blood pressures presently controlled on amlodipine, terazosin, metoprolol, and clonidine; HCTZ and losartan on hold.  Will discuss with nephrology if they want to restart his losartan, see if they feel a DPP 4 inhibitor would be beneficial.  -PTSD-buspirone was discontinued due to possible seizure, continuing his sertraline & quetiapine.    -History of gout-will restart his allopurinol but at renal adjusted dose of 100 mg daily.  -Hyperlipidemia-on simvastatin.  -GERD-on pantoprazole  -Possible history of CAD with stent-review of prior cardiac catheterizations all showed normal coronary arteries, no stent history found.  -COPD/asthma-has as needed albuterol inhaler, has not needed.    SUBJECTIVE/OBJECTIVE   12/26/23 (Dr. 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  I did discuss  with him the fact that it may be several weeks before his vision begins to improve due to the poor blood sugar control PTA, and advised him not to get a new glasses prescription until after his blood sugars have been in good control for several months.  -Also discussed the fact that he will need to be on insulin long-term, rather than oral agents.    -Complaining of headache, presently not bad.  Tells me he has not had prior headaches until 1 week ago (but apparently told neurology he has had a history of migraines in the past).  -Discussed at length with him the multiple medication changes, need to monitor his blood sugars-he says he never was checking blood sugars PTA.  -I asked him when he thinks he had his reported cardiac stent placed, he thinks it was in the late 1990s.  I reviewed with him the cardiac catheterizations from both 2019 and 1997 which both times showed normal coronary arteries.  I suspect the mention of a sheath on the 1997 one was while he thought he may have had a stent.  -Notes that his legs feel somewhat stiff and he still gets a bit dizzy when he first stands up.  No chest pains, no shortness of breath, no nausea or vomiting.  -He is afebrile, vital signs stable.  Satting at 94-98% on room air.  -His chest is clear to auscultation  -Cardiac exam is a regular rhythm-Blood sugars are well-controlled-low of 82 yesterday afternoon, high of 220 during the middle of the night.  165 this morning.  -Chemistry panel with a blood sugar of 184, potassium is low at 3.1-will replace.  BUN 61 with a creatinine down to 4.73.  -Magnesium now normal at 2.33.  -CBC with a WBC of 5.4, H/H11.2/33.8.  Platelet count 215 K.    Chart review:  PCP is  Dr. Mukesh Hanson    Patient is a 53-year-old male with a known history of HTN, HLP, DM, CKD stage IV (baseline creatinine 2.5-2.8), CAD with possible history of remote stents (not found on cardiac catheterization reports from 1997 & 2019), history of gout,  COPD/asthma, GERD, and obesity.    He presented to the ER on 12/22 complaining of dizziness for 1 week with associated blurred vision.  In the ER he was in acute renal failure with a BUN of 71 and creatinine of 6.51, he was hyperosmolar with a blood sugar of 932, sodium 123, potassium 4.7, chloride 83, bicarb of 22.  Magnesium was elevated at 3.05.  LFTs were normal.  Beta hydroxybutyrate was minimally elevated at 0.39.  Venous BG with a pH of 7.30, pCO2 of 56, pCO2 of 30, bicarb of 27.6.  CBC with a WBC of 10.9, H/H of 12.6/36.2.  Platelet count 284 K.  Troponin was 10, BNP 17.  CT of the head was unremarkable, CXR with no acute findings.  Patient was started on insulin drip, given intensive IV fluids, and admitted to the ICU.    In the ICU he was hypotensive briefly requiring a Levophed drip after given NTG.  He was seen on consultation by nephrology and endocrinology.  He had previously been on glipizide for his diabetes which was discontinued.  He was started on glargine and lispro.  His HbA1c was 15.1 on admission, (previously was 6.6 on 7/27/2022.  On 12/23 patient had a seizure while in the MRI with right-sided jerking and nystagmus followed by a postictal stage.  MRI of the brain showed no acute infarct or other abnormality he was seen in consultation by neurology and started on Depakote.  He had a repeat episode of a possible seizure on 12/24.  EEG is pending.  He was transferred out of the ICU last night.  He is presently on glargine 45 units twice daily with 14 of Humalog each meal and SSI    Cardiac catheterization 12/15/1997-normal coronary arteries, no mention of stent  Cardiac catheterization 6/25/2019-no evidence of stenoses, EF = 60-65%              *These notes are being done using Dragon voice recognition technology and may include unintended errors with respect to translation of words, typographical errors or grammar errors which may not have been identified prior to finalization of the chart  note.    CURRENT MEDICATIONS     Scheduled Medications:    Current Facility-Administered Medications:     acetaminophen (Tylenol) oral liquid 650 mg, 650 mg, oral, q6h PRN **OR** acetaminophen (Tylenol) tablet 650 mg, 650 mg, oral, q4h PRN, John Esteves, PA-C, 650 mg at 12/25/23 2140    albuterol 90 mcg/actuation inhaler 2 puff, 2 puff, inhalation, q4h PRN, John Esteves, PA-C    [Held by provider] allopurinol (Zyloprim) tablet 300 mg, 300 mg, oral, Daily, John Esteves, PA-C    amLODIPine (Norvasc) tablet 10 mg, 10 mg, oral, Daily, John Esteves, PA-C, 10 mg at 12/26/23 0818    aspirin EC tablet 81 mg, 81 mg, oral, Daily, John Esteves, PA-C, 81 mg at 12/26/23 0818    [Held by provider] busPIRone (Buspar) tablet 15 mg, 15 mg, oral, TID, John Esteves, PA-C    cholecalciferol (Vitamin D-3) capsule 125 mcg, 5,000 Units, oral, Daily, John Esteves, PA-C, 125 mcg at 12/26/23 0818    cloNIDine (Catapres) tablet 0.1 mg, 0.1 mg, oral, Daily, John Esteves, PA-C, 0.1 mg at 12/26/23 0818    cyanocobalamin (Vitamin B-12) tablet 1,000 mcg, 1,000 mcg, oral, Daily, John Esteves, PA-C, 1,000 mcg at 12/26/23 0818    dextrose 10 % in water (D10W) infusion, 0.3 g/kg/hr, intravenous, Once PRN, John Esteves, PA-C    dextrose 50 % injection 25 g, 25 g, intravenous, q15 min PRN, John Esteves, PA-C    divalproex (Depakote) EC tablet 250 mg, 250 mg, oral, q12h EILEEN, John Esteves, PA-C, 250 mg at 12/26/23 0818    [Held by provider] glipiZIDE (Glucotrol) tablet 2.5 mg, 2.5 mg, oral, Daily, John Esteves, PA-C    glucagon (Glucagen) injection 1 mg, 1 mg, intramuscular, q15 min PRN, John Esteves PA-C    heparin (porcine) injection 5,000 Units, 5,000 Units, subcutaneous, q8h, John Esteves PA-C, 5,000 Units at 12/26/23 0603    [Held by provider] hydroCHLOROthiazide (HYDRODiuril) tablet 25 mg, 25 mg, oral, Daily, John Esteves PA-C    [Held by provider] hydrOXYzine pamoate (Vistaril) capsule 50 mg, 50 mg, oral, BID PRN, John Esteves PA-C    insulin glargine (Lantus)  injection 45 Units, 45 Units, subcutaneous, BID, John Esteves, PA-C, 45 Units at 12/25/23 2228    insulin lispro (HumaLOG) injection 0-15 Units, 0-15 Units, subcutaneous, TID with meals, nightly, & 0300, John Esteves, PA-C, 4 Units at 12/26/23 0818    insulin lispro (HumaLOG) injection 14 Units, 14 Units, subcutaneous, TID with meals, John Esteves, PA-C, 14 Units at 12/26/23 0833    [Held by provider] losartan (Cozaar) tablet 25 mg, 25 mg, oral, BID, Johnsherita Esteves, PA-C    magnesium oxide (Mag-Ox) tablet 400 mg, 400 mg, oral, Daily, John Esteves, PA-C, 400 mg at 12/26/23 0818    meclizine (Antivert) tablet 25 mg, 25 mg, oral, q6h PRN, John Esteves, PA-C, 25 mg at 12/24/23 1659    metoprolol tartrate (Lopressor) tablet 50 mg, 50 mg, oral, BID, John Esteves, PA-C, 50 mg at 12/26/23 0818    [Held by provider] nabumetone (Relafen) tablet 750 mg, 750 mg, oral, q12h, John Esteves, PA-C    ondansetron (Zofran) tablet 4 mg, 4 mg, oral, q8h PRN **OR** ondansetron (Zofran) injection 4 mg, 4 mg, intravenous, q8h PRN, John Esteves, PA-C    oxyCODONE (Roxicodone) immediate release tablet 5 mg, 5 mg, oral, q6h PRN, John Esteves, PA-C, 5 mg at 12/26/23 0832    pantoprazole (ProtoNix) EC tablet 40 mg, 40 mg, oral, Daily, John Esteves, PA-C, 40 mg at 12/26/23 0818    polyethylene glycol (Glycolax, Miralax) packet 17 g, 17 g, oral, Daily, John Harringtonel, PA-C, 17 g at 12/26/23 0830    QUEtiapine (SEROquel) tablet 50 mg, 50 mg, oral, Nightly, John Esteves, PA-C, 50 mg at 12/25/23 2132    sennosides-docusate sodium (Maria Luisa-Colace) 8.6-50 mg per tablet 1 tablet, 1 tablet, oral, Nightly, John Esteves, PA-C, 1 tablet at 12/25/23 2132    sertraline (Zoloft) tablet 100 mg, 100 mg, oral, Daily, Johnsherita Esteves, PA-C, 100 mg at 12/26/23 0818    simvastatin (Zocor) tablet 40 mg, 40 mg, oral, Nightly, John Esteves, PA-C, 40 mg at 12/25/23 2133    terazosin (Hytrin) capsule 5 mg, 5 mg, oral, Once, John Esteves PA-C    [Held by provider] traMADol (Ultram) tablet 50 mg,  "50 mg, oral, q6h, John Esteves PA-C     PRN Medications:  PRN medications   Medication    acetaminophen    Or    acetaminophen    albuterol    dextrose 10 % in water (D10W)    dextrose    glucagon    [Held by provider] hydrOXYzine pamoate    meclizine    ondansetron    Or    ondansetron    oxyCODONE         IVs:        I&Os     Intake/Output last 3 Shifts:  I/O last 3 completed shifts:  In: - (0 mL/kg)   Out: 1850 (18.9 mL/kg) [Urine:1850 (0.5 mL/kg/hr)]  Weight: 97.8 kg     VITAL SIGNS     Blood pressure 125/80, pulse 87, temperature 36.7 °C (98.1 °F), temperature source Temporal, resp. rate 20, height 1.702 m (5' 7\"), weight 97.8 kg (215 lb 9.8 oz), SpO2 98 %.     PHYSICAL EXAM   Physical exam:  -General appearance: Obese male, sitting up in bed, alert and in NAD  -Vital signs:  As above  -HEENT: No icterus  -Neck: Thick neck, no JVD  -Chest: Lungs are clear to auscultation  -Cardiac: Regular rhythm without murmurs  -Abdomen: Bowel sounds active  -Extremities: No edema  -Skin: No rash  -Neurologic:   Patient is alert and oriented x3.   -Behavior/Emotional:  Appropriate, cooperative    LABS   Relevant Results:  Results from last 7 days   Lab Units 12/26/23  0658 12/26/23  0543 12/26/23  0303 12/25/23  1951 12/25/23  0748 12/25/23  0342 12/24/23  1650 12/24/23  1209   POCT GLUCOSE mg/dL 165*  --  220* 156*   < >  --    < >  --    GLUCOSE mg/dL  --  184*  --   --   --  192*  --  259*    < > = values in this interval not displayed.        CBC:   Results from last 7 days   Lab Units 12/26/23  0543 12/25/23  0343 12/24/23  0410   WBC AUTO x10*3/uL 5.4 5.6 7.3   RBC AUTO x10*6/uL 4.10* 3.93* 4.12*   HEMOGLOBIN g/dL 11.2* 10.9* 11.2*   HEMATOCRIT % 33.8* 32.1* 33.9*   MCV fL 82 82 82   MCH pg 27.3 27.7 27.2   MCHC g/dL 33.1 34.0 33.0   RDW % 12.1 12.0 12.0   PLATELETS AUTO x10*3/uL 215 198 190     CMP:    Results from last 7 days   Lab Units 12/26/23  0543 12/25/23  0342 12/24/23  1209 12/24/23  0410 12/23/23  2355 "   SODIUM mmol/L 138 137 132* 134* 131*   POTASSIUM mmol/L 3.1* 3.3* 3.6 3.8 3.8   CHLORIDE mmol/L 101 99 98 97* 97*   CO2 mmol/L 25 24 24 24 23   BUN mg/dL 61* 60* 64* 63* 64*   CREATININE mg/dL 4.73* 4.72* 5.04* 5.29* 5.62*   GLUCOSE mg/dL 184* 192* 259* 315* 317*   PROTEIN TOTAL g/dL  --   --  7.2 7.2 6.9   CALCIUM mg/dL 9.4 9.3 9.3 9.3 8.8   BILIRUBIN TOTAL mg/dL  --   --  0.5 0.6 0.6   ALK PHOS U/L  --   --  72 78 73   AST U/L  --   --  14 10 11   ALT U/L  --   --  7* 6* 5*     Magnesium:  Results from last 7 days   Lab Units 12/26/23  0543 12/25/23  0342 12/24/23  0410   MAGNESIUM mg/dL 2.33 2.70* 2.75*     Troponin:    Results from last 7 days   Lab Units 12/22/23  2042 12/22/23  1529   TROPHS ng/L 11 10     BNP:   Results from last 7 days   Lab Units 12/22/23  1529   BNP pg/mL 17       IMAGING     MR brain wo IV contrast   Final Result   Within limitations of motion degraded study, no evidence of acute   infarct, or other emergent intracranial abnormality.        MACRO:   None        Signed by: Tigre Urrutia 12/23/2023 2:48 PM   Dictation workstation:   [id number]      Transthoracic Echo (TTE) Complete   Final Result      US renal complete   Final Result   1. No hydronephrosis. Increased echogenicity of the renal parenchyma   and renal cortical thinning, suggestive of medical renal disease.   2. Multiple left renal cysts, as above.        MACRO:   None        Signed by: Blank Maki 12/22/2023 7:33 PM   Dictation workstation:   [id number]      CT head wo IV contrast   Final Result   No acute intracranial finding. MRI may be obtained if clinically   indicated        Signed by: Nilo Bates 12/22/2023 4:18 PM   Dictation workstation:   [id number]      XR chest 1 view   Final Result   No acute cardiopulmonary process.        MACRO   None        Signed by: Reilly Galan 12/22/2023 3:53 PM   Dictation workstation:   [id number]         I spent 75 minutes in the professional and overall care of  this patient.    Gabriella Glover MD

## 2023-12-26 NOTE — PROGRESS NOTES
Nephrology Progress Note    Assessment:  53 y.o. male with history s/f CKD stage IV, T2DM, CAD s/p SEAMUS who presented for malaise, fevers, N/V.     LU on CKD stage IV: most likely 2/2 DKA, however ? Contribution of hypotension as well, Scr 6.5 on presentation, baseline 2.5-2.8, CKD risk factors T2DM, HTN, CAD: improving   DKA: resolved   Hyponatremia: in setting of hyperglycemia, improved  Anemia  Hypotension: improving   Vitamin D insufficiency     Plan:  - Scr stable, unlikely to need RRT   - monitor function and output  - no indication for FABIANA   - continue cholecalciferol 5000 units, can decrease to 2000 units daily on discharge   - replete K PRN     Subjective:  Admit Date: 12/22/2023    Interval History: function stable, transferred out of the ICU     Medications:  Scheduled Meds:amLODIPine, 10 mg, oral, Daily  aspirin, 81 mg, oral, Daily  [Held by provider] busPIRone, 15 mg, oral, TID  cholecalciferol, 5,000 Units, oral, Daily  cloNIDine, 0.1 mg, oral, Daily  cyanocobalamin, 1,000 mcg, oral, Daily  divalproex, 250 mg, oral, q12h EILEEN  heparin (porcine), 5,000 Units, subcutaneous, q8h  insulin glargine, 45 Units, subcutaneous, BID  insulin lispro, 0-15 Units, subcutaneous, TID with meals, nightly, & 0300  insulin lispro, 14 Units, subcutaneous, TID with meals  [Held by provider] losartan, 25 mg, oral, BID  magnesium oxide, 400 mg, oral, Daily  metoprolol tartrate, 50 mg, oral, BID  pantoprazole, 40 mg, oral, Daily  polyethylene glycol, 17 g, oral, Daily  QUEtiapine, 50 mg, oral, Nightly  sennosides-docusate sodium, 1 tablet, oral, Nightly  sertraline, 100 mg, oral, Daily  simvastatin, 40 mg, oral, Nightly  sodium chloride, 500 mL, intravenous, Once  terazosin, 5 mg, oral, Once  valproate sodium, 750 mg, intravenous, Once      Continuous Infusions:     CBC:   Lab Results   Component Value Date    WBC 5.4 12/26/2023    RBC 4.10 (L) 12/26/2023    HGB 11.2 (L) 12/26/2023    HCT 33.8 (L) 12/26/2023    MCV 82  "12/26/2023    MCH 27.3 12/26/2023    MCHC 33.1 12/26/2023    RDW 12.1 12/26/2023     12/26/2023     BMP:    Lab Results   Component Value Date     12/26/2023    K 3.1 (L) 12/26/2023     12/26/2023    CO2 25 12/26/2023    BUN 61 (H) 12/26/2023    CREATININE 4.73 (H) 12/26/2023    CALCIUM 9.4 12/26/2023    GLUCOSE 184 (H) 12/26/2023       Objective:  Vitals: /61 (BP Location: Left arm, Patient Position: Lying)   Pulse 64   Temp 36 °C (96.8 °F) (Temporal)   Resp 20   Ht 1.702 m (5' 7\")   Wt 97.8 kg (215 lb 9.8 oz)   SpO2 97%   BMI 33.77 kg/m²    Wt Readings from Last 3 Encounters:   12/26/23 97.8 kg (215 lb 9.8 oz)   12/05/23 106 kg (234 lb 3.2 oz)   11/21/23 108 kg (238 lb 12.8 oz)      24HR INTAKE/OUTPUT:    Intake/Output Summary (Last 24 hours) at 12/26/2023 1436  Last data filed at 12/26/2023 0900  Gross per 24 hour   Intake 375 ml   Output --   Net 375 ml         General: alert, in no apparent distress  HEENT: normocephalic, atraumatic, anicteric  Lungs: non-labored respirations, clear to auscultation bilaterally  Heart: regular rate and rhythm, no murmurs or rubs  Abdomen: soft, non-tender, non-distended  Ext: no cyanosis, no peripheral edema  Neuro: alert and oriented, no gross abnormalities      Electronically signed by Elizabeth La MD, MD              "

## 2023-12-26 NOTE — NURSING NOTE
Meaning of A1C education provided. Patient reports that he had previously bee on insulin injections. Pen teaching provided to patient and mother w/ demo teach back. Patient not to discharge today. Will set up for tomorrow.

## 2023-12-26 NOTE — PROGRESS NOTES
Endocrinology Progress Note  Patient: Amanuel Riojas  Unit/Bed: 1005/1005-A  YOB: 1970  MRN: [id number]  Acct: [id number]   Admitting Diagnosis: Dizziness [R42]  Hyperosmolar (nonketotic) coma (CMS/[facility]) [E11.01]  LU (acute kidney injury) (CMS/[facility]) [N17.9]  Acute renal failure, unspecified acute renal failure type (CMS/[facility]) [N17.9]  Pseudohyponatremia [R79.89]  Date:  12/22/2023  Hospital Day: 4  Attending: Mike New MD       Complaint:  Chief Complaint   Patient presents with    Dizziness    Dental Pain          Assessment     Patient Active Problem List   Diagnosis    Anemia    Asthma    Chronic gout of left hip    CKD (chronic kidney disease), stage V (CMS/[facility])    CTS (carpal tunnel syndrome)    Cyst of left kidney    Type 2 diabetes mellitus with other specified complication (CMS/[facility])    Hypertension associated with diabetes (CMS/[facility])    Noncompliance with treatment    PTSD (post-traumatic stress disorder)    Vitamin B12 deficiency    Vitamin D deficiency    Hyperlipidemia associated with type 2 diabetes mellitus (CMS/[facility])    Class 2 obesity with body mass index (BMI) of 38.0 to 38.9 in adult    Never smoked any substance    Gastroesophageal reflux disease without esophagitis    Hyperosmolar (nonketotic) coma (CMS/[facility])    Seizure-like activity (CMS/[facility])          Plan:  Hyperosmolar coma improved  Type 2 diabetes uncontrolled improved  Insulin using  The patient will continue his Lantus 45 units twice a day and Humalog 14 units with each meal 3 times daily if he eats a large amount of meals he may need 20 units 3 times daily as the patient says his food is not enough and he goes home.  He will also continue his high-dose sliding scale as here when he goes home he was told to learn to self inject insulin also discussed with the nursing staff to teach him how to self inject insulin today he will also learn about diet.  I did talk to him about doing about 60 g Of carbs each meal we  discussed about what carbs are and to limit them when he takes his quick acting insulin.  He will take his long-acting insulin no matter what at 90 units/day in 2 divided doses or as a single dose.  He should follow-up with me in the office in the next 2 to 5 days so I can help him with readjustment  He should also see an ophthalmologist soon probably in the next 2 weeks        SUBJECTIVE    The patient is doing much better and is on the medical floor now.  His eyes have still blurred vision and he is complaining about it which is bilateral however since his glucoses were high.  He denies any chest pain seizures shortness of breath nausea vomiting.  His glucoses have improved and in the 100s now as below.  He says he is unable to go to work with his blurred vision cannot see too well closer for her.  He is also being followed by his primary doctor     FS glucose            Random glucose    184       Fasting glucose           Blood glucose           POCT glucose  202 156 165       Potassium    Potassium, Blood    3.1       Medication Dosing    insulin glargine,hum.rec.anlog SUBQ (Units)  45 45        insulin lispro SUBQ (Units)  40 18 8 18      Insulins         VITALS      Vitals:    12/25/23 1953 12/25/23 2309 12/26/23 0600 12/26/23 0759   BP: 118/72 100/59  125/80   BP Location:    Left arm   Patient Position:    Sitting   Pulse: 63 59  87   Resp: 18 20  20   Temp: 36.2 °C (97.2 °F) 36.2 °C (97.2 °F)  36.7 °C (98.1 °F)   TempSrc:    Temporal   SpO2: 93% 94%  98%   Weight:   97.8 kg (215 lb 9.8 oz)    Height:           Intake/Output Summary (Last 24 hours) at 12/26/2023 0908  Last data filed at 12/25/2023 1400  Gross per 24 hour   Intake --   Output 700 ml   Net -700 ml      Wt Readings from Last 4 Encounters:   12/26/23 97.8 kg (215 lb 9.8 oz)   12/05/23 106 kg (234 lb 3.2 oz)   11/21/23 108 kg (238 lb 12.8 oz)   08/28/23 108 kg (238 lb 3.2 oz)        Allergies:  Allergies   Allergen Reactions     Sulfamethoxazole-Trimethoprim Unknown     kidney failure        PHYSICAL EXAM   Physical Exam    Awake alert in no acute distress lying in bed comfortable    LABS   Magnesium:  Results from last 7 days   Lab Units 12/26/23  0543 12/25/23  0342 12/24/23  0410   MAGNESIUM mg/dL 2.33 2.70* 2.75*     Lipid Panel:       Lab Review  Lab Results   Component Value Date    BILITOT 0.5 12/24/2023    CALCIUM 9.4 12/26/2023    CO2 25 12/26/2023     12/26/2023    CREATININE 4.73 (H) 12/26/2023    GLUCOSE 184 (H) 12/26/2023    ALKPHOS 72 12/24/2023    K 3.1 (L) 12/26/2023    PROT 7.2 12/24/2023     12/26/2023    AST 14 12/24/2023    ALT 7 (L) 12/24/2023    BUN 61 (H) 12/26/2023    ANIONGAP 15 12/26/2023    MG 2.33 12/26/2023    PHOS 4.2 12/26/2023    LDH 85 12/23/2023    ALBUMIN 4.1 12/24/2023    LIPASE 28 06/04/2020    GFRMALE 26 (A) 08/25/2023     Lab Results   Component Value Date    TRIG 270 (H) 04/11/2023    CHOL 152 04/11/2023    HDL 33.6 (A) 04/11/2023     Lab Results   Component Value Date    HGBA1C 15.1 (H) 12/23/2023    HGBA1C 6.6 (A) 07/27/2022    HGBA1C 6.4 06/01/2021     The 10-year ASCVD risk score (Marcia MELO, et al., 2019) is: 18.2%    Values used to calculate the score:      Age: 53 years      Sex: Male      Is Non- : Yes      Diabetic: Yes      Tobacco smoker: No      Systolic Blood Pressure: 125 mmHg      Is BP treated: Yes      HDL Cholesterol: 33.6 mg/dL      Total Cholesterol: 152 mg/dL   Lab Results   Component Value Date    HGBA1C 15.1 (H) 12/23/2023    HGBA1C 6.6 (A) 07/27/2022    HGBA1C 6.4 06/01/2021     12/26/2023    K 3.1 (L) 12/26/2023     12/26/2023    CO2 25 12/26/2023    BUN 61 (H) 12/26/2023    CREATININE 4.73 (H) 12/26/2023    CALCIUM 9.4 12/26/2023    ALBUMIN 4.1 12/24/2023    PROT 7.2 12/24/2023    BILITOT 0.5 12/24/2023    ALKPHOS 72 12/24/2023    ALT 7 (L) 12/24/2023    AST 14 12/24/2023    GLUCOSE 184 (H) 12/26/2023    CHOL 152 04/11/2023     TRIG 270 (H) 04/11/2023    HDL 33.6 (A) 04/11/2023    BHYDRXBUT 0.12 12/23/2023        [Held by provider] allopurinol, 300 mg, oral, Daily  amLODIPine, 10 mg, oral, Daily  aspirin, 81 mg, oral, Daily  [Held by provider] busPIRone, 15 mg, oral, TID  cholecalciferol, 5,000 Units, oral, Daily  cloNIDine, 0.1 mg, oral, Daily  cyanocobalamin, 1,000 mcg, oral, Daily  divalproex, 250 mg, oral, q12h EILEEN  [Held by provider] glipiZIDE, 2.5 mg, oral, Daily  heparin (porcine), 5,000 Units, subcutaneous, q8h  [Held by provider] hydroCHLOROthiazide, 25 mg, oral, Daily  insulin glargine, 45 Units, subcutaneous, BID  insulin lispro, 0-15 Units, subcutaneous, TID with meals, nightly, & 0300  insulin lispro, 14 Units, subcutaneous, TID with meals  [Held by provider] losartan, 25 mg, oral, BID  magnesium oxide, 400 mg, oral, Daily  metoprolol tartrate, 50 mg, oral, BID  [Held by provider] nabumetone, 750 mg, oral, q12h  pantoprazole, 40 mg, oral, Daily  polyethylene glycol, 17 g, oral, Daily  QUEtiapine, 50 mg, oral, Nightly  sennosides-docusate sodium, 1 tablet, oral, Nightly  sertraline, 100 mg, oral, Daily  simvastatin, 40 mg, oral, Nightly  terazosin, 5 mg, oral, Once  [Held by provider] traMADol, 50 mg, oral, q6h             Electronically signed by Daniel Colindres MD on 12/26/2023 at 9:08 AM

## 2023-12-26 NOTE — PROGRESS NOTES
"Amanuel Riojas is a 53 y.o. male on day 4 of admission presenting with Hyperosmolar (nonketotic) coma (CMS/HCC).      Subjective   He is endorsing headache for the past week, right frontal throbbing pain with marked photophobia. He has been experiencing intermittent visual phenomena of bright lights migrating which has been occurring also over the past week or so. He does not recall the RH movements. No seizure activity overnight.       Objective     Last Recorded Vitals  Blood pressure 125/80, pulse 87, temperature 36.7 °C (98.1 °F), temperature source Temporal, resp. rate 20, height 1.702 m (5' 7\"), weight 97.8 kg (215 lb 9.8 oz), SpO2 98 %.    Physical Exam  Eyes:      Extraocular Movements: EOM normal.   Neurological:      Motor: Motor strength is normal.      Neurological Exam  Mental Status  Drowsy. Oriented to person, place, time and situation.    Cranial Nerves  CN III, IV, VI: Extraocular movements intact bilaterally. Slowed saccades in all directions. Normal smooth pursuit.  CN V: Facial sensation is normal.  CN VII: Full and symmetric facial movement.    Motor   Normal muscle tone. Strength is 5/5 throughout all four extremities.  Postural tremor as well as asterixis present.    Sensory  Light touch is normal in upper and lower extremities.     Relevant Results                                                  Assessment/Plan      Principal Problem:    Hyperosmolar (nonketotic) coma (CMS/HCC)  Active Problems:    Seizure-like activity (CMS/HCC)    Acute renal failure (CMS/HCC)    CKD (chronic kidney disease), stage IV (CMS/HCC)    Impression:  HHS, metabolic encephalopathy, improving  focal seizure versus HHS-related chorea versus asterixis  Migraine with aura    Recommend:  EEG pending - can be followed as outpatient if he is ready for discharge  Continue  mg BID, likely can be weaned outpatient  500 cc bolus + migraine cocktail ordered  Opiates should be avoided in migraine and in some cases can " worsen headaches in the long run  Followup with Vikki Ford MD

## 2023-12-26 NOTE — NURSING NOTE
1215- Pt and spouse provided education on how to read sliding scale, Pt unable to see syringe numbers to draw up the correct amount of insulin, Spouse at bedside made a few attempts to draw up insulin and successfully administered medication to left arm, injection locations discussed      1715- Pt alone at bedside, unable to draw insulin into syringe, he was able to inject it correctly

## 2023-12-27 LAB
ALBUMIN SERPL BCP-MCNC: 4 G/DL (ref 3.4–5)
ANION GAP SERPL CALC-SCNC: 16 MMOL/L (ref 10–20)
BASOPHILS # BLD AUTO: 0.02 X10*3/UL (ref 0–0.1)
BASOPHILS NFR BLD AUTO: 0.3 %
BUN SERPL-MCNC: 55 MG/DL (ref 6–23)
CALCIUM SERPL-MCNC: 9.1 MG/DL (ref 8.6–10.3)
CHLORIDE SERPL-SCNC: 101 MMOL/L (ref 98–107)
CHOLEST SERPL-MCNC: 178 MG/DL (ref 0–199)
CHOLESTEROL/HDL RATIO: 7.7
CO2 SERPL-SCNC: 25 MMOL/L (ref 21–32)
CREAT SERPL-MCNC: 4.22 MG/DL (ref 0.5–1.3)
CREAT UR-MCNC: 132.1 MG/DL (ref 20–370)
EOSINOPHIL # BLD AUTO: 0 X10*3/UL (ref 0–0.7)
EOSINOPHIL NFR BLD AUTO: 0 %
ERYTHROCYTE [DISTWIDTH] IN BLOOD BY AUTOMATED COUNT: 12 % (ref 11.5–14.5)
GFR SERPL CREATININE-BSD FRML MDRD: 16 ML/MIN/1.73M*2
GLUCOSE BLD MANUAL STRIP-MCNC: 104 MG/DL (ref 74–99)
GLUCOSE BLD MANUAL STRIP-MCNC: 119 MG/DL (ref 74–99)
GLUCOSE BLD MANUAL STRIP-MCNC: 120 MG/DL (ref 74–99)
GLUCOSE BLD MANUAL STRIP-MCNC: 157 MG/DL (ref 74–99)
GLUCOSE BLD MANUAL STRIP-MCNC: 87 MG/DL (ref 74–99)
GLUCOSE SERPL-MCNC: 76 MG/DL (ref 74–99)
HCT VFR BLD AUTO: 32.4 % (ref 41–52)
HDLC SERPL-MCNC: 23.2 MG/DL
HGB BLD-MCNC: 10.9 G/DL (ref 13.5–17.5)
HGB RETIC QN: 30 PG (ref 28–38)
HOLD SPECIMEN: NORMAL
IMM GRANULOCYTES # BLD AUTO: 0.38 X10*3/UL (ref 0–0.7)
IMM GRANULOCYTES NFR BLD AUTO: 6.4 % (ref 0–0.9)
IMMATURE RETIC FRACTION: 29.3 %
IRON SATN MFR SERPL: 24 %
IRON SERPL-MCNC: 78 UG/DL (ref 35–150)
LDLC SERPL CALC-MCNC: ABNORMAL MG/DL
LYMPHOCYTES # BLD AUTO: 1.67 X10*3/UL (ref 1.2–4.8)
LYMPHOCYTES NFR BLD AUTO: 28.3 %
MAGNESIUM SERPL-MCNC: 2.05 MG/DL (ref 1.6–2.4)
MCH RBC QN AUTO: 27.9 PG (ref 26–34)
MCHC RBC AUTO-ENTMCNC: 33.6 G/DL (ref 32–36)
MCV RBC AUTO: 83 FL (ref 80–100)
MICROALBUMIN UR-MCNC: 419.9 MG/L
MICROALBUMIN/CREAT UR: 317.9 UG/MG CREAT
MONOCYTES # BLD AUTO: 0.49 X10*3/UL (ref 0.1–1)
MONOCYTES NFR BLD AUTO: 8.3 %
NEUTROPHILS # BLD AUTO: 3.34 X10*3/UL (ref 1.2–7.7)
NEUTROPHILS NFR BLD AUTO: 56.7 %
NON HDL CHOLESTEROL: 155 MG/DL (ref 0–149)
NRBC BLD-RTO: 0 /100 WBCS (ref 0–0)
PHOSPHATE SERPL-MCNC: 4.7 MG/DL (ref 2.5–4.9)
PLATELET # BLD AUTO: 209 X10*3/UL (ref 150–450)
POTASSIUM SERPL-SCNC: 3.1 MMOL/L (ref 3.5–5.3)
RBC # BLD AUTO: 3.91 X10*6/UL (ref 4.5–5.9)
RETICS #: 0.06 X10*6/UL (ref 0.02–0.12)
RETICS/RBC NFR AUTO: 1.6 % (ref 0.5–2)
SODIUM SERPL-SCNC: 139 MMOL/L (ref 136–145)
TIBC SERPL-MCNC: 321 UG/DL
TRIGL SERPL-MCNC: 709 MG/DL (ref 0–149)
UIBC SERPL-MCNC: 243 UG/DL (ref 110–370)
URATE SERPL-MCNC: 13.1 MG/DL (ref 4–7.5)
VLDL: ABNORMAL
WBC # BLD AUTO: 5.9 X10*3/UL (ref 4.4–11.3)

## 2023-12-27 PROCEDURE — 36415 COLL VENOUS BLD VENIPUNCTURE: CPT | Performed by: INTERNAL MEDICINE

## 2023-12-27 PROCEDURE — 2500000001 HC RX 250 WO HCPCS SELF ADMINISTERED DRUGS (ALT 637 FOR MEDICARE OP): Performed by: INTERNAL MEDICINE

## 2023-12-27 PROCEDURE — 80069 RENAL FUNCTION PANEL: CPT | Performed by: INTERNAL MEDICINE

## 2023-12-27 PROCEDURE — 2500000001 HC RX 250 WO HCPCS SELF ADMINISTERED DRUGS (ALT 637 FOR MEDICARE OP)

## 2023-12-27 PROCEDURE — 83540 ASSAY OF IRON: CPT | Performed by: INTERNAL MEDICINE

## 2023-12-27 PROCEDURE — RXMED WILLOW AMBULATORY MEDICATION CHARGE

## 2023-12-27 PROCEDURE — 2500000002 HC RX 250 W HCPCS SELF ADMINISTERED DRUGS (ALT 637 FOR MEDICARE OP, ALT 636 FOR OP/ED)

## 2023-12-27 PROCEDURE — 85045 AUTOMATED RETICULOCYTE COUNT: CPT | Performed by: INTERNAL MEDICINE

## 2023-12-27 PROCEDURE — 82043 UR ALBUMIN QUANTITATIVE: CPT | Performed by: INTERNAL MEDICINE

## 2023-12-27 PROCEDURE — 99233 SBSQ HOSP IP/OBS HIGH 50: CPT | Performed by: INTERNAL MEDICINE

## 2023-12-27 PROCEDURE — 80061 LIPID PANEL: CPT | Performed by: INTERNAL MEDICINE

## 2023-12-27 PROCEDURE — 96372 THER/PROPH/DIAG INJ SC/IM: CPT

## 2023-12-27 PROCEDURE — 1210000001 HC SEMI-PRIVATE ROOM DAILY

## 2023-12-27 PROCEDURE — 2500000002 HC RX 250 W HCPCS SELF ADMINISTERED DRUGS (ALT 637 FOR MEDICARE OP, ALT 636 FOR OP/ED): Performed by: INTERNAL MEDICINE

## 2023-12-27 PROCEDURE — 83735 ASSAY OF MAGNESIUM: CPT

## 2023-12-27 PROCEDURE — 2500000004 HC RX 250 GENERAL PHARMACY W/ HCPCS (ALT 636 FOR OP/ED)

## 2023-12-27 PROCEDURE — 85025 COMPLETE CBC W/AUTO DIFF WBC: CPT

## 2023-12-27 PROCEDURE — 82947 ASSAY GLUCOSE BLOOD QUANT: CPT

## 2023-12-27 PROCEDURE — 99231 SBSQ HOSP IP/OBS SF/LOW 25: CPT | Performed by: STUDENT IN AN ORGANIZED HEALTH CARE EDUCATION/TRAINING PROGRAM

## 2023-12-27 PROCEDURE — 2500000004 HC RX 250 GENERAL PHARMACY W/ HCPCS (ALT 636 FOR OP/ED): Performed by: STUDENT IN AN ORGANIZED HEALTH CARE EDUCATION/TRAINING PROGRAM

## 2023-12-27 PROCEDURE — 2500000004 HC RX 250 GENERAL PHARMACY W/ HCPCS (ALT 636 FOR OP/ED): Performed by: INTERNAL MEDICINE

## 2023-12-27 PROCEDURE — 84550 ASSAY OF BLOOD/URIC ACID: CPT | Performed by: INTERNAL MEDICINE

## 2023-12-27 RX ORDER — INSULIN GLARGINE 100 [IU]/ML
75 INJECTION, SOLUTION SUBCUTANEOUS EVERY MORNING
Qty: 15 ML | Refills: 2 | Status: SHIPPED | OUTPATIENT
Start: 2023-12-27 | End: 2024-01-11 | Stop reason: SDUPTHER

## 2023-12-27 RX ORDER — ATORVASTATIN CALCIUM 80 MG/1
80 TABLET, FILM COATED ORAL NIGHTLY
Status: DISCONTINUED | OUTPATIENT
Start: 2023-12-27 | End: 2023-12-29 | Stop reason: HOSPADM

## 2023-12-27 RX ORDER — INSULIN GLARGINE 100 [IU]/ML
30 INJECTION, SOLUTION SUBCUTANEOUS ONCE
Status: COMPLETED | OUTPATIENT
Start: 2023-12-27 | End: 2023-12-27

## 2023-12-27 RX ORDER — CAPTOPRIL 12.5 MG/1
6.25 TABLET ORAL 3 TIMES DAILY
Status: DISCONTINUED | OUTPATIENT
Start: 2023-12-27 | End: 2023-12-29

## 2023-12-27 RX ORDER — METOCLOPRAMIDE HYDROCHLORIDE 5 MG/ML
10 INJECTION INTRAMUSCULAR; INTRAVENOUS ONCE
Status: COMPLETED | OUTPATIENT
Start: 2023-12-27 | End: 2023-12-27

## 2023-12-27 RX ORDER — DIPHENHYDRAMINE HYDROCHLORIDE 50 MG/ML
25 INJECTION INTRAMUSCULAR; INTRAVENOUS ONCE
Status: COMPLETED | OUTPATIENT
Start: 2023-12-27 | End: 2023-12-27

## 2023-12-27 RX ORDER — POTASSIUM CHLORIDE 20 MEQ/1
40 TABLET, EXTENDED RELEASE ORAL ONCE
Status: COMPLETED | OUTPATIENT
Start: 2023-12-27 | End: 2023-12-27

## 2023-12-27 RX ORDER — MAGNESIUM SULFATE 1 G/100ML
1 INJECTION INTRAVENOUS ONCE
Status: COMPLETED | OUTPATIENT
Start: 2023-12-27 | End: 2023-12-27

## 2023-12-27 RX ORDER — ATORVASTATIN CALCIUM 80 MG/1
80 TABLET, FILM COATED ORAL NIGHTLY
Qty: 30 TABLET | Refills: 0 | Status: SHIPPED | OUTPATIENT
Start: 2023-12-27 | End: 2024-05-08

## 2023-12-27 RX ORDER — INSULIN GLARGINE 100 [IU]/ML
75 INJECTION, SOLUTION SUBCUTANEOUS EVERY 24 HOURS
Status: DISCONTINUED | OUTPATIENT
Start: 2023-12-28 | End: 2023-12-29 | Stop reason: HOSPADM

## 2023-12-27 RX ADMIN — INSULIN LISPRO 14 UNITS: 100 INJECTION, SOLUTION INTRAVENOUS; SUBCUTANEOUS at 08:02

## 2023-12-27 RX ADMIN — MAGNESIUM OXIDE 400 MG (241.3 MG MAGNESIUM) TABLET 400 MG: TABLET at 08:02

## 2023-12-27 RX ADMIN — DIVALPROEX SODIUM 250 MG: 250 TABLET, DELAYED RELEASE ORAL at 08:02

## 2023-12-27 RX ADMIN — CAPTOPRIL 6.25 MG: 12.5 TABLET ORAL at 16:40

## 2023-12-27 RX ADMIN — METOPROLOL TARTRATE 50 MG: 50 TABLET, FILM COATED ORAL at 20:19

## 2023-12-27 RX ADMIN — INSULIN GLARGINE 30 UNITS: 100 INJECTION, SOLUTION SUBCUTANEOUS at 11:31

## 2023-12-27 RX ADMIN — HEPARIN SODIUM 5000 UNITS: 5000 INJECTION INTRAVENOUS; SUBCUTANEOUS at 20:33

## 2023-12-27 RX ADMIN — OXYCODONE 5 MG: 5 TABLET ORAL at 08:09

## 2023-12-27 RX ADMIN — HEPARIN SODIUM 5000 UNITS: 5000 INJECTION INTRAVENOUS; SUBCUTANEOUS at 08:02

## 2023-12-27 RX ADMIN — INSULIN LISPRO 4 UNITS: 100 INJECTION, SOLUTION INTRAVENOUS; SUBCUTANEOUS at 20:24

## 2023-12-27 RX ADMIN — DIVALPROEX SODIUM 250 MG: 250 TABLET, DELAYED RELEASE ORAL at 20:16

## 2023-12-27 RX ADMIN — AMLODIPINE BESYLATE 10 MG: 5 TABLET ORAL at 08:02

## 2023-12-27 RX ADMIN — Medication 125 MCG: at 08:02

## 2023-12-27 RX ADMIN — METOPROLOL TARTRATE 50 MG: 50 TABLET, FILM COATED ORAL at 08:02

## 2023-12-27 RX ADMIN — MAGNESIUM SULFATE HEPTAHYDRATE 1 G: 1 INJECTION, SOLUTION INTRAVENOUS at 12:59

## 2023-12-27 RX ADMIN — CAPTOPRIL 6.25 MG: 12.5 TABLET ORAL at 20:16

## 2023-12-27 RX ADMIN — QUETIAPINE FUMARATE 50 MG: 100 TABLET, FILM COATED ORAL at 20:18

## 2023-12-27 RX ADMIN — ASPIRIN 81 MG: 81 TABLET, COATED ORAL at 08:02

## 2023-12-27 RX ADMIN — PANTOPRAZOLE SODIUM 40 MG: 40 TABLET, DELAYED RELEASE ORAL at 08:02

## 2023-12-27 RX ADMIN — HEPARIN SODIUM 5000 UNITS: 5000 INJECTION INTRAVENOUS; SUBCUTANEOUS at 14:00

## 2023-12-27 RX ADMIN — METOCLOPRAMIDE HYDROCHLORIDE 10 MG: 5 INJECTION INTRAMUSCULAR; INTRAVENOUS at 11:30

## 2023-12-27 RX ADMIN — INSULIN GLARGINE 45 UNITS: 100 INJECTION, SOLUTION SUBCUTANEOUS at 08:02

## 2023-12-27 RX ADMIN — INSULIN LISPRO 14 UNITS: 100 INJECTION, SOLUTION INTRAVENOUS; SUBCUTANEOUS at 16:41

## 2023-12-27 RX ADMIN — OXYCODONE 5 MG: 5 TABLET ORAL at 16:40

## 2023-12-27 RX ADMIN — INSULIN LISPRO 14 UNITS: 100 INJECTION, SOLUTION INTRAVENOUS; SUBCUTANEOUS at 12:00

## 2023-12-27 RX ADMIN — ATORVASTATIN CALCIUM 80 MG: 80 TABLET, FILM COATED ORAL at 20:19

## 2023-12-27 RX ADMIN — CYANOCOBALAMIN TAB 500 MCG 1000 MCG: 500 TAB at 08:02

## 2023-12-27 RX ADMIN — CLONIDINE HYDROCHLORIDE 0.1 MG: 0.1 TABLET ORAL at 08:02

## 2023-12-27 RX ADMIN — SERTRALINE HYDROCHLORIDE 100 MG: 50 TABLET, FILM COATED ORAL at 08:02

## 2023-12-27 RX ADMIN — DIPHENHYDRAMINE HYDROCHLORIDE 25 MG: 50 INJECTION, SOLUTION INTRAMUSCULAR; INTRAVENOUS at 11:30

## 2023-12-27 RX ADMIN — POTASSIUM CHLORIDE 40 MEQ: 1500 TABLET, EXTENDED RELEASE ORAL at 12:59

## 2023-12-27 ASSESSMENT — COGNITIVE AND FUNCTIONAL STATUS - GENERAL
MOBILITY SCORE: 24
DAILY ACTIVITIY SCORE: 24

## 2023-12-27 ASSESSMENT — PAIN SCALES - GENERAL: PAINLEVEL_OUTOF10: 7

## 2023-12-27 NOTE — PROGRESS NOTES
Endocrinology Progress Note  Patient: Amanuel Riojas  Unit/Bed: 1005/1005-A  YOB: 1970  MRN: [id number]  Acct: [id number]   Admitting Diagnosis: Dizziness [R42]  Hyperosmolar (nonketotic) coma (CMS/HCC) [E11.01]  LU (acute kidney injury) (CMS/[facility]) [N17.9]  Acute renal failure, unspecified acute renal failure type (CMS/[facility]) [N17.9]  Pseudohyponatremia [R79.89]  Date:  12/22/2023  Hospital Day: 5  Attending: Gabriella Glover MD       Complaint:  Chief Complaint   Patient presents with    Dizziness    Dental Pain          Assessment     Patient Active Problem List   Diagnosis    Anemia    Asthma    Chronic gout of left hip    CKD (chronic kidney disease), stage V (CMS/[facility])    CTS (carpal tunnel syndrome)    Cyst of left kidney    Type 2 diabetes mellitus with other specified complication (CMS/[facility])    Hypertension associated with diabetes (CMS/[facility])    Noncompliance with treatment    PTSD (post-traumatic stress disorder)    Vitamin B12 deficiency    Vitamin D deficiency    Hyperlipidemia associated with type 2 diabetes mellitus (CMS/[facility])    Class 2 obesity with body mass index (BMI) of 38.0 to 38.9 in adult    Never smoked any substance    Gastroesophageal reflux disease without esophagitis    Hyperosmolar coma due to type 2 diabetes mellitus (CMS/HCC)    Seizure-like activity (CMS/[facility])    Acute renal failure (CMS/[facility])    CKD (chronic kidney disease), stage IV (CMS/[facility])    Pseudohyponatremia          Plan:  Diabetes mellitus type 2 uncontrolled with hyperosmolar state now improved  Lower glucose in the a.m. on glargine 45 units twice a day we will switch it to 75 units once a day  Continue Humalog as before        SUBJECTIVE      Patient is doing fair this morning.  Glucoses reviewed and have been lower in the morning at 76 on lab draw and 87 later.    FS glucose            Random glucose    76       Fasting glucose           Blood glucose           POCT glucose  338 166 120 87      Potassium     Potassium, Blood    3.1       Medication Dosing      insulin glargine,hum.rec.anlog SUBQ (Units)  45 45  45      insulin lispro SUBQ (Units)  47 22  14             VITALS      Vitals:    12/26/23 1333 12/26/23 2001 12/26/23 2316 12/27/23 0750   BP: 120/61 137/73 122/71 136/78   BP Location: Left arm  Left arm Left arm   Patient Position: Lying  Lying Lying   Pulse: 64 63 54 71   Resp: 20 18 18 18   Temp: 36 °C (96.8 °F) 36.1 °C (97 °F) 35.6 °C (96.1 °F) 36.9 °C (98.4 °F)   TempSrc: Temporal Temporal Temporal Temporal   SpO2: 97% 95% 95% 96%   Weight:       Height:           Intake/Output Summary (Last 24 hours) at 12/27/2023 0939  Last data filed at 12/27/2023 0830  Gross per 24 hour   Intake 975 ml   Output --   Net 975 ml      Wt Readings from Last 4 Encounters:   12/26/23 97.8 kg (215 lb 9.8 oz)   12/05/23 106 kg (234 lb 3.2 oz)   11/21/23 108 kg (238 lb 12.8 oz)   08/28/23 108 kg (238 lb 3.2 oz)        Allergies:  Allergies   Allergen Reactions    Sulfamethoxazole-Trimethoprim Unknown     kidney failure        PHYSICAL EXAM   Physical Exam      LABS   Magnesium:  Results from last 7 days   Lab Units 12/27/23  0559 12/26/23  0543 12/25/23  0342   MAGNESIUM mg/dL 2.05 2.33 2.70*     Lipid Panel:  Results from last 7 days   Lab Units 12/27/23  0559   HDL mg/dL 23.2   CHOLESTEROL/HDL RATIO  7.7   TRIGLYCERIDES mg/dL 709*   NON HDL CHOL. mg/dL 155*      Lab Review  Lab Results   Component Value Date    BILITOT 0.5 12/24/2023    CALCIUM 9.1 12/27/2023    CO2 25 12/27/2023     12/27/2023    CREATININE 4.22 (H) 12/27/2023    GLUCOSE 76 12/27/2023    ALKPHOS 72 12/24/2023    K 3.1 (L) 12/27/2023    PROT 7.2 12/24/2023     12/27/2023    AST 14 12/24/2023    ALT 7 (L) 12/24/2023    BUN 55 (H) 12/27/2023    ANIONGAP 16 12/27/2023    MG 2.05 12/27/2023    PHOS 4.7 12/27/2023    LDH 85 12/23/2023    ALBUMIN 4.0 12/27/2023    LIPASE 28 06/04/2020    GFRMALE 26 (A) 08/25/2023     Lab Results   Component Value Date     TRIG 709 (H) 12/27/2023    CHOL 178 12/27/2023    LDLCALC  12/27/2023      Comment:      The calculation of LDL and VLDL are inaccurate when the Triglycerides are greater than 400 mg/dL or when the patient is non-fasting. If LDL measurement is necessary contact the testing laboratory for an alternative LDL assay.                                  Near   Borderline      AGE      Desirable  Optimal    High     High     Very High     0-19 Y     0 - 109     ---    110-129   >/= 130     ----    20-24 Y     0 - 119     ---    120-159   >/= 160     ----      >24 Y     0 -  99   100-129  130-159   160-189     >/=190      HDL 23.2 12/27/2023     Lab Results   Component Value Date    HGBA1C 15.1 (H) 12/23/2023    HGBA1C 6.6 (A) 07/27/2022    HGBA1C 6.4 06/01/2021     The 10-year ASCVD risk score (Marcia MELO, et al., 2019) is: 24.2%    Values used to calculate the score:      Age: 53 years      Sex: Male      Is Non- : Yes      Diabetic: Yes      Tobacco smoker: No      Systolic Blood Pressure: 136 mmHg      Is BP treated: Yes      HDL Cholesterol: 23.2 mg/dL      Total Cholesterol: 178 mg/dL   Lab Results   Component Value Date    HGBA1C 15.1 (H) 12/23/2023    HGBA1C 6.6 (A) 07/27/2022    HGBA1C 6.4 06/01/2021     12/27/2023    K 3.1 (L) 12/27/2023     12/27/2023    CO2 25 12/27/2023    BUN 55 (H) 12/27/2023    CREATININE 4.22 (H) 12/27/2023    CALCIUM 9.1 12/27/2023    ALBUMIN 4.0 12/27/2023    PROT 7.2 12/24/2023    BILITOT 0.5 12/24/2023    ALKPHOS 72 12/24/2023    ALT 7 (L) 12/24/2023    AST 14 12/24/2023    GLUCOSE 76 12/27/2023    CHOL 178 12/27/2023    TRIG 709 (H) 12/27/2023    HDL 23.2 12/27/2023    BHYDRXBUT 0.12 12/23/2023        amLODIPine, 10 mg, oral, Daily  aspirin, 81 mg, oral, Daily  [Held by provider] busPIRone, 15 mg, oral, TID  cholecalciferol, 5,000 Units, oral, Daily  cloNIDine, 0.1 mg, oral, Daily  cyanocobalamin, 1,000 mcg, oral, Daily  divalproex, 250 mg, oral,  q12h EILEEN  heparin (porcine), 5,000 Units, subcutaneous, q8h  insulin glargine, 45 Units, subcutaneous, BID  insulin lispro, 0-15 Units, subcutaneous, TID with meals, nightly, & 0300  insulin lispro, 14 Units, subcutaneous, TID with meals  [Held by provider] losartan, 25 mg, oral, BID  magnesium oxide, 400 mg, oral, Daily  metoprolol tartrate, 50 mg, oral, BID  pantoprazole, 40 mg, oral, Daily  polyethylene glycol, 17 g, oral, Daily  QUEtiapine, 50 mg, oral, Nightly  sennosides-docusate sodium, 1 tablet, oral, Nightly  sertraline, 100 mg, oral, Daily  simvastatin, 40 mg, oral, Nightly  terazosin, 5 mg, oral, Once             Electronically signed by Daniel Colindres MD on 12/27/2023 at 9:39 AM

## 2023-12-27 NOTE — PROGRESS NOTES
"Amanuel Riojas is a 53 y.o. male on day 5 of admission presenting with Hyperosmolar coma due to type 2 diabetes mellitus (CMS/HCC).      Subjective   Headache is improved 6/10 compared to 10/10 yesterday    He continues to have marked photophobia and blurry/swirly vision with moving patterns. He feels dizzy and generally weak. He again mentions upper right dental pain which is chronic, and feels the pain may be radiating from this. He denies previous hx of severe headache.       Objective     Last Recorded Vitals  Blood pressure 136/78, pulse 71, temperature 36.9 °C (98.4 °F), temperature source Temporal, resp. rate 18, height 1.702 m (5' 7\"), weight 97.8 kg (215 lb 9.8 oz), SpO2 96 %.    Physical Exam  Neurological Exam  VA 20/50 both eyes but limited by marked photophobia  EOM full range, face, tongue symmetric  5/5 strength  No nuchal rigidity    Relevant Results                                                  Assessment/Plan      Principal Problem:    Hyperosmolar coma due to type 2 diabetes mellitus (CMS/HCC)  Active Problems:    Anemia    Hypertension associated with diabetes (CMS/HCC)    PTSD (post-traumatic stress disorder)    Hyperlipidemia associated with type 2 diabetes mellitus (CMS/HCC)    Seizure-like activity (CMS/HCC)    Acute renal failure (CMS/HCC)    CKD (chronic kidney disease), stage IV (CMS/HCC)    Pseudohyponatremia    Impression:  HHS, metabolic encephalopathy, improving  focal seizure versus HHS-related chorea versus asterixis  Migraine with aura (versus possibly dental pain)    EEG 12/26 showed mild diffuse encephalopathy, no epileptiform discharges     Recommend:  Continue  mg BID, likely can be weaned outpatient  repeat migraine cocktail ordered  Opiates should be avoided in migraine and in some cases can worsen headaches in the long run  Followup with Vikki Ford MD  "

## 2023-12-27 NOTE — PROGRESS NOTES
Amanuel Riojas is a 53 y.o. male on day 5 of admission presenting with Hyperosmolar coma due to type 2 diabetes mellitus (CMS/HCC).      ASSESSMENT/PLAN   Principal Problem:    Hyperosmolar (nonketotic) coma (CMS/HCC)-resolved, HbA1c on admission 15.1. Blood sugars significantly improved, glargine changed to once daily by endocrine, along with Humalog with meals  SSI.  Suspect he may need increased Humalog due to his diet after discharge.  Diabetes educator is working with the patient.  Active Problems:  -Acute renal failure with LU on CKD stage IV.  Creatinine gradually improving, down to 4.22 today.  Nephrology is following.  Losartan on hold, HCTZ held, NSAIDs held.  -Hypokalemia-still low at 3.1, will continue replacing orally.  -Seizure-like activity (CMS/HCC)-EEG pending, neurology note appreciated.  Presently on Depakote twice daily.  -Headache/toothache-neurology is treating as possible migraine  -Blurred vision-due to uncontrolled DM PTA.  -Hypertension-blood pressures presently controlled on amlodipine, terazosin, metoprolol, and clonidine; HCTZ and losartan on hold.  Awaiting orthostatic vital signs due to his complaints of dizziness with standing.  Will discuss with nephrology if they want to restart his losartan, see if they feel a DPP 4 inhibitor would be beneficial.    -PTSD-buspirone was discontinued due to possible seizure, continuing his sertraline & quetiapine.  -History of gout-restarted his allopurinol 12/26, but at renal adjusted dose of 100 mg daily.  -Hyperlipidemia-on simvastatin.  -GERD-on pantoprazole  -Possible history of CAD with stent-review of prior cardiac catheterizations all showed normal coronary arteries, no stent history found.  -COPD/asthma-has as needed albuterol inhaler, has not needed.    SUBJECTIVE/OBJECTIVE   12/27/23   -Still having a problem with feeling dizzy when he stands up.  Will check orthostatic vital signs.  Still having very blurred vision-difficulty trying to  Dr. Freeman draw up insulin in a syringe because he cannot see the numbers.  I did discuss with him that he will have an insulin pen so that will make things easier.  -His girlfriend is coming in and learning how to give insulin also.  -Tells me that his \"headache\" is actually more a tooth ache-was scheduled to see the dentist today.  -He is afebrile, blood pressures are good.  Satting 95-97% on room air.  -Chest is clear to auscultation  -Cardiac exam is a regular rhythm  -Extremities with no edema  -Blood sugars improving, he is running low in the morning, 87 this morning.  Endocrinology has changed his glargine from twice daily to 75 units in the morning.  -Chemistry panel shows he is still hypokalemic at 3.1-will give more replacement.  -BUN and creatinine continue to improve-down to 55 and 4.22.  -Lipids show marked hypertriglyceridemia at 709, cholesterol 178, HDL 23, LDL not calculated due to elevated triglycerides.  Will change his simvastatin to atorvastatin.  -CBC with a WBC of 5.9.  H/H stable at 10.9/32.4.  Platelet count 209K.  -Serum iron & TIBC normal.  -Case discussed with Dr. Ford of neurology.  MRI of brain reviewed with him, was unremarkable  -Case discussed with Dr. Persaud of nephrology-    12/26 2023 (Dr. 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  I did discuss with him the fact that it may be several weeks before his vision begins to improve due to the poor blood sugar control PTA, and advised him not to get a new glasses prescription until after his blood sugars have been in good control for several months.  -Also discussed the fact that he will need to be on insulin long-term, rather than oral agents.    -Complaining of headache, presently not bad.  Tells me he has not had prior headaches until 1 week ago (but apparently told neurology he has had a " history of migraines in the past).  -Discussed at length with him the multiple medication changes, need to monitor his blood sugars-he says he never was checking blood sugars PTA.  -I asked him when he thinks he had his reported cardiac stent placed, he thinks it was in the late 1990s.  I reviewed with him the cardiac catheterizations from both 2019 and 1997 which both times showed normal coronary arteries.  I suspect the mention of a sheath on the 1997 one was while he thought he may have had a stent.  -Notes that his legs feel somewhat stiff and he still gets a bit dizzy when he first stands up.  No chest pains, no shortness of breath, no nausea or vomiting.  -He is afebrile, vital signs stable.  Satting at 94-98% on room air.  -His chest is clear to auscultation  -Cardiac exam is a regular rhythm-Blood sugars are well-controlled-low of 82 yesterday afternoon, high of 220 during the middle of the night.  165 this morning.  -Chemistry panel with a blood sugar of 184, potassium is low at 3.1-will replace.  BUN 61 with a creatinine down to 4.73.  -Magnesium now normal at 2.33.  -CBC with a WBC of 5.4, H/H11.2/33.8.  Platelet count 215 K.    Chart review:  PCP is  Dr. Mukesh Hanson    Patient is a 53-year-old male with a known history of HTN, HLP, DM, CKD stage IV (baseline creatinine 2.5-2.8), CAD with possible history of remote stents (not found on cardiac catheterization reports from 1997 & 2019), history of gout, COPD/asthma, GERD, and obesity.    He presented to the ER on 12/22 complaining of dizziness for 1 week with associated blurred vision.  In the ER he was in acute renal failure with a BUN of 71 and creatinine of 6.51, he was hyperosmolar with a blood sugar of 932, sodium 123, potassium 4.7, chloride 83, bicarb of 22.  Magnesium was elevated at 3.05.  LFTs were normal.  Beta hydroxybutyrate was minimally elevated at 0.39.  Venous BG with a pH of 7.30, pCO2 of 56, pCO2 of 30, bicarb of 27.6.  CBC  with a WBC of 10.9, H/H of 12.6/36.2.  Platelet count 284 K.  Troponin was 10, BNP 17.  CT of the head was unremarkable, CXR with no acute findings.  Patient was started on insulin drip, given intensive IV fluids, and admitted to the ICU.    In the ICU he was hypotensive briefly requiring a Levophed drip after given NTG.  He was seen on consultation by nephrology and endocrinology.  He had previously been on glipizide for his diabetes which was discontinued.  He was started on glargine and lispro.  His HbA1c was 15.1 on admission, (previously was 6.6 on 7/27/2022.  On 12/23 patient had a seizure while in the MRI with right-sided jerking and nystagmus followed by a postictal stage.  MRI of the brain showed no acute infarct or other abnormality he was seen in consultation by neurology and started on Depakote.  He had a repeat episode of a possible seizure on 12/24.  EEG is pending.  He was transferred out of the ICU last night.  He is presently on glargine 45 units twice daily with 14 of Humalog each meal and SSI    Cardiac catheterization 12/15/1997-normal coronary arteries, no mention of stent  Cardiac catheterization 6/25/2019-no evidence of stenoses, EF = 60-65%              *These notes are being done using Dragon voice recognition technology and may include unintended errors with respect to translation of words, typographical errors or grammar errors which may not have been identified prior to finalization of the chart note.    CURRENT MEDICATIONS     Scheduled Medications:    Current Facility-Administered Medications:     [DISCONTINUED] acetaminophen (Tylenol) oral liquid 650 mg, 650 mg, oral, q6h PRN **OR** acetaminophen (Tylenol) tablet 650 mg, 650 mg, oral, q4h PRN, John Esteves, PA-C, 650 mg at 12/25/23 2140    albuterol 90 mcg/actuation inhaler 2 puff, 2 puff, inhalation, q4h PRN, John Esteves, PA-C    amLODIPine (Norvasc) tablet 10 mg, 10 mg, oral, Daily, John Esteves, PA-C, 10 mg at 12/27/23 0802    aspirin  EC tablet 81 mg, 81 mg, oral, Daily, John Esteves, PA-C, 81 mg at 12/27/23 0802    [Held by provider] busPIRone (Buspar) tablet 15 mg, 15 mg, oral, TID, John Esteves PA-C    cholecalciferol (Vitamin D-3) capsule 125 mcg, 5,000 Units, oral, Daily, John Esteves, PA-C, 125 mcg at 12/27/23 0802    cloNIDine (Catapres) tablet 0.1 mg, 0.1 mg, oral, Daily, John Esteves, PA-C, 0.1 mg at 12/27/23 0802    cyanocobalamin (Vitamin B-12) tablet 1,000 mcg, 1,000 mcg, oral, Daily, John Esteves, PA-C, 1,000 mcg at 12/27/23 0802    dextrose 10 % in water (D10W) infusion, 0.3 g/kg/hr, intravenous, Once PRN, John Esteves PA-C    dextrose 50 % injection 25 g, 25 g, intravenous, q15 min PRN, John Esteves PA-C    divalproex (Depakote) EC tablet 250 mg, 250 mg, oral, q12h EILEEN, John Esteves, PA-C, 250 mg at 12/27/23 0802    glucagon (Glucagen) injection 1 mg, 1 mg, intramuscular, q15 min PRN, John Esteves PA-C    heparin (porcine) injection 5,000 Units, 5,000 Units, subcutaneous, q8h, SUAD Zelaya-C, 5,000 Units at 12/27/23 0802    [Held by provider] hydrOXYzine pamoate (Vistaril) capsule 50 mg, 50 mg, oral, BID PRN, John Esteves PA-C    [START ON 12/28/2023] insulin glargine (Lantus) injection 75 Units, 75 Units, subcutaneous, q24h, Daniel Colindres MD    insulin lispro (HumaLOG) injection 0-15 Units, 0-15 Units, subcutaneous, TID with meals, nightly, & 0300, SUAD Zelaya-C, 4 Units at 12/26/23 2009    insulin lispro (HumaLOG) injection 14 Units, 14 Units, subcutaneous, TID with meals, John Esteves PA-C, 14 Units at 12/27/23 1200    [Held by provider] losartan (Cozaar) tablet 25 mg, 25 mg, oral, BID, John Esteves PA-C    magnesium oxide (Mag-Ox) tablet 400 mg, 400 mg, oral, Daily, John Esteves PA-C, 400 mg at 12/27/23 0802    magnesium sulfate in D5W IV 1 g, 1 g, intravenous, Once, Darrell Ford MD    meclizine (Antivert) tablet 25 mg, 25 mg, oral, q6h PRN, SUAD Zelaya-SHANTE, 25 mg at 12/24/23 1659    metoprolol tartrate (Lopressor)  "tablet 50 mg, 50 mg, oral, BID, John Esteves, PA-C, 50 mg at 12/27/23 0802    ondansetron (Zofran) tablet 4 mg, 4 mg, oral, q8h PRN **OR** ondansetron (Zofran) injection 4 mg, 4 mg, intravenous, q8h PRN, John Esteves, PA-C    oxyCODONE (Roxicodone) immediate release tablet 5 mg, 5 mg, oral, q6h PRN, John Esteves, PA-C, 5 mg at 12/27/23 0809    pantoprazole (ProtoNix) EC tablet 40 mg, 40 mg, oral, Daily, John Esteves, PA-C, 40 mg at 12/27/23 0802    polyethylene glycol (Glycolax, Miralax) packet 17 g, 17 g, oral, Daily, John Esteves, PA-C, 17 g at 12/26/23 0830    QUEtiapine (SEROquel) tablet 50 mg, 50 mg, oral, Nightly, John Esteves, PA-C, 50 mg at 12/26/23 2009    sennosides-docusate sodium (Maria Luisa-Colace) 8.6-50 mg per tablet 1 tablet, 1 tablet, oral, Nightly, John Esteves, PA-C, 1 tablet at 12/25/23 2132    sertraline (Zoloft) tablet 100 mg, 100 mg, oral, Daily, John Esteves, PA-C, 100 mg at 12/27/23 0802    simvastatin (Zocor) tablet 40 mg, 40 mg, oral, Nightly, John Esteves, PA-C, 40 mg at 12/26/23 2008    terazosin (Hytrin) capsule 5 mg, 5 mg, oral, Once, John Esteves, PA-C     PRN Medications:  PRN medications   Medication    acetaminophen    albuterol    dextrose 10 % in water (D10W)    dextrose    glucagon    [Held by provider] hydrOXYzine pamoate    meclizine    ondansetron    Or    ondansetron    oxyCODONE         IVs:        I&Os     Intake/Output last 3 Shifts:  I/O last 3 completed shifts:  In: 975 (10 mL/kg) [P.O.:375; IV Piggyback:600]  Out: - (0 mL/kg)   Weight: 97.8 kg     VITAL SIGNS     Blood pressure 136/78, pulse 71, temperature 36.9 °C (98.4 °F), temperature source Temporal, resp. rate 18, height 1.702 m (5' 7\"), weight 97.8 kg (215 lb 9.8 oz), SpO2 96 %.     PHYSICAL EXAM   Physical exam:  -General appearance: Obese male, sitting up in bed, alert and in NAD, does note that his \"headache\" is actually a toothache  -Vital signs:  As above  -HEENT: No icterus  -Neck: Thick neck, no JVD  -Chest: Lungs are " clear to auscultation  -Cardiac: Regular rhythm without murmurs  -Abdomen: Bowel sounds active  -Extremities: No edema  -Skin: No rash  -Neurologic:   Patient is alert and oriented x3.   -Behavior/Emotional:  Appropriate, cooperative    LABS   Relevant Results:  Results from last 7 days   Lab Units 12/27/23  1109 12/27/23  0749 12/27/23  0559 12/27/23  0246 12/26/23  0658 12/26/23  0543 12/25/23  0748 12/25/23  0342   POCT GLUCOSE mg/dL 104* 87  --  120*   < >  --    < >  --    GLUCOSE mg/dL  --   --  76  --   --  184*  --  192*    < > = values in this interval not displayed.          CBC:   Results from last 7 days   Lab Units 12/27/23  0558 12/26/23  0543 12/25/23  0343   WBC AUTO x10*3/uL 5.9 5.4 5.6   RBC AUTO x10*6/uL 3.91* 4.10* 3.93*   HEMOGLOBIN g/dL 10.9* 11.2* 10.9*   HEMATOCRIT % 32.4* 33.8* 32.1*   MCV fL 83 82 82   MCH pg 27.9 27.3 27.7   MCHC g/dL 33.6 33.1 34.0   RDW % 12.0 12.1 12.0   PLATELETS AUTO x10*3/uL 209 215 198       CMP:    Results from last 7 days   Lab Units 12/27/23  0559 12/26/23  0543 12/25/23  0342 12/24/23  1209 12/24/23  0410 12/23/23  2355   SODIUM mmol/L 139 138 137 132* 134* 131*   POTASSIUM mmol/L 3.1* 3.1* 3.3* 3.6 3.8 3.8   CHLORIDE mmol/L 101 101 99 98 97* 97*   CO2 mmol/L 25 25 24 24 24 23   BUN mg/dL 55* 61* 60* 64* 63* 64*   CREATININE mg/dL 4.22* 4.73* 4.72* 5.04* 5.29* 5.62*   GLUCOSE mg/dL 76 184* 192* 259* 315* 317*   PROTEIN TOTAL g/dL  --   --   --  7.2 7.2 6.9   CALCIUM mg/dL 9.1 9.4 9.3 9.3 9.3 8.8   BILIRUBIN TOTAL mg/dL  --   --   --  0.5 0.6 0.6   ALK PHOS U/L  --   --   --  72 78 73   AST U/L  --   --   --  14 10 11   ALT U/L  --   --   --  7* 6* 5*       Magnesium:  Results from last 7 days   Lab Units 12/27/23  0559 12/26/23  0543 12/25/23  0342   MAGNESIUM mg/dL 2.05 2.33 2.70*       Troponin:    Results from last 7 days   Lab Units 12/22/23  2042 12/22/23  1529   TROPHS ng/L 11 10       BNP:   Results from last 7 days   Lab Units 12/22/23  1529   BNP pg/mL  17         IMAGING     MR brain wo IV contrast   Final Result   Within limitations of motion degraded study, no evidence of acute   infarct, or other emergent intracranial abnormality.        MACRO:   None        Signed by: Tigre Urrtuia 12/23/2023 2:48 PM   Dictation workstation:   [id number]      Transthoracic Echo (TTE) Complete   Final Result      US renal complete   Final Result   1. No hydronephrosis. Increased echogenicity of the renal parenchyma   and renal cortical thinning, suggestive of medical renal disease.   2. Multiple left renal cysts, as above.        MACRO:   None        Signed by: Blank Maki 12/22/2023 7:33 PM   Dictation workstation:   [id number]      CT head wo IV contrast   Final Result   No acute intracranial finding. MRI may be obtained if clinically   indicated        Signed by: Nilo Bates 12/22/2023 4:18 PM   Dictation workstation:   [id number]      XR chest 1 view   Final Result   No acute cardiopulmonary process.        MACRO   None        Signed by: Reilly Galan 12/22/2023 3:53 PM   Dictation workstation:   [id number]         I spent 75 minutes in the professional and overall care of this patient.    Gabriella Glover MD

## 2023-12-27 NOTE — CARE PLAN
The patient's goals for the shift include    Problem: Pain - Adult  Goal: Verbalizes/displays adequate comfort level or baseline comfort level  Outcome: Progressing     Problem: Safety - Adult  Goal: Free from fall injury  Outcome: Progressing     Problem: Discharge Planning  Goal: Discharge to home or other facility with appropriate resources  Outcome: Progressing     Problem: Chronic Conditions and Co-morbidities  Goal: Patient's chronic conditions and co-morbidity symptoms are monitored and maintained or improved  Outcome: Progressing     Problem: Diabetes  Goal: Achieve decreasing blood glucose levels by end of shift  Outcome: Progressing  Goal: Increase stability of blood glucose readings by end of shift  Outcome: Progressing  Goal: Decrease in ketones present in urine by end of shift  Outcome: Progressing  Goal: Maintain electrolyte levels within acceptable range throughout shift  Outcome: Progressing  Goal: Maintain glucose levels >70mg/dl to <250mg/dl throughout shift  Outcome: Progressing  Goal: No changes in neurological exam by end of shift  Outcome: Progressing  Goal: Learn about and adhere to nutrition recommendations by end of shift  Outcome: Progressing  Goal: Vital signs within normal range for age by end of shift  Outcome: Progressing  Goal: Increase self care and/or family involovement by end of shift  Outcome: Progressing  Goal: Receive DSME education by end of shift  Outcome: Progressing     Problem: Seizure  Goal: I will remain free from seizures  Outcome: Progressing       The clinical goals for the shift include Pt will learn to self admin insulin

## 2023-12-27 NOTE — PROGRESS NOTES
Nutrition Progress Note:  Met with pt for diabetic diet education. Provided pt with Planning Healthy Meals handout.  Discussed the following:  - what are carbohydrate foods  - Reviewed carbohydrate food list  - amount of carb per serving (1 serving of carb = 15 g carb) and carb allotment per meal  - plate method for meal planning and portion control  - tips for dinning out  - reading the nutrition label  - exercise and diabetes  Pt verbalized understanding of information provided. All questions answered to pt satisfaction throughout visit. RDN name and contact information provided.

## 2023-12-27 NOTE — PROGRESS NOTES
Renal Progress Note  Assessment:  53 y.o. male with history s/f CKD stage IV, T2DM, CAD s/p SEAMUS who presented for malaise, fevers, N/V.     LU on CKD stage IV: most likely 2/2 DKA, however ? Contribution of hypotension as well, Scr 6.5 on presentation, baseline 2.5-2.8, CKD risk factors T2DM, HTN, CAD: improving   DKA: resolved   Hyponatremia: in setting of hyperglycemia, improved  Anemia  Hypotension: improving   Vitamin D insufficiency     Plan:  -Continue to improve his GFR  -Hypokalemia despite the patient GFR in hypertensive -American male raise the probability of atherosclerotic renovascular disease hypertensive nephrosclerosis patient is diabetic with definite diabetic nephropathy    - For therapeutic and diagnostic purposes I will start the patient on captopril the short acting ACE inhibitor 6.25 3 times daily , we may need to adjust amlodipine or discontinue clonidine   - check spot urine for albumin and creatinine  With overwhelming evidence that has SGLT2 blockers can be of value for this patient usually to start when GFR is 20 or above will hold for now  - There is no clinical or laboratory indication to initiate renal replacement therapy  - There is no significant anemia requiring intervention at this point of time  - No hyperphosphatemia or hyperparathyroidism  Daily clinical and laboratory assessment with close monitor GFR and potassium            Subjective:   Admit Date: 12/22/2023    Interval History: Patient seen and examined uneventful night expressed feeling same compared to yesterday no major uremic related symptoms or fluid volume overload    Medications:   Scheduled Meds:amLODIPine, 10 mg, oral, Daily  aspirin, 81 mg, oral, Daily  atorvastatin, 80 mg, oral, Nightly  [Held by provider] busPIRone, 15 mg, oral, TID  cholecalciferol, 5,000 Units, oral, Daily  cloNIDine, 0.1 mg, oral, Daily  cyanocobalamin, 1,000 mcg, oral, Daily  divalproex, 250 mg, oral, q12h EILEEN  heparin (porcine),  "5,000 Units, subcutaneous, q8h  [START ON 12/28/2023] insulin glargine, 75 Units, subcutaneous, q24h  insulin lispro, 0-15 Units, subcutaneous, TID with meals, nightly, & 0300  insulin lispro, 14 Units, subcutaneous, TID with meals  [Held by provider] losartan, 25 mg, oral, BID  magnesium oxide, 400 mg, oral, Daily  magnesium sulfate, 1 g, intravenous, Once  metoprolol tartrate, 50 mg, oral, BID  pantoprazole, 40 mg, oral, Daily  polyethylene glycol, 17 g, oral, Daily  potassium chloride CR, 40 mEq, oral, Once  QUEtiapine, 50 mg, oral, Nightly  sennosides-docusate sodium, 1 tablet, oral, Nightly  sertraline, 100 mg, oral, Daily  terazosin, 5 mg, oral, Once      Continuous Infusions:     CBC:   Lab Results   Component Value Date    HGB 10.9 (L) 12/27/2023    HGB 11.2 (L) 12/26/2023    WBC 5.9 12/27/2023    WBC 5.4 12/26/2023     12/27/2023     12/26/2023      Anemia:    Lab Results   Component Value Date    IRON 78 12/27/2023    TIBC 321 12/27/2023      BMP:    Lab Results   Component Value Date     12/27/2023     12/26/2023    K 3.1 (L) 12/27/2023    K 3.1 (L) 12/26/2023     12/27/2023     12/26/2023    CO2 25 12/27/2023    CO2 25 12/26/2023    BUN 55 (H) 12/27/2023    BUN 61 (H) 12/26/2023    CREATININE 4.22 (H) 12/27/2023    CREATININE 4.73 (H) 12/26/2023      Bone disease:   Lab Results   Component Value Date    PHOS 4.7 12/27/2023      Urinalysis:  No results found for: \"JUAN\", \"PROTUR\", \"GLUCOSEU\", \"BLOODU\", \"KETONESU\", \"BILIRUBINU\", \"NITRITEU\", \"LEUKOCYTESU\", \"UTPCR\"     Objective:   Vitals: /78 (BP Location: Left arm, Patient Position: Lying)   Pulse 71   Temp 36.9 °C (98.4 °F) (Temporal)   Resp 18   Ht 1.702 m (5' 7\")   Wt 97.8 kg (215 lb 9.8 oz)   SpO2 96%   BMI 33.77 kg/m²    Wt Readings from Last 3 Encounters:   12/26/23 97.8 kg (215 lb 9.8 oz)   12/05/23 106 kg (234 lb 3.2 oz)   11/21/23 108 kg (238 lb 12.8 oz)      24HR INTAKE/OUTPUT:    Intake/Output " Summary (Last 24 hours) at 12/27/2023 1259  Last data filed at 12/27/2023 0830  Gross per 24 hour   Intake 975 ml   Output --   Net 975 ml     Admission weight:  Weight: 104 kg (230 lb)      Constitutional:  Alert, awake, no apparent distress   Skin:normal, no rash  HEENT:sclera anicteric.  Head atraumatic normocephalic  Neck:supple with no thyromegally  Cardiovascular:  S1, S2 without m/r/g   Respiratory:  CTA B without w/r/r   Abdomen: +bs, soft, nt  Ext: no LE edema  Musculoskeletal:Intact  Neuro:Alert and oriented with no deficit      Electronically signed by Paco Persaud MD on 12/27/2023 at 12:59 PM

## 2023-12-28 PROBLEM — E79.0 HYPERURICEMIA WITHOUT SIGNS INFLAMMATORY ARTHRITIS/TOPHACEOUS DISEASE: Status: ACTIVE | Noted: 2023-12-28

## 2023-12-28 LAB
ALBUMIN SERPL BCP-MCNC: 3.6 G/DL (ref 3.4–5)
ALP SERPL-CCNC: 57 U/L (ref 33–120)
ALT SERPL W P-5'-P-CCNC: 12 U/L (ref 10–52)
ANION GAP SERPL CALC-SCNC: 13 MMOL/L (ref 10–20)
AST SERPL W P-5'-P-CCNC: 20 U/L (ref 9–39)
BASOPHILS # BLD MANUAL: 0 X10*3/UL (ref 0–0.1)
BASOPHILS NFR BLD MANUAL: 0 %
BILIRUB SERPL-MCNC: 0.5 MG/DL (ref 0–1.2)
BUN SERPL-MCNC: 53 MG/DL (ref 6–23)
CALCIUM SERPL-MCNC: 8.9 MG/DL (ref 8.6–10.3)
CHLORIDE SERPL-SCNC: 103 MMOL/L (ref 98–107)
CO2 SERPL-SCNC: 25 MMOL/L (ref 21–32)
CREAT SERPL-MCNC: 3.97 MG/DL (ref 0.5–1.3)
CRP SERPL-MCNC: 0.95 MG/DL
EOSINOPHIL # BLD MANUAL: 0 X10*3/UL (ref 0–0.7)
EOSINOPHIL NFR BLD MANUAL: 0 %
ERYTHROCYTE [DISTWIDTH] IN BLOOD BY AUTOMATED COUNT: 12.4 % (ref 11.5–14.5)
ERYTHROCYTE [SEDIMENTATION RATE] IN BLOOD BY WESTERGREN METHOD: 41 MM/H (ref 0–20)
GFR SERPL CREATININE-BSD FRML MDRD: 17 ML/MIN/1.73M*2
GLUCOSE BLD MANUAL STRIP-MCNC: 146 MG/DL (ref 74–99)
GLUCOSE BLD MANUAL STRIP-MCNC: 152 MG/DL (ref 74–99)
GLUCOSE BLD MANUAL STRIP-MCNC: 239 MG/DL (ref 74–99)
GLUCOSE BLD MANUAL STRIP-MCNC: 256 MG/DL (ref 74–99)
GLUCOSE BLD MANUAL STRIP-MCNC: 264 MG/DL (ref 74–99)
GLUCOSE SERPL-MCNC: 163 MG/DL (ref 74–99)
HCT VFR BLD AUTO: 29.1 % (ref 41–52)
HGB BLD-MCNC: 9.6 G/DL (ref 13.5–17.5)
HOLD SPECIMEN: NORMAL
IMM GRANULOCYTES # BLD AUTO: 0.34 X10*3/UL (ref 0–0.7)
IMM GRANULOCYTES NFR BLD AUTO: 6.8 % (ref 0–0.9)
LYMPHOCYTES # BLD MANUAL: 1.25 X10*3/UL (ref 1.2–4.8)
LYMPHOCYTES NFR BLD MANUAL: 25 %
MAGNESIUM SERPL-MCNC: 2.14 MG/DL (ref 1.6–2.4)
MCH RBC QN AUTO: 27.5 PG (ref 26–34)
MCHC RBC AUTO-ENTMCNC: 33 G/DL (ref 32–36)
MCV RBC AUTO: 83 FL (ref 80–100)
MONOCYTES # BLD MANUAL: 0.35 X10*3/UL (ref 0.1–1)
MONOCYTES NFR BLD MANUAL: 7 %
NEUTROPHILS # BLD MANUAL: 3.4 X10*3/UL (ref 1.2–7.7)
NEUTS BAND # BLD MANUAL: 0.3 X10*3/UL (ref 0–0.7)
NEUTS BAND NFR BLD MANUAL: 6 %
NEUTS SEG # BLD MANUAL: 3.1 X10*3/UL (ref 1.2–7)
NEUTS SEG NFR BLD MANUAL: 62 %
NRBC BLD-RTO: 0 /100 WBCS (ref 0–0)
PLATELET # BLD AUTO: 198 X10*3/UL (ref 150–450)
POTASSIUM SERPL-SCNC: 3.5 MMOL/L (ref 3.5–5.3)
PROT SERPL-MCNC: 6.3 G/DL (ref 6.4–8.2)
RBC # BLD AUTO: 3.49 X10*6/UL (ref 4.5–5.9)
RBC MORPH BLD: NORMAL
SODIUM SERPL-SCNC: 137 MMOL/L (ref 136–145)
TOTAL CELLS COUNTED BLD: 100
WBC # BLD AUTO: 5 X10*3/UL (ref 4.4–11.3)

## 2023-12-28 PROCEDURE — 83735 ASSAY OF MAGNESIUM: CPT

## 2023-12-28 PROCEDURE — 85652 RBC SED RATE AUTOMATED: CPT | Performed by: STUDENT IN AN ORGANIZED HEALTH CARE EDUCATION/TRAINING PROGRAM

## 2023-12-28 PROCEDURE — 99231 SBSQ HOSP IP/OBS SF/LOW 25: CPT | Performed by: STUDENT IN AN ORGANIZED HEALTH CARE EDUCATION/TRAINING PROGRAM

## 2023-12-28 PROCEDURE — 2500000002 HC RX 250 W HCPCS SELF ADMINISTERED DRUGS (ALT 637 FOR MEDICARE OP, ALT 636 FOR OP/ED): Performed by: INTERNAL MEDICINE

## 2023-12-28 PROCEDURE — 99233 SBSQ HOSP IP/OBS HIGH 50: CPT | Performed by: INTERNAL MEDICINE

## 2023-12-28 PROCEDURE — 80053 COMPREHEN METABOLIC PANEL: CPT | Performed by: INTERNAL MEDICINE

## 2023-12-28 PROCEDURE — 2500000004 HC RX 250 GENERAL PHARMACY W/ HCPCS (ALT 636 FOR OP/ED)

## 2023-12-28 PROCEDURE — 2500000001 HC RX 250 WO HCPCS SELF ADMINISTERED DRUGS (ALT 637 FOR MEDICARE OP)

## 2023-12-28 PROCEDURE — 85007 BL SMEAR W/DIFF WBC COUNT: CPT | Performed by: INTERNAL MEDICINE

## 2023-12-28 PROCEDURE — 82947 ASSAY GLUCOSE BLOOD QUANT: CPT

## 2023-12-28 PROCEDURE — 96372 THER/PROPH/DIAG INJ SC/IM: CPT

## 2023-12-28 PROCEDURE — 36415 COLL VENOUS BLD VENIPUNCTURE: CPT | Performed by: INTERNAL MEDICINE

## 2023-12-28 PROCEDURE — 2500000001 HC RX 250 WO HCPCS SELF ADMINISTERED DRUGS (ALT 637 FOR MEDICARE OP): Performed by: INTERNAL MEDICINE

## 2023-12-28 PROCEDURE — RXMED WILLOW AMBULATORY MEDICATION CHARGE

## 2023-12-28 PROCEDURE — 85027 COMPLETE CBC AUTOMATED: CPT | Performed by: INTERNAL MEDICINE

## 2023-12-28 PROCEDURE — 1210000001 HC SEMI-PRIVATE ROOM DAILY

## 2023-12-28 PROCEDURE — 86140 C-REACTIVE PROTEIN: CPT | Performed by: STUDENT IN AN ORGANIZED HEALTH CARE EDUCATION/TRAINING PROGRAM

## 2023-12-28 RX ORDER — COLCHICINE 0.6 MG/1
0.6 TABLET ORAL DAILY
Qty: 30 TABLET | Refills: 0 | Status: SHIPPED | OUTPATIENT
Start: 2023-12-29

## 2023-12-28 RX ORDER — COLCHICINE 0.6 MG/1
0.6 TABLET ORAL DAILY
Status: DISCONTINUED | OUTPATIENT
Start: 2023-12-28 | End: 2023-12-29 | Stop reason: HOSPADM

## 2023-12-28 RX ADMIN — SERTRALINE HYDROCHLORIDE 100 MG: 50 TABLET, FILM COATED ORAL at 08:22

## 2023-12-28 RX ADMIN — INSULIN LISPRO 4 UNITS: 100 INJECTION, SOLUTION INTRAVENOUS; SUBCUTANEOUS at 07:36

## 2023-12-28 RX ADMIN — DOCUSATE SODIUM 50MG AND SENNOSIDES 8.6MG 1 TABLET: 8.6; 5 TABLET, FILM COATED ORAL at 20:57

## 2023-12-28 RX ADMIN — CAPTOPRIL 6.25 MG: 12.5 TABLET ORAL at 20:57

## 2023-12-28 RX ADMIN — ATORVASTATIN CALCIUM 80 MG: 80 TABLET, FILM COATED ORAL at 20:57

## 2023-12-28 RX ADMIN — CYANOCOBALAMIN TAB 500 MCG 1000 MCG: 500 TAB at 08:22

## 2023-12-28 RX ADMIN — HEPARIN SODIUM 5000 UNITS: 5000 INJECTION INTRAVENOUS; SUBCUTANEOUS at 16:30

## 2023-12-28 RX ADMIN — METOPROLOL TARTRATE 50 MG: 50 TABLET, FILM COATED ORAL at 08:24

## 2023-12-28 RX ADMIN — ASPIRIN 81 MG: 81 TABLET, COATED ORAL at 08:22

## 2023-12-28 RX ADMIN — COLCHICINE 0.6 MG: 0.6 TABLET, FILM COATED ORAL at 11:38

## 2023-12-28 RX ADMIN — MAGNESIUM OXIDE 400 MG (241.3 MG MAGNESIUM) TABLET 400 MG: TABLET at 08:22

## 2023-12-28 RX ADMIN — OXYCODONE 5 MG: 5 TABLET ORAL at 08:22

## 2023-12-28 RX ADMIN — DIVALPROEX SODIUM 250 MG: 250 TABLET, DELAYED RELEASE ORAL at 20:57

## 2023-12-28 RX ADMIN — INSULIN LISPRO 12 UNITS: 100 INJECTION, SOLUTION INTRAVENOUS; SUBCUTANEOUS at 11:39

## 2023-12-28 RX ADMIN — Medication 125 MCG: at 08:22

## 2023-12-28 RX ADMIN — METOPROLOL TARTRATE 50 MG: 50 TABLET, FILM COATED ORAL at 20:57

## 2023-12-28 RX ADMIN — QUETIAPINE FUMARATE 50 MG: 100 TABLET, FILM COATED ORAL at 20:57

## 2023-12-28 RX ADMIN — HEPARIN SODIUM 5000 UNITS: 5000 INJECTION INTRAVENOUS; SUBCUTANEOUS at 21:07

## 2023-12-28 RX ADMIN — DIVALPROEX SODIUM 250 MG: 250 TABLET, DELAYED RELEASE ORAL at 08:22

## 2023-12-28 RX ADMIN — CAPTOPRIL 6.25 MG: 12.5 TABLET ORAL at 16:29

## 2023-12-28 RX ADMIN — INSULIN LISPRO 12 UNITS: 100 INJECTION, SOLUTION INTRAVENOUS; SUBCUTANEOUS at 20:56

## 2023-12-28 RX ADMIN — INSULIN LISPRO 8 UNITS: 100 INJECTION, SOLUTION INTRAVENOUS; SUBCUTANEOUS at 16:32

## 2023-12-28 RX ADMIN — INSULIN GLARGINE 75 UNITS: 100 INJECTION, SOLUTION SUBCUTANEOUS at 11:39

## 2023-12-28 RX ADMIN — AMLODIPINE BESYLATE 10 MG: 5 TABLET ORAL at 08:49

## 2023-12-28 RX ADMIN — OXYCODONE 5 MG: 5 TABLET ORAL at 16:29

## 2023-12-28 RX ADMIN — PANTOPRAZOLE SODIUM 40 MG: 40 TABLET, DELAYED RELEASE ORAL at 08:22

## 2023-12-28 RX ADMIN — CAPTOPRIL 6.25 MG: 12.5 TABLET ORAL at 08:49

## 2023-12-28 RX ADMIN — HEPARIN SODIUM 5000 UNITS: 5000 INJECTION INTRAVENOUS; SUBCUTANEOUS at 05:56

## 2023-12-28 SDOH — SOCIAL STABILITY: SOCIAL INSECURITY: DOES ANYONE TRY TO KEEP YOU FROM HAVING/CONTACTING OTHER FRIENDS OR DOING THINGS OUTSIDE YOUR HOME?: NO

## 2023-12-28 SDOH — SOCIAL STABILITY: SOCIAL INSECURITY: HAVE YOU HAD THOUGHTS OF HARMING ANYONE ELSE?: NO

## 2023-12-28 SDOH — SOCIAL STABILITY: SOCIAL INSECURITY: DO YOU FEEL UNSAFE GOING BACK TO THE PLACE WHERE YOU ARE LIVING?: NO

## 2023-12-28 SDOH — SOCIAL STABILITY: SOCIAL INSECURITY: ARE YOU OR HAVE YOU BEEN THREATENED OR ABUSED PHYSICALLY, EMOTIONALLY, OR SEXUALLY BY ANYONE?: NO

## 2023-12-28 SDOH — SOCIAL STABILITY: SOCIAL INSECURITY: ABUSE: ADULT

## 2023-12-28 SDOH — SOCIAL STABILITY: SOCIAL INSECURITY: ARE THERE ANY APPARENT SIGNS OF INJURIES/BEHAVIORS THAT COULD BE RELATED TO ABUSE/NEGLECT?: NO

## 2023-12-28 SDOH — SOCIAL STABILITY: SOCIAL INSECURITY: HAS ANYONE EVER THREATENED TO HURT YOUR FAMILY OR YOUR PETS?: NO

## 2023-12-28 SDOH — SOCIAL STABILITY: SOCIAL INSECURITY: DO YOU FEEL ANYONE HAS EXPLOITED OR TAKEN ADVANTAGE OF YOU FINANCIALLY OR OF YOUR PERSONAL PROPERTY?: NO

## 2023-12-28 ASSESSMENT — COGNITIVE AND FUNCTIONAL STATUS - GENERAL
MOBILITY SCORE: 24
DAILY ACTIVITIY SCORE: 24

## 2023-12-28 ASSESSMENT — LIFESTYLE VARIABLES
AUDIT-C TOTAL SCORE: 0
HOW OFTEN DO YOU HAVE A DRINK CONTAINING ALCOHOL: NEVER
HOW MANY STANDARD DRINKS CONTAINING ALCOHOL DO YOU HAVE ON A TYPICAL DAY: PATIENT DOES NOT DRINK
HOW OFTEN DO YOU HAVE 6 OR MORE DRINKS ON ONE OCCASION: NEVER
SKIP TO QUESTIONS 9-10: 1
SUBSTANCE_ABUSE_PAST_12_MONTHS: NO
PRESCIPTION_ABUSE_PAST_12_MONTHS: NO
AUDIT-C TOTAL SCORE: 0

## 2023-12-28 ASSESSMENT — PAIN - FUNCTIONAL ASSESSMENT: PAIN_FUNCTIONAL_ASSESSMENT: 0-10

## 2023-12-28 ASSESSMENT — PATIENT HEALTH QUESTIONNAIRE - PHQ9
1. LITTLE INTEREST OR PLEASURE IN DOING THINGS: NOT AT ALL
2. FEELING DOWN, DEPRESSED OR HOPELESS: SEVERAL DAYS
SUM OF ALL RESPONSES TO PHQ9 QUESTIONS 1 & 2: 1

## 2023-12-28 ASSESSMENT — PAIN SCALES - GENERAL
PAINLEVEL_OUTOF10: 1
PAINLEVEL_OUTOF10: 7
PAINLEVEL_OUTOF10: 3

## 2023-12-28 NOTE — PROGRESS NOTES
Amanuel Riojas is a 53 y.o. male on day 6 of admission presenting with Hyperosmolar coma due to type 2 diabetes mellitus (CMS/HCC).      ASSESSMENT/PLAN   Principal Problem:  - Hyperosmolar (nonketotic) coma (CMS/HCC)-resolved, HbA1c on admission 15.1. Blood sugars significantly improved, glargine changed to once daily by endocrine, along with Humalog with meals  SSI.  Blood sugar control improving.  Working with diabetic educator, endocrinology, and patient to obtain CGM and supplies.  -Uncontrolled type 2 diabetes with hyperglycemia-improving control, working with the diabetes educator  Active Problems:  -Acute renal failure with LU on CKD stage IV.  Creatinine gradually improving, down to 3.97 today.  Nephrology is following.  Started on low-dose captopril, HCTZ held, NSAIDs held.  -Hypokalemia-now resolved up to 3.5 after oral replacement and starting captopril.  Will continue to follow.  -Seizure-like activity (CMS/HCC)-EEG showed mild diffuse encephalopathy with no epileptiform discharges.  Neurology recommends continuing Depakote with possible weaning off of it after discharge.  -Headache/toothache-neurology is treating as possible migraine, no headache today, just has his tooth ache.  -Blurred vision-due to uncontrolled DM PTA, gradually improving.  -History of gout-uric acid 13.1, will add low-dose colchicine to prevent acute gout flare now that allopurinol low-dose restarted  -Hypertension-blood pressures low, clonidine discontinued this morning after start of captopril.    -PTSD-buspirone was discontinued due to possible seizure, continuing his sertraline & quetiapine.  -Hyperlipidemia-on simvastatin.  -GERD-on pantoprazole  -Possible history of CAD with stent-review of prior cardiac catheterizations all showed normal coronary arteries, no stent history found.  -COPD/asthma-has as needed albuterol inhaler, has not needed.    SUBJECTIVE/OBJECTIVE   12/28/23   -Headache is much better, still has his  "tooth ache.  Still some dizziness when he stands up but it is gradually improving.  Vision also gradually improving.  No nausea or vomiting.  -He is afebrile, blood pressures are well-controlled with start of captopril-will hold his clonidine and continue to monitor.  Orthostatics yesterday afternoon were negative  -Chest is clear to auscultation  -Cardiac exam is a regular rhythm  -Blood sugars well-controlled-140-150 range, no lows.  -Chemistry panel with normal electrolytes-potassium is now up to 3.5 after replacement.  Will continue to monitor.  -BUN and creatinine continue to improve-down to 53 and 3.97.  -Uric acid significantly elevated at 13.1.  Will add low-dose colchicine as low-dose allopurinol was just restarted  -LFTs normal.  -Nephrology note appreciated-patient was started on captopril noting need to adjust amlodipine or DC clonidine depending on response.  -Nephrology also recommends holding an SGLT2, with waiting until his GFR is 20 or above.  -Neurology note appreciated-EEG without elliptic form discharge just, recommends continuing the Depakote which may l be able to be weaned as an outpatient  -PD tomorrow after arrangements/teaching done.    12/27/2023  -Still having a problem with feeling dizzy when he stands up.  Will check orthostatic vital signs.  Still having very blurred vision-difficulty trying to draw up insulin in a syringe because he cannot see the numbers.  I did discuss with him that he will have an insulin pen so that will make things easier.  -His girlfriend is coming in and learning how to give insulin also.  -Tells me that his \"headache\" is actually more a tooth ache-was scheduled to see the dentist today.  -He is afebrile, blood pressures are good.  Satting 95-97% on room air.  -Chest is clear to auscultation  -Cardiac exam is a regular rhythm  -Extremities with no edema  -Blood sugars improving, he is running low in the morning, 87 this morning.  Endocrinology has changed his " glargine from twice daily to 75 units in the morning.  -Chemistry panel shows he is still hypokalemic at 3.1-will give more replacement.  -BUN and creatinine continue to improve-down to 55 and 4.22.  -Lipids show marked hypertriglyceridemia at 709, cholesterol 178, HDL 23, LDL not calculated due to elevated triglycerides.  Will change his simvastatin to atorvastatin.  -CBC with a WBC of 5.9.  H/H stable at 10.9/32.4.  Platelet count 209K.  -Serum iron & TIBC normal.  -Case discussed with Dr. Ford of neurology.  MRI of brain reviewed with him, was unremarkable  -Case discussed with Dr. Persaud of nephrology-    12/26 2023 (Dr. 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  I did discuss with him the fact that it may be several weeks before his vision begins to improve due to the poor blood sugar control PTA, and advised him not to get a new glasses prescription until after his blood sugars have been in good control for several months.  -Also discussed the fact that he will need to be on insulin long-term, rather than oral agents.    -Complaining of headache, presently not bad.  Tells me he has not had prior headaches until 1 week ago (but apparently told neurology he has had a history of migraines in the past).  -Discussed at length with him the multiple medication changes, need to monitor his blood sugars-he says he never was checking blood sugars PTA.  -I asked him when he thinks he had his reported cardiac stent placed, he thinks it was in the late 1990s.  I reviewed with him the cardiac catheterizations from both 2019 and 1997 which both times showed normal coronary arteries.  I suspect the mention of a sheath on the 1997 one was while he thought he may have had a stent.  -Notes that his legs feel somewhat stiff and he still gets a bit dizzy when he first stands  up.  No chest pains, no shortness of breath, no nausea or vomiting.  -He is afebrile, vital signs stable.  Satting at 94-98% on room air.  -His chest is clear to auscultation  -Cardiac exam is a regular rhythm-Blood sugars are well-controlled-low of 82 yesterday afternoon, high of 220 during the middle of the night.  165 this morning.  -Chemistry panel with a blood sugar of 184, potassium is low at 3.1-will replace.  BUN 61 with a creatinine down to 4.73.  -Magnesium now normal at 2.33.  -CBC with a WBC of 5.4, H/H11.2/33.8.  Platelet count 215 K.    Chart review:  PCP is  Dr. Mukesh Hanson    Patient is a 53-year-old male with a known history of HTN, HLP, DM, CKD stage IV (baseline creatinine 2.5-2.8), CAD with possible history of remote stents (not found on cardiac catheterization reports from 1997 & 2019), history of gout, COPD/asthma, GERD, and obesity.    He presented to the ER on 12/22 complaining of dizziness for 1 week with associated blurred vision.  In the ER he was in acute renal failure with a BUN of 71 and creatinine of 6.51, he was hyperosmolar with a blood sugar of 932, sodium 123, potassium 4.7, chloride 83, bicarb of 22.  Magnesium was elevated at 3.05.  LFTs were normal.  Beta hydroxybutyrate was minimally elevated at 0.39.  Venous BG with a pH of 7.30, pCO2 of 56, pCO2 of 30, bicarb of 27.6.  CBC with a WBC of 10.9, H/H of 12.6/36.2.  Platelet count 284 K.  Troponin was 10, BNP 17.  CT of the head was unremarkable, CXR with no acute findings.  Patient was started on insulin drip, given intensive IV fluids, and admitted to the ICU.    In the ICU he was hypotensive briefly requiring a Levophed drip after given NTG.  He was seen on consultation by nephrology and endocrinology.  He had previously been on glipizide for his diabetes which was discontinued.  He was started on glargine and lispro.  His HbA1c was 15.1 on admission, (previously was 6.6 on 7/27/2022.  On 12/23 patient had a  seizure while in the MRI with right-sided jerking and nystagmus followed by a postictal stage.  MRI of the brain showed no acute infarct or other abnormality he was seen in consultation by neurology and started on Depakote.  He had a repeat episode of a possible seizure on 12/24.  EEG is pending.  He was transferred out of the ICU last night.  He is presently on glargine 45 units twice daily with 14 of Humalog each meal and SSI    Cardiac catheterization 12/15/1997-normal coronary arteries, no mention of stent  Cardiac catheterization 6/25/2019-no evidence of stenoses, EF = 60-65%              *These notes are being done using Dragon voice recognition technology and may include unintended errors with respect to translation of words, typographical errors or grammar errors which may not have been identified prior to finalization of the chart note.    CURRENT MEDICATIONS     Scheduled Medications:    Current Facility-Administered Medications:     [DISCONTINUED] acetaminophen (Tylenol) oral liquid 650 mg, 650 mg, oral, q6h PRN **OR** acetaminophen (Tylenol) tablet 650 mg, 650 mg, oral, q4h PRN, John Esteves PA-C, 650 mg at 12/25/23 2140    albuterol 90 mcg/actuation inhaler 2 puff, 2 puff, inhalation, q4h PRN, John Esteves PA-C    amLODIPine (Norvasc) tablet 10 mg, 10 mg, oral, Daily, John Esteves PA-C, 10 mg at 12/27/23 0802    aspirin EC tablet 81 mg, 81 mg, oral, Daily, SUAD Zelaya-C, 81 mg at 12/28/23 0822    atorvastatin (Lipitor) tablet 80 mg, 80 mg, oral, Nightly, Gabriella Glover MD, 80 mg at 12/27/23 2019    [Held by provider] busPIRone (Buspar) tablet 15 mg, 15 mg, oral, TID, SUAD Zelaya-C    captopril (Capoten) tablet 6.25 mg, 6.25 mg, oral, TID, Paco Persaud MD, 6.25 mg at 12/27/23 2016    cholecalciferol (Vitamin D-3) capsule 125 mcg, 5,000 Units, oral, Daily, SUAD ZelayaCHUCK, 125 mcg at 12/28/23 0822    cloNIDine (Catapres) tablet 0.1 mg, 0.1 mg, oral, Daily, John Esteves PA-C, 0.1 mg at 12/27/23  0802    cyanocobalamin (Vitamin B-12) tablet 1,000 mcg, 1,000 mcg, oral, Daily, Johnsherita Esteves, PA-C, 1,000 mcg at 12/28/23 0822    dextrose 10 % in water (D10W) infusion, 0.3 g/kg/hr, intravenous, Once PRN, John Esteves, PA-C    dextrose 50 % injection 25 g, 25 g, intravenous, q15 min PRN, John Esteves, PA-C    divalproex (Depakote) EC tablet 250 mg, 250 mg, oral, q12h EILEEN, John Esteves, PA-C, 250 mg at 12/28/23 0822    glucagon (Glucagen) injection 1 mg, 1 mg, intramuscular, q15 min PRN, John Esteves PA-C    heparin (porcine) injection 5,000 Units, 5,000 Units, subcutaneous, q8h, John Esteves, PA-C, 5,000 Units at 12/28/23 0556    [Held by provider] hydrOXYzine pamoate (Vistaril) capsule 50 mg, 50 mg, oral, BID PRN, John Esteves, PA-C    insulin glargine (Lantus) injection 75 Units, 75 Units, subcutaneous, q24h, Daniel Colindres MD    insulin lispro (HumaLOG) injection 0-15 Units, 0-15 Units, subcutaneous, TID with meals, nightly, & 0300, John Esteves, PA-C, 4 Units at 12/28/23 0736    insulin lispro (HumaLOG) injection 14 Units, 14 Units, subcutaneous, TID with meals, John Esteves PA-C, 14 Units at 12/27/23 1641    [Held by provider] losartan (Cozaar) tablet 25 mg, 25 mg, oral, BID, Johnsherita Esteves, PA-C    magnesium oxide (Mag-Ox) tablet 400 mg, 400 mg, oral, Daily, John Esteves, PA-C, 400 mg at 12/28/23 0822    meclizine (Antivert) tablet 25 mg, 25 mg, oral, q6h PRN, John Esteves, PA-C, 25 mg at 12/24/23 1659    metoprolol tartrate (Lopressor) tablet 50 mg, 50 mg, oral, BID, SUAD Zelaya-C, 50 mg at 12/28/23 0824    ondansetron (Zofran) tablet 4 mg, 4 mg, oral, q8h PRN **OR** ondansetron (Zofran) injection 4 mg, 4 mg, intravenous, q8h PRN, John Esteves PA-C    oxyCODONE (Roxicodone) immediate release tablet 5 mg, 5 mg, oral, q6h PRN, SUAD Zelaya-SHANTE, 5 mg at 12/28/23 0822    pantoprazole (ProtoNix) EC tablet 40 mg, 40 mg, oral, Daily, SUAD Zelaya-C, 40 mg at 12/28/23 0822    polyethylene glycol (Glycolax, Miralax)  "packet 17 g, 17 g, oral, Daily, SUAD Zelaya-SHANTE, 17 g at 12/26/23 0830    QUEtiapine (SEROquel) tablet 50 mg, 50 mg, oral, Nightly, SUAD Zelaya-C, 50 mg at 12/27/23 2018    sennosides-docusate sodium (Maria Luisa-Colace) 8.6-50 mg per tablet 1 tablet, 1 tablet, oral, Nightly, SUAD Zelaya-SHANTE, 1 tablet at 12/25/23 2132    sertraline (Zoloft) tablet 100 mg, 100 mg, oral, Daily, SUAD Zelaya-C, 100 mg at 12/28/23 0822    terazosin (Hytrin) capsule 5 mg, 5 mg, oral, Once, SUAD Zelaya-C     PRN Medications:  PRN medications   Medication    acetaminophen    albuterol    dextrose 10 % in water (D10W)    dextrose    glucagon    [Held by provider] hydrOXYzine pamoate    meclizine    ondansetron    Or    ondansetron    oxyCODONE         IVs:  None        I&Os     Intake/Output last 3 Shifts:  I/O last 3 completed shifts:  In: 575 (5.8 mL/kg) [P.O.:475; I.V.:100 (1 mL/kg)]  Out: 500 (5 mL/kg) [Urine:500 (0.1 mL/kg/hr)]  Weight: 99.4 kg     VITAL SIGNS     Blood pressure 129/74, pulse 75, temperature 36.5 °C (97.7 °F), temperature source Temporal, resp. rate 17, height 1.702 m (5' 7\"), weight 99.4 kg (219 lb 2.2 oz), SpO2 95 %.     PHYSICAL EXAM   Physical exam:  -General appearance: Obese male, sitting up in bed, alert and in NAD, continues to look better  -Vital signs:  As above  -HEENT: No icterus  -Neck: Thick neck, no JVD  -Chest: Lungs are clear to auscultation  -Cardiac: Regular rhythm without murmurs  -Abdomen: Bowel sounds active  -Extremities: No edema  -Skin: No rash  -Neurologic:   Patient is alert and oriented x3.   -Behavior/Emotional:  Appropriate, cooperative    LABS   Relevant Results:  Results from last 7 days   Lab Units 12/28/23  0726 12/28/23  0536 12/28/23  0300 12/27/23  1954 12/27/23  0749 12/27/23  0559 12/26/23  0658 12/26/23  0543   POCT GLUCOSE mg/dL 152*  --  146* 157*   < >  --    < >  --    GLUCOSE mg/dL  --  163*  --   --   --  76  --  184*    < > = values in this interval not displayed. "          CBC:   Results from last 7 days   Lab Units 12/28/23  0536 12/27/23  0558 12/26/23  0543   WBC AUTO x10*3/uL 5.0 5.9 5.4   RBC AUTO x10*6/uL 3.49* 3.91* 4.10*   HEMOGLOBIN g/dL 9.6* 10.9* 11.2*   HEMATOCRIT % 29.1* 32.4* 33.8*   MCV fL 83 83 82   MCH pg 27.5 27.9 27.3   MCHC g/dL 33.0 33.6 33.1   RDW % 12.4 12.0 12.1   PLATELETS AUTO x10*3/uL 198 209 215       CMP:    Results from last 7 days   Lab Units 12/28/23  0536 12/27/23  0559 12/26/23  0543 12/25/23  0342 12/24/23  1209 12/24/23  0410   SODIUM mmol/L 137 139 138   < > 132* 134*   POTASSIUM mmol/L 3.5 3.1* 3.1*   < > 3.6 3.8   CHLORIDE mmol/L 103 101 101   < > 98 97*   CO2 mmol/L 25 25 25   < > 24 24   BUN mg/dL 53* 55* 61*   < > 64* 63*   CREATININE mg/dL 3.97* 4.22* 4.73*   < > 5.04* 5.29*   GLUCOSE mg/dL 163* 76 184*   < > 259* 315*   PROTEIN TOTAL g/dL 6.3*  --   --   --  7.2 7.2   CALCIUM mg/dL 8.9 9.1 9.4   < > 9.3 9.3   BILIRUBIN TOTAL mg/dL 0.5  --   --   --  0.5 0.6   ALK PHOS U/L 57  --   --   --  72 78   AST U/L 20  --   --   --  14 10   ALT U/L 12  --   --   --  7* 6*    < > = values in this interval not displayed.       Magnesium:  Results from last 7 days   Lab Units 12/28/23  0536 12/27/23  0559 12/26/23  0543   MAGNESIUM mg/dL 2.14 2.05 2.33       Troponin:    Results from last 7 days   Lab Units 12/22/23  2042 12/22/23  1529   TROPHS ng/L 11 10       BNP:   Results from last 7 days   Lab Units 12/22/23  1529   BNP pg/mL 17       IMAGING     MR brain wo IV contrast   Final Result   Within limitations of motion degraded study, no evidence of acute   infarct, or other emergent intracranial abnormality.        MACRO:   None        Signed by: Tigre Urrutia 12/23/2023 2:48 PM   Dictation workstation:   [id number]      Transthoracic Echo (TTE) Complete   Final Result      US renal complete   Final Result   1. No hydronephrosis. Increased echogenicity of the renal parenchyma   and renal cortical thinning, suggestive of medical renal  disease.   2. Multiple left renal cysts, as above.        MACRO:   None        Signed by: Blank Maki 12/22/2023 7:33 PM   Dictation workstation:   [id number]      CT head wo IV contrast   Final Result   No acute intracranial finding. MRI may be obtained if clinically   indicated        Signed by: Nilo Bates 12/22/2023 4:18 PM   Dictation workstation:   [id number]      XR chest 1 view   Final Result   No acute cardiopulmonary process.        MACRO   None        Signed by: Reilly Galan 12/22/2023 3:53 PM   Dictation workstation:   [id number]           Gabriella Glover MD

## 2023-12-28 NOTE — PROGRESS NOTES
Patient is still being followed by RN Navigator for insulin education. Patient will be home no needs once medically stable.

## 2023-12-28 NOTE — PROGRESS NOTES
"Nutrition Follow Up Assessment:   Nutrition Assessment         Patient is a 53 y.o. male presenting with: hyperosmolar coma due to type 2 DM. Transferred to [facility].       Nutrition History:  Energy Intake: Fair 50-75 %  Food and Nutrient History: Pt reports appetite and intake have improved. Not eating 100% of meals but eating more than half. No more N/V. Had DM education yesterday. Pt also notes he has worsening kidney disease - offered low sodium diet and education on diet.  Food Allergies/Intolerances:  None  GI Symptoms: None  Oral Problems: None       Anthropometrics:  Height: 170.2 cm (5' 7\")   Weight: 99.4 kg (219 lb 2.2 oz)   BMI (Calculated): 34.31  IBW/kg (Dietitian Calculated): 67.3 kg          Weight History:     Weight Change %:  Weight History / % Weight Change: New wt 99.4 kg on 12/28 - pt with ~3 kg wt gain x 5 days, possibly due to difference in scales/hydration status.    Nutrition Focused Physical Exam Findings:  defer: pt eating lunch - will reattempt as able  Subcutaneous Fat Loss:   Orbital Fat Pads: Defer  Muscle Wasting:  Temporalis: Defer  Edema:  Edema: none  Physical Findings:  Skin: Negative    Nutrition Significant Labs:    Reviewed   Nutrition Specific Medications:  Reviewed     I/O:   Last BM Date: 12/27/23;          Dietary Orders (From admission, onward)       Start     Ordered    12/28/23 1127  Adult diet Carb Controlled; 60 gram carb/meal, 30 gram Carb evening snack; 2 - 3 gm Sodium  Diet effective now        Question Answer Comment   Diet type Carb Controlled    Carb diet selection: 60 gram carb/meal, 30 gram Carb evening snack    Other restriction(s): 2 - 3 gm Sodium        12/28/23 1126    12/23/23 0658  May Participate in Room Service  Once        Question:  .  Answer:  Yes    12/23/23 0657                     Estimated Needs:   Total Energy Estimated Needs (kCal): 1926 kCal  Method for Estimating Needs: 20 kcal/kg ABW  Total Protein Estimated Needs (g): 58 g  Method for " Estimating Needs: 0.6 g/kg ABW  Total Fluid Estimated Needs (mL): 1926 mL  Method for Estimating Needs: 20 ml/kg ABW        Nutrition Diagnosis   Malnutrition Diagnosis  Patient has Malnutrition Diagnosis:  (unable to determine at this time)    Nutrition Diagnosis  Patient has Nutrition Diagnosis: Yes  Diagnosis Status (1): Ongoing  Nutrition Diagnosis 1: Altered nutrition related to laboratory values  Related to (1): lack of diabetic diet, not checking BG levels at home  As Evidenced by (1): BG >200 mg/dl throughout admission  Additional Assessment Information (1): BG improving, mostly mid 100's (mg/dl)  Additional Nutrition Diagnosis: Diagnosis 2  Diagnosis Status (2): New  Nutrition Diagnosis 2: Decreased nutrient needs  Related to (2): chronic illnesses requiring sodium restriction  As Evidenced by (2): CKD       Nutrition Interventions/Recommendations         Nutrition Prescription:  Individualized Nutrition Prescription Provided for : 60 g CHO/meal diabetic diet, 2-3 g sodium diet        Nutrition Interventions:   Food and/or Nutrient Delivery Interventions  Interventions: Meals and snacks  Meals and Snacks: Carbohydrate-modified diet, Mineral-modified diet  Goal: Consumes 3 meals per day  Additional Interventions: -         Nutrition Education:   Education Documentation  Nutrition Care Manual, taught by Rufina Sanchez RD at 12/28/2023  2:43 PM.  Learner: Patient  Readiness: Acceptance  Method: Explanation, Handout  Response: Verbalizes Understanding  Comment: Handout: low sodium nutrition therapy. Discussed rationale for low sodium diet, foods that are high in sodium, and total daily limit of sodium to adhere to. Disucssed using food labels to determine sodium content. All questions answered at this time. Pt already has contact info for inpatient and outpatient RD should he have further questions.          Nutrition Monitoring and Evaluation   Food/Nutrient Related History Monitoring  Monitoring and  Evaluation Plan: Energy intake, Amount of food  Energy Intake: Estimated energy intake  Criteria: Pt meets >75% of estimated energy needs  Amount of Food: Estimated amout of food  Criteria: Pt consumes >75% of meals    Body Composition/Growth/Weight History  Monitoring and Evaluation Plan: Weight  Weight: Measured weight  Criteria: Maintains stable weight    Biochemical Data, Medical Tests and Procedures  Monitoring and Evaluation Plan: Glucose/endocrine profile  Glucose/Endocrine Profile: Glucose, casual  Criteria: BG within desirable range            Time Spent/Follow-up Reminder:   Time Spent (min): 30 minutes  Last Date of Nutrition Visit: 12/28/23  Nutrition Follow-Up Needed?: 3-8 days

## 2023-12-28 NOTE — PROGRESS NOTES
Endocrinology Progress Note  Patient: Amanuel Riojas  Unit/Bed: 1005/1005-A  YOB: 1970  MRN: [id number]  Acct: [id number]   Admitting Diagnosis: Dizziness [R42]  Hyperosmolar (nonketotic) coma (CMS/[facility]) [E11.01]  LU (acute kidney injury) (CMS/[facility]) [N17.9]  Acute renal failure, unspecified acute renal failure type (CMS/[facility]) [N17.9]  Pseudohyponatremia [R79.89]  Date:  12/22/2023  Hospital Day: 6  Attending: Gabriella Glover MD       Complaint:  Chief Complaint   Patient presents with    Dizziness    Dental Pain          Assessment     Patient Active Problem List   Diagnosis    Anemia    Asthma    Chronic gout of left hip    CKD (chronic kidney disease), stage V (CMS/[facility])    CTS (carpal tunnel syndrome)    Cyst of left kidney    Type 2 diabetes mellitus with other specified complication (CMS/[facility])    Hypertension associated with diabetes (CMS/[facility])    Noncompliance with treatment    PTSD (post-traumatic stress disorder)    Vitamin B12 deficiency    Vitamin D deficiency    Hyperlipidemia associated with type 2 diabetes mellitus (CMS/[facility])    Class 2 obesity with body mass index (BMI) of 38.0 to 38.9 in adult    Never smoked any substance    Gastroesophageal reflux disease without esophagitis    Hyperosmolar coma due to type 2 diabetes mellitus (CMS/[facility])    Seizure-like activity (CMS/[facility])    Acute renal failure (CMS/[facility])    CKD (chronic kidney disease), stage IV (CMS/[facility])    Pseudohyponatremia    Hyperuricemia without signs inflammatory arthritis/tophaceous disease          Plan:  Diabetes mellitus type 2 uncontrolled  Greatly improved on insulin patient to continue Lantus 75 units daily  And lispro/Humalog 14 units per meal and a sliding scale as before  Patient appears to have an understanding on how to use insulin and can be discharged home anytime to follow-up with me in the next 4-6 days.        SUBJECTIVE    Doing better overall except for his visual complaints since admission.  Glucose is  reviewed        VITALS      Vitals:    12/27/23 2343 12/28/23 0300 12/28/23 0752 12/28/23 0824   BP: 124/69  129/74    BP Location: Left arm      Patient Position: Lying      Pulse: 55  64 75   Resp: 18  17    Temp: 36.9 °C (98.4 °F)  36.5 °C (97.7 °F)    TempSrc: Temporal  Temporal    SpO2: 95%  95%    Weight:  99.4 kg (219 lb 2.2 oz)     Height:           Intake/Output Summary (Last 24 hours) at 12/28/2023 1503  Last data filed at 12/28/2023 0336  Gross per 24 hour   Intake 200 ml   Output 500 ml   Net -300 ml      Wt Readings from Last 4 Encounters:   12/28/23 99.4 kg (219 lb 2.2 oz)   12/05/23 106 kg (234 lb 3.2 oz)   11/21/23 108 kg (238 lb 12.8 oz)   08/28/23 108 kg (238 lb 3.2 oz)        Allergies:  Allergies   Allergen Reactions    Sulfamethoxazole-Trimethoprim Unknown     kidney failure        PHYSICAL EXAM   Physical Exam      LABS   Magnesium:  Results from last 7 days   Lab Units 12/28/23  0536 12/27/23  0559 12/26/23  0543   MAGNESIUM mg/dL 2.14 2.05 2.33     Lipid Panel:  Results from last 7 days   Lab Units 12/27/23  0559   HDL mg/dL 23.2   CHOLESTEROL/HDL RATIO  7.7   TRIGLYCERIDES mg/dL 709*   NON HDL CHOL. mg/dL 155*      Lab Review  Lab Results   Component Value Date    BILITOT 0.5 12/28/2023    CALCIUM 8.9 12/28/2023    CO2 25 12/28/2023     12/28/2023    CREATININE 3.97 (H) 12/28/2023    GLUCOSE 163 (H) 12/28/2023    ALKPHOS 57 12/28/2023    K 3.5 12/28/2023    PROT 6.3 (L) 12/28/2023     12/28/2023    AST 20 12/28/2023    ALT 12 12/28/2023    BUN 53 (H) 12/28/2023    ANIONGAP 13 12/28/2023    MG 2.14 12/28/2023    PHOS 4.7 12/27/2023    LDH 85 12/23/2023    ALBUMIN 3.6 12/28/2023    LIPASE 28 06/04/2020    GFRMALE 26 (A) 08/25/2023     Lab Results   Component Value Date    TRIG 709 (H) 12/27/2023    CHOL 178 12/27/2023    LDLCALC  12/27/2023      Comment:      The calculation of LDL and VLDL are inaccurate when the Triglycerides are greater than 400 mg/dL or when the patient is  non-fasting. If LDL measurement is necessary contact the testing laboratory for an alternative LDL assay.                                  Near   Borderline      AGE      Desirable  Optimal    High     High     Very High     0-19 Y     0 - 109     ---    110-129   >/= 130     ----    20-24 Y     0 - 119     ---    120-159   >/= 160     ----      >24 Y     0 -  99   100-129  130-159   160-189     >/=190      HDL 23.2 12/27/2023     Lab Results   Component Value Date    HGBA1C 15.1 (H) 12/23/2023    HGBA1C 6.6 (A) 07/27/2022    HGBA1C 6.4 06/01/2021     The 10-year ASCVD risk score (Marcia MELO, et al., 2019) is: 22.2%    Values used to calculate the score:      Age: 53 years      Sex: Male      Is Non- : Yes      Diabetic: Yes      Tobacco smoker: No      Systolic Blood Pressure: 129 mmHg      Is BP treated: Yes      HDL Cholesterol: 23.2 mg/dL      Total Cholesterol: 178 mg/dL   Lab Results   Component Value Date    HGBA1C 15.1 (H) 12/23/2023    HGBA1C 6.6 (A) 07/27/2022    HGBA1C 6.4 06/01/2021     12/28/2023    K 3.5 12/28/2023     12/28/2023    CO2 25 12/28/2023    BUN 53 (H) 12/28/2023    CREATININE 3.97 (H) 12/28/2023    CALCIUM 8.9 12/28/2023    ALBUMIN 3.6 12/28/2023    PROT 6.3 (L) 12/28/2023    BILITOT 0.5 12/28/2023    ALKPHOS 57 12/28/2023    ALT 12 12/28/2023    AST 20 12/28/2023    GLUCOSE 163 (H) 12/28/2023    CHOL 178 12/27/2023    TRIG 709 (H) 12/27/2023    HDL 23.2 12/27/2023    BHYDRXBUT 0.12 12/23/2023        amLODIPine, 10 mg, oral, Daily  aspirin, 81 mg, oral, Daily  atorvastatin, 80 mg, oral, Nightly  [Held by provider] busPIRone, 15 mg, oral, TID  captopril, 6.25 mg, oral, TID  cholecalciferol, 5,000 Units, oral, Daily  colchicine, 0.6 mg, oral, Daily  cyanocobalamin, 1,000 mcg, oral, Daily  divalproex, 250 mg, oral, q12h EILEEN  heparin (porcine), 5,000 Units, subcutaneous, q8h  insulin glargine, 75 Units, subcutaneous, q24h  insulin lispro, 0-15 Units,  subcutaneous, TID with meals, nightly, & 0300  insulin lispro, 14 Units, subcutaneous, TID with meals  [Held by provider] losartan, 25 mg, oral, BID  magnesium oxide, 400 mg, oral, Daily  metoprolol tartrate, 50 mg, oral, BID  pantoprazole, 40 mg, oral, Daily  polyethylene glycol, 17 g, oral, Daily  QUEtiapine, 50 mg, oral, Nightly  sennosides-docusate sodium, 1 tablet, oral, Nightly  sertraline, 100 mg, oral, Daily  terazosin, 5 mg, oral, Once             Electronically signed by Daniel Colindres MD on 12/28/2023 at 3:03 PM

## 2023-12-28 NOTE — PROGRESS NOTES
"Amanuel Rijoas is a 53 y.o. male on day 6 of admission presenting with Hyperosmolar coma due to type 2 diabetes mellitus (CMS/HCC).      Subjective   Headache and visual difficulty have resolved today         Objective     Last Recorded Vitals  Blood pressure 129/74, pulse 75, temperature 36.5 °C (97.7 °F), temperature source Temporal, resp. rate 17, height 1.702 m (5' 7\"), weight 99.4 kg (219 lb 2.2 oz), SpO2 95 %.    Physical Exam  Neurological Exam  EOM full range, face, tongue symmetric  5/5 strength      Relevant Results                                                  Assessment/Plan      Principal Problem:    Hyperosmolar coma due to type 2 diabetes mellitus (CMS/HCC)  Active Problems:    Anemia    Hypertension associated with diabetes (CMS/HCC)    PTSD (post-traumatic stress disorder)    Hyperlipidemia associated with type 2 diabetes mellitus (CMS/HCC)    Seizure-like activity (CMS/HCC)    Acute renal failure (CMS/HCC)    CKD (chronic kidney disease), stage IV (CMS/HCC)    Pseudohyponatremia    Impression:  HHS, metabolic encephalopathy, improved  focal seizure versus HHS-related chorea versus asterixis  Migraine with aura (versus possibly dental pain) - improved    EEG 12/26 showed mild diffuse encephalopathy, no epileptiform discharges     Recommend:  Continue  mg BID, likely can be weaned outpatient  Opiates should be avoided in migraine and in some cases can worsen headaches in the long run  Followup with Vikki Hernandez CNP             Darrell Ford MD  "

## 2023-12-29 ENCOUNTER — PHARMACY VISIT (OUTPATIENT)
Dept: PHARMACY | Facility: CLINIC | Age: 53
End: 2023-12-29
Payer: MEDICAID

## 2023-12-29 VITALS
OXYGEN SATURATION: 94 % | DIASTOLIC BLOOD PRESSURE: 72 MMHG | BODY MASS INDEX: 34.95 KG/M2 | SYSTOLIC BLOOD PRESSURE: 137 MMHG | WEIGHT: 222.66 LBS | HEIGHT: 67 IN | TEMPERATURE: 96.6 F | RESPIRATION RATE: 15 BRPM | HEART RATE: 68 BPM

## 2023-12-29 LAB
ALBUMIN SERPL BCP-MCNC: 3.8 G/DL (ref 3.4–5)
ANION GAP SERPL CALC-SCNC: 14 MMOL/L (ref 10–20)
BUN SERPL-MCNC: 55 MG/DL (ref 6–23)
CALCIUM SERPL-MCNC: 9.1 MG/DL (ref 8.6–10.3)
CHLORIDE SERPL-SCNC: 105 MMOL/L (ref 98–107)
CO2 SERPL-SCNC: 25 MMOL/L (ref 21–32)
CREAT SERPL-MCNC: 3.87 MG/DL (ref 0.5–1.3)
GFR SERPL CREATININE-BSD FRML MDRD: 18 ML/MIN/1.73M*2
GLUCOSE BLD MANUAL STRIP-MCNC: 158 MG/DL (ref 74–99)
GLUCOSE BLD MANUAL STRIP-MCNC: 162 MG/DL (ref 74–99)
GLUCOSE BLD MANUAL STRIP-MCNC: 197 MG/DL (ref 74–99)
GLUCOSE BLD MANUAL STRIP-MCNC: 204 MG/DL (ref 74–99)
GLUCOSE BLD MANUAL STRIP-MCNC: 262 MG/DL (ref 74–99)
GLUCOSE SERPL-MCNC: 171 MG/DL (ref 74–99)
HOLD SPECIMEN: NORMAL
HOLD SPECIMEN: NORMAL
PHOSPHATE SERPL-MCNC: 4.1 MG/DL (ref 2.5–4.9)
POTASSIUM SERPL-SCNC: 3.6 MMOL/L (ref 3.5–5.3)
SODIUM SERPL-SCNC: 140 MMOL/L (ref 136–145)

## 2023-12-29 PROCEDURE — 82947 ASSAY GLUCOSE BLOOD QUANT: CPT

## 2023-12-29 PROCEDURE — 36415 COLL VENOUS BLD VENIPUNCTURE: CPT | Performed by: INTERNAL MEDICINE

## 2023-12-29 PROCEDURE — 96372 THER/PROPH/DIAG INJ SC/IM: CPT

## 2023-12-29 PROCEDURE — 2500000001 HC RX 250 WO HCPCS SELF ADMINISTERED DRUGS (ALT 637 FOR MEDICARE OP)

## 2023-12-29 PROCEDURE — 2500000001 HC RX 250 WO HCPCS SELF ADMINISTERED DRUGS (ALT 637 FOR MEDICARE OP): Performed by: INTERNAL MEDICINE

## 2023-12-29 PROCEDURE — 2500000004 HC RX 250 GENERAL PHARMACY W/ HCPCS (ALT 636 FOR OP/ED)

## 2023-12-29 PROCEDURE — 99239 HOSP IP/OBS DSCHRG MGMT >30: CPT | Performed by: INTERNAL MEDICINE

## 2023-12-29 PROCEDURE — 80069 RENAL FUNCTION PANEL: CPT | Performed by: INTERNAL MEDICINE

## 2023-12-29 PROCEDURE — 2500000002 HC RX 250 W HCPCS SELF ADMINISTERED DRUGS (ALT 637 FOR MEDICARE OP, ALT 636 FOR OP/ED): Performed by: INTERNAL MEDICINE

## 2023-12-29 PROCEDURE — 2500000002 HC RX 250 W HCPCS SELF ADMINISTERED DRUGS (ALT 637 FOR MEDICARE OP, ALT 636 FOR OP/ED)

## 2023-12-29 RX ORDER — INSULIN LISPRO 100 [IU]/ML
14 INJECTION, SOLUTION INTRAVENOUS; SUBCUTANEOUS
Start: 2023-12-30

## 2023-12-29 RX ORDER — INSULIN LISPRO 100 [IU]/ML
0-15 INJECTION, SOLUTION INTRAVENOUS; SUBCUTANEOUS
Start: 2023-12-29

## 2023-12-29 RX ADMIN — INSULIN LISPRO 4 UNITS: 100 INJECTION, SOLUTION INTRAVENOUS; SUBCUTANEOUS at 02:56

## 2023-12-29 RX ADMIN — INSULIN LISPRO 4 UNITS: 100 INJECTION, SOLUTION INTRAVENOUS; SUBCUTANEOUS at 16:29

## 2023-12-29 RX ADMIN — CYANOCOBALAMIN TAB 500 MCG 1000 MCG: 500 TAB at 08:17

## 2023-12-29 RX ADMIN — HEPARIN SODIUM 5000 UNITS: 5000 INJECTION INTRAVENOUS; SUBCUTANEOUS at 14:00

## 2023-12-29 RX ADMIN — HEPARIN SODIUM 5000 UNITS: 5000 INJECTION INTRAVENOUS; SUBCUTANEOUS at 06:07

## 2023-12-29 RX ADMIN — INSULIN LISPRO 14 UNITS: 100 INJECTION, SOLUTION INTRAVENOUS; SUBCUTANEOUS at 16:30

## 2023-12-29 RX ADMIN — INSULIN LISPRO 4 UNITS: 100 INJECTION, SOLUTION INTRAVENOUS; SUBCUTANEOUS at 07:32

## 2023-12-29 RX ADMIN — INSULIN LISPRO 14 UNITS: 100 INJECTION, SOLUTION INTRAVENOUS; SUBCUTANEOUS at 11:55

## 2023-12-29 RX ADMIN — Medication 125 MCG: at 08:16

## 2023-12-29 RX ADMIN — INSULIN LISPRO 12 UNITS: 100 INJECTION, SOLUTION INTRAVENOUS; SUBCUTANEOUS at 11:51

## 2023-12-29 RX ADMIN — CAPTOPRIL 6.25 MG: 12.5 TABLET ORAL at 08:17

## 2023-12-29 RX ADMIN — COLCHICINE 0.6 MG: 0.6 TABLET, FILM COATED ORAL at 08:17

## 2023-12-29 RX ADMIN — DIVALPROEX SODIUM 250 MG: 250 TABLET, DELAYED RELEASE ORAL at 08:16

## 2023-12-29 RX ADMIN — PANTOPRAZOLE SODIUM 40 MG: 40 TABLET, DELAYED RELEASE ORAL at 08:16

## 2023-12-29 RX ADMIN — AMLODIPINE BESYLATE 10 MG: 5 TABLET ORAL at 08:17

## 2023-12-29 RX ADMIN — MAGNESIUM OXIDE 400 MG (241.3 MG MAGNESIUM) TABLET 400 MG: TABLET at 08:17

## 2023-12-29 RX ADMIN — SERTRALINE HYDROCHLORIDE 100 MG: 50 TABLET, FILM COATED ORAL at 08:16

## 2023-12-29 RX ADMIN — INSULIN GLARGINE 75 UNITS: 100 INJECTION, SOLUTION SUBCUTANEOUS at 11:24

## 2023-12-29 RX ADMIN — ASPIRIN 81 MG: 81 TABLET, COATED ORAL at 08:16

## 2023-12-29 NOTE — PROGRESS NOTES
Endocrinology Progress Note  Patient: Amanuel Riojas  Unit/Bed: 1005/1005-A  YOB: 1970  MRN: [id number]  Acct: [id number]   Admitting Diagnosis: Dizziness [R42]  Hyperosmolar (nonketotic) coma (CMS/[facility]) [E11.01]  LU (acute kidney injury) (CMS/[facility]) [N17.9]  Acute renal failure, unspecified acute renal failure type (CMS/[facility]) [N17.9]  Pseudohyponatremia [R79.89]  Date:  12/22/2023  Hospital Day: 7  Attending: Gabriella Glover MD       Complaint:  Chief Complaint   Patient presents with    Dizziness    Dental Pain          Assessment     Patient Active Problem List   Diagnosis    Anemia    Asthma    Chronic gout of left hip    CKD (chronic kidney disease), stage V (CMS/[facility])    CTS (carpal tunnel syndrome)    Cyst of left kidney    Type 2 diabetes mellitus with other specified complication (CMS/[facility])    Hypertension associated with diabetes (CMS/[facility])    Noncompliance with treatment    PTSD (post-traumatic stress disorder)    Vitamin B12 deficiency    Vitamin D deficiency    Hyperlipidemia associated with type 2 diabetes mellitus (CMS/[facility])    Class 2 obesity with body mass index (BMI) of 38.0 to 38.9 in adult    Never smoked any substance    Gastroesophageal reflux disease without esophagitis    Hyperosmolar coma due to type 2 diabetes mellitus (CMS/[facility])    Seizure-like activity (CMS/[facility])    Acute renal failure (CMS/[facility])    CKD (chronic kidney disease), stage IV (CMS/[facility])    Pseudohyponatremia    Hyperuricemia without signs inflammatory arthritis/tophaceous disease      Uncontrolled diabetes improved    Plan:  Patient has diabetes mellitus  Improving  Glucoses are in the mid 100s mostly he will continue on Lantus 35 units daily Humalog 14 units per meal and a sliding scale patient does need to be told again again about his  Diet including consistent carb diet and check his glucoses before each meal and bedtime at home and take his insulin as directed when he goes home.  He also has CKD and LU         SUBJECTIVE    Glucoses reviewed improved  Labs reviewed CKD/LU being followed by nephrology        VITALS      Vitals:    12/29/23 0600 12/29/23 0700 12/29/23 0827 12/29/23 1414   BP:  132/74  137/72   BP Location:       Patient Position:       Pulse:  62 60 68   Resp:       Temp:  36.9 °C (98.4 °F)  35.9 °C (96.6 °F)   TempSrc:  Temporal     SpO2:  95%  94%   Weight: 101 kg (222 lb 10.6 oz)      Height:         No intake or output data in the 24 hours ending 12/29/23 1819   Wt Readings from Last 4 Encounters:   12/29/23 101 kg (222 lb 10.6 oz)   12/05/23 106 kg (234 lb 3.2 oz)   11/21/23 108 kg (238 lb 12.8 oz)   08/28/23 108 kg (238 lb 3.2 oz)        Allergies:  Allergies   Allergen Reactions    Sulfamethoxazole-Trimethoprim Unknown     kidney failure        PHYSICAL EXAM   Physical Exam      LABS   Magnesium:  Results from last 7 days   Lab Units 12/28/23  0536 12/27/23  0559 12/26/23  0543   MAGNESIUM mg/dL 2.14 2.05 2.33     Lipid Panel:  Results from last 7 days   Lab Units 12/27/23  0559   HDL mg/dL 23.2   CHOLESTEROL/HDL RATIO  7.7   TRIGLYCERIDES mg/dL 709*   NON HDL CHOL. mg/dL 155*      Lab Review  Lab Results   Component Value Date    BILITOT 0.5 12/28/2023    CALCIUM 9.1 12/29/2023    CO2 25 12/29/2023     12/29/2023    CREATININE 3.87 (H) 12/29/2023    GLUCOSE 171 (H) 12/29/2023    ALKPHOS 57 12/28/2023    K 3.6 12/29/2023    PROT 6.3 (L) 12/28/2023     12/29/2023    AST 20 12/28/2023    ALT 12 12/28/2023    BUN 55 (H) 12/29/2023    ANIONGAP 14 12/29/2023    MG 2.14 12/28/2023    PHOS 4.1 12/29/2023    LDH 85 12/23/2023    ALBUMIN 3.8 12/29/2023    LIPASE 28 06/04/2020    GFRMALE 26 (A) 08/25/2023     Lab Results   Component Value Date    TRIG 709 (H) 12/27/2023    CHOL 178 12/27/2023    LDLCALC  12/27/2023      Comment:      The calculation of LDL and VLDL are inaccurate when the Triglycerides are greater than 400 mg/dL or when the patient is non-fasting. If LDL measurement is necessary  contact the testing laboratory for an alternative LDL assay.                                  Near   Borderline      AGE      Desirable  Optimal    High     High     Very High     0-19 Y     0 - 109     ---    110-129   >/= 130     ----    20-24 Y     0 - 119     ---    120-159   >/= 160     ----      >24 Y     0 -  99   100-129  130-159   160-189     >/=190      HDL 23.2 12/27/2023     Lab Results   Component Value Date    HGBA1C 15.1 (H) 12/23/2023    HGBA1C 6.6 (A) 07/27/2022    HGBA1C 6.4 06/01/2021     The 10-year ASCVD risk score (Marcia MELO, et al., 2019) is: 24.5%    Values used to calculate the score:      Age: 53 years      Sex: Male      Is Non- : Yes      Diabetic: Yes      Tobacco smoker: No      Systolic Blood Pressure: 137 mmHg      Is BP treated: Yes      HDL Cholesterol: 23.2 mg/dL      Total Cholesterol: 178 mg/dL   Lab Results   Component Value Date    HGBA1C 15.1 (H) 12/23/2023    HGBA1C 6.6 (A) 07/27/2022    HGBA1C 6.4 06/01/2021     12/29/2023    K 3.6 12/29/2023     12/29/2023    CO2 25 12/29/2023    BUN 55 (H) 12/29/2023    CREATININE 3.87 (H) 12/29/2023    CALCIUM 9.1 12/29/2023    ALBUMIN 3.8 12/29/2023    PROT 6.3 (L) 12/28/2023    BILITOT 0.5 12/28/2023    ALKPHOS 57 12/28/2023    ALT 12 12/28/2023    AST 20 12/28/2023    GLUCOSE 171 (H) 12/29/2023    CHOL 178 12/27/2023    TRIG 709 (H) 12/27/2023    HDL 23.2 12/27/2023    BHYDRXBUT 0.12 12/23/2023        amLODIPine, 10 mg, oral, Daily  aspirin, 81 mg, oral, Daily  atorvastatin, 80 mg, oral, Nightly  [Held by provider] busPIRone, 15 mg, oral, TID  cholecalciferol, 5,000 Units, oral, Daily  colchicine, 0.6 mg, oral, Daily  cyanocobalamin, 1,000 mcg, oral, Daily  divalproex, 250 mg, oral, q12h EILEEN  heparin (porcine), 5,000 Units, subcutaneous, q8h  insulin glargine, 75 Units, subcutaneous, q24h  insulin lispro, 0-15 Units, subcutaneous, TID with meals, nightly, & 0300  insulin lispro, 14 Units,  subcutaneous, TID with meals  losartan, 25 mg, oral, BID  magnesium oxide, 400 mg, oral, Daily  metoprolol tartrate, 50 mg, oral, BID  pantoprazole, 40 mg, oral, Daily  polyethylene glycol, 17 g, oral, Daily  QUEtiapine, 50 mg, oral, Nightly  sennosides-docusate sodium, 1 tablet, oral, Nightly  sertraline, 100 mg, oral, Daily  terazosin, 5 mg, oral, Once             Electronically signed by Daniel Colindres MD on 12/29/2023 at 6:19 PM

## 2023-12-29 NOTE — DISCHARGE INSTRUCTIONS
-You were admitted with hyperosmolar state and a blood sugar of >900.  Your glipizide has been discontinued, you are being discharged on a long-acting insulin Lantus/glargine 75 units every morning.  Additionally you should take your insulin lispro 15 units before meals, if your blood sugar is >150 take additional lispro as per the sliding scale.  -Your vision should improve as your blood sugar control improves, this may take several weeks to months before it resolves totally.  -You had acute kidney failure on admission, it has improved some while you are in the hospital, but you are not yet back to your baseline kidney function that you had prior to hospitalization.  Make sure you follow-up with Dr. La in 3 weeks.  -DO NOT TAKE ANY NSAID MEDICATIONS.  (Relafen, ibuprofen, naproxen, Aleve, etc.).  Your daily baby aspirin is okay.  You can use Tylenol if needed.  -You had possible seizures while hospitalized and are going out on a medication called divalproex/Depakote.  Do not drive until cleared by Dr. Ford.  -Your uric acid was very high so you were restarted on allopurinol, but at a much lower dose than before.  You should also take colchicine 1 tablet daily for the first month until your uric acid levels have come down.  -Your simvastatin was changed to atorvastatin.  -Your blood pressure medications were changed-you will no longer take HCTZ & clonidine.  Your losartan was changed to 25 mg twice daily.  Continue your amlodipine and terazosin at the same dosage as before admission.  -Do not return to work until January 14.

## 2023-12-29 NOTE — PROGRESS NOTES
Renal Progress Note  Assessment:  53 y.o. male with history s/f CKD stage IV, T2DM, CAD s/p SEAMUS who presented for malaise, fevers, N/V.     LU on CKD stage IV: most likely 2/2 DKA, however ? Contribution of hypotension as well, Scr 6.5 on presentation, baseline 2.5-2.8, CKD risk factors T2DM, HTN, CAD: improving   DKA: resolved   Hyponatremia: in setting of hyperglycemia, improved  Anemia  Hypotension: improving   Vitamin D insufficiency     Plan:  - change ace I to long acting losartan as was on this at home  - pt has some proteinuria.  Would benefit from sglt2i but wait til gfr better  - has relafen on his home med list.  Cannot take any NSAIDs  - outpt follow up Dr. La            Subjective:   Admit Date: 12/22/2023    Interval History:  no new events.  Labs about same/ slightly better.  Ace I restarted.  No cp.  No sob.      Medications:   Scheduled Meds:amLODIPine, 10 mg, oral, Daily  aspirin, 81 mg, oral, Daily  atorvastatin, 80 mg, oral, Nightly  [Held by provider] busPIRone, 15 mg, oral, TID  captopril, 6.25 mg, oral, TID  cholecalciferol, 5,000 Units, oral, Daily  colchicine, 0.6 mg, oral, Daily  cyanocobalamin, 1,000 mcg, oral, Daily  divalproex, 250 mg, oral, q12h EILEEN  heparin (porcine), 5,000 Units, subcutaneous, q8h  insulin glargine, 75 Units, subcutaneous, q24h  insulin lispro, 0-15 Units, subcutaneous, TID with meals, nightly, & 0300  insulin lispro, 14 Units, subcutaneous, TID with meals  [Held by provider] losartan, 25 mg, oral, BID  magnesium oxide, 400 mg, oral, Daily  metoprolol tartrate, 50 mg, oral, BID  pantoprazole, 40 mg, oral, Daily  polyethylene glycol, 17 g, oral, Daily  QUEtiapine, 50 mg, oral, Nightly  sennosides-docusate sodium, 1 tablet, oral, Nightly  sertraline, 100 mg, oral, Daily  terazosin, 5 mg, oral, Once      Continuous Infusions:     CBC:   Lab Results   Component Value Date    HGB 9.6 (L) 12/28/2023    HGB 10.9 (L) 12/27/2023    WBC 5.0 12/28/2023    WBC 5.9  "12/27/2023     12/28/2023     12/27/2023      Anemia:    Lab Results   Component Value Date    IRON 78 12/27/2023    TIBC 321 12/27/2023      BMP:    Lab Results   Component Value Date     12/29/2023     12/28/2023    K 3.6 12/29/2023    K 3.5 12/28/2023     12/29/2023     12/28/2023    CO2 25 12/29/2023    CO2 25 12/28/2023    BUN 55 (H) 12/29/2023    BUN 53 (H) 12/28/2023    CREATININE 3.87 (H) 12/29/2023    CREATININE 3.97 (H) 12/28/2023      Bone disease:   Lab Results   Component Value Date    PHOS 4.1 12/29/2023      Urinalysis:  No results found for: \"JUAN\", \"PROTUR\", \"GLUCOSEU\", \"BLOODU\", \"KETONESU\", \"BILIRUBINU\", \"NITRITEU\", \"LEUKOCYTESU\", \"UTPCR\"     Objective:   Vitals: /74   Pulse 60   Temp 36.9 °C (98.4 °F) (Temporal)   Resp 15   Ht 1.702 m (5' 7\")   Wt 101 kg (222 lb 10.6 oz)   SpO2 95%   BMI 34.87 kg/m²    Wt Readings from Last 3 Encounters:   12/29/23 101 kg (222 lb 10.6 oz)   12/05/23 106 kg (234 lb 3.2 oz)   11/21/23 108 kg (238 lb 12.8 oz)      24HR INTAKE/OUTPUT:  No intake or output data in the 24 hours ending 12/29/23 1155    Admission weight:  Weight: 104 kg (230 lb)      Constitutional:  Alert, awake, no apparent distress   Skin:normal, no rash  HEENT:sclera anicteric.  Head atraumatic normocephalic  Neck:supple with no thyromegally  Cardiovascular:  S1, S2 without m/r/g   Respiratory:  CTA B without w/r/r   Abdomen: +bs, soft, nt  Ext: no LE edema  Musculoskeletal:Intact  Neuro:Alert and oriented with no deficit      Electronically signed by Galileo Coulter MD on 12/29/2023 at 11:55 AM            "

## 2023-12-29 NOTE — PROGRESS NOTES
Amanuel Riojas is a 53 y.o. male on day 7 of admission presenting with Hyperosmolar coma due to type 2 diabetes mellitus (CMS/HCC).      ASSESSMENT/PLAN   Principal Problem:  - Hyperosmolar (nonketotic) coma (CMS/HCC)-resolved, HbA1c on admission 15.1. Blood sugars significantly improved, glargine changed to once daily by endocrine, along with Humalog with meals  SSI.  Blood sugar control improving.  Working with diabetic educator, endocrinology, and patient to obtain CGM and supplies.  -Uncontrolled type 2 diabetes with hyperglycemia-improving control, working with the diabetes educator  Active Problems:  -Acute renal failure with LU on CKD stage IV.  Creatinine gradually improving, down to 3.97 today.  Nephrology is following.  Started on low-dose captopril, HCTZ held, NSAIDs held.  -Hypokalemia-now resolved up to 3.5 after oral replacement and starting captopril.  Will continue to follow.  -Seizure-like activity (CMS/HCC)-EEG showed mild diffuse encephalopathy with no epileptiform discharges.  Neurology recommends continuing Depakote with possible weaning off of it after discharge.  -Headache/toothache-neurology is treating as possible migraine, no headache today, just has his tooth ache.  -Blurred vision-due to uncontrolled DM PTA, gradually improving.  -History of gout-uric acid 13.1, will add low-dose colchicine to prevent acute gout flare now that allopurinol low-dose restarted  -Hypertension-blood pressures low, clonidine discontinued this morning after start of captopril.    -PTSD-buspirone was discontinued due to possible seizure, continuing his sertraline & quetiapine.  -Hyperlipidemia-on simvastatin.  -GERD-on pantoprazole  -Possible history of CAD with stent-review of prior cardiac catheterizations all showed normal coronary arteries, no stent history found.  -COPD/asthma-has as needed albuterol inhaler, has not needed.    SUBJECTIVE/OBJECTIVE   12/29/23  -Feels good, still has some blurred vision  but it is improving a little bit.  Still some dizziness and weakness when he stands up, but also improving.  -He is afebrile, blood pressures are well-controlled.  Satting 94% on room air.  -Chest is clear to auscultation  -Cardiac exam is a regular rhythm  -Blood sugars reasonably controlled-158-262 today, his a.m. mealtime lispro was held this morning.  -Chemistry panel with normal electrolytes.  -BUN and creatinine continue to improve some-down to 55 and 3.87.  -Diabetic educator and endocrinology notes appreciated.  Okay for patient to be discharged today.  -Nephrology note from today appreciated.  -I had a long discussion with the patient regarding his diabetes control.  He was able to show me how to use his [employer] reader, I discussed with him the fact that his lispro insulin may be adjusted depending on his diet after he is discharged.  -Will discharge home today.    12/28/2023  -Headache is much better, still has his tooth ache.  Still some dizziness when he stands up but it is gradually improving.  Vision also gradually improving.  No nausea or vomiting.  -He is afebrile, blood pressures are well-controlled with start of captopril-will hold his clonidine and continue to monitor.  Orthostatics yesterday afternoon were negative  -Chest is clear to auscultation  -Cardiac exam is a regular rhythm  -Blood sugars well-controlled-140-150 range, no lows.  -Chemistry panel with normal electrolytes-potassium is now up to 3.5 after replacement.  Will continue to monitor.  -BUN and creatinine continue to improve-down to 53 and 3.97.  -Uric acid significantly elevated at 13.1.  Will add low-dose colchicine as low-dose allopurinol was just restarted  -LFTs normal.  -Nephrology note appreciated-patient was started on captopril noting need to adjust amlodipine or DC clonidine depending on response.  -Nephrology also recommends holding an SGLT2, with waiting until his GFR is 20 or above.  -Neurology note appreciated-EEG  "without elliptic form discharge just, recommends continuing the Depakote which may l be able to be weaned as an outpatient  -PD tomorrow after arrangements/teaching done.    12/27/2023  -Still having a problem with feeling dizzy when he stands up.  Will check orthostatic vital signs.  Still having very blurred vision-difficulty trying to draw up insulin in a syringe because he cannot see the numbers.  I did discuss with him that he will have an insulin pen so that will make things easier.  -His girlfriend is coming in and learning how to give insulin also.  -Tells me that his \"headache\" is actually more a tooth ache-was scheduled to see the dentist today.  -He is afebrile, blood pressures are good.  Satting 95-97% on room air.  -Chest is clear to auscultation  -Cardiac exam is a regular rhythm  -Extremities with no edema  -Blood sugars improving, he is running low in the morning, 87 this morning.  Endocrinology has changed his glargine from twice daily to 75 units in the morning.  -Chemistry panel shows he is still hypokalemic at 3.1-will give more replacement.  -BUN and creatinine continue to improve-down to 55 and 4.22.  -Lipids show marked hypertriglyceridemia at 709, cholesterol 178, HDL 23, LDL not calculated due to elevated triglycerides.  Will change his simvastatin to atorvastatin.  -CBC with a WBC of 5.9.  H/H stable at 10.9/32.4.  Platelet count 209K.  -Serum iron & TIBC normal.  -Case discussed with Dr. Ford of neurology.  MRI of brain reviewed with him, was unremarkable  -Case discussed with Dr. Persaud of nephrology-    12/26 2023 (Dr. 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  I did discuss with him the fact that it may be several weeks before his vision begins to improve due to the poor blood sugar control PTA, and advised him not to get a new " glasses prescription until after his blood sugars have been in good control for several months.  -Also discussed the fact that he will need to be on insulin long-term, rather than oral agents.    -Complaining of headache, presently not bad.  Tells me he has not had prior headaches until 1 week ago (but apparently told neurology he has had a history of migraines in the past).  -Discussed at length with him the multiple medication changes, need to monitor his blood sugars-he says he never was checking blood sugars PTA.  -I asked him when he thinks he had his reported cardiac stent placed, he thinks it was in the late 1990s.  I reviewed with him the cardiac catheterizations from both 2019 and 1997 which both times showed normal coronary arteries.  I suspect the mention of a sheath on the 1997 one was while he thought he may have had a stent.  -Notes that his legs feel somewhat stiff and he still gets a bit dizzy when he first stands up.  No chest pains, no shortness of breath, no nausea or vomiting.  -He is afebrile, vital signs stable.  Satting at 94-98% on room air.  -His chest is clear to auscultation  -Cardiac exam is a regular rhythm-Blood sugars are well-controlled-low of 82 yesterday afternoon, high of 220 during the middle of the night.  165 this morning.  -Chemistry panel with a blood sugar of 184, potassium is low at 3.1-will replace.  BUN 61 with a creatinine down to 4.73.  -Magnesium now normal at 2.33.  -CBC with a WBC of 5.4, H/H11.2/33.8.  Platelet count 215 K.    Chart review:  PCP is  Dr. Mukesh Hanson    Patient is a 53-year-old male with a known history of HTN, HLP, DM, CKD stage IV (baseline creatinine 2.5-2.8), CAD with possible history of remote stents (not found on cardiac catheterization reports from 1997 & 2019), history of gout, COPD/asthma, GERD, and obesity.    He presented to the ER on 12/22 complaining of dizziness for 1 week with associated blurred vision.  In the ER he was in  acute renal failure with a BUN of 71 and creatinine of 6.51, he was hyperosmolar with a blood sugar of 932, sodium 123, potassium 4.7, chloride 83, bicarb of 22.  Magnesium was elevated at 3.05.  LFTs were normal.  Beta hydroxybutyrate was minimally elevated at 0.39.  Venous BG with a pH of 7.30, pCO2 of 56, pCO2 of 30, bicarb of 27.6.  CBC with a WBC of 10.9, H/H of 12.6/36.2.  Platelet count 284 K.  Troponin was 10, BNP 17.  CT of the head was unremarkable, CXR with no acute findings.  Patient was started on insulin drip, given intensive IV fluids, and admitted to the ICU.    In the ICU he was hypotensive briefly requiring a Levophed drip after given NTG.  He was seen on consultation by nephrology and endocrinology.  He had previously been on glipizide for his diabetes which was discontinued.  He was started on glargine and lispro.  His HbA1c was 15.1 on admission, (previously was 6.6 on 7/27/2022.  On 12/23 patient had a seizure while in the MRI with right-sided jerking and nystagmus followed by a postictal stage.  MRI of the brain showed no acute infarct or other abnormality he was seen in consultation by neurology and started on Depakote.  He had a repeat episode of a possible seizure on 12/24.  EEG is pending.  He was transferred out of the ICU last night.  He is presently on glargine 45 units twice daily with 14 of Humalog each meal and SSI    Cardiac catheterization 12/15/1997-normal coronary arteries, no mention of stent  Cardiac catheterization 6/25/2019-no evidence of stenoses, EF = 60-65%    *These notes are being done using Dragon voice recognition technology and may include unintended errors with respect to translation of words, typographical errors or grammar errors which may not have been identified prior to finalization of the chart note.    CURRENT MEDICATIONS     Scheduled Medications:    Current Facility-Administered Medications:     [DISCONTINUED] acetaminophen (Tylenol) oral liquid 650 mg, 650  mg, oral, q6h PRN **OR** acetaminophen (Tylenol) tablet 650 mg, 650 mg, oral, q4h PRN, John Esteves, PA-C, 650 mg at 12/25/23 2140    albuterol 90 mcg/actuation inhaler 2 puff, 2 puff, inhalation, q4h PRN, John Esteves, PA-C    amLODIPine (Norvasc) tablet 10 mg, 10 mg, oral, Daily, John Esteves, PA-C, 10 mg at 12/29/23 0817    aspirin EC tablet 81 mg, 81 mg, oral, Daily, John Esteves, PA-C, 81 mg at 12/29/23 0816    atorvastatin (Lipitor) tablet 80 mg, 80 mg, oral, Nightly, Gabriella Glover MD, 80 mg at 12/28/23 2057    [Held by provider] busPIRone (Buspar) tablet 15 mg, 15 mg, oral, TID, John Esteves, PA-C    cholecalciferol (Vitamin D-3) capsule 125 mcg, 5,000 Units, oral, Daily, John Esteves, PA-C, 125 mcg at 12/29/23 0816    colchicine tablet 0.6 mg, 0.6 mg, oral, Daily, Gabriella Glover MD, 0.6 mg at 12/29/23 0817    cyanocobalamin (Vitamin B-12) tablet 1,000 mcg, 1,000 mcg, oral, Daily, John Esteves, PA-C, 1,000 mcg at 12/29/23 0817    dextrose 10 % in water (D10W) infusion, 0.3 g/kg/hr, intravenous, Once PRN, John Esteves, PA-C    dextrose 50 % injection 25 g, 25 g, intravenous, q15 min PRN, John Esteves, PA-C    divalproex (Depakote) EC tablet 250 mg, 250 mg, oral, q12h EILEEN, John Esteves, PA-C, 250 mg at 12/29/23 0816    glucagon (Glucagen) injection 1 mg, 1 mg, intramuscular, q15 min PRN, John Esteves, PA-C    heparin (porcine) injection 5,000 Units, 5,000 Units, subcutaneous, q8h, John Esteves, PA-C, 5,000 Units at 12/29/23 1400    [Held by provider] hydrOXYzine pamoate (Vistaril) capsule 50 mg, 50 mg, oral, BID PRN, John Esteves PA-C    insulin glargine (Lantus) injection 75 Units, 75 Units, subcutaneous, q24h, Daniel Colindres MD, 75 Units at 12/29/23 1124    insulin lispro (HumaLOG) injection 0-15 Units, 0-15 Units, subcutaneous, TID with meals, nightly, & 0300, John Esteves PA-C, 4 Units at 12/29/23 1629    insulin lispro (HumaLOG) injection 14 Units, 14 Units, subcutaneous, TID with meals, John Esteves PA-C, 14 Units at  "12/29/23 1630    losartan (Cozaar) tablet 25 mg, 25 mg, oral, BID, John Esteves, PA-C    magnesium oxide (Mag-Ox) tablet 400 mg, 400 mg, oral, Daily, John Esteves, PA-C, 400 mg at 12/29/23 0817    meclizine (Antivert) tablet 25 mg, 25 mg, oral, q6h PRN, John Esteves, PA-C, 25 mg at 12/24/23 1659    metoprolol tartrate (Lopressor) tablet 50 mg, 50 mg, oral, BID, John Esteves, PA-C, 50 mg at 12/28/23 2057    ondansetron (Zofran) tablet 4 mg, 4 mg, oral, q8h PRN **OR** ondansetron (Zofran) injection 4 mg, 4 mg, intravenous, q8h PRN, John Esteves, PA-C    oxyCODONE (Roxicodone) immediate release tablet 5 mg, 5 mg, oral, q6h PRN, John Esteves, PA-C, 5 mg at 12/28/23 1629    pantoprazole (ProtoNix) EC tablet 40 mg, 40 mg, oral, Daily, John Esteves, PA-C, 40 mg at 12/29/23 0816    polyethylene glycol (Glycolax, Miralax) packet 17 g, 17 g, oral, Daily, John Esteves, PA-C, 17 g at 12/26/23 0830    QUEtiapine (SEROquel) tablet 50 mg, 50 mg, oral, Nightly, John Esteves, PA-C, 50 mg at 12/28/23 2057    sennosides-docusate sodium (Maria Luisa-Colace) 8.6-50 mg per tablet 1 tablet, 1 tablet, oral, Nightly, John Esteves, PA-C, 1 tablet at 12/28/23 2057    sertraline (Zoloft) tablet 100 mg, 100 mg, oral, Daily, John Esteves, PA-C, 100 mg at 12/29/23 0816    terazosin (Hytrin) capsule 5 mg, 5 mg, oral, Once, John Esteves, PA-C     PRN Medications:  PRN medications   Medication    acetaminophen    albuterol    dextrose 10 % in water (D10W)    dextrose    glucagon    [Held by provider] hydrOXYzine pamoate    meclizine    ondansetron    Or    ondansetron    oxyCODONE         IVs:  None        I&Os     Intake/Output last 3 Shifts:  I/O last 3 completed shifts:  In: 100 (1 mL/kg) [P.O.:100]  Out: - (0 mL/kg)   Weight: 101 kg     VITAL SIGNS     Blood pressure 137/72, pulse 68, temperature 35.9 °C (96.6 °F), resp. rate 15, height 1.702 m (5' 7\"), weight 101 kg (222 lb 10.6 oz), SpO2 94 %.     PHYSICAL EXAM   Physical exam:  -General appearance: Obese male, " lying in bed, alert and in NAD  -Vital signs:  As above  -HEENT: No icterus  -Neck: Thick neck  -Chest: Lungs are clear to auscultation  -Cardiac: Regular rhythm without murmurs  -Abdomen: Bowel sounds active  -Extremities: No edema  -Skin: No rash  -Neurologic:   Patient is alert and oriented x3.   -Behavior/Emotional:  Appropriate, cooperative    LABS   Relevant Results:  Results from last 7 days   Lab Units 12/29/23  1614 12/29/23  1358 12/29/23  1110 12/29/23  0649 12/29/23  0525 12/28/23  0726 12/28/23  0536 12/27/23  0749 12/27/23  0559   POCT GLUCOSE mg/dL 162* 204* 262*   < >  --    < >  --    < >  --    GLUCOSE mg/dL  --   --   --   --  171*  --  163*  --  76    < > = values in this interval not displayed.          CBC:   Results from last 7 days   Lab Units 12/28/23  0536 12/27/23  0558 12/26/23  0543   WBC AUTO x10*3/uL 5.0 5.9 5.4   RBC AUTO x10*6/uL 3.49* 3.91* 4.10*   HEMOGLOBIN g/dL 9.6* 10.9* 11.2*   HEMATOCRIT % 29.1* 32.4* 33.8*   MCV fL 83 83 82   MCH pg 27.5 27.9 27.3   MCHC g/dL 33.0 33.6 33.1   RDW % 12.4 12.0 12.1   PLATELETS AUTO x10*3/uL 198 209 215       CMP:    Results from last 7 days   Lab Units 12/29/23  0525 12/28/23  0536 12/27/23  0559 12/25/23  0342 12/24/23  1209 12/24/23  0410   SODIUM mmol/L 140 137 139   < > 132* 134*   POTASSIUM mmol/L 3.6 3.5 3.1*   < > 3.6 3.8   CHLORIDE mmol/L 105 103 101   < > 98 97*   CO2 mmol/L 25 25 25   < > 24 24   BUN mg/dL 55* 53* 55*   < > 64* 63*   CREATININE mg/dL 3.87* 3.97* 4.22*   < > 5.04* 5.29*   GLUCOSE mg/dL 171* 163* 76   < > 259* 315*   PROTEIN TOTAL g/dL  --  6.3*  --   --  7.2 7.2   CALCIUM mg/dL 9.1 8.9 9.1   < > 9.3 9.3   BILIRUBIN TOTAL mg/dL  --  0.5  --   --  0.5 0.6   ALK PHOS U/L  --  57  --   --  72 78   AST U/L  --  20  --   --  14 10   ALT U/L  --  12  --   --  7* 6*    < > = values in this interval not displayed.       Magnesium:  Results from last 7 days   Lab Units 12/28/23  0536 12/27/23  0559 12/26/23  0543   MAGNESIUM  mg/dL 2.14 2.05 2.33       Troponin:    Results from last 7 days   Lab Units 12/22/23 2042   TROPHS ng/L 11         IMAGING     MR brain wo IV contrast   Final Result   Within limitations of motion degraded study, no evidence of acute   infarct, or other emergent intracranial abnormality.        MACRO:   None        Signed by: Tigre Urrutia 12/23/2023 2:48 PM   Dictation workstation:   [id number]      Transthoracic Echo (TTE) Complete   Final Result      US renal complete   Final Result   1. No hydronephrosis. Increased echogenicity of the renal parenchyma   and renal cortical thinning, suggestive of medical renal disease.   2. Multiple left renal cysts, as above.        MACRO:   None        Signed by: Blank Maki 12/22/2023 7:33 PM   Dictation workstation:   [id number]      CT head wo IV contrast   Final Result   No acute intracranial finding. MRI may be obtained if clinically   indicated        Signed by: Nilo Bates 12/22/2023 4:18 PM   Dictation workstation:   [id number]      XR chest 1 view   Final Result   No acute cardiopulmonary process.        MACRO   None        Signed by: Reilly Galan 12/22/2023 3:53 PM   Dictation workstation:   [id number]           Gabriella Glover MD

## 2023-12-29 NOTE — PROGRESS NOTES
"[facility]– [place][note 2] set up [employer] Amanda 2 reader and applied sensor-60 minutes until filament is heated and activated. CGM working without incident.  Insulin pen teaching provided to both patient and SO both able to verbalize and demonstrate proper technique using demo pen and skin pad. Education regarding the s/s of Hypoglycemia and treatment using \"The Rule of 15\" provided both patient and SO verbalized understanding. Back up glucometer, supplies, Atorvastatin, Depakote and both insulins clearly marked long and short acting. Colchicine pending PA. If PA not approved before discharge [facility] will transfer to preferred pharmacy.       This is your short acting insulin: Lispro Take with meals. Check your blood sugar before each meal. If your blood sugar is 70 or less do not give yourself insulin. Inject 15 units with each meal if your blood sugar is between . If your blood sugar is above 200 hundred increase to 20 units.     Record your blood sugars 5 times per day. Fasting in the morning, before bed and 3 times a day before meals. Please keep a record your blood sugars on the log provided. DO NOT GIVE YOURSELF INSULIN IF YOUR BLOOD SUGAR IS 70 or lower    This your long-acting insulin: LANTUS Give yourself 75 in the morning            Hypoglycemia if Blood Glucose is between 0 - 70 mg/dL use rule of 15.   Signs and symptoms of low blood glucose (happen quickly)  Each person's reaction to low blood glucose is different. Learn your own signs and symptoms of when your blood glucose is low. Taking time to write these symptoms down may help you learn your own symptoms of when your blood glucose is low. From milder, more common indicators to most severe, signs and symptoms of low blood glucose include:  Feeling shaky  Being nervous or anxious  Sweating, chills and clamminess  Irritability or impatience  Confusion  Fast heartbeat  Feeling lightheaded or dizzy  Hunger  Nausea  Color draining from the skin (pallor)  Feeling " "sleepy  Feeling weak or having no energy  Blurred/impaired vision  Tingling or numbness in the lips, tongue, or cheeks  Headaches  Coordination problems, clumsiness  Nightmares or crying out during sleep  Seizures  Treatment--The \"15-15 Rule\"  The 15-15 rule--have 15 grams of carbohydrate to raise your blood glucose and check it after 15 minutes. If it's still below 70 mg/dL, have another serving.  Repeat these steps until your blood glucose is at least 70 mg/dL. Once your blood glucose is back to normal, eat a meal or snack to make sure it doesn't lower again.  This may be:  Glucose tablets (see instructions)  Gel tube (see instructions)  4 ounces (1/2 cup) of juice or regular soda (not diet)  1 tablespoon of sugar, honey, or corn syrup  "

## 2023-12-29 NOTE — CARE PLAN
Problem: Pain - Adult  Goal: Verbalizes/displays adequate comfort level or baseline comfort level  Outcome: Adequate for Discharge     Problem: Safety - Adult  Goal: Free from fall injury  Outcome: Adequate for Discharge     Problem: Discharge Planning  Goal: Discharge to home or other facility with appropriate resources  Outcome: Adequate for Discharge     Problem: Chronic Conditions and Co-morbidities  Goal: Patient's chronic conditions and co-morbidity symptoms are monitored and maintained or improved  Outcome: Adequate for Discharge     Problem: Diabetes  Goal: Achieve decreasing blood glucose levels by end of shift  Outcome: Adequate for Discharge  Goal: Increase stability of blood glucose readings by end of shift  Outcome: Adequate for Discharge  Goal: Decrease in ketones present in urine by end of shift  Outcome: Adequate for Discharge  Goal: Maintain electrolyte levels within acceptable range throughout shift  Outcome: Adequate for Discharge  Goal: Maintain glucose levels >70mg/dl to <250mg/dl throughout shift  Outcome: Adequate for Discharge  Goal: No changes in neurological exam by end of shift  Outcome: Adequate for Discharge  Goal: Learn about and adhere to nutrition recommendations by end of shift  Outcome: Adequate for Discharge  Goal: Vital signs within normal range for age by end of shift  Outcome: Adequate for Discharge  Goal: Increase self care and/or family involovement by end of shift  Outcome: Adequate for Discharge  Goal: Receive DSME education by end of shift  Outcome: Adequate for Discharge     Problem: Seizure  Goal: I will remain free from seizures  Outcome: Adequate for Discharge

## 2023-12-29 NOTE — DISCHARGE SUMMARY
DISCHARGE SUMMARY      Amanuel Riojas  Age:  53 y.o. male   :  1970  MRN:  [id number]    23    Date of admission:  2023     Date of discharge:   23    Attending physician at discharge:  Gabriella Glover MD     Discharge Diagnoses:  -Hyperosmolar coma due to type 2 diabetes mellitus (CMS/[facility])  -Uncontrolled type 2 diabetes with hyperglycemia  -Acute renal failure  -Acute kidney injury on CKD stage IV  -Seizures  -Hyperuricemia with history of gout    -Hyperlipidemia  -Reported history of CAD with stent-however review of his cardiac catheterizations in the past showed normal coronary arteries and no stent placement  -COPD/asthma-no exacerbation  -Chronic eczema on dupilumab    Hospital Course:    Patient is a 53-year-old male with a known history of HTN, HLP, DM, CKD stage IV (baseline creatinine 2.5-2.8), CAD with possible history of remote stents (not found on cardiac catheterization reports from  & ), history of gout, COPD/asthma, GERD, and obesity.    He presented to the ER on  complaining of dizziness for 1 week with associated blurred vision.  In the ER he was in acute renal failure with a BUN of 71 and creatinine of 6.51, he was hyperosmolar with a blood sugar of 932, sodium 123, potassium 4.7, chloride 83, bicarb of 22.  Magnesium was elevated at 3.05. Beta hydroxybutyrate was minimally elevated at 0.39.  Venous BG with a pH of 7.30, pCO2 of 56, pCO2 of 30, bicarb of 27.6.  CBC with a WBC of 10.9, H/H of 12.6/36.2.  Troponin and BNP normal.  CT of the head was unremarkable, CXR with no acute findings.  Patient was started on insulin drip, given intensive IV fluids, and admitted to the ICU.      -Hyperosmolar state-His HbA1c was 15.1 on admission, (previously was 6.6 on 2022). His glipizide was discontinued.  He was treated with intensive fluid replacement & insulin drip.  He was seen by endocrinology, after improved  blood sugars was taken off the insulin drip and started on glargine daily and lispro with meals along with sliding scale insulin.  At the time of discharge his blood sugars were significantly improved, he had diabetic teaching and was discharged with a jonh 2 CGM.  He will follow-up with Dr. Colindres of endocrinology after discharge.  At the time of discharge she still had significantly blurred vision, he understands that it will be weeks before this improves.    -Acute renal failure on CKD stage IV- In the ICU he was hypotensive briefly  after given NTG, requiring a Levophed drip.  His baseline creatinine PTA was 2.2-2.8.  He was felt to have LU due to hypotension and uncontrolled DM, by the time of discharge his creatinine was down to 3.97.  He will follow-up with Dr. La in 3 weeks after discharge.    -Hyperuricemia with history of gout-patient was on allopurinol 300 mg daily PTA.  This was discontinued on admission due to his acute renal failure.  Uric acid on 12/27 was elevated at 13.1.  He was started on low-dose allopurinol and low-dose colchicine to prevent acute gout.  This should be followed up after discharge.    -Seizure-on 12/23 patient had a seizure while in the MRI with right-sided jerking and nystagmus followed by a postictal stage.  MRI of the brain showed no acute infarct or other abnormality he was seen in consultation by neurology and started on Depakote.  He had a repeat episode of a possible seizure on 12/24.  EEG was negative for epileptiform discharges, he is being discharged on Depakote which possibly can be discontinued after discharge.  His buspirone was discontinued due to potential for seizures.  He will follow-up with Dr. Ford in 1 month after discharge.  He is aware he cannot drive for the next 6 weeks or until approved by neurology.  He was also complaining of a severe headache and right tooth ache, he was treated by Dr. Ford for possible migraine and headache had improved by the  "time of discharge.    -Hypertension-patient's blood pressure medications were managed by nephrology, his losartan was discontinued on admission due to his acute renal failure.  He was restarted on losartan 25 mg twice daily, his clonidine and HCTZ was discontinued on 12/29.  He is continuing amlodipine 10 mg daily and terazosin 5 mg daily.    Procedures:  None    Problem list:  Problem List Items Addressed This Visit          Cardiac and Vasculature    Hyperlipidemia associated with type 2 diabetes mellitus (CMS/[facility])    Relevant Medications    atorvastatin (Lipitor) 80 mg tablet       Endocrine/Metabolic    Type 2 diabetes mellitus with other specified complication (CMS/[facility])    Relevant Medications    insulin lispro (HumaLOG) 100 unit/mL injection    blood-glucose meter misc    lancets 33 gauge misc    blood-glucose meter misc    pen needle, diabetic (TechLITE Pen Needle) 31 gauge x 5/16\" needle    FreeStyle Amanda sensor system (FreeStyle Amanda 2 Sensor) kit    insulin lispro (HumaLOG) 100 unit/mL injection    insulin glargine (Lantus Solostar U-100 Insulin) 100 unit/mL (3 mL) pen    insulin lispro (HumaLOG) 100 unit/mL injection    insulin lispro (HumaLOG) 100 unit/mL injection (Start on 12/30/2023)    Other Relevant Orders    Diabetic Supplies Glucose Monitoring Strips    * (Principal) Hyperosmolar coma due to type 2 diabetes mellitus (CMS/[facility]) - Primary    Relevant Medications    insulin glargine (Lantus Solostar U-100 Insulin) 100 unit/mL (3 mL) pen    insulin lispro (HumaLOG) 100 unit/mL injection    insulin lispro (HumaLOG) 100 unit/mL injection (Start on 12/30/2023)       Genitourinary and Reproductive    Acute renal failure (CMS/[facility])       Musculoskeletal and Injuries    Hyperuricemia without signs inflammatory arthritis/tophaceous disease    Relevant Medications    colchicine 0.6 mg tablet       Neuro    Seizure-like activity (CMS/[facility])    Relevant Medications    divalproex (Depakote) 250 mg EC tablet       " "Symptoms and Signs    Pseudohyponatremia     Other Visit Diagnoses       LU (acute kidney injury) (CMS/[facility])        Dizziness        Chest pain due to coronary artery disease (CMS/[facility])        Relevant Medications    metoprolol tartrate (Lopressor) tablet 50 mg    amLODIPine (Norvasc) tablet 10 mg    Other Relevant Orders    Transthoracic Echo (TTE) Complete (Completed)    Chest pain, unspecified            Disposition:  Home    Issues Requiring Follow-Up:  Blood sugar control, renal function tests, uric acid levels, possible seizure.    Condition on Discharge:  Satisfactory    Discharge medications:  START taking these medications     atorvastatin 80 mg tablet; Commonly known as: Lipitor; Take 1 tablet (80   mg) by mouth once daily at bedtime.   BD Ultra-Fine Short Pen Needle 31 gauge x 5/16\" needle; Generic drug:   pen needle, diabetic; Use to inject 1-4 times daily as directed.   * blood-glucose meter misc; Use daily or as directed for monitoring of   diabetes.   * OneTouch Ultra2 Meter misc; Generic drug: blood-glucose meter; Use   daily or as directed for monitoring of diabetes.   colchicine 0.6 mg tablet; Take 1 tablet (0.6 mg) by mouth once daily. Do   not start before December 29, 2023.   divalproex 250 mg EC tablet; Commonly known as: Depakote; Take 1 tablet   (250 mg) by mouth every 12 hours. Do not crush, chew, or split.   FreeStyle Amanda 2 [place] misc; Generic drug: FreeStyle Amanda reader; Use   as directed to check blood sugars daily   FreeStyle Amanda 2 Sensor kit; Generic drug: FreeStyle Amanda sensor   system; Use as directed to check blood sugar. Change every 14 days   * insulin lispro 100 unit/mL injection; Commonly known as: HumaLOG;   Inject 1-4 times per day as directed.   * insulin lispro 100 unit/mL injection; Commonly known as: HumaLOG;   Inject 15 Units under the skin 3 times a day before meals. Increase to 20   units if blood sugar >200.   * insulin lispro 100 unit/mL injection; Commonly known " as: HumaLOG;   Inject 0-0.15 mL (0-15 Units) under the skin with breakfast, with lunch,   and with evening meal. Take as directed per insulin instructions.   * insulin lispro 100 unit/mL injection; Commonly known as: HumaLOG;   Inject 0.14 mL (14 Units) under the skin 3 times a day with meals. Take as   directed per insulin instructions. Do not start before December 30, 2023.;   Start taking on: December 30, 2023   Lantus Solostar U-100 Insulin 100 unit/mL (3 mL) pen; Generic drug:   insulin glargine; Inject 75 Units under the skin once daily in the   morning. Take as directed per insulin instructions.   OneTouch Delica Plus Lancet 33 gauge misc; Generic drug: lancets; Use as   directed to check blood sugar before meals and at bedtime.  * This list has 6 medication(s) that are the same as other medications   prescribed for you. Read the directions carefully, and ask your doctor or   other care provider to review them with you.     CONTINUE taking these medications     Adult Low Dose Aspirin 81 mg EC tablet; Generic drug: aspirin   albuterol 90 mcg/actuation inhaler; Inhale 2 puffs every 4 hours if   needed for shortness of breath.   amLODIPine 10 mg tablet; Commonly known as: Norvasc; Take 1 tablet (10   mg) by mouth once daily.   busPIRone 15 mg tablet; Commonly known as: Buspar   cholecalciferol 50 mcg (2,000 unit) capsule; Commonly known as: Vitamin   D3; Take 1 capsule (50 mcg) by mouth once daily.   clobetasol 0.05 % cream; Commonly known as: Temovate; APPLY SPARINGLY TO   AFFECTED AREA(S) TWICE DAILY AS NEEDED   clotrimazole-betamethasone cream; Commonly known as: Lotrisone   cyanocobalamin 1,000 mcg tablet; Commonly known as: Vitamin B-12; Take 1   tablet (1,000 mcg) by mouth once daily.   Dulera 200-5 mcg/actuation inhaler; Generic drug: mometasone-formoterol;   Inhale 1 puff 2 times a day.   dupilumab 300 mg/2 mL syringe injection; Commonly known as: Dupixent   hydrocortisone 2.5 % ointment   hydrOXYzine  "pamoate 25 mg capsule; Commonly known as: Vistaril   metoprolol tartrate 50 mg tablet; Commonly known as: Lopressor; Take 1   tablet by mouth 2 times a day.   pantoprazole 40 mg EC tablet; Commonly known as: ProtoNix; Take 1 tablet   (40 mg) by mouth once daily. Do not crush, chew, or split.   losartan 25 mg tablet twice daily; Commonly known as: Cozaar   potassium chloride CR 20 mEq ER tablet; Commonly known as: Klor-Con M20;   Take 1 tablet (20 mEq) by mouth once daily.   QUEtiapine 50 mg tablet; Commonly known as: SEROquel   sertraline 100 mg tablet; Commonly known as: Zoloft   terazosin 5 mg capsule; Commonly known as: Hytrin   triamcinolone 0.1 % cream; Commonly known as: Kenalog     STOP taking these medications     allopurinol 300 mg tablet; Commonly known as: Zyloprim   cloNIDine 0.1 mg tablet; Commonly known as: Catapres   glipiZIDE 5 mg tablet; Commonly known as: Glucotrol   hydroCHLOROthiazide 25 mg tablet; Commonly known as: HYDRODiuril   magnesium oxide 400 mg tablet; Commonly known as: Mag-Ox   nabumetone 750 mg tablet; Commonly known as: Relafen   simvastatin 40 mg tablet; Commonly known as: Zocor   traMADol 50 mg tablet; Commonly known as: Ultram                                                  Discharge physical exam:  Vital signs:  Blood pressure 137/72, pulse 68, temperature 35.9 °C (96.6 °F), resp. rate 15, height 1.702 m (5' 7\"), weight 101 kg (222 lb 10.6 oz), SpO2 94 %.   At the time of discharge he was alert and in no acute distress.  His chest is clear to auscultation, cardiac exam is a regular rhythm.  Please refer to progress note this date for full details.    Test Results Pending At Discharge:  None     Code Status:  Full Code     Outpatient Follow-Up:  Future Appointments   Date Time Provider Department Center   1/4/2024  1:45 PM Chetan Sharma DO [id number] [place]   1/10/2024  4:15 PM Mukesh Hanson MD [id number] [place]       Gabriella Glover MD  12/29/23          *Some of this " note was completed using Dragon voice recognition technology and may include unintended errors with respect to translation of words, typographical errors or grammar errors which may not have been identified prior to finalization of the chart note.  >31 minutes patient care/medication reconciliation/discharge coordination and discussion of discharge instructions & follow-up with the patient.

## 2023-12-30 PROCEDURE — RXMED WILLOW AMBULATORY MEDICATION CHARGE

## 2024-01-02 ENCOUNTER — PATIENT OUTREACH (OUTPATIENT)
Dept: PRIMARY CARE | Facility: CLINIC | Age: 54
End: 2024-01-02
Payer: COMMERCIAL

## 2024-01-02 ENCOUNTER — DOCUMENTATION (OUTPATIENT)
Dept: PRIMARY CARE | Facility: CLINIC | Age: 54
End: 2024-01-02
Payer: COMMERCIAL

## 2024-01-02 DIAGNOSIS — R79.89 PSEUDOHYPONATREMIA: ICD-10-CM

## 2024-01-02 DIAGNOSIS — E11.01: ICD-10-CM

## 2024-01-02 DIAGNOSIS — R56.9 SEIZURE (MULTI): ICD-10-CM

## 2024-01-02 DIAGNOSIS — N17.9 ACUTE RENAL FAILURE, UNSPECIFIED ACUTE RENAL FAILURE TYPE (CMS-HCC): ICD-10-CM

## 2024-01-02 NOTE — PROGRESS NOTES
Discharge Facility:   [facility]    Discharge Diagnosis:  Dizziness [R42]  Hyperosmolar (nonketotic) coma (CMS/[facility]) [E11.01]  LU (acute kidney injury) (CMS/[facility]) [N17.9]  Acute renal failure, unspecified acute renal failure type (CMS/[facility]) [N17.9]  Pseudohyponatremia [R79.89]  Seizure     Admission Date:12/22/23  Discharge Date: 12/29/23    PCP Appointment Date:  PATI 3  2024 12:30 PM - Primary Care Hospital Discharge  [facility] - Mukesh Hanson MD     Specialist Appointment Date: TBD- Dr Colindres for Endo, Vikki Hernandez CNP Neuro in 1 mo, Elizabeth La MD (Nephrology) in 3 weeks     JAN 4 2024 01:45 PM - Orthopaedic Established Patient Visit  [facility] - Chetan Sharma DO     Hospital Encounter and Summary: Linked   No answer with first outreach and then realized patient has an appt tomorrow with PCP which will qualify for TCM program. I will enroll and set next outreach for after PCP visit.

## 2024-01-03 ENCOUNTER — TELEPHONE (OUTPATIENT)
Dept: PRIMARY CARE | Facility: CLINIC | Age: 54
End: 2024-01-03

## 2024-01-03 ENCOUNTER — OFFICE VISIT (OUTPATIENT)
Dept: PRIMARY CARE | Facility: CLINIC | Age: 54
End: 2024-01-03
Payer: COMMERCIAL

## 2024-01-03 VITALS
OXYGEN SATURATION: 98 % | WEIGHT: 222 LBS | SYSTOLIC BLOOD PRESSURE: 112 MMHG | BODY MASS INDEX: 34.84 KG/M2 | DIASTOLIC BLOOD PRESSURE: 72 MMHG | TEMPERATURE: 97.9 F | HEIGHT: 67 IN | HEART RATE: 80 BPM

## 2024-01-03 DIAGNOSIS — K21.9 GASTROESOPHAGEAL REFLUX DISEASE WITHOUT ESOPHAGITIS: ICD-10-CM

## 2024-01-03 DIAGNOSIS — N18.5 CKD (CHRONIC KIDNEY DISEASE), STAGE V (MULTI): ICD-10-CM

## 2024-01-03 DIAGNOSIS — Z09 HOSPITAL DISCHARGE FOLLOW-UP: ICD-10-CM

## 2024-01-03 DIAGNOSIS — I15.2 HYPERTENSION ASSOCIATED WITH DIABETES (MULTI): Primary | ICD-10-CM

## 2024-01-03 DIAGNOSIS — E11.69 TYPE 2 DIABETES MELLITUS WITH OTHER SPECIFIED COMPLICATION, WITHOUT LONG-TERM CURRENT USE OF INSULIN (MULTI): ICD-10-CM

## 2024-01-03 DIAGNOSIS — H53.8 BLURRY VISION: Primary | ICD-10-CM

## 2024-01-03 DIAGNOSIS — E78.5 HYPERLIPIDEMIA ASSOCIATED WITH TYPE 2 DIABETES MELLITUS (MULTI): ICD-10-CM

## 2024-01-03 DIAGNOSIS — E66.01 CLASS 2 SEVERE OBESITY DUE TO EXCESS CALORIES WITH SERIOUS COMORBIDITY AND BODY MASS INDEX (BMI) OF 38.0 TO 38.9 IN ADULT (MULTI): ICD-10-CM

## 2024-01-03 DIAGNOSIS — E11.59 HYPERTENSION ASSOCIATED WITH DIABETES (MULTI): Primary | ICD-10-CM

## 2024-01-03 DIAGNOSIS — E11.69 HYPERLIPIDEMIA ASSOCIATED WITH TYPE 2 DIABETES MELLITUS (MULTI): ICD-10-CM

## 2024-01-03 PROBLEM — E11.01: Status: RESOLVED | Noted: 2023-12-22 | Resolved: 2024-01-03

## 2024-01-03 PROBLEM — N17.9 ACUTE RENAL FAILURE (CMS-HCC): Status: RESOLVED | Noted: 2023-12-26 | Resolved: 2024-01-03

## 2024-01-03 PROBLEM — N18.4 CKD (CHRONIC KIDNEY DISEASE), STAGE IV (MULTI): Status: RESOLVED | Noted: 2023-12-26 | Resolved: 2024-01-03

## 2024-01-03 PROCEDURE — 3008F BODY MASS INDEX DOCD: CPT | Performed by: INTERNAL MEDICINE

## 2024-01-03 PROCEDURE — 4010F ACE/ARB THERAPY RXD/TAKEN: CPT | Performed by: INTERNAL MEDICINE

## 2024-01-03 PROCEDURE — 3078F DIAST BP <80 MM HG: CPT | Performed by: INTERNAL MEDICINE

## 2024-01-03 PROCEDURE — 99496 TRANSJ CARE MGMT HIGH F2F 7D: CPT | Performed by: INTERNAL MEDICINE

## 2024-01-03 PROCEDURE — 1036F TOBACCO NON-USER: CPT | Performed by: INTERNAL MEDICINE

## 2024-01-03 PROCEDURE — 3074F SYST BP LT 130 MM HG: CPT | Performed by: INTERNAL MEDICINE

## 2024-01-03 ASSESSMENT — ENCOUNTER SYMPTOMS
LOSS OF SENSATION IN FEET: 1
OCCASIONAL FEELINGS OF UNSTEADINESS: 1
DEPRESSION: 1

## 2024-01-03 ASSESSMENT — PATIENT HEALTH QUESTIONNAIRE - PHQ9
1. LITTLE INTEREST OR PLEASURE IN DOING THINGS: SEVERAL DAYS
10. IF YOU CHECKED OFF ANY PROBLEMS, HOW DIFFICULT HAVE THESE PROBLEMS MADE IT FOR YOU TO DO YOUR WORK, TAKE CARE OF THINGS AT HOME, OR GET ALONG WITH OTHER PEOPLE: VERY DIFFICULT
SUM OF ALL RESPONSES TO PHQ9 QUESTIONS 1 AND 2: 2
2. FEELING DOWN, DEPRESSED OR HOPELESS: SEVERAL DAYS

## 2024-01-03 NOTE — PROGRESS NOTES
"Subjective   Patient ID: Amanuel Riojas is a 53 y.o. male who presents for Hospital Follow-up (Patient is in office today for a hospital follow-up discharge 12/29/2023 for uncontrolled sugars. ).    HPI   53-year-old male with a past medical history of hypertension hyperlipidemia uncontrolled diabetes noncompliant GERD here for hospital discharge follow-up for hyper osmolar coma and uncontrolled diabetes he has a history of chronic kidney disease gotten worse now feels better his vision is still blurry  Review of Systems  REVIEW OF SYSTEMS:  General:  Denies significant weight changes, fever, chills or weakness.  SKIN: Denies any rash or change in moles.  HEENT:  No vision or hearing changes. No headache. No vertigo, No tinnitus.   GI:  No loss of appetite. No change in bowel habit. No abdominal pain. No blood in stool.  GUR: No dysuria. No hematoma, No fever. No incontinence.  Respiratory:  No cough or shortness of breath.  CNS:  No memory or mood changes. No gait disturbance. No focal weakness. No tremors. No tingling or number of extremities.   ENDO: No cold intolerance. No fatigue.    Objective   /72 (BP Location: Right arm, Patient Position: Sitting, BP Cuff Size: Large adult)   Pulse 80   Temp 36.6 °C (97.9 °F) (Temporal)   Ht 1.702 m (5' 7\")   Wt 101 kg (222 lb)   SpO2 98% Comment: RA  BMI 34.77 kg/m²     Physical Exam  PHYSICAL EXAM LONG:  Vitals:  Per [employer].  General Appearance:  Normal-built, well-nourished  with no apparent distress.  Skin:  Normal turgor.  No rash.  Head:  Normocephalic, atraumatic.  Eyes:  Pupils are equal, round, and reactive to light and accommodation.  Extraocular movements are intact.  No pallor of conjunctivae.  Mouth has moist oral mucosa.  Pharynx appears normal.  No erythema.  Nose:  Nasal mucosa normal.  Turbinates are within normal limits.  Ears:  Bilateral auditory ear canals are normal.  Bilateral tympanic membranes are normal and visible.  Neck:  Supple.  " No JVD.  No carotid bruit.  No thyromegaly. No cervical lymphadenopathy.   Chest:  Bilaterally good air entry and bilaterally clear to auscultation.  No wheezing.  No crackles.  Heart:  Regular rate and rhythm.  S1, S2 positive.  No murmur.  Abdomen:  Soft and nontender.  Bowel sounds are positive.  No organomegaly.  Extremities:  Bilaterally no pedal pitting edema.  Bilaterally 2+ dorsalis pedis pulses.  Neuro Exam:  Cranial nerves from II to XII intact.  No facial droop.  Tongue at midline.  Facial sensation intact to light touch and pain sensation.  Motor strength 5/5 in upper and lower extremities.  Sensation is grossly intact to light touch and pain sensation.  Deep tendon reflexes are bilaterally symmetric in upper and lower extremities and within normal limits, 2+.  No cerebellar signs.  Finger-to-nose intact.      Blood work reviewed A1c 15.1 GFR 17 currently blood sugar is 257 nonfasting  Assessment/Plan     Hospital discharge follow-up for hyper osmolar, stable he is on Lantus 70 5 at night and Humalog 10 units 3 times a day    Noncompliance with instructions taking medications strongly advised to go to the medication  Bring all the bottles I will see him back in 1 month    Chronic kidney disease stable follow-up with nephrology    Hypertension stable advised DASH diet continue medications    GERD stable

## 2024-01-03 NOTE — TELEPHONE ENCOUNTER
Patient called clinical line, stated that he had appointment today and forgot to ask the DrLuciano for a handicap placard order due to vision (unable to see well).   Please advise.

## 2024-01-04 ENCOUNTER — APPOINTMENT (OUTPATIENT)
Dept: ORTHOPEDIC SURGERY | Facility: CLINIC | Age: 54
End: 2024-01-04
Payer: COMMERCIAL

## 2024-01-04 DIAGNOSIS — E11.59 HYPERTENSION ASSOCIATED WITH DIABETES (MULTI): ICD-10-CM

## 2024-01-04 DIAGNOSIS — I15.2 HYPERTENSION ASSOCIATED WITH DIABETES (MULTI): ICD-10-CM

## 2024-01-04 RX ORDER — LOSARTAN POTASSIUM 25 MG/1
25 TABLET ORAL 2 TIMES DAILY
Qty: 180 TABLET | Refills: 1 | Status: SHIPPED | OUTPATIENT
Start: 2024-01-04 | End: 2024-05-30

## 2024-01-04 NOTE — TELEPHONE ENCOUNTER
Patient has an appointment for root canal today, should she go off Xarelto? Rx request  Last OV 1/2024  Pending OV none

## 2024-01-09 ENCOUNTER — LAB (OUTPATIENT)
Dept: LAB | Facility: LAB | Age: 54
End: 2024-01-09
Payer: COMMERCIAL

## 2024-01-09 DIAGNOSIS — E11.69 TYPE 2 DIABETES MELLITUS WITH OTHER SPECIFIED COMPLICATION, WITHOUT LONG-TERM CURRENT USE OF INSULIN (MULTI): ICD-10-CM

## 2024-01-09 DIAGNOSIS — E11.69 TYPE 2 DIABETES MELLITUS WITH OTHER SPECIFIED COMPLICATION, WITHOUT LONG-TERM CURRENT USE OF INSULIN (MULTI): Primary | ICD-10-CM

## 2024-01-09 DIAGNOSIS — N18.32 CHRONIC KIDNEY DISEASE, STAGE 3B (MULTI): Primary | ICD-10-CM

## 2024-01-09 LAB
ALBUMIN SERPL BCP-MCNC: 4.3 G/DL (ref 3.4–5)
ANION GAP SERPL CALC-SCNC: 16 MMOL/L (ref 10–20)
APPEARANCE UR: CLEAR
BILIRUB UR STRIP.AUTO-MCNC: NEGATIVE MG/DL
BUN SERPL-MCNC: 28 MG/DL (ref 6–23)
CALCIUM SERPL-MCNC: 8.7 MG/DL (ref 8.6–10.3)
CHLORIDE SERPL-SCNC: 107 MMOL/L (ref 98–107)
CO2 SERPL-SCNC: 23 MMOL/L (ref 21–32)
COLOR UR: ABNORMAL
CREAT SERPL-MCNC: 2.98 MG/DL (ref 0.5–1.3)
CREAT UR-MCNC: 125 MG/DL (ref 20–370)
EGFRCR SERPLBLD CKD-EPI 2021: 24 ML/MIN/1.73M*2
GLUCOSE SERPL-MCNC: 115 MG/DL (ref 74–99)
GLUCOSE UR STRIP.AUTO-MCNC: NEGATIVE MG/DL
KETONES UR STRIP.AUTO-MCNC: NEGATIVE MG/DL
LEUKOCYTE ESTERASE UR QL STRIP.AUTO: NEGATIVE
MUCOUS THREADS #/AREA URNS AUTO: NORMAL /LPF
NITRITE UR QL STRIP.AUTO: NEGATIVE
PH UR STRIP.AUTO: 5 [PH]
PHOSPHATE SERPL-MCNC: 4.9 MG/DL (ref 2.5–4.9)
POTASSIUM SERPL-SCNC: 4.2 MMOL/L (ref 3.5–5.3)
PROT UR STRIP.AUTO-MCNC: ABNORMAL MG/DL
PROT UR-ACNC: 75 MG/DL (ref 5–25)
PROT/CREAT UR: 0.6 MG/MG CREAT (ref 0–0.17)
RBC # UR STRIP.AUTO: NEGATIVE /UL
RBC #/AREA URNS AUTO: NORMAL /HPF
SODIUM SERPL-SCNC: 142 MMOL/L (ref 136–145)
SP GR UR STRIP.AUTO: 1.01
SQUAMOUS #/AREA URNS AUTO: NORMAL /HPF
UROBILINOGEN UR STRIP.AUTO-MCNC: <2 MG/DL
WBC #/AREA URNS AUTO: NORMAL /HPF

## 2024-01-09 PROCEDURE — 84156 ASSAY OF PROTEIN URINE: CPT

## 2024-01-09 PROCEDURE — 81001 URINALYSIS AUTO W/SCOPE: CPT

## 2024-01-09 PROCEDURE — 82570 ASSAY OF URINE CREATININE: CPT

## 2024-01-09 PROCEDURE — 36415 COLL VENOUS BLD VENIPUNCTURE: CPT

## 2024-01-09 PROCEDURE — 80069 RENAL FUNCTION PANEL: CPT

## 2024-01-09 RX ORDER — FLASH GLUCOSE SENSOR
KIT MISCELLANEOUS
Qty: 6 EACH | Refills: 1 | OUTPATIENT
Start: 2024-01-09

## 2024-01-09 NOTE — TELEPHONE ENCOUNTER
1/3/2024    Next office visit none.      The patient is also requesting a note for work to have another 14 days off. Please advise

## 2024-01-10 ENCOUNTER — APPOINTMENT (OUTPATIENT)
Dept: PRIMARY CARE | Facility: CLINIC | Age: 54
End: 2024-01-10
Payer: COMMERCIAL

## 2024-01-10 ENCOUNTER — PHARMACY VISIT (OUTPATIENT)
Dept: PHARMACY | Facility: CLINIC | Age: 54
End: 2024-01-10
Payer: MEDICAID

## 2024-01-11 DIAGNOSIS — J45.909 ASTHMA, UNSPECIFIED ASTHMA SEVERITY, UNSPECIFIED WHETHER COMPLICATED, UNSPECIFIED WHETHER PERSISTENT (HHS-HCC): ICD-10-CM

## 2024-01-11 DIAGNOSIS — E11.01: ICD-10-CM

## 2024-01-11 DIAGNOSIS — E11.69 TYPE 2 DIABETES MELLITUS WITH OTHER SPECIFIED COMPLICATION, WITH LONG-TERM CURRENT USE OF INSULIN (MULTI): ICD-10-CM

## 2024-01-11 DIAGNOSIS — L30.9 DERMATITIS: ICD-10-CM

## 2024-01-11 DIAGNOSIS — L30.9 DERMATITIS, UNSPECIFIED: ICD-10-CM

## 2024-01-11 DIAGNOSIS — Z79.4 TYPE 2 DIABETES MELLITUS WITH OTHER SPECIFIED COMPLICATION, WITH LONG-TERM CURRENT USE OF INSULIN (MULTI): ICD-10-CM

## 2024-01-11 RX ORDER — INSULIN GLARGINE 100 [IU]/ML
75 INJECTION, SOLUTION SUBCUTANEOUS EVERY MORNING
Qty: 34 ML | Refills: 1 | Status: SHIPPED | OUTPATIENT
Start: 2024-01-11

## 2024-01-12 ENCOUNTER — TELEPHONE (OUTPATIENT)
Dept: PRIMARY CARE | Facility: CLINIC | Age: 54
End: 2024-01-12
Payer: COMMERCIAL

## 2024-01-12 RX ORDER — CLOTRIMAZOLE AND BETAMETHASONE DIPROPIONATE 10; .64 MG/G; MG/G
CREAM TOPICAL
Qty: 45 G | Refills: 1 | Status: SHIPPED | OUTPATIENT
Start: 2024-01-12

## 2024-01-12 RX ORDER — ALBUTEROL SULFATE 90 UG/1
2 AEROSOL, METERED RESPIRATORY (INHALATION) EVERY 4 HOURS PRN
Qty: 18 G | Refills: 1 | Status: SHIPPED | OUTPATIENT
Start: 2024-01-12

## 2024-01-12 RX ORDER — CLOBETASOL PROPIONATE 0.5 MG/G
CREAM TOPICAL
Qty: 60 G | Refills: 1 | Status: SHIPPED | OUTPATIENT
Start: 2024-01-12

## 2024-01-12 NOTE — TELEPHONE ENCOUNTER
Patient called office wanting to know if Dr. Grace will extend his time off work patient has a pending appt with the eye doctor on 1/19/2024, he will be seeing Dr. Chente Benoit at [facility]. Phone number there is [phone]        Patient will  note

## 2024-01-29 ENCOUNTER — TELEMEDICINE (OUTPATIENT)
Dept: PHARMACY | Facility: HOSPITAL | Age: 54
End: 2024-01-29
Payer: COMMERCIAL

## 2024-01-29 DIAGNOSIS — E11.69 TYPE 2 DIABETES MELLITUS WITH OTHER SPECIFIED COMPLICATION, WITHOUT LONG-TERM CURRENT USE OF INSULIN (MULTI): ICD-10-CM

## 2024-01-29 DIAGNOSIS — E11.69 TYPE 2 DIABETES MELLITUS WITH OTHER SPECIFIED COMPLICATION, WITH LONG-TERM CURRENT USE OF INSULIN (MULTI): ICD-10-CM

## 2024-01-29 DIAGNOSIS — Z79.4 TYPE 2 DIABETES MELLITUS WITH OTHER SPECIFIED COMPLICATION, WITH LONG-TERM CURRENT USE OF INSULIN (MULTI): ICD-10-CM

## 2024-01-29 ASSESSMENT — ENCOUNTER SYMPTOMS
POLYDIPSIA: 1
VISUAL CHANGE: 1
WEIGHT LOSS: 1
BLURRED VISION: 1

## 2024-01-29 NOTE — PROGRESS NOTES
Subjective   Patient ID: Amanuel Riojas is a 53 y.o. male was referred to Clinical Pharmacy Team to complete a post-discharge medication optimization and monitoring visit. The patient was referred for Diabetes.    Referring Provider: Mike New MD     Diabetes  He presents for his initial diabetic visit. He has type 2 diabetes mellitus. The initial diagnosis of diabetes was made 20 years ago. His disease course has been worsening. (Reports hypoglycemia down to 59 x2 nights ago) Associated symptoms include blurred vision, foot paresthesias, polydipsia, polyuria, visual change and weight loss. There are no hypoglycemic complications. Diabetic complications include nephropathy. Risk factors for coronary artery disease include hypertension, male sex, obesity, diabetes mellitus, dyslipidemia, sedentary lifestyle and stress. Current diabetic treatment includes insulin injections. Diabetic current diet: Eating a lot of fruit and drinking water. Drinks orange juice and coca cola for hypoglycemia. Cautioned do not drink if no hypoglycemia. When asked about meal planning, he reported none. He participates in exercise daily. His home blood glucose trend is decreasing rapidly. (Most recent ) An ACE inhibitor/angiotensin II receptor blocker is not being taken. Eye exam is current.       Review of Systems   Constitutional:  Positive for weight loss.   Eyes:  Positive for blurred vision.   Endocrine: Positive for polydipsia and polyuria.       Medication System Management:  Affordability/Accessibility: none  Adherence/Organization: questionable  Adverse Effects: none    Objective     There were no vitals taken for this visit.     Labs  Lab Results   Component Value Date    BILITOT 0.5 12/28/2023    CALCIUM 8.7 01/09/2024    CO2 23 01/09/2024     01/09/2024    CREATININE 2.98 (H) 01/09/2024    GLUCOSE 115 (H) 01/09/2024    ALKPHOS 57 12/28/2023    K 4.2 01/09/2024    PROT 6.3 (L) 12/28/2023     01/09/2024     AST 20 12/28/2023    ALT 12 12/28/2023    BUN 28 (H) 01/09/2024    ANIONGAP 16 01/09/2024    MG 2.14 12/28/2023    PHOS 4.9 01/09/2024    LDH 85 12/23/2023    ALBUMIN 4.3 01/09/2024    LIPASE 28 06/04/2020    GFRMALE 26 (A) 08/25/2023     Lab Results   Component Value Date    TRIG 709 (H) 12/27/2023    CHOL 178 12/27/2023    LDLCALC  12/27/2023      Comment:      The calculation of LDL and VLDL are inaccurate when the Triglycerides are greater than 400 mg/dL or when the patient is non-fasting. If LDL measurement is necessary contact the testing laboratory for an alternative LDL assay.                                  Near   Borderline      AGE      Desirable  Optimal    High     High     Very High     0-19 Y     0 - 109     ---    110-129   >/= 130     ----    20-24 Y     0 - 119     ---    120-159   >/= 160     ----      >24 Y     0 -  99   100-129  130-159   160-189     >/=190      HDL 23.2 12/27/2023     Lab Results   Component Value Date    HGBA1C 15.1 (H) 12/23/2023       Current Outpatient Medications on File Prior to Visit   Medication Sig Dispense Refill    albuterol 90 mcg/actuation inhaler INHALE 2 PUFFS EVERY 4 HOURS IF NEEDED FOR SHORTNESS OF BREATH. 18 g 1    amLODIPine (Norvasc) 10 mg tablet Take 1 tablet (10 mg) by mouth once daily. 90 tablet 1    aspirin (Adult Low Dose Aspirin) 81 mg EC tablet Take 1 tablet (81 mg) by mouth once daily.      atorvastatin (Lipitor) 80 mg tablet Take 1 tablet (80 mg) by mouth once daily at bedtime. 30 tablet 0    blood-glucose meter misc Use daily or as directed for monitoring of diabetes. 1 each 0    cholecalciferol (Vitamin D3) 50 mcg (2,000 unit) capsule Take 1 capsule (50 mcg) by mouth once daily. 90 capsule 1    clobetasol (Temovate) 0.05 % cream APPLY SPARINGLY TO AFFECTED AREA(S) TWICE DAILY AS NEEDED 60 g 1    clotrimazole-betamethasone (Lotrisone) cream APPLY SPARINGLY TO AFFECTED AREA 3 TIMES A DAY 45 g 1    colchicine 0.6 mg tablet Take 1 tablet (0.6 mg)  by mouth once daily. Do not start before December 29, 2023. 30 tablet 0    cyanocobalamin (Vitamin B-12) 1,000 mcg tablet Take 1 tablet (1,000 mcg) by mouth once daily. 90 tablet 1    divalproex (Depakote) 250 mg EC tablet Take 1 tablet (250 mg) by mouth every 12 hours. Do not crush, chew, or split. 60 tablet 0    dupilumab (Dupixent) 300 mg/2 mL syringe injection Inject 1 Syringe (300 mg) under the skin every 14 (fourteen) days.      flash glucose sensor (FREESTYLE NESTOR 14 DAY SENSOR MISC) every 14 (fourteen) days.      FreeStyle Nestor reader (FreeStyle Nestor 2 [place]) misc Use as directed to check blood sugars daily 1 each 0    hydrocortisone 2.5 % ointment Apply 1 Application topically 2 times a day.      hydrOXYzine pamoate (Vistaril) 25 mg capsule Take 2 capsules (50 mg) by mouth 2 times a day as needed (at bedtime).      insulin glargine (Lantus Solostar U-100 Insulin) 100 unit/mL (3 mL) pen Inject 75 Units under the skin once daily in the morning. Take as directed per insulin instructions. (Patient taking differently: Inject 75 Units under the skin once daily at bedtime. Take as directed per insulin instructions.) 34 mL 1    insulin lispro (HumaLOG) 100 unit/mL injection Inject 15 Units under the skin 3 times a day before meals. Increase to 20 units if blood sugar >200. 30 mL 11    insulin lispro (HumaLOG) 100 unit/mL injection Inject 0-0.15 mL (0-15 Units) under the skin with breakfast, with lunch, and with evening meal. Take as directed per insulin instructions.      insulin lispro (HumaLOG) 100 unit/mL injection Inject 0.14 mL (14 Units) under the skin 3 times a day with meals. Take as directed per insulin instructions. Do not start before December 30, 2023.      lancets 33 gauge misc Use as directed to check blood sugar before meals and at bedtime. 100 each 1    losartan (Cozaar) 25 mg tablet TAKE 1 TABLET BY MOUTH TWICE A  tablet 1    metoprolol tartrate (Lopressor) 50 mg tablet Take 1 tablet by  "mouth 2 times a day. 90 tablet 1    mometasone-formoterol (Dulera) 200-5 mcg/actuation inhaler Inhale 1 puff 2 times a day. 13 g 1    pen needle, diabetic (TechLITE Pen Needle) 31 gauge x 5/16\" needle Use to inject 1-4 times daily as directed. 100 each 11    potassium chloride CR 20 mEq ER tablet Take 1 tablet (20 mEq) by mouth once daily. 90 tablet 1    QUEtiapine (SEROquel) 50 mg tablet Take 1 tablet (50 mg) by mouth once daily at bedtime.      sertraline (Zoloft) 100 mg tablet Take 1 tablet (100 mg) by mouth once daily.      terazosin (Hytrin) 5 mg capsule Take 1 capsule (5 mg) by mouth 1 time.      triamcinolone (Kenalog) 0.1 % cream Apply 1 Application topically 2 times a day.      [DISCONTINUED] FreeStyle Amanda sensor system (FreeStyle Amanda 2 Sensor) kit Use as directed to check blood sugar. Change every 14 days 2 each 2    [DISCONTINUED] pantoprazole (ProtoNix) 40 mg EC tablet Take 1 tablet (40 mg) by mouth once daily. Do not crush, chew, or split. (Patient not taking: Reported on 1/31/2024) 30 tablet 1     No current facility-administered medications on file prior to visit.        Assessment/Plan   Problem List Items Addressed This Visit             ICD-10-CM    Type 2 diabetes mellitus with other specified complication (CMS/[facility]) E11.69     Patient with multi-year history of being well controlled A1c <6.5% prior to hospital admission for [facility] in Dec 2023 where A1c was found to be 15.1%. Reports polydipsia, polyuria, vision change, and weight loss preceding presentation.  Patient feels like stress, poor diet and lack of home monitoring are primary factors leading to worsening control.  Now on freestyle amanda 2 system and finding this very helpful.  Was placed back on insulin at hospital discharge, previously on oral therapy.  Now on Lantus 75 units QPM, Humalog 15-20 units TID AC.  Pharmacotherapy options limited by renal function.  Would avoid GLP-1 at this time given pancreatitis risk with TG >700. Consider " SGLT2i with renal monitoring.  No current issues with affordability/access to medication.  Watching diet more carefully and working on establishing dedicated time for exercise.    Plan:  Continue all current pharmacotherapy. Pharmacy to follow for medication titration and disease state education.         Relevant Medications    flash glucose sensor kit (FreeStyle Amanda 2 Sensor) kit   Follow up: 3 weeks    Armond Barker, PharmD    Continue all meds under the continuation of care with the referring provider and clinical pharmacy team.

## 2024-01-29 NOTE — ASSESSMENT & PLAN NOTE
Patient with multi-year history of being well controlled A1c <6.5% prior to hospital admission for [facility] in Dec 2023 where A1c was found to be 15.1%. Reports polydipsia, polyuria, vision change, and weight loss preceding presentation.  Patient feels like stress, poor diet and lack of home monitoring are primary factors leading to worsening control.  Now on freestyle jonh 2 system and finding this very helpful.  Was placed back on insulin at hospital discharge, previously on oral therapy.  Now on Lantus 75 units QPM, Humalog 15-20 units TID AC.  Pharmacotherapy options limited by renal function.  Would avoid GLP-1 at this time given pancreatitis risk with TG >700. Consider SGLT2i with renal monitoring.  No current issues with affordability/access to medication.  Watching diet more carefully and working on establishing dedicated time for exercise.    Plan:  Continue all current pharmacotherapy. Pharmacy to follow for medication titration and disease state education.

## 2024-01-31 ENCOUNTER — OFFICE VISIT (OUTPATIENT)
Dept: PRIMARY CARE | Facility: CLINIC | Age: 54
End: 2024-01-31
Payer: COMMERCIAL

## 2024-01-31 VITALS
TEMPERATURE: 98.3 F | OXYGEN SATURATION: 98 % | DIASTOLIC BLOOD PRESSURE: 90 MMHG | BODY MASS INDEX: 36.1 KG/M2 | HEIGHT: 67 IN | WEIGHT: 230 LBS | HEART RATE: 57 BPM | SYSTOLIC BLOOD PRESSURE: 134 MMHG

## 2024-01-31 DIAGNOSIS — J45.909 ASTHMA, UNSPECIFIED ASTHMA SEVERITY, UNSPECIFIED WHETHER COMPLICATED, UNSPECIFIED WHETHER PERSISTENT (HHS-HCC): ICD-10-CM

## 2024-01-31 DIAGNOSIS — I15.2 HYPERTENSION ASSOCIATED WITH DIABETES (MULTI): ICD-10-CM

## 2024-01-31 DIAGNOSIS — E11.59 HYPERTENSION ASSOCIATED WITH DIABETES (MULTI): ICD-10-CM

## 2024-01-31 DIAGNOSIS — E11.69 TYPE 2 DIABETES MELLITUS WITH OTHER SPECIFIED COMPLICATION, WITH LONG-TERM CURRENT USE OF INSULIN (MULTI): Primary | ICD-10-CM

## 2024-01-31 DIAGNOSIS — Z79.4 TYPE 2 DIABETES MELLITUS WITH OTHER SPECIFIED COMPLICATION, WITH LONG-TERM CURRENT USE OF INSULIN (MULTI): Primary | ICD-10-CM

## 2024-01-31 DIAGNOSIS — L20.9 ATOPIC DERMATITIS, UNSPECIFIED TYPE: ICD-10-CM

## 2024-01-31 DIAGNOSIS — R56.9 CONVULSIONS, UNSPECIFIED CONVULSION TYPE (MULTI): ICD-10-CM

## 2024-01-31 DIAGNOSIS — E11.69 HYPERLIPIDEMIA ASSOCIATED WITH TYPE 2 DIABETES MELLITUS (MULTI): ICD-10-CM

## 2024-01-31 DIAGNOSIS — E78.5 HYPERLIPIDEMIA ASSOCIATED WITH TYPE 2 DIABETES MELLITUS (MULTI): ICD-10-CM

## 2024-01-31 PROBLEM — D64.9 ANEMIA: Status: RESOLVED | Noted: 2023-09-25 | Resolved: 2024-01-31

## 2024-01-31 PROBLEM — N28.1 CYST OF LEFT KIDNEY: Status: RESOLVED | Noted: 2023-09-25 | Resolved: 2024-01-31

## 2024-01-31 PROBLEM — Z09 HOSPITAL DISCHARGE FOLLOW-UP: Status: RESOLVED | Noted: 2024-01-03 | Resolved: 2024-01-31

## 2024-01-31 PROBLEM — G56.00 CTS (CARPAL TUNNEL SYNDROME): Status: RESOLVED | Noted: 2023-09-25 | Resolved: 2024-01-31

## 2024-01-31 PROCEDURE — 1036F TOBACCO NON-USER: CPT | Performed by: INTERNAL MEDICINE

## 2024-01-31 PROCEDURE — 3060F POS MICROALBUMINURIA REV: CPT | Performed by: INTERNAL MEDICINE

## 2024-01-31 PROCEDURE — 3075F SYST BP GE 130 - 139MM HG: CPT | Performed by: INTERNAL MEDICINE

## 2024-01-31 PROCEDURE — 3080F DIAST BP >= 90 MM HG: CPT | Performed by: INTERNAL MEDICINE

## 2024-01-31 PROCEDURE — 4010F ACE/ARB THERAPY RXD/TAKEN: CPT | Performed by: INTERNAL MEDICINE

## 2024-01-31 PROCEDURE — 3008F BODY MASS INDEX DOCD: CPT | Performed by: INTERNAL MEDICINE

## 2024-01-31 PROCEDURE — 99214 OFFICE O/P EST MOD 30 MIN: CPT | Performed by: INTERNAL MEDICINE

## 2024-01-31 RX ORDER — KETOCONAZOLE 20 MG/ML
1 SHAMPOO, SUSPENSION TOPICAL
COMMUNITY

## 2024-01-31 ASSESSMENT — ENCOUNTER SYMPTOMS
DEPRESSION: 0
DYSURIA: 0
LOSS OF SENSATION IN FEET: 0
ABDOMINAL PAIN: 0
ACTIVITY CHANGE: 0
MYALGIAS: 0
LIGHT-HEADEDNESS: 0
COUGH: 0
SORE THROAT: 0
OCCASIONAL FEELINGS OF UNSTEADINESS: 0

## 2024-01-31 ASSESSMENT — PATIENT HEALTH QUESTIONNAIRE - PHQ9
2. FEELING DOWN, DEPRESSED OR HOPELESS: SEVERAL DAYS
1. LITTLE INTEREST OR PLEASURE IN DOING THINGS: NOT AT ALL
SUM OF ALL RESPONSES TO PHQ9 QUESTIONS 1 AND 2: 1

## 2024-01-31 NOTE — PROGRESS NOTES
"CHIEF COMPLAINT:    Amanuel Riojas is a 53 y.o. male who presents for Follow-up (Patient here to establish PCP, previous [facility] patient. Diabetes management. ).    HISTORY OF PRESENT ILLNESS:  Amanuel Riojas  is a pleasant 53-year-old -American gentleman here to establish primary care physician.  He was seeing Dr. Grace in our office.  He has recently come out of the hospital with 7-day stay for hyperglycemia.  His A1c was 15.  Patient does have multiple comorbidities along with hypertension, hyperlipidemia seizure disorder, posttraumatic disorder.  He has not been very compliant with his medications.  Now he is trying to get everything put together.  He is seeing Dr. Colindres.  He is using his subcu insulin religiously.  He is also watching so that he can control.  Patient goes to [facility] for multiple antipsychotic medication.  His blood pressure is somewhat elevated today but he has started having exercise.  Patient needs some education for nutrition.  He wants to get better education for his diabetic diet.  Patient also has diabetic retinopathy and he is somewhat blind.  He is seeing ophthalmology and will be getting power glasses soon.        Review of Systems   Constitutional:  Negative for activity change.   HENT:  Negative for congestion and sore throat.    Respiratory:  Negative for cough.    Cardiovascular:  Negative for chest pain.   Gastrointestinal:  Negative for abdominal pain.   Endocrine: Negative for polyuria.   Genitourinary:  Negative for dysuria.   Musculoskeletal:  Negative for myalgias.   Skin:  Negative for rash.   Neurological:  Negative for light-headedness.   Psychiatric/Behavioral:  Negative for behavioral problems.      Visit Vitals  /90 (BP Location: Left arm, Patient Position: Sitting, BP Cuff Size: Adult)   Pulse 57   Temp 36.8 °C (98.3 °F) (Temporal)   Ht 1.702 m (5' 7\")   Wt 104 kg (230 lb)   SpO2 98%   BMI 36.02 kg/m²   Smoking Status Never   BSA 2.22 m²         Wt " Readings from Last 10 Encounters:   01/31/24 104 kg (230 lb)   01/03/24 101 kg (222 lb)   12/29/23 101 kg (222 lb 10.6 oz)   12/05/23 106 kg (234 lb 3.2 oz)   11/21/23 108 kg (238 lb 12.8 oz)   08/28/23 108 kg (238 lb 3.2 oz)   04/12/23 106 kg (234 lb 6 oz)   04/05/23 106 kg (234 lb)   12/05/22 108 kg (237 lb)   11/18/22 109 kg (240 lb)       Physical Exam  Vitals and nursing note reviewed.   Constitutional:       Appearance: Normal appearance.   HENT:      Head: Normocephalic.      Right Ear: Tympanic membrane normal.      Left Ear: Tympanic membrane normal.      Nose: Nose normal.      Mouth/Throat:      Mouth: Mucous membranes are moist.   Cardiovascular:      Rate and Rhythm: Normal rate and regular rhythm.      Pulses: Normal pulses.      Heart sounds: No murmur heard.  Pulmonary:      Effort: No respiratory distress.      Breath sounds: Normal breath sounds.   Abdominal:      Palpations: Abdomen is soft.   Musculoskeletal:      Cervical back: Neck supple.      Right lower leg: No edema.      Left lower leg: No edema.   Skin:     General: Skin is warm.      Findings: No rash.   Neurological:      General: No focal deficit present.      Mental Status: He is alert and oriented to person, place, and time.   Psychiatric:         Mood and Affect: Mood normal.        RECENT LABS:  Lab Results   Component Value Date    WBC 5.0 12/28/2023    HGB 9.6 (L) 12/28/2023    HCT 29.1 (L) 12/28/2023     12/28/2023    CHOL 178 12/27/2023    TRIG 709 (H) 12/27/2023    HDL 23.2 12/27/2023    ALT 12 12/28/2023    AST 20 12/28/2023     01/09/2024    K 4.2 01/09/2024     01/09/2024    CREATININE 2.98 (H) 01/09/2024    BUN 28 (H) 01/09/2024    CO2 23 01/09/2024    TSH 2.73 05/02/2022    PSA 0.60 06/09/2021    INR 1.0 12/22/2023    HGBA1C 15.1 (H) 12/23/2023     IMAGING:  === 12/22/23 ===    XR CHEST 1 VIEW    - Impression -  No acute cardiopulmonary process.    MACRO  None    Signed by: Reilly Galan 12/22/2023  3:53 PM  Dictation workstation:   [id number]   === 12/22/23 ===    CT HEAD WO IV CONTRAST    - Impression -  No acute intracranial finding. MRI may be obtained if clinically  indicated    Signed by: Nilo Bates 12/22/2023 4:18 PM  Dictation workstation:   [id number]   Reviewed:   MEDICATIONS:   Current Outpatient Medications   Medication Instructions    albuterol 90 mcg/actuation inhaler 2 puffs, inhalation, Every 4 hours PRN    amLODIPine (NORVASC) 10 mg, oral, Daily    aspirin (Adult Low Dose Aspirin) 81 mg EC tablet 1 tablet, oral, Daily    atorvastatin (LIPITOR) 80 mg, oral, Nightly    blood-glucose meter misc Use daily or as directed for monitoring of diabetes.    cholecalciferol (VITAMIN D3) 50 mcg, oral, Daily    clobetasol (Temovate) 0.05 % cream APPLY SPARINGLY TO AFFECTED AREA(S) TWICE DAILY AS NEEDED    clotrimazole-betamethasone (Lotrisone) cream APPLY SPARINGLY TO AFFECTED AREA 3 TIMES A DAY    colchicine 0.6 mg tablet Take 1 tablet (0.6 mg) by mouth once daily. Do not start before December 29, 2023.    cyanocobalamin (VITAMIN B-12) 1,000 mcg, oral, Daily    divalproex (DEPAKOTE) 250 mg, oral, Every 12 hours scheduled, Do not crush, chew, or split.    dupilumab (DUPIXENT) 300 mg, subcutaneous, Every 14 days    flash glucose sensor (FREESTYLE NESTOR 14 DAY SENSOR MISC) miscellaneous, Every 14 days    FreeStyle Nestor reader (FreeStyle Nestor 2 [place]) misc Use as directed to check blood sugars daily    FreeStyle Nestor sensor system (FreeStyle Nestor 2 Sensor) kit Use as directed to check blood sugar. Change every 14 days    hydrocortisone 2.5 % ointment 1 Application, Topical, 2 times daily    hydrOXYzine pamoate (VISTARIL) 50 mg, oral, 2 times daily PRN    insulin glargine (LANTUS SOLOSTAR U-100 INSULIN) 75 Units, subcutaneous, Every morning, Take as directed per insulin instructions.    insulin lispro (HumaLOG) 100 unit/mL injection Inject 15 Units under the skin 3 times a day before meals. Increase  "to 20 units if blood sugar >200.    insulin lispro (HUMALOG) 0-15 Units, subcutaneous, With meals, nightly, and at 3AM, Take as directed per insulin instructions.    insulin lispro (HUMALOG) 14 Units, subcutaneous, 3 times daily with meals, Take as directed per insulin instructions.    ketoconazole (NIZOral) 2 % shampoo 1 Application, Topical, Weekly    lancets 33 gauge misc Use as directed to check blood sugar before meals and at bedtime.    losartan (COZAAR) 25 mg, oral, 2 times daily    metoprolol tartrate (LOPRESSOR) 50 mg, oral, 2 times daily    mometasone-formoterol (Dulera) 200-5 mcg/actuation inhaler 1 puff, inhalation, 2 times daily RT    pen needle, diabetic (TechLITE Pen Needle) 31 gauge x 5/16\" needle Use to inject 1-4 times daily as directed.    potassium chloride CR 20 mEq ER tablet 20 mEq, oral, Daily    QUEtiapine (SEROquel) 50 mg tablet 1 tablet, oral, Nightly    sertraline (Zoloft) 100 mg tablet 1 tablet, oral, Daily    terazosin (HYTRIN) 5 mg, oral, Once    triamcinolone (Kenalog) 0.1 % cream 1 Application, Topical, 2 times daily      TODAY'S VISIT  DX:   1. Type 2 diabetes mellitus with other specified complication, with long-term current use of insulin (CMS/[facility])  Referral to Nutrition Services      2. Atopic dermatitis, unspecified type        3. Hypertension associated with diabetes (CMS/[facility])        4. Hyperlipidemia associated with type 2 diabetes mellitus (CMS/[facility])        5. Convulsions, unspecified convulsion type (CMS/[facility])        6. Asthma, unspecified asthma severity, unspecified whether complicated, unspecified whether persistent           MEDICAL DECISION MAKING:  - Recent lab work and relevant imaging studies have been reviewed.    - The current active medical co morbidities have been considered.   - Relevant correspondence/notes from specialty consultants were reviewed and discussed with patient.    - Patient was given treatment as per above plan.   - Patient will continue with current " medical therapy and plan.   - Next Follow up in 3 months..

## 2024-02-02 ENCOUNTER — PATIENT OUTREACH (OUTPATIENT)
Dept: PRIMARY CARE | Facility: CLINIC | Age: 54
End: 2024-02-02
Payer: COMMERCIAL

## 2024-02-02 ENCOUNTER — TELEPHONE (OUTPATIENT)
Dept: PHARMACY | Facility: HOSPITAL | Age: 54
End: 2024-02-02
Payer: COMMERCIAL

## 2024-02-02 PROCEDURE — RXMED WILLOW AMBULATORY MEDICATION CHARGE

## 2024-02-02 RX ORDER — FLASH GLUCOSE SENSOR
KIT MISCELLANEOUS DAILY
Qty: 2 EACH | Refills: 2 | Status: SHIPPED | OUTPATIENT
Start: 2024-02-02

## 2024-02-02 NOTE — TELEPHONE ENCOUNTER
I reviewed the progress note and agree with the resident’s findings and plans as written. Case discussed with resident.    Armond Barker, OsmarD

## 2024-02-02 NOTE — NURSING NOTE
[facility] message from patient stating his Free Style Amanda sensor fell off. Reached out to patient's provider Dr. Colindres:  This patient needs Free Style Amanda 2 sensors called into his pharmacy [facility] [phone]. [facility]  automated secure chat from Dr. Colindres: Not on call until 02/10. Called [facility] patient has refills on file but to early to fill. Req override. Cannot to today.Reached out to Dr. Glover to place verbal on her behalf to order refills to [facility] to req override. Script processed override placed may take 24 hours. Spoke with patient informed him of above. Informed patient to use his back up glucometer. He in car and will return home. Will attempt to teach patient over phone then

## 2024-02-02 NOTE — TELEPHONE ENCOUNTER
Called to check in on patient after receiving a message from [facility] Manager Marta Hutchinson. Left voicemail.

## 2024-02-02 NOTE — PROGRESS NOTES
Call regarding appt. with PCP on 1/3/24 & 1/31/24 also saw Dr Colindres Endo 12/29/24 after hospitalization.  At time of outreach call the patient feels as if their condition has has not improved since last visit.  I have reached out to Armond Barker PharmD who has been working with the patient. He will try to contact patient this afternoon between patients. I have also reached out to Dr Colindres requesting his office call to check with patient about getting new monitor and gave patient the number to Dr Colindres office. He was able to take down the number and said if he uses his magnified glass he will be able to dial the phone. He promised me he would call when we hung to report that his glucose monitor is not staying on this month so he can not check his sugars. For last few days he has been feeling a lightheaded and dizzy at times and thinks sugars could be dropping but not sure since monitor not working properly.  He reported that this brother taped the monitor on but it still is not staying on and does not feel like the other monitors that have stayed on.   I educated Amanuel on the importance of trying to get a new monitor and he verbal expressed understanding and agreed he did not want to wait until his brother got home from work to reach out since the weekend is coming.

## 2024-02-03 ENCOUNTER — PHARMACY VISIT (OUTPATIENT)
Dept: PHARMACY | Facility: CLINIC | Age: 54
End: 2024-02-03
Payer: MEDICAID

## 2024-02-05 ENCOUNTER — DOCUMENTATION (OUTPATIENT)
Dept: INPATIENT UNIT | Facility: HOSPITAL | Age: 54
End: 2024-02-05
Payer: COMMERCIAL

## 2024-02-06 ENCOUNTER — OFFICE VISIT (OUTPATIENT)
Dept: FAMILY MEDICINE CLINIC | Age: 54
End: 2024-02-06
Payer: COMMERCIAL

## 2024-02-06 VITALS
DIASTOLIC BLOOD PRESSURE: 64 MMHG | WEIGHT: 235 LBS | BODY MASS INDEX: 36.88 KG/M2 | HEART RATE: 69 BPM | HEIGHT: 67 IN | OXYGEN SATURATION: 97 % | SYSTOLIC BLOOD PRESSURE: 110 MMHG

## 2024-02-06 DIAGNOSIS — E11.22 TYPE 2 DIABETES MELLITUS WITH STAGE 4 CHRONIC KIDNEY DISEASE AND HYPERTENSION (HCC): ICD-10-CM

## 2024-02-06 DIAGNOSIS — E11.69 HYPERLIPIDEMIA ASSOCIATED WITH TYPE 2 DIABETES MELLITUS (HCC): ICD-10-CM

## 2024-02-06 DIAGNOSIS — R80.9 TYPE 2 DIABETES MELLITUS WITH MICROALBUMINURIA, WITH LONG-TERM CURRENT USE OF INSULIN (HCC): Primary | ICD-10-CM

## 2024-02-06 DIAGNOSIS — N18.4 TYPE 2 DM WITH CKD STAGE 4 AND HYPERTENSION (HCC): ICD-10-CM

## 2024-02-06 DIAGNOSIS — Z12.11 SCREEN FOR COLON CANCER: ICD-10-CM

## 2024-02-06 DIAGNOSIS — B35.9 TINEA: ICD-10-CM

## 2024-02-06 DIAGNOSIS — N18.4 TYPE 2 DIABETES MELLITUS WITH STAGE 4 CHRONIC KIDNEY DISEASE AND HYPERTENSION (HCC): ICD-10-CM

## 2024-02-06 DIAGNOSIS — E78.5 HYPERLIPIDEMIA ASSOCIATED WITH TYPE 2 DIABETES MELLITUS (HCC): ICD-10-CM

## 2024-02-06 DIAGNOSIS — I12.9 TYPE 2 DM WITH CKD STAGE 4 AND HYPERTENSION (HCC): ICD-10-CM

## 2024-02-06 DIAGNOSIS — Z79.4 TYPE 2 DIABETES MELLITUS WITH MICROALBUMINURIA, WITH LONG-TERM CURRENT USE OF INSULIN (HCC): Primary | ICD-10-CM

## 2024-02-06 DIAGNOSIS — E11.29 TYPE 2 DIABETES MELLITUS WITH MICROALBUMINURIA, WITH LONG-TERM CURRENT USE OF INSULIN (HCC): Primary | ICD-10-CM

## 2024-02-06 DIAGNOSIS — I12.9 TYPE 2 DIABETES MELLITUS WITH STAGE 4 CHRONIC KIDNEY DISEASE AND HYPERTENSION (HCC): ICD-10-CM

## 2024-02-06 DIAGNOSIS — E11.22 TYPE 2 DM WITH CKD STAGE 4 AND HYPERTENSION (HCC): ICD-10-CM

## 2024-02-06 DIAGNOSIS — Z12.5 SCREENING FOR PROSTATE CANCER: ICD-10-CM

## 2024-02-06 DIAGNOSIS — N18.4 STAGE 4 CHRONIC KIDNEY DISEASE (HCC): ICD-10-CM

## 2024-02-06 PROCEDURE — G8427 DOCREV CUR MEDS BY ELIG CLIN: HCPCS | Performed by: INTERNAL MEDICINE

## 2024-02-06 PROCEDURE — 1036F TOBACCO NON-USER: CPT | Performed by: INTERNAL MEDICINE

## 2024-02-06 PROCEDURE — 2022F DILAT RTA XM EVC RTNOPTHY: CPT | Performed by: INTERNAL MEDICINE

## 2024-02-06 PROCEDURE — G8484 FLU IMMUNIZE NO ADMIN: HCPCS | Performed by: INTERNAL MEDICINE

## 2024-02-06 PROCEDURE — 3017F COLORECTAL CA SCREEN DOC REV: CPT | Performed by: INTERNAL MEDICINE

## 2024-02-06 PROCEDURE — G8417 CALC BMI ABV UP PARAM F/U: HCPCS | Performed by: INTERNAL MEDICINE

## 2024-02-06 PROCEDURE — 99497 ADVNCD CARE PLAN 30 MIN: CPT | Performed by: INTERNAL MEDICINE

## 2024-02-06 PROCEDURE — 3046F HEMOGLOBIN A1C LEVEL >9.0%: CPT | Performed by: INTERNAL MEDICINE

## 2024-02-06 PROCEDURE — 99214 OFFICE O/P EST MOD 30 MIN: CPT | Performed by: INTERNAL MEDICINE

## 2024-02-06 RX ORDER — ASPIRIN 81 MG/1
81 TABLET, COATED ORAL DAILY
COMMUNITY
Start: 2024-01-24

## 2024-02-06 RX ORDER — INSULIN GLARGINE 100 [IU]/ML
75 INJECTION, SOLUTION SUBCUTANEOUS NIGHTLY
COMMUNITY
Start: 2024-01-11 | End: 2024-02-06 | Stop reason: SDUPTHER

## 2024-02-06 RX ORDER — INSULIN GLARGINE 100 [IU]/ML
75 INJECTION, SOLUTION SUBCUTANEOUS NIGHTLY
Qty: 5 ADJUSTABLE DOSE PRE-FILLED PEN SYRINGE | Refills: 1 | Status: SHIPPED | OUTPATIENT
Start: 2024-02-06 | End: 2024-04-16

## 2024-02-06 RX ORDER — LANCETS 33 GAUGE
EACH MISCELLANEOUS
COMMUNITY
Start: 2023-12-26

## 2024-02-06 RX ORDER — INSULIN LISPRO 100 [IU]/ML
15 INJECTION, SOLUTION INTRAVENOUS; SUBCUTANEOUS
Qty: 2 ADJUSTABLE DOSE PRE-FILLED PEN SYRINGE | Refills: 1 | Status: SHIPPED | OUTPATIENT
Start: 2024-02-06

## 2024-02-06 RX ORDER — LANOLIN ALCOHOL/MO/W.PET/CERES
CREAM (GRAM) TOPICAL
COMMUNITY
Start: 2023-11-21

## 2024-02-06 RX ORDER — ALBUTEROL SULFATE 90 UG/1
AEROSOL, METERED RESPIRATORY (INHALATION)
COMMUNITY
Start: 2023-12-31

## 2024-02-06 RX ORDER — COLCHICINE 0.6 MG/1
0.6 TABLET ORAL DAILY
COMMUNITY
Start: 2024-01-24

## 2024-02-06 RX ORDER — CLOTRIMAZOLE AND BETAMETHASONE DIPROPIONATE 10; .64 MG/G; MG/G
CREAM TOPICAL
Qty: 15 G | Refills: 1 | Status: SHIPPED | OUTPATIENT
Start: 2024-02-06

## 2024-02-06 RX ORDER — ATORVASTATIN CALCIUM 80 MG/1
80 TABLET, FILM COATED ORAL DAILY
COMMUNITY
Start: 2024-01-24

## 2024-02-06 RX ORDER — INSULIN LISPRO 100 [IU]/ML
15 INJECTION, SOLUTION INTRAVENOUS; SUBCUTANEOUS
COMMUNITY
Start: 2023-12-26 | End: 2024-02-06 | Stop reason: SDUPTHER

## 2024-02-06 RX ORDER — AMLODIPINE BESYLATE 10 MG/1
10 TABLET ORAL DAILY
COMMUNITY
Start: 2023-11-21

## 2024-02-06 SDOH — ECONOMIC STABILITY: FOOD INSECURITY: WITHIN THE PAST 12 MONTHS, THE FOOD YOU BOUGHT JUST DIDN'T LAST AND YOU DIDN'T HAVE MONEY TO GET MORE.: NEVER TRUE

## 2024-02-06 SDOH — ECONOMIC STABILITY: INCOME INSECURITY: HOW HARD IS IT FOR YOU TO PAY FOR THE VERY BASICS LIKE FOOD, HOUSING, MEDICAL CARE, AND HEATING?: NOT HARD AT ALL

## 2024-02-06 SDOH — ECONOMIC STABILITY: FOOD INSECURITY: WITHIN THE PAST 12 MONTHS, YOU WORRIED THAT YOUR FOOD WOULD RUN OUT BEFORE YOU GOT MONEY TO BUY MORE.: NEVER TRUE

## 2024-02-06 SDOH — ECONOMIC STABILITY: HOUSING INSECURITY
IN THE LAST 12 MONTHS, WAS THERE A TIME WHEN YOU DID NOT HAVE A STEADY PLACE TO SLEEP OR SLEPT IN A SHELTER (INCLUDING NOW)?: NO

## 2024-02-06 ASSESSMENT — ENCOUNTER SYMPTOMS
PHOTOPHOBIA: 0
EYE PAIN: 0
COLOR CHANGE: 0
VOICE CHANGE: 0
BLOOD IN STOOL: 0
CONSTIPATION: 0
NAUSEA: 0
ABDOMINAL PAIN: 0

## 2024-02-06 ASSESSMENT — PATIENT HEALTH QUESTIONNAIRE - PHQ9
SUM OF ALL RESPONSES TO PHQ QUESTIONS 1-9: 0
2. FEELING DOWN, DEPRESSED OR HOPELESS: 0
1. LITTLE INTEREST OR PLEASURE IN DOING THINGS: 0
SUM OF ALL RESPONSES TO PHQ QUESTIONS 1-9: 0

## 2024-02-06 NOTE — PROGRESS NOTES
DAILY, Disp: , Rfl:     Lancets (ONETOUCH DELICA PLUS [id number]) MISC, , Disp: , Rfl:     LANTUS SOLOSTAR 100 UNIT/ML injection pen, Inject 75 Units into the skin nightly Take 70 u at night, Disp: 5 Adjustable Dose Pre-filled Pen Syringe, Rfl: 1    insulin lispro, 1 Unit Dial, (HUMALOG/ADMELOG) 100 UNIT/ML SOPN, Inject 15 Units into the skin 3 times daily (before meals), Disp: 2 Adjustable Dose Pre-filled Pen Syringe, Rfl: 1    clotrimazole-betamethasone (LOTRISONE) 1-0.05 % cream, Apply topically 2 times daily., Disp: 15 g, Rfl: 1   No past medical history on file.  No past surgical history on file.  No family history on file.  Social History     Tobacco Use    Smoking status: Never    Smokeless tobacco: Never   Substance Use Topics    Alcohol use: Never       PMH, Surgical Hx, Family Hx, and Social Hx reviewed and updated.  Health Maintenance reviewed.    Reviewed with the patient: current clinical status, medications, activities and diet.     Side effects, adverse effects of the medication prescribed today, as well as treatment plan/ rationale and result expectations have been discussed with the patient who expresses understanding and desires to proceed.  Close follow up to evaluate treatment results and for coordination of care.    I have reviewed the patient's medical history in detail and updated the computerized patient record.  Objective     Vitals:    02/06/24 1500   BP: 110/64   Site: Left Upper Arm   Position: Sitting   Cuff Size: Large Adult   Pulse: 69   SpO2: 97%   Weight: 106.6 kg (235 lb)   Height: 1.702 m (5' 7\")          Brayan Christensen MD

## 2024-02-06 NOTE — PATIENT INSTRUCTIONS
Patient Education        Lifestyle Changes for Chronic Health Conditions: Care Instructions  Overview     If you have diabetes, heart disease, or blood pressure or cholesterol problems, making healthy lifestyle changes can help. Changing your diet, getting more exercise, and getting rid of harmful habits can reduce your risk of heart attack, stroke, and other serious health problems. Even small changes can help. Start with steps that you can take right away. Think about things such as time limits, stress, and temptations that might get in the way, and figure out how you can avoid or overcome them.  Work with your doctor to plan lifestyle changes to deal with your health problem.  Follow-up care is a key part of your treatment and safety. Be sure to make and go to all appointments, and call your doctor if you are having problems. It's also a good idea to know your test results and keep a list of the medicines you take.  How can you care for yourself at home?  If your doctor recommends it, get more exercise. For many people, walking is a good choice. Or you may want to swim, bike, or do other activities. Bit by bit, increase the time you're active every day. Try for at least 30 minutes on most days of the week.  Eat healthy foods.  Choose fruits, vegetables, whole grains, protein, and low-fat dairy foods.  Limit saturated fat and reduce salt.  Stay at a weight that's healthy for you. Talk to your doctor if you need help with this.  If you smoke, try to quit. Smoking can make most chronic health problems worse. If you need help quitting, talk to your doctor about stop-smoking programs and medicines. These can increase your chances of quitting for good.  Limit alcohol to 2 drinks a day for men and 1 drink a day for women. Too much alcohol can cause health problems.  Take your medicines on time and in the right amounts. Use a pillbox to organize them, and use schedules, alarms, or other tools to help you stay on track.

## 2024-02-06 NOTE — ACP (ADVANCE CARE PLANNING)
Advance Care Planning     General Advance Care Planning (ACP) Conversation    Date of Conversation: 2/6/2024  Conducted with: Patient with Decision Making Capacity    Healthcare Decision Maker:  No healthcare decision makers have been documented.  Click here to complete HealthCare Decision Makers including selection of the Healthcare Decision Maker Relationship (ie \"Primary\")  Today we discussed Healthcare Decision Makers. The patient is considering options.    Content/Action Overview:  Has NO ACP documents/care preferences - requested patient complete ACP documents  Reviewed DNR/DNI and patient elects Full Code (Attempt Resuscitation)        Length of Voluntary ACP Conversation in minutes:  16 minutes    Brayan Christensen MD

## 2024-02-06 NOTE — ASSESSMENT & PLAN NOTE
Advised lifestyle modification diet exercise lose weight lifestyle modification information given

## 2024-02-19 ENCOUNTER — TELEMEDICINE (OUTPATIENT)
Dept: PHARMACY | Facility: HOSPITAL | Age: 54
End: 2024-02-19
Payer: COMMERCIAL

## 2024-02-19 DIAGNOSIS — E11.69 TYPE 2 DIABETES MELLITUS WITH OTHER SPECIFIED COMPLICATION, WITHOUT LONG-TERM CURRENT USE OF INSULIN (MULTI): Primary | ICD-10-CM

## 2024-02-19 NOTE — PROGRESS NOTES
Pharmacy Post-Discharge Visit  Amanuel Riojas is a 53 y.o. male was referred to Clinical Pharmacy Team to complete a post-discharge medication optimization and monitoring visit.  The patient was referred for their Diabetes.      Referring Provider: Mukesh Hanson MD    Subjective   Allergies   Allergen Reactions    Sulfamethoxazole-Trimethoprim Unknown     kidney failure       [id number] - [place], [place] - [address]  [address] [facility] [address]  Phone: [phone] Fax: [phone]    [facility]  [address]  [facility] [address]  Phone: [phone] Fax: [phone]      Social History     Social History Narrative    Not on file        Diabetes  He presents for his follow up diabetic visit. He has type 2 diabetes mellitus. The initial diagnosis of diabetes was made 20 years ago. His disease course has been worsening. (Reports hypoglycemia down to 48 x2 nights ago) Associated symptoms include blurred vision, foot paresthesias, polydipsia, polyuria, visual change and weight loss. There are no hypoglycemic complications. Diabetic complications include nephropathy. Risk factors for coronary artery disease include hypertension, male sex, obesity, diabetes mellitus, dyslipidemia, sedentary lifestyle and stress. Current diabetic treatment includes insulin injections. Diabetic current diet: Eating a lot of fruit and drinking water. Drinks orange juice and coca cola for hypoglycemia. Cautioned do not drink if no hypoglycemia. When asked about meal planning, he reported none. He participates in exercise daily. His home blood glucose trend is decreasing rapidly. (Most recent ) An ACE inhibitor/angiotensin II receptor blocker is not being taken. Eye exam is current.          this morning    Av-day: 114  7-day: 115    TIR:  7 day: Above 2 %, 96%, 2% below  14 day: above 2%, 94%, 4% below    RE 24:  Patient with  multi-year history of being well controlled A1c <6.5% prior to hospital admission for [facility] in Dec 2023 where A1c was found to be 15.1%. Reports polydipsia, polyuria, vision change, and weight loss preceding presentation.  Patient feels like stress, poor diet and lack of home monitoring are primary factors leading to worsening control.  Now on freestyle jonh 2 system and finding this very helpful.  Was placed back on insulin at hospital discharge, previously on oral therapy.  Now on Lantus 75 units QPM, Humalog 15-20 units TID AC.  Pharmacotherapy options limited by renal function.  Would avoid GLP-1 at this time given pancreatitis risk with TG >700. Consider SGLT2i with renal monitoring.  No current issues with affordability/access to medication.  Watching diet more carefully and working on establishing dedicated time for exercise.    Review of Systems    Medication System Management:  Affordability/Accessibility: none  Adherence/Organization: none  Adverse Effects: hypoglycemia with insulin    Objective     There were no vitals taken for this visit.     LAB  Lab Results   Component Value Date    BILITOT 0.5 12/28/2023    CALCIUM 8.7 01/09/2024    CO2 23 01/09/2024     01/09/2024    CREATININE 2.98 (H) 01/09/2024    GLUCOSE 115 (H) 01/09/2024    ALKPHOS 57 12/28/2023    K 4.2 01/09/2024    PROT 6.3 (L) 12/28/2023     01/09/2024    AST 20 12/28/2023    ALT 12 12/28/2023    BUN 28 (H) 01/09/2024    ANIONGAP 16 01/09/2024    MG 2.14 12/28/2023    PHOS 4.9 01/09/2024    LDH 85 12/23/2023    ALBUMIN 4.3 01/09/2024    LIPASE 28 06/04/2020    GFRMALE 26 (A) 08/25/2023     Lab Results   Component Value Date    TRIG 709 (H) 12/27/2023    CHOL 178 12/27/2023    LDLCALC  12/27/2023      Comment:      The calculation of LDL and VLDL are inaccurate when the Triglycerides are greater than 400 mg/dL or when the patient is non-fasting. If LDL measurement is necessary contact the testing laboratory for an alternative LDL  assay.                                  Near   Borderline      AGE      Desirable  Optimal    High     High     Very High     0-19 Y     0 - 109     ---    110-129   >/= 130     ----    20-24 Y     0 - 119     ---    120-159   >/= 160     ----      >24 Y     0 -  99   100-129  130-159   160-189     >/=190      HDL 23.2 12/27/2023     Lab Results   Component Value Date    HGBA1C 15.1 (H) 12/23/2023         Current Outpatient Medications on File Prior to Visit   Medication Sig Dispense Refill    insulin glargine (Lantus Solostar U-100 Insulin) 100 unit/mL (3 mL) pen Inject 75 Units under the skin once daily in the morning. Take as directed per insulin instructions. (Patient taking differently: Inject 65 Units under the skin once daily at bedtime. Take as directed per insulin instructions.) 34 mL 1    albuterol 90 mcg/actuation inhaler INHALE 2 PUFFS EVERY 4 HOURS IF NEEDED FOR SHORTNESS OF BREATH. 18 g 1    amLODIPine (Norvasc) 10 mg tablet Take 1 tablet (10 mg) by mouth once daily. 90 tablet 1    aspirin (Adult Low Dose Aspirin) 81 mg EC tablet Take 1 tablet (81 mg) by mouth once daily.      atorvastatin (Lipitor) 80 mg tablet Take 1 tablet (80 mg) by mouth once daily at bedtime. 30 tablet 0    blood-glucose meter misc Use daily or as directed for monitoring of diabetes. 1 each 0    cholecalciferol (Vitamin D3) 50 mcg (2,000 unit) capsule Take 1 capsule (50 mcg) by mouth once daily. 90 capsule 1    clobetasol (Temovate) 0.05 % cream APPLY SPARINGLY TO AFFECTED AREA(S) TWICE DAILY AS NEEDED 60 g 1    clotrimazole-betamethasone (Lotrisone) cream APPLY SPARINGLY TO AFFECTED AREA 3 TIMES A DAY 45 g 1    colchicine 0.6 mg tablet Take 1 tablet (0.6 mg) by mouth once daily. Do not start before December 29, 2023. 30 tablet 0    cyanocobalamin (Vitamin B-12) 1,000 mcg tablet Take 1 tablet (1,000 mcg) by mouth once daily. 90 tablet 1    divalproex (Depakote) 250 mg EC tablet Take 1 tablet (250 mg) by mouth every 12 hours. Do  "not crush, chew, or split. 60 tablet 0    dupilumab (Dupixent) 300 mg/2 mL syringe injection Inject 1 Syringe (300 mg) under the skin every 14 (fourteen) days.      flash glucose sensor (FREESTYLE NESTOR 14 DAY SENSOR MISC) every 14 (fourteen) days.      flash glucose sensor kit (FreeStyle Nestor 2 Sensor) kit Use as directed to check blood sugar. Change every 14 days 2 each 2    flash glucose sensor kit kit Use as directed to check blood sugar. Change every 14 days. 2 each 0    FreeStyle Nestor reader (FreeStyle Nestor 2 [place]) misc Use as directed to check blood sugars daily 1 each 0    hydrocortisone 2.5 % ointment Apply 1 Application topically 2 times a day.      hydrOXYzine pamoate (Vistaril) 25 mg capsule Take 2 capsules (50 mg) by mouth 2 times a day as needed (at bedtime).      insulin lispro (HumaLOG) 100 unit/mL injection Inject 15 Units under the skin 3 times a day before meals. Increase to 20 units if blood sugar >200. 30 mL 11    insulin lispro (HumaLOG) 100 unit/mL injection Inject 0-0.15 mL (0-15 Units) under the skin with breakfast, with lunch, and with evening meal. Take as directed per insulin instructions.      insulin lispro (HumaLOG) 100 unit/mL injection Inject 0.14 mL (14 Units) under the skin 3 times a day with meals. Take as directed per insulin instructions. Do not start before December 30, 2023.      ketoconazole (NIZOral) 2 % shampoo Apply 1 Application topically 1 (one) time per week.      lancets 33 gauge misc Use as directed to check blood sugar before meals and at bedtime. 100 each 1    losartan (Cozaar) 25 mg tablet TAKE 1 TABLET BY MOUTH TWICE A  tablet 1    metoprolol tartrate (Lopressor) 50 mg tablet Take 1 tablet by mouth 2 times a day. 90 tablet 1    mometasone-formoterol (Dulera) 200-5 mcg/actuation inhaler Inhale 1 puff 2 times a day. 13 g 1    pen needle, diabetic (TechLITE Pen Needle) 31 gauge x 5/16\" needle Use to inject 1-4 times daily as directed. 100 each 11    " potassium chloride CR 20 mEq ER tablet Take 1 tablet (20 mEq) by mouth once daily. 90 tablet 1    QUEtiapine (SEROquel) 50 mg tablet Take 1 tablet (50 mg) by mouth once daily at bedtime.      sertraline (Zoloft) 100 mg tablet Take 1 tablet (100 mg) by mouth once daily.      terazosin (Hytrin) 5 mg capsule Take 1 capsule (5 mg) by mouth 1 time.      triamcinolone (Kenalog) 0.1 % cream Apply 1 Application topically 2 times a day.       No current facility-administered medications on file prior to visit.          Assessment/Plan   Problem List Items Addressed This Visit       Type 2 diabetes mellitus with other specified complication (CMS/HCC) - Primary     On Lantus 70 units nightly, and humalog 8-15-15-0.  FBG <130 but having some concerning low glucose events, down to 48 x2 days ago, managing with oral carbs.  CGM TIR report over last week shows, above 2%, TIR 96%, low 2%. Sensors have been falling off so not wearing for full 14 or 30 day reports.  Has upcoming appointment with nutritionist 2/21/24.    Plan:  Decrease Lantus to 65 units nightly  Continue Humalog 8-15-15-0.  Clean skin before applying new sensor and get skin tape from pharmacy to help with sensor adhesion. Last sensor was replaced by            Follow up: 4 weeks    Continue all meds under the continuation of care with the referring provider and clinical pharmacy team.    Armond Barker, Vince     Verbal consent to manage patient's drug therapy was obtained from [the patient and/or an individual authorized to act on behalf of a patient]. They were informed they may decline to participate or withdraw from participation in pharmacy services at any time.

## 2024-02-19 NOTE — ASSESSMENT & PLAN NOTE
On Lantus 70 units nightly, and humalog 8-15-15-0.  FBG <130 but having some concerning low glucose events, down to 48 x2 days ago, managing with oral carbs.  CGM TIR report over last week shows, above 2%, TIR 96%, low 2%. Sensors have been falling off so not wearing for full 14 or 30 day reports.  Has upcoming appointment with nutritionist 2/21/24.    Plan:  Decrease Lantus to 65 units nightly  Continue Humalog 8-15-15-0.  Clean skin before applying new sensor and get skin tape from pharmacy to help with sensor adhesion. Last sensor was replaced by

## 2024-02-21 ENCOUNTER — PATIENT OUTREACH (OUTPATIENT)
Dept: PRIMARY CARE | Facility: CLINIC | Age: 54
End: 2024-02-21

## 2024-02-21 NOTE — PROGRESS NOTES
Unable to reach patient for one month +post discharge follow up call.   LVM with call back number for patient to call to assist with any questions or concerns patient may have.   Also asked him to let me know if he decided to follow Dr Hanson to her new office or was staying with Dr New at [facility]. I see latest appt was with Dr Grace at [facility] and has follow up appts scheduled with both docs in future.   Asked that he let us know so we can keep his chart updated for him.

## 2024-03-18 ENCOUNTER — TELEMEDICINE (OUTPATIENT)
Dept: PHARMACY | Facility: HOSPITAL | Age: 54
End: 2024-03-18
Payer: COMMERCIAL

## 2024-03-18 DIAGNOSIS — Z79.4 TYPE 2 DIABETES MELLITUS WITH OTHER SPECIFIED COMPLICATION, WITH LONG-TERM CURRENT USE OF INSULIN (MULTI): Primary | ICD-10-CM

## 2024-03-18 DIAGNOSIS — E11.69 TYPE 2 DIABETES MELLITUS WITH OTHER SPECIFIED COMPLICATION, WITH LONG-TERM CURRENT USE OF INSULIN (MULTI): Primary | ICD-10-CM

## 2024-03-18 ASSESSMENT — ENCOUNTER SYMPTOMS
VISUAL CHANGE: 1
POLYDIPSIA: 0
BLURRED VISION: 1

## 2024-03-18 NOTE — ASSESSMENT & PLAN NOTE
Follows with Dr. Colindres.  On Lantus 50 units nightly and humalog 8-8-8.  Symptomatically improved since December.  CGM report shows 30-day avg 124, TIR 92%. Hypoglycemia has improved with insulin decrease.  Walking more, dedicated time for aerobic and anaerobic exercise 3-4 times/week.    Plan:  Continue all current pharmacotherapy.

## 2024-03-18 NOTE — PROGRESS NOTES
Pharmacy Post-Discharge Visit  Amanuel Riojas is a 53 y.o. male was referred to Clinical Pharmacy Team to complete a post-discharge medication optimization and monitoring visit.  The patient was referred for their Diabetes.      Referring Provider: Mike New MD    Subjective   Allergies   Allergen Reactions    Sulfamethoxazole-Trimethoprim Unknown     kidney failure       [id number] - [place], [place] - [address]  [address] [address]  Phone: [phone] Fax: [phone]    [facility]  [address]  [facility] [address]  Phone: [phone] Fax: [phone]      Social History     Social History Narrative    Not on file        Diabetes  He presents for his follow-up diabetic visit. He has type 2 diabetes mellitus. His disease course has been improving. There are no hypoglycemic associated symptoms. Associated symptoms include blurred vision, foot paresthesias and visual change. Pertinent negatives for diabetes include no polydipsia and no polyuria. There are no hypoglycemic complications. Symptoms are improving. Current diabetic treatment includes insulin injections. Weight trend: Increasing minimally. Diabetic current diet: limiting portion sizes but difficulty avoiding concentrated carbohydrates. When asked about meal planning, he reported none. He participates in exercise three times a week. An ACE inhibitor/angiotensin II receptor blocker is being taken. Eye exam is current.     50 units, 8-8-8    Av-day: 132  14-day: 124  30-day: 124    TIR:  7-day: above 13%, 86%, below 1%  14-day: 8%, 91%, below 1%  30-day: above 7, 92%, below 1%    Review of Systems   Eyes:  Positive for blurred vision.   Endocrine: Negative for polydipsia and polyuria.       Medication System Management:  Affordability/Accessibility: none  Adherence/Organization: none  Adverse Effects: none    Objective     There were no vitals taken for this visit.      LAB  Lab Results   Component Value Date    BILITOT 0.5 12/28/2023    CALCIUM 8.7 01/09/2024    CO2 23 01/09/2024     01/09/2024    CREATININE 2.98 (H) 01/09/2024    GLUCOSE 115 (H) 01/09/2024    ALKPHOS 57 12/28/2023    K 4.2 01/09/2024    PROT 6.3 (L) 12/28/2023     01/09/2024    AST 20 12/28/2023    ALT 12 12/28/2023    BUN 28 (H) 01/09/2024    ANIONGAP 16 01/09/2024    MG 2.14 12/28/2023    PHOS 4.9 01/09/2024    LDH 85 12/23/2023    ALBUMIN 4.3 01/09/2024    LIPASE 28 06/04/2020    GFRMALE 26 (A) 08/25/2023     Lab Results   Component Value Date    TRIG 709 (H) 12/27/2023    CHOL 178 12/27/2023    LDLCALC  12/27/2023      Comment:      The calculation of LDL and VLDL are inaccurate when the Triglycerides are greater than 400 mg/dL or when the patient is non-fasting. If LDL measurement is necessary contact the testing laboratory for an alternative LDL assay.                                  Near   Borderline      AGE      Desirable  Optimal    High     High     Very High     0-19 Y     0 - 109     ---    110-129   >/= 130     ----    20-24 Y     0 - 119     ---    120-159   >/= 160     ----      >24 Y     0 -  99   100-129  130-159   160-189     >/=190      HDL 23.2 12/27/2023     Lab Results   Component Value Date    HGBA1C 15.1 (H) 12/23/2023         Current Outpatient Medications on File Prior to Visit   Medication Sig Dispense Refill    albuterol 90 mcg/actuation inhaler INHALE 2 PUFFS EVERY 4 HOURS IF NEEDED FOR SHORTNESS OF BREATH. 18 g 1    amLODIPine (Norvasc) 10 mg tablet Take 1 tablet (10 mg) by mouth once daily. 90 tablet 1    aspirin (Adult Low Dose Aspirin) 81 mg EC tablet Take 1 tablet (81 mg) by mouth once daily.      atorvastatin (Lipitor) 80 mg tablet Take 1 tablet (80 mg) by mouth once daily at bedtime. 30 tablet 0    blood-glucose meter misc Use daily or as directed for monitoring of diabetes. 1 each 0    cholecalciferol (Vitamin D3) 50 mcg (2,000 unit) capsule Take 1 capsule  (50 mcg) by mouth once daily. 90 capsule 1    clobetasol (Temovate) 0.05 % cream APPLY SPARINGLY TO AFFECTED AREA(S) TWICE DAILY AS NEEDED 60 g 1    clotrimazole-betamethasone (Lotrisone) cream APPLY SPARINGLY TO AFFECTED AREA 3 TIMES A DAY 45 g 1    colchicine 0.6 mg tablet Take 1 tablet (0.6 mg) by mouth once daily. Do not start before December 29, 2023. 30 tablet 0    cyanocobalamin (Vitamin B-12) 1,000 mcg tablet Take 1 tablet (1,000 mcg) by mouth once daily. 90 tablet 1    divalproex (Depakote) 250 mg EC tablet Take 1 tablet (250 mg) by mouth every 12 hours. Do not crush, chew, or split. 60 tablet 0    dupilumab (Dupixent) 300 mg/2 mL syringe injection Inject 1 Syringe (300 mg) under the skin every 14 (fourteen) days.      flash glucose sensor (FREESTYLE NESTOR 14 DAY SENSOR MISC) every 14 (fourteen) days.      flash glucose sensor kit (FreeStyle Nestor 2 Sensor) kit Use as directed to check blood sugar. Change every 14 days 2 each 2    flash glucose sensor kit kit Use as directed to check blood sugar. Change every 14 days. 2 each 0    FreeStyle Nestor reader (FreeStyle Nestor 2 [place]) misc Use as directed to check blood sugars daily 1 each 0    hydrocortisone 2.5 % ointment Apply 1 Application topically 2 times a day.      hydrOXYzine pamoate (Vistaril) 25 mg capsule Take 2 capsules (50 mg) by mouth 2 times a day as needed (at bedtime).      insulin glargine (Lantus Solostar U-100 Insulin) 100 unit/mL (3 mL) pen Inject 75 Units under the skin once daily in the morning. Take as directed per insulin instructions. (Patient taking differently: Inject 65 Units under the skin once daily at bedtime. Take as directed per insulin instructions.) 34 mL 1    insulin lispro (HumaLOG) 100 unit/mL injection Inject 15 Units under the skin 3 times a day before meals. Increase to 20 units if blood sugar >200. 30 mL 11    insulin lispro (HumaLOG) 100 unit/mL injection Inject 0-0.15 mL (0-15 Units) under the skin with breakfast,  "with lunch, and with evening meal. Take as directed per insulin instructions.      insulin lispro (HumaLOG) 100 unit/mL injection Inject 0.14 mL (14 Units) under the skin 3 times a day with meals. Take as directed per insulin instructions. Do not start before December 30, 2023.      ketoconazole (NIZOral) 2 % shampoo Apply 1 Application topically 1 (one) time per week.      lancets 33 gauge misc Use as directed to check blood sugar before meals and at bedtime. 100 each 1    losartan (Cozaar) 25 mg tablet TAKE 1 TABLET BY MOUTH TWICE A  tablet 1    metoprolol tartrate (Lopressor) 50 mg tablet Take 1 tablet by mouth 2 times a day. 90 tablet 1    mometasone-formoterol (Dulera) 200-5 mcg/actuation inhaler Inhale 1 puff 2 times a day. 13 g 1    pen needle, diabetic (TechLITE Pen Needle) 31 gauge x 5/16\" needle Use to inject 1-4 times daily as directed. 100 each 11    potassium chloride CR 20 mEq ER tablet Take 1 tablet (20 mEq) by mouth once daily. 90 tablet 1    QUEtiapine (SEROquel) 50 mg tablet Take 1 tablet (50 mg) by mouth once daily at bedtime.      sertraline (Zoloft) 100 mg tablet Take 1 tablet (100 mg) by mouth once daily.      terazosin (Hytrin) 5 mg capsule Take 1 capsule (5 mg) by mouth 1 time.      triamcinolone (Kenalog) 0.1 % cream Apply 1 Application topically 2 times a day.       No current facility-administered medications on file prior to visit.          Assessment/Plan   Problem List Items Addressed This Visit       Type 2 diabetes mellitus with other specified complication (CMS/[facility]) - Primary     Follows with Dr. Colindres.  On Lantus 50 units nightly and humalog 8-8-8.  Symptomatically improved since December.  CGM report shows 30-day avg 124, TIR 92%. Hypoglycemia has improved with insulin decrease.  Walking more, dedicated time for aerobic and anaerobic exercise 3-4 times/week.    Plan:  Continue all current pharmacotherapy.        Follow up: 5/20/24 @9 AM    Continue all meds under the " continuation of care with the referring provider and clinical pharmacy team.    Armond Barker, Vince     Verbal consent to manage patient's drug therapy was obtained from [the patient and/or an individual authorized to act on behalf of a patient]. They were informed they may decline to participate or withdraw from participation in pharmacy services at any time.

## 2024-03-27 ENCOUNTER — PATIENT OUTREACH (OUTPATIENT)
Dept: PRIMARY CARE | Facility: CLINIC | Age: 54
End: 2024-03-27
Payer: COMMERCIAL

## 2024-03-27 NOTE — PROGRESS NOTES
CM left voicemail for pt on follow up call to assess any final questions or concerns regarding hospitalization. Contact info provided.    Pt has met the target of no readmission for 90 days post discharge and is graduated from the TCM program at this time

## 2024-04-02 DIAGNOSIS — E11.59 HYPERTENSION ASSOCIATED WITH DIABETES (MULTI): ICD-10-CM

## 2024-04-02 DIAGNOSIS — I15.2 HYPERTENSION ASSOCIATED WITH DIABETES (MULTI): ICD-10-CM

## 2024-04-03 RX ORDER — METOPROLOL TARTRATE 50 MG/1
50 TABLET ORAL 2 TIMES DAILY
Qty: 180 TABLET | Refills: 1 | Status: SHIPPED | OUTPATIENT
Start: 2024-04-03

## 2024-04-26 DIAGNOSIS — E55.9 VITAMIN D DEFICIENCY, UNSPECIFIED: ICD-10-CM

## 2024-04-26 DIAGNOSIS — N18.32 CHRONIC KIDNEY DISEASE, STAGE 3B (MULTI): Primary | ICD-10-CM

## 2024-04-29 ENCOUNTER — LAB (OUTPATIENT)
Dept: LAB | Facility: LAB | Age: 54
End: 2024-04-29
Payer: COMMERCIAL

## 2024-04-29 DIAGNOSIS — N18.32 CHRONIC KIDNEY DISEASE, STAGE 3B (MULTI): ICD-10-CM

## 2024-04-29 DIAGNOSIS — E55.9 VITAMIN D DEFICIENCY, UNSPECIFIED: ICD-10-CM

## 2024-04-29 LAB
25(OH)D3 SERPL-MCNC: 37 NG/ML (ref 30–100)
ALBUMIN SERPL BCP-MCNC: 4.6 G/DL (ref 3.4–5)
ANION GAP SERPL CALC-SCNC: 15 MMOL/L (ref 10–20)
APPEARANCE UR: CLEAR
BACTERIA #/AREA URNS AUTO: ABNORMAL /HPF
BILIRUB UR STRIP.AUTO-MCNC: NEGATIVE MG/DL
BUN SERPL-MCNC: 37 MG/DL (ref 6–23)
CALCIUM SERPL-MCNC: 8.7 MG/DL (ref 8.6–10.3)
CHLORIDE SERPL-SCNC: 112 MMOL/L (ref 98–107)
CO2 SERPL-SCNC: 21 MMOL/L (ref 21–32)
COLOR UR: YELLOW
CREAT SERPL-MCNC: 3.83 MG/DL (ref 0.5–1.3)
CREAT UR-MCNC: 142 MG/DL (ref 20–370)
EGFRCR SERPLBLD CKD-EPI 2021: 18 ML/MIN/1.73M*2
GLUCOSE SERPL-MCNC: 156 MG/DL (ref 74–99)
GLUCOSE UR STRIP.AUTO-MCNC: NEGATIVE MG/DL
KETONES UR STRIP.AUTO-MCNC: NEGATIVE MG/DL
LEUKOCYTE ESTERASE UR QL STRIP.AUTO: NEGATIVE
MUCOUS THREADS #/AREA URNS AUTO: ABNORMAL /LPF
NITRITE UR QL STRIP.AUTO: NEGATIVE
PH UR STRIP.AUTO: 5 [PH]
PHOSPHATE SERPL-MCNC: 5.2 MG/DL (ref 2.5–4.9)
POTASSIUM SERPL-SCNC: 4.6 MMOL/L (ref 3.5–5.3)
PROT UR STRIP.AUTO-MCNC: ABNORMAL MG/DL
PROT UR-ACNC: 73 MG/DL (ref 5–25)
PROT/CREAT UR: 0.51 MG/MG CREAT (ref 0–0.17)
PTH-INTACT SERPL-MCNC: 158.4 PG/ML (ref 18.5–88)
RBC # UR STRIP.AUTO: NEGATIVE /UL
RBC #/AREA URNS AUTO: ABNORMAL /HPF
SODIUM SERPL-SCNC: 143 MMOL/L (ref 136–145)
SP GR UR STRIP.AUTO: 1.01
UROBILINOGEN UR STRIP.AUTO-MCNC: <2 MG/DL
WBC #/AREA URNS AUTO: ABNORMAL /HPF

## 2024-04-29 PROCEDURE — 83970 ASSAY OF PARATHORMONE: CPT

## 2024-04-29 PROCEDURE — 80069 RENAL FUNCTION PANEL: CPT

## 2024-04-29 PROCEDURE — 82306 VITAMIN D 25 HYDROXY: CPT

## 2024-04-29 PROCEDURE — 82570 ASSAY OF URINE CREATININE: CPT

## 2024-04-29 PROCEDURE — 84156 ASSAY OF PROTEIN URINE: CPT

## 2024-04-29 PROCEDURE — 81001 URINALYSIS AUTO W/SCOPE: CPT

## 2024-04-29 PROCEDURE — 36415 COLL VENOUS BLD VENIPUNCTURE: CPT

## 2024-04-30 ENCOUNTER — APPOINTMENT (OUTPATIENT)
Dept: PRIMARY CARE | Facility: CLINIC | Age: 54
End: 2024-04-30
Payer: COMMERCIAL

## 2024-05-08 ENCOUNTER — OFFICE VISIT (OUTPATIENT)
Dept: PRIMARY CARE | Facility: CLINIC | Age: 54
End: 2024-05-08
Payer: COMMERCIAL

## 2024-05-08 VITALS
SYSTOLIC BLOOD PRESSURE: 126 MMHG | HEART RATE: 69 BPM | BODY MASS INDEX: 35.39 KG/M2 | WEIGHT: 225.5 LBS | OXYGEN SATURATION: 93 % | HEIGHT: 67 IN | TEMPERATURE: 98.2 F | DIASTOLIC BLOOD PRESSURE: 90 MMHG

## 2024-05-08 DIAGNOSIS — Z79.4 TYPE 2 DIABETES MELLITUS WITH OTHER SPECIFIED COMPLICATION, WITH LONG-TERM CURRENT USE OF INSULIN (MULTI): Primary | ICD-10-CM

## 2024-05-08 DIAGNOSIS — I15.2 HYPERTENSION ASSOCIATED WITH DIABETES (MULTI): ICD-10-CM

## 2024-05-08 DIAGNOSIS — E11.69 TYPE 2 DIABETES MELLITUS WITH OTHER SPECIFIED COMPLICATION, WITH LONG-TERM CURRENT USE OF INSULIN (MULTI): Primary | ICD-10-CM

## 2024-05-08 DIAGNOSIS — E11.69 HYPERLIPIDEMIA ASSOCIATED WITH TYPE 2 DIABETES MELLITUS (MULTI): ICD-10-CM

## 2024-05-08 DIAGNOSIS — E55.9 VITAMIN D INSUFFICIENCY: ICD-10-CM

## 2024-05-08 DIAGNOSIS — E78.5 HYPERLIPIDEMIA ASSOCIATED WITH TYPE 2 DIABETES MELLITUS (MULTI): ICD-10-CM

## 2024-05-08 DIAGNOSIS — E11.59 HYPERTENSION ASSOCIATED WITH DIABETES (MULTI): ICD-10-CM

## 2024-05-08 LAB — POC HEMOGLOBIN A1C: 6.1 % (ref 4.2–6.5)

## 2024-05-08 PROCEDURE — 3074F SYST BP LT 130 MM HG: CPT | Performed by: INTERNAL MEDICINE

## 2024-05-08 PROCEDURE — 4010F ACE/ARB THERAPY RXD/TAKEN: CPT | Performed by: INTERNAL MEDICINE

## 2024-05-08 PROCEDURE — 3080F DIAST BP >= 90 MM HG: CPT | Performed by: INTERNAL MEDICINE

## 2024-05-08 PROCEDURE — 3008F BODY MASS INDEX DOCD: CPT | Performed by: INTERNAL MEDICINE

## 2024-05-08 PROCEDURE — 83036 HEMOGLOBIN GLYCOSYLATED A1C: CPT | Performed by: INTERNAL MEDICINE

## 2024-05-08 PROCEDURE — 99214 OFFICE O/P EST MOD 30 MIN: CPT | Performed by: INTERNAL MEDICINE

## 2024-05-08 PROCEDURE — 3060F POS MICROALBUMINURIA REV: CPT | Performed by: INTERNAL MEDICINE

## 2024-05-08 ASSESSMENT — PATIENT HEALTH QUESTIONNAIRE - PHQ9
2. FEELING DOWN, DEPRESSED OR HOPELESS: NOT AT ALL
SUM OF ALL RESPONSES TO PHQ9 QUESTIONS 1 AND 2: 0
1. LITTLE INTEREST OR PLEASURE IN DOING THINGS: NOT AT ALL

## 2024-05-14 DIAGNOSIS — E53.8 VITAMIN B12 DEFICIENCY: ICD-10-CM

## 2024-05-14 RX ORDER — LANOLIN ALCOHOL/MO/W.PET/CERES
1000 CREAM (GRAM) TOPICAL DAILY
Qty: 90 TABLET | Refills: 1 | Status: SHIPPED | OUTPATIENT
Start: 2024-05-14

## 2024-05-19 DIAGNOSIS — E11.59 HYPERTENSION ASSOCIATED WITH DIABETES (MULTI): ICD-10-CM

## 2024-05-19 DIAGNOSIS — I15.2 HYPERTENSION ASSOCIATED WITH DIABETES (MULTI): ICD-10-CM

## 2024-05-20 ENCOUNTER — TELEMEDICINE (OUTPATIENT)
Dept: PHARMACY | Facility: HOSPITAL | Age: 54
End: 2024-05-20
Payer: COMMERCIAL

## 2024-05-20 DIAGNOSIS — Z79.4 TYPE 2 DIABETES MELLITUS WITH OTHER SPECIFIED COMPLICATION, WITH LONG-TERM CURRENT USE OF INSULIN (MULTI): Primary | ICD-10-CM

## 2024-05-20 DIAGNOSIS — E11.69 TYPE 2 DIABETES MELLITUS WITH OTHER SPECIFIED COMPLICATION, WITH LONG-TERM CURRENT USE OF INSULIN (MULTI): Primary | ICD-10-CM

## 2024-05-20 RX ORDER — POTASSIUM CHLORIDE 1500 MG/1
20 TABLET, EXTENDED RELEASE ORAL DAILY
Qty: 90 TABLET | Refills: 1 | Status: SHIPPED | OUTPATIENT
Start: 2024-05-20

## 2024-05-20 ASSESSMENT — ENCOUNTER SYMPTOMS
BLURRED VISION: 1
POLYDIPSIA: 0
VISUAL CHANGE: 1

## 2024-05-20 NOTE — TELEPHONE ENCOUNTER
Pharmacy request     Last 5/8/2024    Future Appointments       Date / Time Provider Department Dept Phone    5/20/2024 9:00 AM Alicia Ayala, PharmD [facility]  Arrive at: Your Home [phone]    5/28/2024 1:30 PM (Arrive by 1:15 PM) Chetan Sharma DO [facility] [phone]    8/7/2024 1:30 PM Mike New MD [facility] [phone]

## 2024-05-20 NOTE — PROGRESS NOTES
Pharmacy Post-Discharge Visit  Amanuel Riojas is a 54 y.o. male was referred to Clinical Pharmacy Team to complete a post-discharge medication optimization and monitoring visit.  The patient was referred for their Diabetes.      Referring Provider: Mike New MD    Subjective   Allergies   Allergen Reactions    Sulfamethoxazole-Trimethoprim Unknown     kidney failure       [id number] - [place], [place] - [address]  [address] [address]  Phone: [phone] Fax: [phone]    [facility]  [address]  [facility] [address]  Phone: [phone] Fax: [phone]      Social History     Social History Narrative    Not on file        Diabetes  He presents for his follow-up diabetic visit. He has type 2 diabetes mellitus. His disease course has been improving. There are no hypoglycemic associated symptoms. Associated symptoms include blurred vision, foot paresthesias and visual change. Pertinent negatives for diabetes include no polydipsia and no polyuria. There are no hypoglycemic complications. Symptoms are improving. Current diabetic treatment includes insulin injections. Weight trend: Increasing minimally. Diabetic current diet: limiting portion sizes but difficulty avoiding concentrated carbohydrates. When asked about meal planning, he reported none. He participates in exercise three times a week. An ACE inhibitor/angiotensin II receptor blocker is being taken. Eye exam is current.     Av-day: 142  14-day: 135  30-day: 127    TIR:  7-day: above 11%, 88%, below 1%  14-day: above 11%, 89%, below 1%  30-day: above 7%, 92%, below 1%    Review of Systems   Eyes:  Positive for blurred vision.   Endocrine: Negative for polydipsia and polyuria.       Medication System Management:  Affordability/Accessibility: none  Adherence/Organization: none  Adverse Effects: none    Objective     There were no vitals taken for this visit.      LAB  Lab Results   Component Value Date    BILITOT 0.5 12/28/2023    CALCIUM 8.7 04/29/2024    CO2 21 04/29/2024     (H) 04/29/2024    CREATININE 3.83 (H) 04/29/2024    GLUCOSE 156 (H) 04/29/2024    ALKPHOS 57 12/28/2023    K 4.6 04/29/2024    PROT 6.3 (L) 12/28/2023     04/29/2024    AST 20 12/28/2023    ALT 12 12/28/2023    BUN 37 (H) 04/29/2024    ANIONGAP 15 04/29/2024    MG 2.14 12/28/2023    PHOS 5.2 (H) 04/29/2024    LDH 85 12/23/2023    ALBUMIN 4.6 04/29/2024    LIPASE 28 06/04/2020    GFRMALE 26 (A) 08/25/2023     Lab Results   Component Value Date    TRIG 709 (H) 12/27/2023    CHOL 178 12/27/2023    LDLCALC  12/27/2023      Comment:      The calculation of LDL and VLDL are inaccurate when the Triglycerides are greater than 400 mg/dL or when the patient is non-fasting. If LDL measurement is necessary contact the testing laboratory for an alternative LDL assay.                                  Near   Borderline      AGE      Desirable  Optimal    High     High     Very High     0-19 Y     0 - 109     ---    110-129   >/= 130     ----    20-24 Y     0 - 119     ---    120-159   >/= 160     ----      >24 Y     0 -  99   100-129  130-159   160-189     >/=190      HDL 23.2 12/27/2023     Lab Results   Component Value Date    HGBA1C 6.1 05/08/2024         Current Outpatient Medications on File Prior to Visit   Medication Sig Dispense Refill    albuterol 90 mcg/actuation inhaler INHALE 2 PUFFS EVERY 4 HOURS IF NEEDED FOR SHORTNESS OF BREATH. 18 g 1    amLODIPine (Norvasc) 10 mg tablet Take 1 tablet (10 mg) by mouth once daily. 90 tablet 1    aspirin (Adult Low Dose Aspirin) 81 mg EC tablet Take 1 tablet (81 mg) by mouth once daily.      atorvastatin (Lipitor) 80 mg tablet Take 1 tablet (80 mg) by mouth once daily at bedtime. 30 tablet 0    blood-glucose meter misc Use daily or as directed for monitoring of diabetes. 1 each 0    cholecalciferol (Vitamin D3) 50 mcg (2,000 unit) capsule Take 1 capsule  (50 mcg) by mouth once daily. 90 capsule 1    clobetasol (Temovate) 0.05 % cream APPLY SPARINGLY TO AFFECTED AREA(S) TWICE DAILY AS NEEDED 60 g 1    clotrimazole-betamethasone (Lotrisone) cream APPLY SPARINGLY TO AFFECTED AREA 3 TIMES A DAY 45 g 1    colchicine 0.6 mg tablet Take 1 tablet (0.6 mg) by mouth once daily. Do not start before December 29, 2023. 30 tablet 0    cyanocobalamin (Vitamin B-12) 1,000 mcg tablet TAKE 1 TABLET (1,000 MCG) BY MOUTH ONCE DAILY 90 tablet 1    divalproex (Depakote) 250 mg EC tablet Take 1 tablet (250 mg) by mouth every 12 hours. Do not crush, chew, or split. 60 tablet 0    dupilumab (Dupixent) 300 mg/2 mL syringe injection Inject 1 Syringe (300 mg) under the skin every 14 (fourteen) days.      flash glucose sensor (FREESTYLE NESTOR 14 DAY SENSOR MISC) every 14 (fourteen) days.      flash glucose sensor kit (FreeStyle Nsetor 2 Sensor) kit Use as directed to check blood sugar. Change every 14 days 2 each 2    flash glucose sensor kit kit Use as directed to check blood sugar. Change every 14 days. 2 each 0    FreeStyle Nestor reader (FreeStyle Nestor 2 [place]) misc Use as directed to check blood sugars daily 1 each 0    hydrocortisone 2.5 % ointment Apply 1 Application topically 2 times a day.      hydrOXYzine pamoate (Vistaril) 25 mg capsule Take 2 capsules (50 mg) by mouth 2 times a day as needed (at bedtime).      insulin glargine (Lantus Solostar U-100 Insulin) 100 unit/mL (3 mL) pen Inject 75 Units under the skin once daily in the morning. Take as directed per insulin instructions. (Patient taking differently: Inject 65 Units under the skin once daily at bedtime. Take as directed per insulin instructions.) 34 mL 1    insulin lispro (HumaLOG) 100 unit/mL injection Inject 15 Units under the skin 3 times a day before meals. Increase to 20 units if blood sugar >200. 30 mL 11    insulin lispro (HumaLOG) 100 unit/mL injection Inject 0-0.15 mL (0-15 Units) under the skin with breakfast, with  "lunch, and with evening meal. Take as directed per insulin instructions.      insulin lispro (HumaLOG) 100 unit/mL injection Inject 0.14 mL (14 Units) under the skin 3 times a day with meals. Take as directed per insulin instructions. Do not start before December 30, 2023.      ketoconazole (NIZOral) 2 % shampoo Apply 1 Application topically 1 (one) time per week.      lancets 33 gauge misc Use as directed to check blood sugar before meals and at bedtime. 100 each 1    losartan (Cozaar) 25 mg tablet TAKE 1 TABLET BY MOUTH TWICE A  tablet 1    metoprolol tartrate (Lopressor) 50 mg tablet TAKE 1 TABLET BY MOUTH TWICE A  tablet 1    mometasone-formoterol (Dulera) 200-5 mcg/actuation inhaler Inhale 1 puff 2 times a day. 13 g 1    pen needle, diabetic (TechLITE Pen Needle) 31 gauge x 5/16\" needle Use to inject 1-4 times daily as directed. 100 each 11    potassium chloride CR 20 mEq ER tablet Take 1 tablet (20 mEq) by mouth once daily. 90 tablet 1    QUEtiapine (SEROquel) 50 mg tablet Take 1 tablet (50 mg) by mouth once daily at bedtime.      sertraline (Zoloft) 100 mg tablet Take 1 tablet (100 mg) by mouth once daily.      terazosin (Hytrin) 5 mg capsule Take 1 capsule (5 mg) by mouth 1 time.      triamcinolone (Kenalog) 0.1 % cream Apply 1 Application topically 2 times a day.      [DISCONTINUED] cyanocobalamin (Vitamin B-12) 1,000 mcg tablet Take 1 tablet (1,000 mcg) by mouth once daily. 90 tablet 1     No current facility-administered medications on file prior to visit.          Assessment/Plan   Problem List Items Addressed This Visit       Type 2 diabetes mellitus with other specified complication (Multi) - Primary     Follows with Dr. Colindres. Next appointment at the end of this month.  On Lantus 50 units nightly and humalog 8-8-8.  Symptomatically improved since December. A1C has also improved from 15 to 6.1% on 5/8/24  CGM report shows 30-day avg 127, TIR 92%.   7- and 14-day glucose averages slightly " higher than 30-day and previous averages due to celebrating birthday. TIR still well over target.  Hypoglycemia has improved with insulin decrease.  Walking more, dedicated time for aerobic and anaerobic exercise 3-4 times/week.    Plan:  Continue all current pharmacotherapy.         Relevant Orders    Follow Up In Clinical Pharmacy     Follow up: 7/15/24 @9am    Continue all meds under the continuation of care with the referring provider and clinical pharmacy team.    Alicia Ayala, PharmD     Verbal consent to manage patient's drug therapy was obtained from the patient. They were informed they may decline to participate or withdraw from participation in pharmacy services at any time.

## 2024-05-20 NOTE — ASSESSMENT & PLAN NOTE
Follows with Dr. Colindres. Next appointment at the end of this month.  On Lantus 50 units nightly and humalog 8-8-8.  Symptomatically improved since December. A1C has also improved from 15 to 6.1% on 5/8/24  CGM report shows 30-day avg 127, TIR 92%.   7- and 14-day glucose averages slightly higher than 30-day and previous averages due to celebrating birthday. TIR still well over target.  Hypoglycemia has improved with insulin decrease.  Walking more, dedicated time for aerobic and anaerobic exercise 3-4 times/week.    Plan:  Continue all current pharmacotherapy.

## 2024-05-27 DIAGNOSIS — E11.59 HYPERTENSION ASSOCIATED WITH DIABETES (MULTI): ICD-10-CM

## 2024-05-27 DIAGNOSIS — E11.59 TYPE 2 DIABETES MELLITUS WITH OTHER CIRCULATORY COMPLICATIONS (MULTI): ICD-10-CM

## 2024-05-27 DIAGNOSIS — I15.2 HYPERTENSION SECONDARY TO ENDOCRINE DISORDERS: ICD-10-CM

## 2024-05-27 DIAGNOSIS — I15.2 HYPERTENSION ASSOCIATED WITH DIABETES (MULTI): ICD-10-CM

## 2024-05-28 ENCOUNTER — OFFICE VISIT (OUTPATIENT)
Dept: ORTHOPEDIC SURGERY | Facility: CLINIC | Age: 54
End: 2024-05-28
Payer: COMMERCIAL

## 2024-05-28 ENCOUNTER — HOSPITAL ENCOUNTER (OUTPATIENT)
Dept: RADIOLOGY | Facility: CLINIC | Age: 54
Discharge: HOME | End: 2024-05-28
Payer: COMMERCIAL

## 2024-05-28 DIAGNOSIS — S69.91XA HAND INJURY, RIGHT, INITIAL ENCOUNTER: ICD-10-CM

## 2024-05-28 PROCEDURE — 3060F POS MICROALBUMINURIA REV: CPT | Performed by: ORTHOPAEDIC SURGERY

## 2024-05-28 PROCEDURE — 73130 X-RAY EXAM OF HAND: CPT | Mod: RIGHT SIDE | Performed by: ORTHOPAEDIC SURGERY

## 2024-05-28 PROCEDURE — 73130 X-RAY EXAM OF HAND: CPT | Mod: RT

## 2024-05-28 PROCEDURE — 1036F TOBACCO NON-USER: CPT | Performed by: ORTHOPAEDIC SURGERY

## 2024-05-28 PROCEDURE — 99214 OFFICE O/P EST MOD 30 MIN: CPT | Performed by: ORTHOPAEDIC SURGERY

## 2024-05-28 PROCEDURE — 3008F BODY MASS INDEX DOCD: CPT | Performed by: ORTHOPAEDIC SURGERY

## 2024-05-28 PROCEDURE — 4010F ACE/ARB THERAPY RXD/TAKEN: CPT | Performed by: ORTHOPAEDIC SURGERY

## 2024-05-28 NOTE — PROGRESS NOTES
History present illness: Patient presents with a new problem.  He was participating in martial arts.  He did not have a padded glove over the hand with striking.  He injured the right fourth MCP.  He describes pain and swelling over the dorsum of that joint.  Injury occurred 10 days ago.      Past medical history: The patient's past medical history, family history, social history, and review of systems were documented on the patient medical intake.  The updated data was reviewed in the electronic medical record.  History is negative except otherwise stated in history of present illness.        Physical examination:  General: Alert and oriented to person, place, and time.  No acute distress and breathing comfortably: Pleasant and cooperative with examination.  HEENT: Head is normocephalic and atraumatic.  Neck: Supple, no visible swelling.  Cardiovascular: No palpable tachycardia  Lungs: No audible wheezing or labored breathing  Abdomen: Nondistended.  Extremities: Evaluation of the right upper extremity finds the patient had palpable radial artery at the wrist with brisk capillary refill to all digits.  Patient has intact sensation to axillary radial median and ulnar nerves.  There are no open wounds.  There are no signs of infection.  There is no evidence of lymphedema or lymphatic streaking.  The patient has supple compartments to right arm forearm and hand.  Mild crepitus noted through the flexion extension arc over the extensor mechanism to the dorsum of the right fourth MCP.  No obvious evidence for rashaad instability of the extensor mechanism.  Full flexion extension arc.  Painful at end range both extension and flexion.      Radiology: X-rays show no acute fracture or dislocation      Assessment: Injury about the right fourth MCP either secondary to sprain contusion of the joint versus sagittal band injury      Plan: Treatment options were discussed.  Patient was offered MRI to evaluate for soft tissue  disruption about the fourth MCP.  He was also offered a 2-week period of splint immobilization and activity restriction.  He prefers the latter.  Follow-up in 2 weeks.  No x-rays upon return.  Fiberglas splinting material was used to create a well-padded palmarly based splint extending from the forearm across the wrist spanning the MCPs blocked at 45 degrees flexion allowing for PIP range of motion.  This was secured with an Ace wrap.        Procedure:

## 2024-05-28 NOTE — LETTER
May 28, 2024     Patient: Amanuel Riojas   YOB: 1970   Date of Visit: 5/28/2024       To Whom It May Concern:    Amanuel Riojas was seen in my clinic on 5/28/2024 at 1:30 pm. Please excuse Amanuel for his absence from work on this day to make the appointment.  May return to work with the following restrictions, light duty work with no use of right arm for two weeks or until follow up visit.      If you have any questions or concerns, please don't hesitate to call.         Sincerely,         Chetan Sharma DO        CC: No Recipients

## 2024-05-30 RX ORDER — TERAZOSIN 5 MG/1
5 CAPSULE ORAL NIGHTLY
Qty: 90 CAPSULE | Refills: 1 | Status: SHIPPED | OUTPATIENT
Start: 2024-05-30

## 2024-05-30 RX ORDER — LOSARTAN POTASSIUM 25 MG/1
25 TABLET ORAL 2 TIMES DAILY
Qty: 180 TABLET | Refills: 1 | Status: SHIPPED | OUTPATIENT
Start: 2024-05-30

## 2024-06-01 ASSESSMENT — ENCOUNTER SYMPTOMS
ABDOMINAL PAIN: 0
SORE THROAT: 0
ACTIVITY CHANGE: 0
DYSURIA: 0
COUGH: 0
LIGHT-HEADEDNESS: 0
MYALGIAS: 0

## 2024-06-01 NOTE — PROGRESS NOTES
"CHIEF COMPLAINT:    Chief Complaint   Patient presents with    Med Management     Patient in office today for med management.         HISTORY OF PRESENT ILLNESS:  Amanuel Riojas  is a pleasant 54 y.o. male Comes for follow-up.  Patient has diabetes.  He is managing well.  His A1c in the office today is 6.1%.  Otherwise he has iron deficiency anemia.  He was encouraged to drink iron pills.  He probably does not absorb iron well.  His hypertension and hyperlipidemia is well-controlled.  He needs his annual blood work.  Patient does not have any acute medical complaint.  Schedule suggested left leg.      Review of Systems   Constitutional:  Negative for activity change.   HENT:  Negative for congestion and sore throat.    Respiratory:  Negative for cough.    Cardiovascular:  Negative for chest pain.   Gastrointestinal:  Negative for abdominal pain.   Endocrine: Negative for polyuria.   Genitourinary:  Negative for dysuria.   Musculoskeletal:  Negative for myalgias.   Skin:  Negative for rash.   Neurological:  Negative for light-headedness.   Psychiatric/Behavioral:  Negative for behavioral problems.      Visit Vitals  /90 (BP Location: Left arm, Patient Position: Sitting, BP Cuff Size: Large adult)   Pulse 69   Temp 36.8 °C (98.2 °F) (Temporal)   Ht 1.702 m (5' 7\")   Wt 102 kg (225 lb 8 oz)   SpO2 93%   BMI 35.32 kg/m²   Smoking Status Never   BSA 2.2 m²         Wt Readings from Last 10 Encounters:   05/08/24 102 kg (225 lb 8 oz)   01/31/24 104 kg (230 lb)   01/03/24 101 kg (222 lb)   12/29/23 101 kg (222 lb 10.6 oz)   12/05/23 106 kg (234 lb 3.2 oz)   11/21/23 108 kg (238 lb 12.8 oz)   08/28/23 108 kg (238 lb 3.2 oz)   04/12/23 106 kg (234 lb 6 oz)   04/05/23 106 kg (234 lb)   12/05/22 108 kg (237 lb)       Physical Exam  Vitals and nursing note reviewed.   Constitutional:       Appearance: Normal appearance.   HENT:      Head: Normocephalic.      Right Ear: Tympanic membrane normal.      Left Ear: Tympanic " membrane normal.      Nose: Nose normal.      Mouth/Throat:      Mouth: Mucous membranes are moist.   Cardiovascular:      Rate and Rhythm: Normal rate and regular rhythm.      Pulses: Normal pulses.      Heart sounds: No murmur heard.  Pulmonary:      Effort: No respiratory distress.      Breath sounds: Normal breath sounds.   Abdominal:      Palpations: Abdomen is soft.   Musculoskeletal:      Cervical back: Neck supple.      Right lower leg: No edema.      Left lower leg: No edema.   Skin:     General: Skin is warm.      Findings: No rash.   Neurological:      General: No focal deficit present.      Mental Status: He is alert and oriented to person, place, and time.   Psychiatric:         Mood and Affect: Mood normal.          RECENT LABS:  Lab Results   Component Value Date    WBC 5.0 12/28/2023    HGB 9.6 (L) 12/28/2023    HCT 29.1 (L) 12/28/2023     12/28/2023    CHOL 178 12/27/2023    TRIG 709 (H) 12/27/2023    HDL 23.2 12/27/2023    ALT 12 12/28/2023    AST 20 12/28/2023     04/29/2024    K 4.6 04/29/2024     (H) 04/29/2024    CREATININE 3.83 (H) 04/29/2024    BUN 37 (H) 04/29/2024    CO2 21 04/29/2024    TSH 2.73 05/02/2022    PSA 0.60 06/09/2021    INR 1.0 12/22/2023    HGBA1C 6.1 05/08/2024       IMAGING:  === 05/28/24 ===    XR HAND 3+ VIEWS RIGHT   === 12/22/23 ===    CT HEAD WO IV CONTRAST    - Impression -  No acute intracranial finding. MRI may be obtained if clinically  indicated        MEDICATIONS:   Current Outpatient Medications   Medication Instructions    albuterol 90 mcg/actuation inhaler 2 puffs, inhalation, Every 4 hours PRN    amLODIPine (NORVASC) 10 mg, oral, Daily    aspirin (Adult Low Dose Aspirin) 81 mg EC tablet 1 tablet, oral, Daily    atorvastatin (LIPITOR) 80 mg, oral, Nightly    blood-glucose meter misc Use daily or as directed for monitoring of diabetes.    cholecalciferol (VITAMIN D3) 50 mcg, oral, Daily    clobetasol (Temovate) 0.05 % cream APPLY SPARINGLY TO  AFFECTED AREA(S) TWICE DAILY AS NEEDED    clotrimazole-betamethasone (Lotrisone) cream APPLY SPARINGLY TO AFFECTED AREA 3 TIMES A DAY    colchicine 0.6 mg tablet Take 1 tablet (0.6 mg) by mouth once daily. Do not start before December 29, 2023.    cyanocobalamin (VITAMIN B-12) 1,000 mcg, oral, Daily    divalproex (DEPAKOTE) 250 mg, oral, Every 12 hours scheduled, Do not crush, chew, or split.    dupilumab (DUPIXENT) 300 mg, subcutaneous, Every 14 days    flash glucose sensor (FREESTYLE NESTOR 14 DAY SENSOR MISC) miscellaneous, Every 14 days    flash glucose sensor kit (FreeStyle Nestor 2 Sensor) kit Use as directed to check blood sugar. Change every 14 days    flash glucose sensor kit kit Use as directed to check blood sugar. Change every 14 days.    FreeStyle Nestor reader (FreeStyle Nestor 2 [place]) misc Use as directed to check blood sugars daily    hydrocortisone 2.5 % ointment 1 Application, Topical, 2 times daily    hydrOXYzine pamoate (VISTARIL) 50 mg, oral, 2 times daily PRN    insulin glargine (LANTUS SOLOSTAR U-100 INSULIN) 75 Units, subcutaneous, Every morning, Take as directed per insulin instructions.    insulin lispro (HumaLOG) 100 unit/mL injection Inject 15 Units under the skin 3 times a day before meals. Increase to 20 units if blood sugar >200.    insulin lispro (HUMALOG) 0-15 Units, subcutaneous, With meals, nightly, and at 3AM, Take as directed per insulin instructions.    insulin lispro (HUMALOG) 14 Units, subcutaneous, 3 times daily (morning, midday, late afternoon), Take as directed per insulin instructions.    ketoconazole (NIZOral) 2 % shampoo 1 Application, Topical, Once Weekly    Klor-Con M20 20 mEq ER tablet 20 mEq, oral, Daily    lancets 33 gauge misc Use as directed to check blood sugar before meals and at bedtime.    losartan (COZAAR) 25 mg, oral, 2 times daily    metoprolol tartrate (LOPRESSOR) 50 mg, oral, 2 times daily    mometasone-formoterol (Dulera) 200-5 mcg/actuation inhaler 1  "puff, inhalation, 2 times daily RT    pen needle, diabetic (TechLITE Pen Needle) 31 gauge x 5/16\" needle Use to inject 1-4 times daily as directed.    QUEtiapine (SEROquel) 50 mg tablet 1 tablet, oral, Nightly    sertraline (Zoloft) 100 mg tablet 1 tablet, oral, Daily    terazosin (HYTRIN) 5 mg, oral, Nightly    triamcinolone (Kenalog) 0.1 % cream 1 Application, Topical, 2 times daily        TODAY'S VISIT  DX:   1. Type 2 diabetes mellitus with other specified complication, with long-term current use of insulin (Multi)  POCT glycosylated hemoglobin (Hb A1C) manually resulted    Comprehensive Metabolic Panel      2. Vitamin D insufficiency  Vitamin D 25-Hydroxy,Total (for eval of Vitamin D levels)      3. Hyperlipidemia associated with type 2 diabetes mellitus (Multi)  Lipid Panel    TSH with reflex to Free T4 if abnormal      4. Hypertension associated with diabetes (Multi)  CBC and Auto Differential    Prostate Specific Antigen    Urinalysis with Reflex Microscopic           MEDICAL DECISION MAKING:  - The current and active medical co morbidities have been considered.   - Recent lab work and relevant imaging studies have been reviewed.    - Relevant correspondence/notes from other specialty consultants were reviewed.    - Next Follow up in 3 months  - Patient was given treatment as per above plan      "

## 2024-06-13 ENCOUNTER — OFFICE VISIT (OUTPATIENT)
Dept: ORTHOPEDIC SURGERY | Facility: CLINIC | Age: 54
End: 2024-06-13
Payer: COMMERCIAL

## 2024-06-13 VITALS — BODY MASS INDEX: 36.1 KG/M2 | WEIGHT: 230 LBS | HEIGHT: 67 IN

## 2024-06-13 DIAGNOSIS — S63.659D SAGITTAL BAND RUPTURE AT METACARPOPHALANGEAL JOINT, SUBSEQUENT ENCOUNTER: ICD-10-CM

## 2024-06-13 DIAGNOSIS — S69.91XA HAND INJURY, RIGHT, INITIAL ENCOUNTER: Primary | ICD-10-CM

## 2024-06-13 PROCEDURE — 99213 OFFICE O/P EST LOW 20 MIN: CPT | Performed by: ORTHOPAEDIC SURGERY

## 2024-06-13 ASSESSMENT — PAIN SCALES - GENERAL: PAINLEVEL_OUTOF10: 7

## 2024-06-13 ASSESSMENT — PAIN - FUNCTIONAL ASSESSMENT: PAIN_FUNCTIONAL_ASSESSMENT: 0-10

## 2024-06-13 NOTE — PROGRESS NOTES
History present illness: Patient presents today for ongoing follow-up of his right hand.  He had a striking type injury that occurred approximately 3 to 4 weeks ago injuring the right hand.  He describes pain specifically to the fourth and fifth MCP.  Is worse than the fourth.  He was thought to potentially have MCP contusion versus radial sagittal band injury.  He was offered MRI.  He declined in favor of observation.  He presents today with persistent pain.  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        Physical examination:  General: Alert and oriented to person, place, and time.  No acute distress and breathing comfortably: Pleasant and cooperative with examination.  Extremities: Evaluation of the right upper extremity finds the patient had palpable radial artery at the wrist with brisk capillary refill to all digits.  Patient has intact sensation to axillary radial median and ulnar nerves.  There are no open wounds.  There are no signs of infection.  There is no evidence of lymphedema or lymphatic streaking.  The patient has supple compartments to right arm forearm and hand.  Tenderness and swelling to right fourth and fifth MCP more profound to the fourth.  No overt evidence of extensor mechanism instability.  Most focal and discrete tenderness noted about the radial aspect of extensor mechanism to the fourth MCP      Diagnostic studies:      Assessment: Right hand pain with concern for radial sagittal band injury to the fourth MCP      Plan: Treatment options were discussed.  We talked about MRI as a way to better evaluate soft tissue structures to evaluate for injury that could need surgery.  Patient is agreeable.  He was placed into a removable splint.  He will remain nonweightbearing.  Follow-up with me after MRI.  No x-rays upon return.      Procedure:        Procedure:

## 2024-06-18 ENCOUNTER — TELEPHONE (OUTPATIENT)
Dept: ORTHOPEDIC SURGERY | Facility: CLINIC | Age: 54
End: 2024-06-18
Payer: COMMERCIAL

## 2024-06-18 NOTE — TELEPHONE ENCOUNTER
Called patient to give him his date/time for his MRI:    MRI: 6/21/2024 at 1030a [facility]    FUVResults: 7/9/2024 at 1030a      
5

## 2024-06-21 ENCOUNTER — HOSPITAL ENCOUNTER (OUTPATIENT)
Dept: RADIOLOGY | Facility: HOSPITAL | Age: 54
Discharge: HOME | End: 2024-06-21
Payer: COMMERCIAL

## 2024-06-21 DIAGNOSIS — S69.91XA HAND INJURY, RIGHT, INITIAL ENCOUNTER: ICD-10-CM

## 2024-06-21 DIAGNOSIS — S63.659D SAGITTAL BAND RUPTURE AT METACARPOPHALANGEAL JOINT, SUBSEQUENT ENCOUNTER: ICD-10-CM

## 2024-06-21 PROCEDURE — 73218 MRI UPPER EXTREMITY W/O DYE: CPT | Mod: RT

## 2024-07-09 ENCOUNTER — OFFICE VISIT (OUTPATIENT)
Dept: ORTHOPEDIC SURGERY | Facility: CLINIC | Age: 54
End: 2024-07-09
Payer: COMMERCIAL

## 2024-07-09 DIAGNOSIS — M79.641 PAIN OF RIGHT HAND: Primary | ICD-10-CM

## 2024-07-09 PROCEDURE — 99214 OFFICE O/P EST MOD 30 MIN: CPT | Performed by: ORTHOPAEDIC SURGERY

## 2024-07-09 PROCEDURE — 20600 DRAIN/INJ JOINT/BURSA W/O US: CPT | Mod: RT | Performed by: ORTHOPAEDIC SURGERY

## 2024-07-09 PROCEDURE — 1036F TOBACCO NON-USER: CPT | Performed by: ORTHOPAEDIC SURGERY

## 2024-07-09 PROCEDURE — 2500000005 HC RX 250 GENERAL PHARMACY W/O HCPCS: Performed by: ORTHOPAEDIC SURGERY

## 2024-07-09 PROCEDURE — 3008F BODY MASS INDEX DOCD: CPT | Performed by: ORTHOPAEDIC SURGERY

## 2024-07-09 PROCEDURE — 3060F POS MICROALBUMINURIA REV: CPT | Performed by: ORTHOPAEDIC SURGERY

## 2024-07-09 PROCEDURE — 4010F ACE/ARB THERAPY RXD/TAKEN: CPT | Performed by: ORTHOPAEDIC SURGERY

## 2024-07-09 PROCEDURE — 2500000004 HC RX 250 GENERAL PHARMACY W/ HCPCS (ALT 636 FOR OP/ED): Performed by: ORTHOPAEDIC SURGERY

## 2024-07-09 RX ORDER — LIDOCAINE HYDROCHLORIDE 10 MG/ML
0.5 INJECTION INFILTRATION; PERINEURAL
Status: COMPLETED | OUTPATIENT
Start: 2024-07-09 | End: 2024-07-09

## 2024-07-09 NOTE — LETTER
July 9, 2024     Patient: Amanuel Riojas   YOB: 1970   Date of Visit: 7/9/2024       To Whom It May Concern:    It is my medical opinion that Amanuel Riojas may return to work on 07/10/2024 with restrictions of no use of his right hand until cleared by orthopedics .    If you have any questions or concerns, please don't hesitate to call, [phone].         Sincerely,        Chetan Sharma, DO

## 2024-07-09 NOTE — PROGRESS NOTES
History present illness: Patient presents status post MRI to the right hand to evaluate for sagittal band injury.  The patient was found to have a sagittal band type injury about the fourth MCP including ulnar sagittal band.  He continues to have quite a bit of pain.  He has been immobilizing in a ulnar gutter type Velcro splint including ring finger and small finger today he clearly localizes pain to dorsal ulnar aspect of right fourth MCP.      Past medical history: The patient's past medical history, family history, social history, and review of systems were documented on the patient medical intake.  The updated data was reviewed in the electronic medical record.  History is negative except otherwise stated in history of present illness.        Physical examination:  General: Alert and oriented to person, place, and time.  No acute distress and breathing comfortably: Pleasant and cooperative with examination.  HEENT: Head is normocephalic and atraumatic.  Neck: Supple, no visible swelling.  Cardiovascular: No palpable tachycardia  Lungs: No audible wheezing or labored breathing  Abdomen: Nondistended.  Extremities: Evaluation of the right upper extremity finds the patient had palpable radial artery at the wrist with brisk capillary refill to all digits.  Patient has intact sensation to axillary radial median and ulnar nerves.  There are no open wounds.  There are no signs of infection.  There is no evidence of lymphedema or lymphatic streaking.  The patient has supple compartments to right arm forearm and hand.  No gross instability with digital flexion or extension to right fourth MCP to extensor mechanism.  Clear focal tenderness and swelling over dorsal ulnar aspect of right fourth MCP.      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  The patient still having quite a bit of pain.  We  talked about operative and nonoperative strategies for repair versus reconstruction.  We talked about intraoperative techniques and postoperative protocols.  He was offered a trial of steroid injection with continuance of immobilization.  He elects for ongoing nonoperative management for now.  Plan for 6-week follow-up.  The right fourth MCP was injected with steroid solution infiltrating about the ulnar aspect        Procedure:  Hand / UE Inj/Asp: R ring MCP for osteoarthritis on 7/9/2024 10:57 AM  Indications: pain  Details: 25 G needle, dorsal approach  Medications: 5 mg triamcinolone acetonide 10 mg/mL; 0.5 mL lidocaine 10 mg/mL (1 %)  Outcome: tolerated well, no immediate complications  Procedure, treatment alternatives, risks and benefits explained, specific risks discussed. Consent was given by the patient. Immediately prior to procedure a time out was called to verify the correct patient, procedure, equipment, support staff and site/side marked as required. Patient was prepped and draped in the usual sterile fashion.

## 2024-07-12 DIAGNOSIS — E55.9 VITAMIN D DEFICIENCY, UNSPECIFIED: Primary | ICD-10-CM

## 2024-07-12 DIAGNOSIS — N18.32 CHRONIC KIDNEY DISEASE, STAGE 3B (MULTI): ICD-10-CM

## 2024-07-15 ENCOUNTER — APPOINTMENT (OUTPATIENT)
Dept: PHARMACY | Facility: HOSPITAL | Age: 54
End: 2024-07-15
Payer: COMMERCIAL

## 2024-07-18 ENCOUNTER — LAB (OUTPATIENT)
Dept: LAB | Facility: LAB | Age: 54
End: 2024-07-18
Payer: COMMERCIAL

## 2024-07-18 DIAGNOSIS — N18.32 CHRONIC KIDNEY DISEASE, STAGE 3B (MULTI): ICD-10-CM

## 2024-07-18 DIAGNOSIS — E55.9 VITAMIN D DEFICIENCY, UNSPECIFIED: ICD-10-CM

## 2024-07-18 LAB
25(OH)D3 SERPL-MCNC: 40 NG/ML (ref 30–100)
ALBUMIN SERPL BCP-MCNC: 4.5 G/DL (ref 3.4–5)
ANION GAP SERPL CALC-SCNC: 15 MMOL/L (ref 10–20)
APPEARANCE UR: CLEAR
BILIRUB UR STRIP.AUTO-MCNC: NEGATIVE MG/DL
BUN SERPL-MCNC: 24 MG/DL (ref 6–23)
CALCIUM SERPL-MCNC: 8.9 MG/DL (ref 8.6–10.3)
CHLORIDE SERPL-SCNC: 109 MMOL/L (ref 98–107)
CO2 SERPL-SCNC: 25 MMOL/L (ref 21–32)
COLOR UR: ABNORMAL
CREAT SERPL-MCNC: 3.3 MG/DL (ref 0.5–1.3)
CREAT UR-MCNC: 124.7 MG/DL (ref 20–370)
EGFRCR SERPLBLD CKD-EPI 2021: 21 ML/MIN/1.73M*2
GLUCOSE SERPL-MCNC: 151 MG/DL (ref 74–99)
GLUCOSE UR STRIP.AUTO-MCNC: NORMAL MG/DL
KETONES UR STRIP.AUTO-MCNC: NEGATIVE MG/DL
LEUKOCYTE ESTERASE UR QL STRIP.AUTO: ABNORMAL
MUCOUS THREADS #/AREA URNS AUTO: ABNORMAL /LPF
NITRITE UR QL STRIP.AUTO: NEGATIVE
PH UR STRIP.AUTO: 5.5 [PH]
PHOSPHATE SERPL-MCNC: 4.8 MG/DL (ref 2.5–4.9)
POTASSIUM SERPL-SCNC: 4.1 MMOL/L (ref 3.5–5.3)
PROT UR STRIP.AUTO-MCNC: ABNORMAL MG/DL
PROT UR-ACNC: 73 MG/DL (ref 5–25)
PROT/CREAT UR: 0.59 MG/MG CREAT (ref 0–0.17)
PTH-INTACT SERPL-MCNC: 170.3 PG/ML (ref 18.5–88)
RBC # UR STRIP.AUTO: NEGATIVE /UL
RBC #/AREA URNS AUTO: ABNORMAL /HPF
SODIUM SERPL-SCNC: 145 MMOL/L (ref 136–145)
SP GR UR STRIP.AUTO: 1.01
SQUAMOUS #/AREA URNS AUTO: ABNORMAL /HPF
UROBILINOGEN UR STRIP.AUTO-MCNC: NORMAL MG/DL
WBC #/AREA URNS AUTO: ABNORMAL /HPF

## 2024-07-18 PROCEDURE — 81001 URINALYSIS AUTO W/SCOPE: CPT

## 2024-07-18 PROCEDURE — 83970 ASSAY OF PARATHORMONE: CPT

## 2024-07-18 PROCEDURE — 36415 COLL VENOUS BLD VENIPUNCTURE: CPT

## 2024-07-18 PROCEDURE — 80069 RENAL FUNCTION PANEL: CPT

## 2024-07-18 PROCEDURE — 82570 ASSAY OF URINE CREATININE: CPT

## 2024-07-18 PROCEDURE — 84156 ASSAY OF PROTEIN URINE: CPT

## 2024-07-18 PROCEDURE — 82306 VITAMIN D 25 HYDROXY: CPT

## 2024-08-03 ENCOUNTER — LAB (OUTPATIENT)
Dept: LAB | Facility: LAB | Age: 54
End: 2024-08-03
Payer: COMMERCIAL

## 2024-08-03 DIAGNOSIS — Z79.4 TYPE 2 DIABETES MELLITUS WITH OTHER SPECIFIED COMPLICATION, WITH LONG-TERM CURRENT USE OF INSULIN (MULTI): ICD-10-CM

## 2024-08-03 DIAGNOSIS — E11.69 TYPE 2 DIABETES MELLITUS WITH OTHER SPECIFIED COMPLICATION, WITH LONG-TERM CURRENT USE OF INSULIN (MULTI): ICD-10-CM

## 2024-08-03 DIAGNOSIS — E55.9 VITAMIN D INSUFFICIENCY: ICD-10-CM

## 2024-08-03 DIAGNOSIS — E78.5 HYPERLIPIDEMIA ASSOCIATED WITH TYPE 2 DIABETES MELLITUS (MULTI): ICD-10-CM

## 2024-08-03 DIAGNOSIS — I15.2 HYPERTENSION ASSOCIATED WITH DIABETES (MULTI): ICD-10-CM

## 2024-08-03 DIAGNOSIS — E11.59 HYPERTENSION ASSOCIATED WITH DIABETES (MULTI): ICD-10-CM

## 2024-08-03 DIAGNOSIS — E11.69 HYPERLIPIDEMIA ASSOCIATED WITH TYPE 2 DIABETES MELLITUS (MULTI): ICD-10-CM

## 2024-08-03 LAB
25(OH)D3 SERPL-MCNC: 37 NG/ML (ref 30–100)
ALBUMIN SERPL BCP-MCNC: 4.3 G/DL (ref 3.4–5)
ALP SERPL-CCNC: 82 U/L (ref 33–120)
ALT SERPL W P-5'-P-CCNC: 60 U/L (ref 10–52)
ANION GAP SERPL CALC-SCNC: 15 MMOL/L (ref 10–20)
APPEARANCE UR: CLEAR
AST SERPL W P-5'-P-CCNC: 65 U/L (ref 9–39)
BASOPHILS # BLD AUTO: 0.03 X10*3/UL (ref 0–0.1)
BASOPHILS NFR BLD AUTO: 0.8 %
BILIRUB SERPL-MCNC: 0.7 MG/DL (ref 0–1.2)
BILIRUB UR STRIP.AUTO-MCNC: NEGATIVE MG/DL
BUN SERPL-MCNC: 28 MG/DL (ref 6–23)
CALCIUM SERPL-MCNC: 9.1 MG/DL (ref 8.6–10.3)
CHLORIDE SERPL-SCNC: 110 MMOL/L (ref 98–107)
CHOLEST SERPL-MCNC: 119 MG/DL (ref 0–199)
CHOLESTEROL/HDL RATIO: 3.5
CO2 SERPL-SCNC: 21 MMOL/L (ref 21–32)
COLOR UR: COLORLESS
CREAT SERPL-MCNC: 3.31 MG/DL (ref 0.5–1.3)
EGFRCR SERPLBLD CKD-EPI 2021: 21 ML/MIN/1.73M*2
EOSINOPHIL # BLD AUTO: 0 X10*3/UL (ref 0–0.7)
EOSINOPHIL NFR BLD AUTO: 0 %
ERYTHROCYTE [DISTWIDTH] IN BLOOD BY AUTOMATED COUNT: 13.8 % (ref 11.5–14.5)
GLUCOSE SERPL-MCNC: 132 MG/DL (ref 74–99)
GLUCOSE UR STRIP.AUTO-MCNC: NORMAL MG/DL
HCT VFR BLD AUTO: 37.3 % (ref 41–52)
HDLC SERPL-MCNC: 33.7 MG/DL
HGB BLD-MCNC: 11.9 G/DL (ref 13.5–17.5)
IMM GRANULOCYTES # BLD AUTO: 0.03 X10*3/UL (ref 0–0.7)
IMM GRANULOCYTES NFR BLD AUTO: 0.8 % (ref 0–0.9)
KETONES UR STRIP.AUTO-MCNC: NEGATIVE MG/DL
LDLC SERPL CALC-MCNC: 25 MG/DL
LEUKOCYTE ESTERASE UR QL STRIP.AUTO: ABNORMAL
LYMPHOCYTES # BLD AUTO: 1.57 X10*3/UL (ref 1.2–4.8)
LYMPHOCYTES NFR BLD AUTO: 39.6 %
MCH RBC QN AUTO: 27.5 PG (ref 26–34)
MCHC RBC AUTO-ENTMCNC: 31.9 G/DL (ref 32–36)
MCV RBC AUTO: 86 FL (ref 80–100)
MONOCYTES # BLD AUTO: 0.39 X10*3/UL (ref 0.1–1)
MONOCYTES NFR BLD AUTO: 9.8 %
MUCOUS THREADS #/AREA URNS AUTO: ABNORMAL /LPF
NEUTROPHILS # BLD AUTO: 1.94 X10*3/UL (ref 1.2–7.7)
NEUTROPHILS NFR BLD AUTO: 49 %
NITRITE UR QL STRIP.AUTO: NEGATIVE
NON HDL CHOLESTEROL: 85 MG/DL (ref 0–149)
NRBC BLD-RTO: 0 /100 WBCS (ref 0–0)
PH UR STRIP.AUTO: 5.5 [PH]
PLATELET # BLD AUTO: 231 X10*3/UL (ref 150–450)
POTASSIUM SERPL-SCNC: 4.5 MMOL/L (ref 3.5–5.3)
PROT SERPL-MCNC: 7 G/DL (ref 6.4–8.2)
PROT UR STRIP.AUTO-MCNC: ABNORMAL MG/DL
PSA SERPL-MCNC: 1.24 NG/ML
RBC # BLD AUTO: 4.32 X10*6/UL (ref 4.5–5.9)
RBC # UR STRIP.AUTO: NEGATIVE /UL
RBC #/AREA URNS AUTO: ABNORMAL /HPF
SODIUM SERPL-SCNC: 141 MMOL/L (ref 136–145)
SP GR UR STRIP.AUTO: 1.01
SQUAMOUS #/AREA URNS AUTO: ABNORMAL /HPF
TRIGL SERPL-MCNC: 300 MG/DL (ref 0–149)
TSH SERPL-ACNC: 2.72 MIU/L (ref 0.44–3.98)
UROBILINOGEN UR STRIP.AUTO-MCNC: NORMAL MG/DL
VLDL: 60 MG/DL (ref 0–40)
WBC # BLD AUTO: 4 X10*3/UL (ref 4.4–11.3)
WBC #/AREA URNS AUTO: ABNORMAL /HPF
YEAST BUDDING #/AREA UR COMP ASSIST: PRESENT /HPF

## 2024-08-03 PROCEDURE — 80053 COMPREHEN METABOLIC PANEL: CPT

## 2024-08-03 PROCEDURE — 82306 VITAMIN D 25 HYDROXY: CPT

## 2024-08-03 PROCEDURE — 84153 ASSAY OF PSA TOTAL: CPT

## 2024-08-03 PROCEDURE — 84443 ASSAY THYROID STIM HORMONE: CPT

## 2024-08-03 PROCEDURE — 36415 COLL VENOUS BLD VENIPUNCTURE: CPT

## 2024-08-03 PROCEDURE — 80061 LIPID PANEL: CPT

## 2024-08-03 PROCEDURE — 81001 URINALYSIS AUTO W/SCOPE: CPT

## 2024-08-03 PROCEDURE — 85025 COMPLETE CBC W/AUTO DIFF WBC: CPT

## 2024-08-07 ENCOUNTER — APPOINTMENT (OUTPATIENT)
Dept: PRIMARY CARE | Facility: CLINIC | Age: 54
End: 2024-08-07
Payer: COMMERCIAL

## 2024-08-07 VITALS
BODY MASS INDEX: 35.66 KG/M2 | DIASTOLIC BLOOD PRESSURE: 84 MMHG | OXYGEN SATURATION: 97 % | WEIGHT: 227.2 LBS | HEART RATE: 57 BPM | TEMPERATURE: 98.2 F | SYSTOLIC BLOOD PRESSURE: 134 MMHG | HEIGHT: 67 IN

## 2024-08-07 DIAGNOSIS — K21.9 GASTROESOPHAGEAL REFLUX DISEASE WITHOUT ESOPHAGITIS: ICD-10-CM

## 2024-08-07 DIAGNOSIS — N18.5 CKD (CHRONIC KIDNEY DISEASE), STAGE V (MULTI): Primary | ICD-10-CM

## 2024-08-07 DIAGNOSIS — I15.2 HYPERTENSION ASSOCIATED WITH DIABETES (MULTI): ICD-10-CM

## 2024-08-07 DIAGNOSIS — E78.5 HYPERLIPIDEMIA ASSOCIATED WITH TYPE 2 DIABETES MELLITUS (MULTI): ICD-10-CM

## 2024-08-07 DIAGNOSIS — E11.69 HYPERLIPIDEMIA ASSOCIATED WITH TYPE 2 DIABETES MELLITUS (MULTI): ICD-10-CM

## 2024-08-07 DIAGNOSIS — E11.59 HYPERTENSION ASSOCIATED WITH DIABETES (MULTI): ICD-10-CM

## 2024-08-07 LAB — POC HEMOGLOBIN A1C: 6.4 % (ref 4.2–6.5)

## 2024-08-07 PROCEDURE — 3048F LDL-C <100 MG/DL: CPT | Performed by: INTERNAL MEDICINE

## 2024-08-07 PROCEDURE — 83036 HEMOGLOBIN GLYCOSYLATED A1C: CPT | Performed by: INTERNAL MEDICINE

## 2024-08-07 PROCEDURE — 4010F ACE/ARB THERAPY RXD/TAKEN: CPT | Performed by: INTERNAL MEDICINE

## 2024-08-07 PROCEDURE — 99214 OFFICE O/P EST MOD 30 MIN: CPT | Performed by: INTERNAL MEDICINE

## 2024-08-07 PROCEDURE — 3008F BODY MASS INDEX DOCD: CPT | Performed by: INTERNAL MEDICINE

## 2024-08-07 PROCEDURE — 3075F SYST BP GE 130 - 139MM HG: CPT | Performed by: INTERNAL MEDICINE

## 2024-08-07 PROCEDURE — 3060F POS MICROALBUMINURIA REV: CPT | Performed by: INTERNAL MEDICINE

## 2024-08-07 PROCEDURE — 3079F DIAST BP 80-89 MM HG: CPT | Performed by: INTERNAL MEDICINE

## 2024-08-07 RX ORDER — ACETAMINOPHEN 500 MG
1 TABLET ORAL 2 TIMES DAILY
Qty: 1 KIT | Refills: 3 | Status: SHIPPED | OUTPATIENT
Start: 2024-08-07

## 2024-08-08 ASSESSMENT — ENCOUNTER SYMPTOMS
ABDOMINAL PAIN: 0
SORE THROAT: 0
ACTIVITY CHANGE: 0
DYSURIA: 0
MYALGIAS: 0
LIGHT-HEADEDNESS: 0
COUGH: 0

## 2024-08-09 NOTE — PROGRESS NOTES
CHIEF COMPLAINT:    Chief Complaint   Patient presents with    Follow-up     Patient is in office today for a 3 month follow-up. Patient is requesting a BP cuff per kidney doctor.         HISTORY OF PRESENT ILLNESS:  Amanuel Riojas  is a pleasant 54 y.o. male Comes here after he saw the nephrologist this morning.  Patient does have chronic kidney disease stage IV secondary to diabetes and hypertension.  He is somewhat depressed today because he was told that he might go into hemodialysis which patient is worried about.  However he does not have any acidosis or uremic symptoms at this moment.  Patient had A1c done in the office today.  His A1c today is 6.4%.  Patient was congratulated.  His blood pressure is also well-maintained at 134/84 mmHg.  Patient wants to patient has contract with me for a controlled medicine. The OARRS report has been checked today. There is no aberrancy. Patient wants to stay on the same medicine as it is helpful for day to day life. As of yet, patient did not experienced any obvious adverse effect. However the potential side effects have been discussed again during this visit.   Better in terms of diabetes and hypertension.  He is requesting a blood pressure monitoring cuff.  He sees an endocrinologist for his diabetes management.      Review of Systems   Constitutional:  Negative for activity change.   HENT:  Negative for congestion and sore throat.    Respiratory:  Negative for cough.    Cardiovascular:  Negative for chest pain.   Gastrointestinal:  Negative for abdominal pain.   Endocrine: Negative for polyuria.   Genitourinary:  Negative for dysuria.   Musculoskeletal:  Negative for myalgias.   Skin:  Negative for rash.   Neurological:  Negative for light-headedness.   Psychiatric/Behavioral:  Negative for behavioral problems.      Visit Vitals  /84 (BP Location: Right arm, Patient Position: Sitting, BP Cuff Size: Large adult)   Pulse 57   Temp 36.8 °C (98.2 °F) (Temporal)   Ht  "1.702 m (5' 7\")   Wt 103 kg (227 lb 3.2 oz)   SpO2 97% Comment: RA   BMI 35.58 kg/m²   Smoking Status Never   BSA 2.21 m²         Wt Readings from Last 10 Encounters:   08/07/24 103 kg (227 lb 3.2 oz)   06/13/24 104 kg (230 lb)   05/08/24 102 kg (225 lb 8 oz)   01/31/24 104 kg (230 lb)   01/03/24 101 kg (222 lb)   12/29/23 101 kg (222 lb 10.6 oz)   12/05/23 106 kg (234 lb 3.2 oz)   11/21/23 108 kg (238 lb 12.8 oz)   08/28/23 108 kg (238 lb 3.2 oz)   04/12/23 106 kg (234 lb 6 oz)       Physical Exam  Vitals and nursing note reviewed.   Constitutional:       Appearance: Normal appearance.   HENT:      Head: Normocephalic.      Right Ear: Tympanic membrane normal.      Left Ear: Tympanic membrane normal.      Nose: Nose normal.      Mouth/Throat:      Mouth: Mucous membranes are moist.   Cardiovascular:      Rate and Rhythm: Normal rate and regular rhythm.      Pulses: Normal pulses.      Heart sounds: No murmur heard.  Pulmonary:      Effort: No respiratory distress.      Breath sounds: Normal breath sounds.   Abdominal:      Palpations: Abdomen is soft.   Musculoskeletal:      Cervical back: Neck supple.      Right lower leg: No edema.      Left lower leg: No edema.   Skin:     General: Skin is warm.      Findings: No rash.   Neurological:      General: No focal deficit present.      Mental Status: He is alert and oriented to person, place, and time.   Psychiatric:         Mood and Affect: Mood normal.        RECENT LABS:  Lab Results   Component Value Date    WBC 4.0 (L) 08/03/2024    HGB 11.9 (L) 08/03/2024    HCT 37.3 (L) 08/03/2024     08/03/2024    CHOL 119 08/03/2024    TRIG 300 (H) 08/03/2024    HDL 33.7 08/03/2024    ALT 60 (H) 08/03/2024    AST 65 (H) 08/03/2024     08/03/2024    K 4.5 08/03/2024     (H) 08/03/2024    CREATININE 3.31 (H) 08/03/2024    BUN 28 (H) 08/03/2024    CO2 21 08/03/2024    TSH 2.72 08/03/2024    PSA 1.24 08/03/2024    INR 1.0 12/22/2023    HGBA1C 6.4 08/07/2024 "     Lab Results   Component Value Date    GLUCOSE 132 (H) 08/03/2024    CALCIUM 9.1 08/03/2024     08/03/2024    K 4.5 08/03/2024    CO2 21 08/03/2024     (H) 08/03/2024    BUN 28 (H) 08/03/2024    CREATININE 3.31 (H) 08/03/2024        IMAGING:  === 05/28/24 ===  CT HEAD WO IV CONTRAST    No acute intracranial finding. MRI may be obtained if clinically  indicated    === 06/21/24 ===  MR HAND RIGHT WO IV CONTRAST  Ulnar sided sagittal band tear right 4th metacarpophalangeal joint  with surrounding soft tissue edema.  Minimal osteoarthritis 2nd MCP.  Remote fracture 3rd metacarpal head.      MEDICATIONS:   Current Outpatient Medications   Medication Instructions    albuterol 90 mcg/actuation inhaler 2 puffs, inhalation, Every 4 hours PRN    amLODIPine (NORVASC) 10 mg, oral, Daily    aspirin (Adult Low Dose Aspirin) 81 mg EC tablet 1 tablet, oral, Daily    atorvastatin (LIPITOR) 80 mg, oral, Nightly    blood pressure monitor kit 1 Piece, miscellaneous, 2 times daily    blood-glucose meter misc Use daily or as directed for monitoring of diabetes.    cholecalciferol (VITAMIN D3) 50 mcg, oral, Daily    clobetasol (Temovate) 0.05 % cream APPLY SPARINGLY TO AFFECTED AREA(S) TWICE DAILY AS NEEDED    clotrimazole-betamethasone (Lotrisone) cream APPLY SPARINGLY TO AFFECTED AREA 3 TIMES A DAY    colchicine 0.6 mg tablet Take 1 tablet (0.6 mg) by mouth once daily. Do not start before December 29, 2023.    cyanocobalamin (VITAMIN B-12) 1,000 mcg, oral, Daily    divalproex (DEPAKOTE) 250 mg, oral, Every 12 hours scheduled, Do not crush, chew, or split.    dupilumab (DUPIXENT) 300 mg, subcutaneous, Every 14 days    flash glucose sensor (FREESTYLE NESTOR 14 DAY SENSOR MISC) miscellaneous, Every 14 days    flash glucose sensor kit (FreeStyle Nestor 2 Sensor) kit Use as directed to check blood sugar. Change every 14 days    flash glucose sensor kit kit Use as directed to check blood sugar. Change every 14 days.    FreeStyle  "Amanda reader (FreeStyle Amanda 2 [place]) misc Use as directed to check blood sugars daily    hydrocortisone 2.5 % ointment 1 Application, Topical, 2 times daily    hydrOXYzine pamoate (VISTARIL) 50 mg, oral, 2 times daily PRN    insulin glargine (LANTUS SOLOSTAR U-100 INSULIN) 75 Units, subcutaneous, Every morning, Take as directed per insulin instructions.    insulin lispro (HumaLOG) 100 unit/mL injection Inject 15 Units under the skin 3 times a day before meals. Increase to 20 units if blood sugar >200.    insulin lispro (HUMALOG) 0-15 Units, subcutaneous, With meals, nightly, and at 3AM, Take as directed per insulin instructions.    ketoconazole (NIZOral) 2 % shampoo 1 Application, Topical, Once Weekly    Klor-Con M20 20 mEq ER tablet 20 mEq, oral, Daily    lancets 33 gauge misc Use as directed to check blood sugar before meals and at bedtime.    losartan (COZAAR) 25 mg, oral, 2 times daily    metoprolol tartrate (LOPRESSOR) 50 mg, oral, 2 times daily    mometasone-formoterol (Dulera) 200-5 mcg/actuation inhaler 1 puff, inhalation, 2 times daily RT    pen needle, diabetic (TechLITE Pen Needle) 31 gauge x 5/16\" needle Use to inject 1-4 times daily as directed.    QUEtiapine (SEROquel) 50 mg tablet 1 tablet, oral, Nightly    sertraline (Zoloft) 100 mg tablet 1 tablet, oral, Daily    terazosin (HYTRIN) 5 mg, oral, Nightly    triamcinolone (Kenalog) 0.1 % cream 1 Application, Topical, 2 times daily        TODAY'S VISIT  DX:      1. CKD (chronic kidney disease), stage V (Multi)  Following with nephrologist.  Secondary to diabetes and hypertension      2. Hypertension associated with diabetes (Multi)  blood pressure monitor kit      3. Hyperlipidemia associated with type 2 diabetes mellitus (Multi)  A1c today in the office is 6.4%.      4. Gastroesophageal reflux disease without esophagitis  Takes OTC PPI as needed           MEDICAL DECISION MAKING:  - The current and active medical co morbidities have been considered. "   - Recent lab work and relevant imaging studies have been reviewed.    - Relevant correspondence/notes from other specialty consultants were reviewed.    - Next Follow up in 3 months  - Patient was given treatment as per above plan

## 2024-08-15 ENCOUNTER — HOSPITAL ENCOUNTER (EMERGENCY)
Age: 54
Discharge: HOME | End: 2024-08-15
Attending: STUDENT IN AN ORGANIZED HEALTH CARE EDUCATION/TRAINING PROGRAM
Payer: COMMERCIAL

## 2024-08-15 VITALS
DIASTOLIC BLOOD PRESSURE: 99 MMHG | BODY MASS INDEX: 35.63 KG/M2 | WEIGHT: 227 LBS | OXYGEN SATURATION: 96 % | HEART RATE: 63 BPM | TEMPERATURE: 97.6 F | HEIGHT: 67 IN | SYSTOLIC BLOOD PRESSURE: 129 MMHG | RESPIRATION RATE: 16 BRPM

## 2024-08-15 DIAGNOSIS — H53.8 BLURRED VISION, BILATERAL: Primary | ICD-10-CM

## 2024-08-15 DIAGNOSIS — H57.13 EYE PAIN, BILATERAL: ICD-10-CM

## 2024-08-15 LAB
ALBUMIN SERPL-MCNC: 4.7 G/DL (ref 3.5–4.6)
ALP SERPL-CCNC: 96 U/L (ref 35–104)
ALT SERPL-CCNC: 31 U/L (ref 0–41)
ANION GAP SERPL CALCULATED.3IONS-SCNC: 13 MEQ/L (ref 9–15)
AST SERPL-CCNC: 25 U/L (ref 0–40)
BASOPHILS # BLD: 0 K/UL (ref 0–0.2)
BASOPHILS NFR BLD: 0.7 %
BILIRUB SERPL-MCNC: 0.9 MG/DL (ref 0.2–0.7)
BUN SERPL-MCNC: 26 MG/DL (ref 6–20)
CALCIUM SERPL-MCNC: 9.1 MG/DL (ref 8.5–9.9)
CHLORIDE SERPL-SCNC: 106 MEQ/L (ref 95–107)
CO2 SERPL-SCNC: 22 MEQ/L (ref 20–31)
CREAT SERPL-MCNC: 3.22 MG/DL (ref 0.7–1.2)
EOSINOPHIL # BLD: 0 K/UL (ref 0–0.7)
EOSINOPHIL NFR BLD: 0 %
ERYTHROCYTE [DISTWIDTH] IN BLOOD BY AUTOMATED COUNT: 13.8 % (ref 11.5–14.5)
GLOBULIN SER CALC-MCNC: 2.9 G/DL (ref 2.3–3.5)
GLUCOSE SERPL-MCNC: 129 MG/DL (ref 70–99)
HCT VFR BLD AUTO: 37.9 % (ref 42–52)
HGB BLD-MCNC: 12.7 G/DL (ref 14–18)
LYMPHOCYTES # BLD: 1.9 K/UL (ref 1–4.8)
LYMPHOCYTES NFR BLD: 43.5 %
MAGNESIUM SERPL-MCNC: 1.9 MG/DL (ref 1.7–2.4)
MCH RBC QN AUTO: 28 PG (ref 27–31.3)
MCHC RBC AUTO-ENTMCNC: 33.5 % (ref 33–37)
MCV RBC AUTO: 83.5 FL (ref 79–92.2)
MONOCYTES # BLD: 0.5 K/UL (ref 0.2–0.8)
MONOCYTES NFR BLD: 11.2 %
NEUTROPHILS # BLD: 1.9 K/UL (ref 1.4–6.5)
NEUTS SEG NFR BLD: 43.9 %
PLATELET # BLD AUTO: 219 K/UL (ref 130–400)
POTASSIUM SERPL-SCNC: 4.5 MEQ/L (ref 3.4–4.9)
PROT SERPL-MCNC: 7.6 G/DL (ref 6.3–8)
RBC # BLD AUTO: 4.54 M/UL (ref 4.7–6.1)
SODIUM SERPL-SCNC: 141 MEQ/L (ref 135–144)
WBC # BLD AUTO: 4.3 K/UL (ref 4.8–10.8)

## 2024-08-15 PROCEDURE — 85025 COMPLETE CBC W/AUTO DIFF WBC: CPT

## 2024-08-15 PROCEDURE — 6360000002 HC RX W HCPCS: Performed by: STUDENT IN AN ORGANIZED HEALTH CARE EDUCATION/TRAINING PROGRAM

## 2024-08-15 PROCEDURE — 96375 TX/PRO/DX INJ NEW DRUG ADDON: CPT

## 2024-08-15 PROCEDURE — 80053 COMPREHEN METABOLIC PANEL: CPT

## 2024-08-15 PROCEDURE — 2580000003 HC RX 258: Performed by: STUDENT IN AN ORGANIZED HEALTH CARE EDUCATION/TRAINING PROGRAM

## 2024-08-15 PROCEDURE — 96374 THER/PROPH/DIAG INJ IV PUSH: CPT

## 2024-08-15 PROCEDURE — 99284 EMERGENCY DEPT VISIT MOD MDM: CPT

## 2024-08-15 PROCEDURE — 83735 ASSAY OF MAGNESIUM: CPT

## 2024-08-15 RX ORDER — 0.9 % SODIUM CHLORIDE 0.9 %
1000 INTRAVENOUS SOLUTION INTRAVENOUS ONCE
Status: COMPLETED | OUTPATIENT
Start: 2024-08-15 | End: 2024-08-15

## 2024-08-15 RX ORDER — MORPHINE SULFATE 4 MG/ML
4 INJECTION, SOLUTION INTRAMUSCULAR; INTRAVENOUS ONCE
Status: COMPLETED | OUTPATIENT
Start: 2024-08-15 | End: 2024-08-15

## 2024-08-15 RX ORDER — ONDANSETRON 2 MG/ML
4 INJECTION INTRAMUSCULAR; INTRAVENOUS ONCE
Status: COMPLETED | OUTPATIENT
Start: 2024-08-15 | End: 2024-08-15

## 2024-08-15 RX ORDER — PROPARACAINE HYDROCHLORIDE 5 MG/ML
1 SOLUTION/ DROPS OPHTHALMIC ONCE
Status: DISCONTINUED | OUTPATIENT
Start: 2024-08-15 | End: 2024-08-15 | Stop reason: HOSPADM

## 2024-08-15 RX ADMIN — MORPHINE SULFATE 4 MG: 4 INJECTION, SOLUTION INTRAMUSCULAR; INTRAVENOUS at 17:59

## 2024-08-15 RX ADMIN — ONDANSETRON 4 MG: 2 INJECTION INTRAMUSCULAR; INTRAVENOUS at 17:59

## 2024-08-15 RX ADMIN — SODIUM CHLORIDE 1000 ML: 9 INJECTION, SOLUTION INTRAVENOUS at 17:57

## 2024-08-15 ASSESSMENT — PAIN SCALES - GENERAL
PAINLEVEL_OUTOF10: 9
PAINLEVEL_OUTOF10: 7

## 2024-08-15 ASSESSMENT — TONOMETRY
OS_IOP_MMHG: 10
IOP_HANDHELD: 1
OD_IOP_MMHG: 12

## 2024-08-15 ASSESSMENT — LIFESTYLE VARIABLES
HOW OFTEN DO YOU HAVE A DRINK CONTAINING ALCOHOL: NEVER
HOW MANY STANDARD DRINKS CONTAINING ALCOHOL DO YOU HAVE ON A TYPICAL DAY: PATIENT DOES NOT DRINK

## 2024-08-15 ASSESSMENT — PAIN DESCRIPTION - LOCATION: LOCATION: EYE

## 2024-08-15 ASSESSMENT — PAIN - FUNCTIONAL ASSESSMENT: PAIN_FUNCTIONAL_ASSESSMENT: 0-10

## 2024-08-15 ASSESSMENT — PAIN DESCRIPTION - ORIENTATION: ORIENTATION: RIGHT;LEFT

## 2024-08-15 NOTE — ED NOTES
[facility] called @ 1930 to start transfer  [facility] Faxed  Called [facility] direct per provider's (Dr. Mercado)  request - he wanted STAT transfer for this PT.

## 2024-08-15 NOTE — ED TRIAGE NOTES
Arrived with report of bilateral eye pain after beng at the eye dr and when they put numbing drops in pt then began to have extreme burning and then lost ability to see  Pt reports extreme burning   Pt is a diabetic and is already losing vision prior to incident   Eye dr tried flushing eyes but no relief

## 2024-08-15 NOTE — ED PROVIDER NOTES
Housing Stability Vital Sign     Unstable Housing in the Last Year: No   Recent Concern: Housing Stability - High Risk (12/22/2023)    Received from [facility], [facility]    Housing Stability Vital Sign     Unable to Pay for Housing in the Last Year: Yes     Number of Places Lived in the Last Year: 1     Unstable Housing in the Last Year: No       CURRENT MEDICATIONS       Previous Medications    ALBUTEROL SULFATE HFA (PROVENTIL;VENTOLIN;PROAIR) 108 (90 BASE) MCG/ACT INHALER    INHALE 2 PUFFS EVERY 4 HOURS IF NEEDED FOR SHORTNESS OF BREATH.    AMLODIPINE (NORVASC) 10 MG TABLET    Take 1 tablet by mouth daily    ASPIRIN LOW DOSE 81 MG EC TABLET    Take 1 tablet by mouth daily    ATORVASTATIN (LIPITOR) 80 MG TABLET    Take 1 tablet by mouth daily    CLOTRIMAZOLE-BETAMETHASONE (LOTRISONE) 1-0.05 % CREAM    Apply topically 2 times daily.    COLCHICINE (COLCRYS) 0.6 MG TABLET    Take 1 tablet by mouth daily    INSULIN LISPRO, 1 UNIT DIAL, (HUMALOG/ADMELOG) 100 UNIT/ML SOPN    Inject 15 Units into the skin 3 times daily (before meals)    LANCETS (ONETOUCH DELICA PLUS [id number]) MISC        LANTUS SOLOSTAR 100 UNIT/ML INJECTION PEN    Inject 75 Units into the skin nightly Take 70 u at night    VITAMIN B-12 (CYANOCOBALAMIN) 1000 MCG TABLET    TAKE 1 TABLET (1,000 MCG) BY MOUTH ONCE DAILY       ALLERGIES     Patient has no known allergies.      PHYSICAL EXAM       ED Triage Vitals [08/15/24 1642]   BP Systolic BP Percentile Diastolic BP Percentile Temp Temp Source Pulse Respirations SpO2   135/89 -- -- 97.6 °F (36.4 °C) Oral 63 20 97 %      Height Weight - Scale         1.702 m (5' 7\") 103 kg (227 lb)             Physical Exam  Vitals and nursing note reviewed.   Constitutional:       General: He is in acute distress (2/2 pain).      Appearance: He is obese.   HENT:      Head: Normocephalic and atraumatic.      Comments: No TTP over the scalp     Mouth/Throat:      Pharynx:

## 2024-08-16 NOTE — DISCHARGE INSTRUCTIONS
Please follow up with Ophthalmology at [facility] tomorrow at 10:30 AM in their ophthalmology clinic.  Follow up with your PCP at the first available appointment.   Return to the ED if any new or worsening symptoms.

## 2024-08-16 NOTE — ED NOTES
Discharge instruction was given to pt. With the follow up care information about going to [facility] tomorrow morning for his 1030 AM appointment. PT. Said \"I will have someone take me up to [facility] at [phone] tomorrow for my appointment.\"

## 2024-08-20 ENCOUNTER — OFFICE VISIT (OUTPATIENT)
Dept: ORTHOPEDIC SURGERY | Facility: CLINIC | Age: 54
End: 2024-08-20
Payer: COMMERCIAL

## 2024-08-20 VITALS — HEIGHT: 60 IN | BODY MASS INDEX: 44.57 KG/M2 | WEIGHT: 227 LBS

## 2024-08-20 DIAGNOSIS — M19.049 HAND ARTHRITIS: Primary | ICD-10-CM

## 2024-08-20 PROCEDURE — 2500000004 HC RX 250 GENERAL PHARMACY W/ HCPCS (ALT 636 FOR OP/ED): Performed by: ORTHOPAEDIC SURGERY

## 2024-08-20 PROCEDURE — 20600 DRAIN/INJ JOINT/BURSA W/O US: CPT | Mod: RT | Performed by: ORTHOPAEDIC SURGERY

## 2024-08-20 PROCEDURE — 4010F ACE/ARB THERAPY RXD/TAKEN: CPT | Performed by: ORTHOPAEDIC SURGERY

## 2024-08-20 PROCEDURE — 3060F POS MICROALBUMINURIA REV: CPT | Performed by: ORTHOPAEDIC SURGERY

## 2024-08-20 PROCEDURE — 1036F TOBACCO NON-USER: CPT | Performed by: ORTHOPAEDIC SURGERY

## 2024-08-20 PROCEDURE — 2500000005 HC RX 250 GENERAL PHARMACY W/O HCPCS: Performed by: ORTHOPAEDIC SURGERY

## 2024-08-20 PROCEDURE — 99214 OFFICE O/P EST MOD 30 MIN: CPT | Performed by: ORTHOPAEDIC SURGERY

## 2024-08-20 PROCEDURE — 3048F LDL-C <100 MG/DL: CPT | Performed by: ORTHOPAEDIC SURGERY

## 2024-08-20 PROCEDURE — 3008F BODY MASS INDEX DOCD: CPT | Performed by: ORTHOPAEDIC SURGERY

## 2024-08-20 RX ORDER — LIDOCAINE HYDROCHLORIDE 10 MG/ML
0.5 INJECTION INFILTRATION; PERINEURAL
Status: COMPLETED | OUTPATIENT
Start: 2024-08-20 | End: 2024-08-20

## 2024-08-20 NOTE — PROGRESS NOTES
History present illness: Patient presents today for ongoing evaluation of his right hand.  He is status post injection to right fourth MCP.  That feels completely better.  Today he is having pain localized to the right second and third MCP.  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      Past medical history: The patient's past medical history, family history, social history, and review of systems were documented on the patient medical intake.  The updated data was reviewed in the electronic medical record.  History is negative except otherwise stated in history of present illness.        Physical examination:  General: Alert and oriented to person, place, and time.  No acute distress and breathing comfortably: Pleasant and cooperative with examination.  HEENT: Head is normocephalic and atraumatic.  Neck: Supple, no visible swelling.  Cardiovascular: No palpable tachycardia  Lungs: No audible wheezing or labored breathing  Abdomen: Nondistended.  Extremities: Evaluation of the right upper extremity finds the patient had palpable radial artery at the wrist with brisk capillary refill to all digits.  Patient has intact sensation to axillary radial median and ulnar nerves.  There are no open wounds.  There are no signs of infection.  There is no evidence of lymphedema or lymphatic streaking.  The patient has supple compartments to right arm forearm and hand.  No tenderness over the dorsum of the fourth MCP.  No evidence for extensor mechanism instability.  Tenderness notable to dorsal aspect of right second and third MCP.      Radiology:      Assessment: Right second and third MCP pain      Plan: Treatment options were discussed.  Patient is requesting steroid injection to the right second and third MCP given his excellent relief of discomfort with steroid injection to the right fourth MCP.  That was performed and he tolerated well.  Plan for  follow-up in 3 months.  No x-rays upon return.        Procedure:                        Hand / UE Inj/Asp: R index MCP for osteoarthritis on 8/20/2024 10:25 AM  Indications: pain  Details: 25 G needle, dorsal approach  Medications: 5 mg triamcinolone acetonide 10 mg/mL; 0.5 mL lidocaine 10 mg/mL (1 %)  Outcome: tolerated well, no immediate complications  Procedure, treatment alternatives, risks and benefits explained, specific risks discussed. Consent was given by the patient. Immediately prior to procedure a time out was called to verify the correct patient, procedure, equipment, support staff and site/side marked as required. Patient was prepped and draped in the usual sterile fashion.       Hand / UE Inj/Asp: R long MCP for osteoarthritis on 8/20/2024 10:25 AM  Indications: pain  Details: 25 G needle, dorsal approach  Medications: 5 mg triamcinolone acetonide 10 mg/mL; 0.5 mL lidocaine 10 mg/mL (1 %)  Outcome: tolerated well, no immediate complications  Procedure, treatment alternatives, risks and benefits explained, specific risks discussed. Consent was given by the patient. Immediately prior to procedure a time out was called to verify the correct patient, procedure, equipment, support staff and site/side marked as required. Patient was prepped and draped in the usual sterile fashion.

## 2024-08-20 NOTE — LETTER
August 20, 2024     Patient: Amanuel Riojas   YOB: 1970   Date of Visit: 8/20/2024       To Whom It May Concern:    It is my medical opinion that Amanuel Riojas may return to full duty immediately with no restrictions. Please excuse him from work for today's visit on 8/20/2024.    If you have any questions or concerns, please don't hesitate to call.         Sincerely,        Chetan Sharma, DO

## 2024-09-11 ENCOUNTER — LAB (OUTPATIENT)
Dept: LAB | Facility: LAB | Age: 54
End: 2024-09-11
Payer: COMMERCIAL

## 2024-09-11 DIAGNOSIS — E11.65 TYPE 2 DIABETES MELLITUS WITH HYPERGLYCEMIA (MULTI): Primary | ICD-10-CM

## 2024-09-11 DIAGNOSIS — E78.2 MIXED HYPERLIPIDEMIA: ICD-10-CM

## 2024-09-11 LAB
ALBUMIN SERPL BCP-MCNC: 4.3 G/DL (ref 3.4–5)
ALP SERPL-CCNC: 74 U/L (ref 33–120)
ALT SERPL W P-5'-P-CCNC: 38 U/L (ref 10–52)
ANION GAP SERPL CALC-SCNC: 13 MMOL/L (ref 10–20)
AST SERPL W P-5'-P-CCNC: 25 U/L (ref 9–39)
BILIRUB SERPL-MCNC: 0.6 MG/DL (ref 0–1.2)
BUN SERPL-MCNC: 28 MG/DL (ref 6–23)
CALCIUM SERPL-MCNC: 8.8 MG/DL (ref 8.6–10.3)
CHLORIDE SERPL-SCNC: 110 MMOL/L (ref 98–107)
CHOLEST SERPL-MCNC: 144 MG/DL (ref 0–199)
CHOLESTEROL/HDL RATIO: 3.8
CO2 SERPL-SCNC: 23 MMOL/L (ref 21–32)
CREAT SERPL-MCNC: 3.4 MG/DL (ref 0.5–1.3)
CREAT UR-MCNC: 173.9 MG/DL (ref 20–370)
EGFRCR SERPLBLD CKD-EPI 2021: 21 ML/MIN/1.73M*2
EST. AVERAGE GLUCOSE BLD GHB EST-MCNC: 148 MG/DL
GLUCOSE SERPL-MCNC: 124 MG/DL (ref 74–99)
HBA1C MFR BLD: 6.8 %
HDLC SERPL-MCNC: 37.7 MG/DL
LDLC SERPL CALC-MCNC: 55 MG/DL
MICROALBUMIN UR-MCNC: 394.5 MG/L
MICROALBUMIN/CREAT UR: 226.9 UG/MG CREAT
NON HDL CHOLESTEROL: 106 MG/DL (ref 0–149)
POTASSIUM SERPL-SCNC: 4.4 MMOL/L (ref 3.5–5.3)
PROT SERPL-MCNC: 6.7 G/DL (ref 6.4–8.2)
SODIUM SERPL-SCNC: 142 MMOL/L (ref 136–145)
TRIGL SERPL-MCNC: 259 MG/DL (ref 0–149)
TSH SERPL-ACNC: 3.75 MIU/L (ref 0.44–3.98)
VLDL: 52 MG/DL (ref 0–40)

## 2024-09-11 PROCEDURE — 82043 UR ALBUMIN QUANTITATIVE: CPT

## 2024-09-11 PROCEDURE — 80061 LIPID PANEL: CPT

## 2024-09-11 PROCEDURE — 36415 COLL VENOUS BLD VENIPUNCTURE: CPT

## 2024-09-11 PROCEDURE — 82570 ASSAY OF URINE CREATININE: CPT

## 2024-09-11 PROCEDURE — 80053 COMPREHEN METABOLIC PANEL: CPT

## 2024-09-11 PROCEDURE — 83036 HEMOGLOBIN GLYCOSYLATED A1C: CPT

## 2024-09-11 PROCEDURE — 84443 ASSAY THYROID STIM HORMONE: CPT

## 2024-09-17 ENCOUNTER — TELEPHONE (OUTPATIENT)
Dept: PRIMARY CARE | Facility: CLINIC | Age: 54
End: 2024-09-17
Payer: COMMERCIAL

## 2024-09-17 NOTE — TELEPHONE ENCOUNTER
Pt call confirmation of next office visit 11-7-2024 1:30 pm. Aware to complete lab work a few days prior to appt.

## 2024-10-04 DIAGNOSIS — I15.2 HYPERTENSION ASSOCIATED WITH DIABETES (MULTI): ICD-10-CM

## 2024-10-04 DIAGNOSIS — E11.59 HYPERTENSION ASSOCIATED WITH DIABETES (MULTI): ICD-10-CM

## 2024-10-04 DIAGNOSIS — E79.0 HYPERURICEMIA WITHOUT SIGNS INFLAMMATORY ARTHRITIS/TOPHACEOUS DISEASE: ICD-10-CM

## 2024-10-04 RX ORDER — COLCHICINE 0.6 MG/1
0.6 TABLET ORAL DAILY
Qty: 30 TABLET | Refills: 0 | Status: SHIPPED | OUTPATIENT
Start: 2024-10-04 | End: 2024-11-03

## 2024-10-04 RX ORDER — POTASSIUM CHLORIDE 20 MEQ/1
20 TABLET, EXTENDED RELEASE ORAL DAILY
Qty: 90 TABLET | Refills: 1 | Status: SHIPPED | OUTPATIENT
Start: 2024-10-04

## 2024-10-04 NOTE — TELEPHONE ENCOUNTER
Patient is calling for medication refills. The colchicine has not been filled since 12/2023 by Dr. Glover and it was a 30 day supply FYI    Rx request  Last OV 8/2024  Pending OV 11/2024

## 2024-10-19 ENCOUNTER — LAB (OUTPATIENT)
Dept: LAB | Facility: LAB | Age: 54
End: 2024-10-19
Payer: COMMERCIAL

## 2024-10-19 DIAGNOSIS — E55.9 VITAMIN D DEFICIENCY, UNSPECIFIED: ICD-10-CM

## 2024-10-19 DIAGNOSIS — N18.4 CHRONIC KIDNEY DISEASE, STAGE 4 (SEVERE) (MULTI): Primary | ICD-10-CM

## 2024-10-19 LAB
25(OH)D3 SERPL-MCNC: 42 NG/ML (ref 30–100)
ALBUMIN SERPL BCP-MCNC: 4.4 G/DL (ref 3.4–5)
ANION GAP SERPL CALC-SCNC: 14 MMOL/L (ref 10–20)
APPEARANCE UR: CLEAR
BACTERIA #/AREA URNS AUTO: ABNORMAL /HPF
BILIRUB UR STRIP.AUTO-MCNC: NEGATIVE MG/DL
BUN SERPL-MCNC: 33 MG/DL (ref 6–23)
CALCIUM SERPL-MCNC: 9 MG/DL (ref 8.6–10.3)
CHLORIDE SERPL-SCNC: 111 MMOL/L (ref 98–107)
CO2 SERPL-SCNC: 22 MMOL/L (ref 21–32)
COLOR UR: ABNORMAL
CREAT SERPL-MCNC: 3.7 MG/DL (ref 0.5–1.3)
CREAT UR-MCNC: 169.3 MG/DL (ref 20–370)
EGFRCR SERPLBLD CKD-EPI 2021: 19 ML/MIN/1.73M*2
GLUCOSE SERPL-MCNC: 153 MG/DL (ref 74–99)
GLUCOSE UR STRIP.AUTO-MCNC: NORMAL MG/DL
KETONES UR STRIP.AUTO-MCNC: NEGATIVE MG/DL
LEUKOCYTE ESTERASE UR QL STRIP.AUTO: ABNORMAL
MUCOUS THREADS #/AREA URNS AUTO: ABNORMAL /LPF
NITRITE UR QL STRIP.AUTO: NEGATIVE
PH UR STRIP.AUTO: 5.5 [PH]
PHOSPHATE SERPL-MCNC: 4.6 MG/DL (ref 2.5–4.9)
POTASSIUM SERPL-SCNC: 4.7 MMOL/L (ref 3.5–5.3)
PROT UR STRIP.AUTO-MCNC: ABNORMAL MG/DL
PROT UR-ACNC: 91 MG/DL (ref 5–25)
PROT/CREAT UR: 0.54 MG/MG CREAT (ref 0–0.17)
RBC # UR STRIP.AUTO: ABNORMAL /UL
RBC #/AREA URNS AUTO: ABNORMAL /HPF
SODIUM SERPL-SCNC: 142 MMOL/L (ref 136–145)
SP GR UR STRIP.AUTO: 1.01
UROBILINOGEN UR STRIP.AUTO-MCNC: NORMAL MG/DL
WBC #/AREA URNS AUTO: ABNORMAL /HPF

## 2024-10-19 PROCEDURE — 82570 ASSAY OF URINE CREATININE: CPT

## 2024-10-19 PROCEDURE — 81001 URINALYSIS AUTO W/SCOPE: CPT

## 2024-10-19 PROCEDURE — 80069 RENAL FUNCTION PANEL: CPT

## 2024-10-19 PROCEDURE — 82306 VITAMIN D 25 HYDROXY: CPT

## 2024-10-19 PROCEDURE — 84156 ASSAY OF PROTEIN URINE: CPT

## 2024-10-19 PROCEDURE — 83970 ASSAY OF PARATHORMONE: CPT

## 2024-10-19 PROCEDURE — 36415 COLL VENOUS BLD VENIPUNCTURE: CPT

## 2024-10-20 LAB — PTH-INTACT SERPL-MCNC: 97.5 PG/ML (ref 18.5–88)

## 2024-10-24 ENCOUNTER — TELEPHONE (OUTPATIENT)
Dept: PRIMARY CARE | Facility: CLINIC | Age: 54
End: 2024-10-24
Payer: COMMERCIAL

## 2024-10-24 DIAGNOSIS — M54.40 ACUTE LOW BACK PAIN WITH SCIATICA, SCIATICA LATERALITY UNSPECIFIED, UNSPECIFIED BACK PAIN LATERALITY: ICD-10-CM

## 2024-10-24 NOTE — TELEPHONE ENCOUNTER
Patient requesting a referral to PT for complaints of low back pain  OK for referral or do you want patient to schedule appointment to discuss?      Last OV 08/2024  Pending 11/2024

## 2024-11-01 ENCOUNTER — APPOINTMENT (OUTPATIENT)
Dept: ORTHOPEDIC SURGERY | Facility: CLINIC | Age: 54
End: 2024-11-01
Payer: COMMERCIAL

## 2024-11-07 ENCOUNTER — APPOINTMENT (OUTPATIENT)
Dept: PRIMARY CARE | Facility: CLINIC | Age: 54
End: 2024-11-07
Payer: COMMERCIAL

## 2024-11-07 VITALS
OXYGEN SATURATION: 95 % | WEIGHT: 231.6 LBS | DIASTOLIC BLOOD PRESSURE: 90 MMHG | BODY MASS INDEX: 45.47 KG/M2 | HEART RATE: 63 BPM | TEMPERATURE: 99 F | SYSTOLIC BLOOD PRESSURE: 140 MMHG | HEIGHT: 60 IN

## 2024-11-07 DIAGNOSIS — N18.5 CKD (CHRONIC KIDNEY DISEASE), STAGE V (MULTI): ICD-10-CM

## 2024-11-07 DIAGNOSIS — Z79.4 TYPE 2 DIABETES MELLITUS WITH OTHER SPECIFIED COMPLICATION, WITH LONG-TERM CURRENT USE OF INSULIN: ICD-10-CM

## 2024-11-07 DIAGNOSIS — I20.9 ANGINA PECTORIS: Primary | ICD-10-CM

## 2024-11-07 DIAGNOSIS — E11.59 HYPERTENSION ASSOCIATED WITH DIABETES (MULTI): ICD-10-CM

## 2024-11-07 DIAGNOSIS — E11.69 HYPERLIPIDEMIA ASSOCIATED WITH TYPE 2 DIABETES MELLITUS (MULTI): ICD-10-CM

## 2024-11-07 DIAGNOSIS — E78.5 HYPERLIPIDEMIA ASSOCIATED WITH TYPE 2 DIABETES MELLITUS (MULTI): ICD-10-CM

## 2024-11-07 DIAGNOSIS — I15.2 HYPERTENSION ASSOCIATED WITH DIABETES (MULTI): ICD-10-CM

## 2024-11-07 DIAGNOSIS — E11.69 TYPE 2 DIABETES MELLITUS WITH OTHER SPECIFIED COMPLICATION, WITH LONG-TERM CURRENT USE OF INSULIN: ICD-10-CM

## 2024-11-07 PROCEDURE — 3048F LDL-C <100 MG/DL: CPT | Performed by: INTERNAL MEDICINE

## 2024-11-07 PROCEDURE — 3008F BODY MASS INDEX DOCD: CPT | Performed by: INTERNAL MEDICINE

## 2024-11-07 PROCEDURE — 99214 OFFICE O/P EST MOD 30 MIN: CPT | Performed by: INTERNAL MEDICINE

## 2024-11-07 PROCEDURE — 3060F POS MICROALBUMINURIA REV: CPT | Performed by: INTERNAL MEDICINE

## 2024-11-07 PROCEDURE — 1036F TOBACCO NON-USER: CPT | Performed by: INTERNAL MEDICINE

## 2024-11-07 PROCEDURE — 3077F SYST BP >= 140 MM HG: CPT | Performed by: INTERNAL MEDICINE

## 2024-11-07 PROCEDURE — 4010F ACE/ARB THERAPY RXD/TAKEN: CPT | Performed by: INTERNAL MEDICINE

## 2024-11-07 PROCEDURE — 3080F DIAST BP >= 90 MM HG: CPT | Performed by: INTERNAL MEDICINE

## 2024-11-07 PROCEDURE — 3044F HG A1C LEVEL LT 7.0%: CPT | Performed by: INTERNAL MEDICINE

## 2024-11-07 RX ORDER — NITROGLYCERIN 0.4 MG/1
0.4 TABLET SUBLINGUAL EVERY 5 MIN PRN
Qty: 25 TABLET | Refills: 2 | Status: SHIPPED | OUTPATIENT
Start: 2024-11-07 | End: 2025-11-07

## 2024-11-07 ASSESSMENT — ENCOUNTER SYMPTOMS
LIGHT-HEADEDNESS: 0
DYSURIA: 0
COUGH: 0
ABDOMINAL PAIN: 0
SORE THROAT: 0
MYALGIAS: 0
ACTIVITY CHANGE: 0

## 2024-11-07 ASSESSMENT — PATIENT HEALTH QUESTIONNAIRE - PHQ9
1. LITTLE INTEREST OR PLEASURE IN DOING THINGS: NOT AT ALL
2. FEELING DOWN, DEPRESSED OR HOPELESS: NOT AT ALL
SUM OF ALL RESPONSES TO PHQ9 QUESTIONS 1 AND 2: 0

## 2024-11-07 NOTE — PROGRESS NOTES
Amanuel Riojas, [facility] 54 y.o. male was seen today:   Chief Complaint   Patient presents with    Follow-up     Patient is here today for a 3 month follow up    Med Refill       VISIT HARRIET:    Amanuel Riojas Comes here to discuss about his chronic medical conditions.  Patient has chronic kidney disease he nephrologist; he complained during an office visit that he had chest pain.  I discussed about this chest pain which was while he was resting.  He felt like something heavy on his chest.  He took his breathing treatment and felt better.  Patient does have a history of diabetes with hypertension and hyperlipidemia.  His blood pressure is well-controlled.  He sees Dr. Colindres for diabetic management.  He did not have any other anginal-like patient otherwise doing well.  Symptoms in the past.  He is not a smoker.  At this time I am going to give him some nitroglycerin to keep handy.  If he continues to have chest pain then we need a cardiac workup at that point.  Patient was told if the nitroglycerin does not help to relieve his chest pain then he should go to the emergency department.      MEDICATIONS:   Current Outpatient Medications   Medication Instructions    albuterol 90 mcg/actuation inhaler 2 puffs, inhalation, Every 4 hours PRN    amLODIPine (NORVASC) 10 mg, oral, Daily    aspirin (Adult Low Dose Aspirin) 81 mg EC tablet 1 tablet, Daily    atorvastatin (LIPITOR) 80 mg, oral, Nightly    blood pressure monitor kit 1 Piece, miscellaneous, 2 times daily    blood-glucose meter misc Use daily or as directed for monitoring of diabetes.    cholecalciferol (VITAMIN D3) 50 mcg, oral, Daily    clobetasol (Temovate) 0.05 % cream APPLY SPARINGLY TO AFFECTED AREA(S) TWICE DAILY AS NEEDED    clotrimazole-betamethasone (Lotrisone) cream APPLY SPARINGLY TO AFFECTED AREA 3 TIMES A DAY    colchicine 0.6 mg tablet Take 1 tablet (0.6 mg) by mouth once daily. Do not start before December 29, 2023.    cyanocobalamin (VITAMIN  "B-12) 1,000 mcg, oral, Daily    divalproex (DEPAKOTE) 250 mg, oral, Every 12 hours scheduled, Do not crush, chew, or split.    dupilumab (DUPIXENT) 300 mg, Every 14 days    flash glucose sensor (FREESTYLE NESTOR 14 DAY SENSOR MISC) Every 14 days    flash glucose sensor kit (FreeStyle Nestor 2 Sensor) kit Use as directed to check blood sugar. Change every 14 days    flash glucose sensor kit kit Use as directed to check blood sugar. Change every 14 days.    FreeStyle Nestor reader (FreeStyle Nestor 2 [place]) misc Use as directed to check blood sugars daily    hydrocortisone 2.5 % ointment 1 Application, 2 times daily    hydrOXYzine pamoate (VISTARIL) 50 mg, 2 times daily PRN    insulin glargine (LANTUS SOLOSTAR U-100 INSULIN) 75 Units, subcutaneous, Every morning, Take as directed per insulin instructions.    insulin lispro (HumaLOG) 100 unit/mL injection Inject 15 Units under the skin 3 times a day before meals. Increase to 20 units if blood sugar >200.    insulin lispro 0-15 Units, subcutaneous, With meals, nightly, and at 3AM, Take as directed per insulin instructions.    ketoconazole (NIZOral) 2 % shampoo 1 Application, Once Weekly    lancets 33 gauge misc Use as directed to check blood sugar before meals and at bedtime.    losartan (COZAAR) 25 mg, oral, 2 times daily    metoprolol tartrate (LOPRESSOR) 50 mg, oral, 2 times daily    mometasone-formoterol (Dulera) 200-5 mcg/actuation inhaler 1 puff, inhalation, 2 times daily RT    nitroglycerin (NITROSTAT) 0.4 mg, sublingual, Every 5 min PRN    pen needle, diabetic (TechLITE Pen Needle) 31 gauge x 5/16\" needle Use to inject 1-4 times daily as directed.    potassium chloride CR (Klor-Con M20) 20 mEq ER tablet 20 mEq, oral, Daily    QUEtiapine (SEROquel) 50 mg tablet 1 tablet, Nightly    sertraline (Zoloft) 100 mg tablet 1 tablet, Daily    terazosin (HYTRIN) 5 mg, oral, Nightly    triamcinolone (Kenalog) 0.1 % cream 1 Application, 2 times daily       TODAY'S VISIT  DX: "     1. Angina pectoris  nitroglycerin (Nitrostat) 0.4 mg SL tablet      2. Hyperlipidemia associated with type 2 diabetes mellitus (Multi)  Last LDL is 55.  Currently on Lipitor.      3. Hypertension associated with diabetes (Multi)  Blood pressure today in the office is 140/90 mmHg.  Patient was told increase activity and decrease calorie consumption.  Patient was given a blood pressure log to keep home BP diary twice daily for 2 weeks. Patient was adviced to bring the diary and BP measuring kit to next offfice visit in 3-4 weeks.          4. CKD (chronic kidney disease), stage V (Multi)  Sees the nephrologist.  So far stable.  He he was advised to drink water.  He does not need dialysis at this time.      5. Type 2 diabetes mellitus with other specified complication, with long-term current use of insulin  Sees Dr. Colindres.  Currently on insulin Subcu.  Last A1c was 6.8.           MEDICAL DECISION MAKING:  - Treatment and therapy plan are as above   - Active medical co morbidities have been considered.   - Recent lab work and relevant imaging studies were reviewed.    - Correspondence/notes from specialty consultants were checked.    - Next Follow up in 3 months      Review of Systems   Constitutional:  Negative for activity change.   HENT:  Negative for congestion and sore throat.    Respiratory:  Negative for cough.    Cardiovascular:  Negative for chest pain.   Gastrointestinal:  Negative for abdominal pain.   Endocrine: Negative for polyuria.   Genitourinary:  Negative for dysuria.   Musculoskeletal:  Negative for myalgias.   Skin:  Negative for rash.   Neurological:  Negative for light-headedness.   Psychiatric/Behavioral:  Negative for behavioral problems.      Visit Vitals  /90 (BP Location: Right arm, Patient Position: Sitting, BP Cuff Size: Large adult)   Pulse 63   Temp 37.2 °C (99 °F) (Temporal)   Ht 1.524 m (5')   Wt 105 kg (231 lb 9.6 oz)   SpO2 95% Comment: RA   BMI 45.23 kg/m²   Smoking Status  Never   BSA 2.11 m²         Wt Readings from Last 10 Encounters:   11/07/24 105 kg (231 lb 9.6 oz)   08/20/24 103 kg (227 lb)   08/07/24 103 kg (227 lb 3.2 oz)   06/13/24 104 kg (230 lb)   05/08/24 102 kg (225 lb 8 oz)   01/31/24 104 kg (230 lb)   01/03/24 101 kg (222 lb)   12/29/23 101 kg (222 lb 10.6 oz)   12/05/23 106 kg (234 lb 3.2 oz)   11/21/23 108 kg (238 lb 12.8 oz)     Physical Exam  Vitals and nursing note reviewed.   Constitutional:       Appearance: Normal appearance.   HENT:      Head: Normocephalic.      Right Ear: Tympanic membrane normal.      Left Ear: Tympanic membrane normal.      Nose: Nose normal.      Mouth/Throat:      Mouth: Mucous membranes are moist.   Cardiovascular:      Rate and Rhythm: Normal rate and regular rhythm.      Pulses: Normal pulses.      Heart sounds: No murmur heard.  Pulmonary:      Effort: No respiratory distress.      Breath sounds: Normal breath sounds.   Abdominal:      Palpations: Abdomen is soft.   Musculoskeletal:      Cervical back: Neck supple.      Right lower leg: No edema.      Left lower leg: No edema.   Skin:     General: Skin is warm.      Findings: No rash.   Neurological:      General: No focal deficit present.      Mental Status: He is alert and oriented to person, place, and time.   Psychiatric:         Mood and Affect: Mood normal.            RECENT LABS:  Lab Results   Component Value Date    WBC 4.0 (L) 08/03/2024    HGB 11.9 (L) 08/03/2024    HCT 37.3 (L) 08/03/2024     08/03/2024    CHOL 144 09/11/2024    TRIG 259 (H) 09/11/2024    HDL 37.7 09/11/2024    ALT 38 09/11/2024    AST 25 09/11/2024     10/19/2024    K 4.7 10/19/2024     (H) 10/19/2024    CREATININE 3.70 (H) 10/19/2024    BUN 33 (H) 10/19/2024    CO2 22 10/19/2024    TSH 3.75 09/11/2024    PSA 1.24 08/03/2024    INR 1.0 12/22/2023    HGBA1C 6.8 (H) 09/11/2024     Lab Results   Component Value Date    GLUCOSE 153 (H) 10/19/2024    CALCIUM 9.0 10/19/2024      10/19/2024    K 4.7 10/19/2024    CO2 22 10/19/2024     (H) 10/19/2024    BUN 33 (H) 10/19/2024    CREATININE 3.70 (H) 10/19/2024      Lab Results   Component Value Date    HGBA1C 6.8 (H) 09/11/2024    HGBA1C 6.4 08/07/2024    HGBA1C 6.1 05/08/2024     Lab Results   Component Value Date    LDLCALC 55 09/11/2024    CREATININE 3.70 (H) 10/19/2024             P.S: This note was completed using Dragon voice recognition technology and may include unintended errors with respect to translation of words, typographical errors or grammar errors which may not have been identified while finalizing the chart.

## 2024-11-18 ENCOUNTER — APPOINTMENT (OUTPATIENT)
Dept: ORTHOPEDIC SURGERY | Facility: CLINIC | Age: 54
End: 2024-11-18
Payer: COMMERCIAL

## 2024-11-21 ENCOUNTER — APPOINTMENT (OUTPATIENT)
Dept: ORTHOPEDIC SURGERY | Facility: CLINIC | Age: 54
End: 2024-11-21
Payer: COMMERCIAL

## 2024-11-22 DIAGNOSIS — E53.8 VITAMIN B12 DEFICIENCY: ICD-10-CM

## 2024-11-23 RX ORDER — LANOLIN ALCOHOL/MO/W.PET/CERES
1000 CREAM (GRAM) TOPICAL DAILY
Qty: 90 TABLET | Refills: 1 | Status: SHIPPED | OUTPATIENT
Start: 2024-11-23

## 2024-11-26 ENCOUNTER — APPOINTMENT (OUTPATIENT)
Dept: ORTHOPEDIC SURGERY | Facility: CLINIC | Age: 54
End: 2024-11-26
Payer: COMMERCIAL

## 2024-12-02 DIAGNOSIS — I15.2 HYPERTENSION SECONDARY TO ENDOCRINE DISORDERS: ICD-10-CM

## 2024-12-02 DIAGNOSIS — E11.59 TYPE 2 DIABETES MELLITUS WITH OTHER CIRCULATORY COMPLICATIONS: ICD-10-CM

## 2024-12-02 RX ORDER — TOBRAMYCIN AND DEXAMETHASONE 3; 1 MG/ML; MG/ML
SUSPENSION/ DROPS OPHTHALMIC
COMMUNITY
Start: 2024-08-15

## 2024-12-02 RX ORDER — PANTOPRAZOLE SODIUM 40 MG/1
40 TABLET, DELAYED RELEASE ORAL DAILY
COMMUNITY
Start: 2024-09-14

## 2024-12-03 RX ORDER — TERAZOSIN 5 MG/1
5 CAPSULE ORAL NIGHTLY
Qty: 90 CAPSULE | Refills: 1 | Status: SHIPPED | OUTPATIENT
Start: 2024-12-03

## 2024-12-06 DIAGNOSIS — E11.69 HYPERLIPIDEMIA ASSOCIATED WITH TYPE 2 DIABETES MELLITUS (MULTI): ICD-10-CM

## 2024-12-06 DIAGNOSIS — I15.2 HYPERTENSION ASSOCIATED WITH DIABETES (MULTI): ICD-10-CM

## 2024-12-06 DIAGNOSIS — E11.59 HYPERTENSION ASSOCIATED WITH DIABETES (MULTI): ICD-10-CM

## 2024-12-06 DIAGNOSIS — E78.5 HYPERLIPIDEMIA ASSOCIATED WITH TYPE 2 DIABETES MELLITUS (MULTI): ICD-10-CM

## 2024-12-06 DIAGNOSIS — E79.0 HYPERURICEMIA WITHOUT SIGNS INFLAMMATORY ARTHRITIS/TOPHACEOUS DISEASE: ICD-10-CM

## 2024-12-06 RX ORDER — COLCHICINE 0.6 MG/1
0.6 TABLET ORAL DAILY
Qty: 90 TABLET | Refills: 1 | Status: SHIPPED | OUTPATIENT
Start: 2024-12-06

## 2024-12-06 RX ORDER — ATORVASTATIN CALCIUM 80 MG/1
80 TABLET, FILM COATED ORAL NIGHTLY
Qty: 90 TABLET | Refills: 1 | Status: SHIPPED | OUTPATIENT
Start: 2024-12-06

## 2024-12-06 RX ORDER — METOPROLOL TARTRATE 50 MG/1
50 TABLET ORAL 2 TIMES DAILY
Qty: 180 TABLET | Refills: 1 | Status: SHIPPED | OUTPATIENT
Start: 2024-12-06

## 2024-12-06 RX ORDER — COLCHICINE 0.6 MG/1
0.6 TABLET ORAL DAILY
COMMUNITY
End: 2024-12-06 | Stop reason: SDUPTHER

## 2024-12-06 NOTE — TELEPHONE ENCOUNTER
Last OV 11/07/2024  Pending 05/07/2025      Patient also requesting a rx for colchicine 0.6 mg tablets take one tablet by mouth daily.   Last rx was from the hospital by Dr. Glover but patient states that he takes it daily

## 2024-12-10 ENCOUNTER — OFFICE VISIT (OUTPATIENT)
Dept: ORTHOPEDIC SURGERY | Facility: CLINIC | Age: 54
End: 2024-12-10
Payer: COMMERCIAL

## 2024-12-10 ENCOUNTER — HOSPITAL ENCOUNTER (OUTPATIENT)
Dept: RADIOLOGY | Facility: CLINIC | Age: 54
Discharge: HOME | End: 2024-12-10
Payer: COMMERCIAL

## 2024-12-10 DIAGNOSIS — M54.50 LUMBAR PAIN: ICD-10-CM

## 2024-12-10 DIAGNOSIS — M54.16 LUMBAR RADICULOPATHY: Primary | ICD-10-CM

## 2024-12-10 PROCEDURE — 3044F HG A1C LEVEL LT 7.0%: CPT | Performed by: ORTHOPAEDIC SURGERY

## 2024-12-10 PROCEDURE — 4010F ACE/ARB THERAPY RXD/TAKEN: CPT | Performed by: ORTHOPAEDIC SURGERY

## 2024-12-10 PROCEDURE — 99214 OFFICE O/P EST MOD 30 MIN: CPT | Performed by: ORTHOPAEDIC SURGERY

## 2024-12-10 PROCEDURE — 72114 X-RAY EXAM L-S SPINE BENDING: CPT | Performed by: ORTHOPAEDIC SURGERY

## 2024-12-10 PROCEDURE — 3048F LDL-C <100 MG/DL: CPT | Performed by: ORTHOPAEDIC SURGERY

## 2024-12-10 PROCEDURE — 3060F POS MICROALBUMINURIA REV: CPT | Performed by: ORTHOPAEDIC SURGERY

## 2024-12-10 PROCEDURE — 72120 X-RAY BEND ONLY L-S SPINE: CPT

## 2024-12-10 NOTE — PROGRESS NOTES
Amanuel Riojas is a 54 y.o. male who presents for New Patient Visit of the Lower Back (X-rays today).    HPI:  54-year-old gentleman here for new patient evaluation of low back pain.  He denies any fever chills nausea vomiting night sweats.  He has no bowel or bladder complaints.    Physical exam:  Well-nourished, well kept.No lymphangitis or lymphadenopathy in the examined extremities.  Gait normal.  Can stand on heels and toes.   Examination of the back shows tenderness in the paraspinous musculature in the central lumbosacral area.  There is decreased range of motion in all directions due to guarding/muscle spasms and pain at extremes.  There is good strength and no instability.  Examination of the lower extremities reveals no point tenderness, swelling, or deformity.  Range of motion of the hips, knees, and ankles are full without crepitance, instability, or exacerbation of pain, except internal/external rotation of his right hip.  Elicits some hip and groin pain on the right.  Also except he is moderately to severely tender over that right greater trochanteric bursa.  Strength is 5/5 throughout, except knee flexion extension on the right is about 4+/5 just slightly weaker than compared to the left.  No redness, abrasions, or lesions on extremities  Gross sensation intact in the extremities.  Deep tendon reflexes 1+ bilateral. Clonus negative.  Affect normal.  Alert and oriented ×3.  Coordination normal.    Imaging studies:  Central stenosis and AP lateral flexion-extension plain films were ordered and reviewed today.    Assessment:  54-year-old gentleman here for new patient evaluation of low back pain and bilateral right greater than left quadricep pain.  The pain in the quadriceps and the back pain are pretty even 50-50.  This started in 2019 when he was lifting a very heavy garage door.  He did physical therapy back in 2019 but he has not really done much conservatively since then.  No recent chiropractic  care, no recent physical therapy.  No history of epidural injections or back surgery.  His pain flared up in 2023 and has been pretty severe since then.  Patient is a diabetic, recent hemoglobin A1c from September 2024 was 6.8.  He has a little weakness in his knee flexion extension on the right, this is more than likely a pain guarding response.  He is moderate to severely tender over his right greater trochanteric bursa, given his symptoms, there may be a component of bursitis in his right hip.    The symptoms we have ordered and reviewed tests, x-rays.  I reviewed labs from September 2024 hemoglobin A1c is 6.8.  This is an exacerbation of a chronic problem that is affecting his bodily function.    For complete plan and/or surgical details, please refer to Dr. Keyes's portion of this split dictation.    -Seng Dejesus PA-C    In a face-to-face encounter, I performed a history and physical examination, discussed pertinent diagnostic studies if indicated, and discussed diagnosis and management strategies with both the patient and the midlevel provider.  I reviewed the midlevel's note and agree with the documented findings and plan of care.    Patient with back pain and bilateral quad pain.  This is been going on for 3 to 4 years.  Is gotten worse recently.  He is had no treatment for recently.  His x-rays are relatively normal that we ordered and reviewed today.  I think he would benefit from physical therapy.  He used to be a poorly controlled diabetic but he now has his A1c down to 6.8.  We are going to get him into some physical therapy and have him follow-up in about 6 weeks.  If he is no better at that point we are going to get an MRI of his lumbar spine.    Dread Keyes MD  Orthopedic surgery

## 2025-01-23 ENCOUNTER — APPOINTMENT (OUTPATIENT)
Dept: PRIMARY CARE | Facility: CLINIC | Age: 55
End: 2025-01-23
Payer: COMMERCIAL

## 2025-01-23 VITALS
TEMPERATURE: 98.6 F | DIASTOLIC BLOOD PRESSURE: 80 MMHG | OXYGEN SATURATION: 96 % | SYSTOLIC BLOOD PRESSURE: 128 MMHG | HEIGHT: 60 IN | BODY MASS INDEX: 46.37 KG/M2 | HEART RATE: 69 BPM | WEIGHT: 236.2 LBS

## 2025-01-23 DIAGNOSIS — J44.9 CHRONIC OBSTRUCTIVE PULMONARY DISEASE, UNSPECIFIED COPD TYPE (MULTI): ICD-10-CM

## 2025-01-23 DIAGNOSIS — E55.9 VITAMIN D DEFICIENCY: ICD-10-CM

## 2025-01-23 DIAGNOSIS — F10.21 ALCOHOL DEPENDENCE, IN REMISSION: ICD-10-CM

## 2025-01-23 DIAGNOSIS — I15.2 HYPERTENSION ASSOCIATED WITH DIABETES (MULTI): ICD-10-CM

## 2025-01-23 DIAGNOSIS — R56.9 SEIZURE-LIKE ACTIVITY (MULTI): ICD-10-CM

## 2025-01-23 DIAGNOSIS — E11.59 HYPERTENSION ASSOCIATED WITH DIABETES (MULTI): ICD-10-CM

## 2025-01-23 DIAGNOSIS — E79.0 HYPERURICEMIA WITHOUT SIGNS INFLAMMATORY ARTHRITIS/TOPHACEOUS DISEASE: ICD-10-CM

## 2025-01-23 DIAGNOSIS — I48.0 PAROXYSMAL ATRIAL FIBRILLATION (MULTI): ICD-10-CM

## 2025-01-23 DIAGNOSIS — E55.9 VITAMIN D DEFICIENCY, UNSPECIFIED: ICD-10-CM

## 2025-01-23 DIAGNOSIS — N18.4 STAGE 4 CHRONIC KIDNEY DISEASE (MULTI): ICD-10-CM

## 2025-01-23 DIAGNOSIS — K21.00 GASTROESOPHAGEAL REFLUX DISEASE WITH ESOPHAGITIS WITHOUT HEMORRHAGE: ICD-10-CM

## 2025-01-23 DIAGNOSIS — G81.91: Primary | ICD-10-CM

## 2025-01-23 PROCEDURE — 99214 OFFICE O/P EST MOD 30 MIN: CPT | Performed by: INTERNAL MEDICINE

## 2025-01-23 PROCEDURE — 3074F SYST BP LT 130 MM HG: CPT | Performed by: INTERNAL MEDICINE

## 2025-01-23 PROCEDURE — 4010F ACE/ARB THERAPY RXD/TAKEN: CPT | Performed by: INTERNAL MEDICINE

## 2025-01-23 PROCEDURE — 3008F BODY MASS INDEX DOCD: CPT | Performed by: INTERNAL MEDICINE

## 2025-01-23 PROCEDURE — 3079F DIAST BP 80-89 MM HG: CPT | Performed by: INTERNAL MEDICINE

## 2025-01-23 RX ORDER — POTASSIUM CHLORIDE 20 MEQ/1
20 TABLET, EXTENDED RELEASE ORAL DAILY
Qty: 90 TABLET | Refills: 1 | Status: SHIPPED | OUTPATIENT
Start: 2025-01-23

## 2025-01-23 RX ORDER — COLCHICINE 0.6 MG/1
0.6 TABLET ORAL DAILY
Qty: 90 TABLET | Refills: 1 | Status: SHIPPED | OUTPATIENT
Start: 2025-01-23

## 2025-01-23 RX ORDER — ACETAMINOPHEN 500 MG
2000 TABLET ORAL DAILY
Qty: 90 CAPSULE | Refills: 1 | Status: CANCELLED | OUTPATIENT
Start: 2025-01-23

## 2025-01-23 RX ORDER — PANTOPRAZOLE SODIUM 40 MG/1
40 TABLET, DELAYED RELEASE ORAL DAILY
Qty: 90 TABLET | Refills: 3 | Status: SHIPPED | OUTPATIENT
Start: 2025-01-23 | End: 2026-01-23

## 2025-01-23 RX ORDER — ERGOCALCIFEROL 1.25 MG/1
50000 CAPSULE ORAL
Qty: 12 CAPSULE | Refills: 3 | Status: SHIPPED | OUTPATIENT
Start: 2025-01-26 | End: 2026-01-26

## 2025-02-03 ENCOUNTER — APPOINTMENT (OUTPATIENT)
Dept: ORTHOPEDIC SURGERY | Facility: CLINIC | Age: 55
End: 2025-02-03
Payer: COMMERCIAL

## 2025-02-14 ENCOUNTER — DOCUMENTATION (OUTPATIENT)
Dept: TRANSPLANT | Facility: HOSPITAL | Age: 55
End: 2025-02-14
Payer: COMMERCIAL

## 2025-02-14 VITALS — HEIGHT: 67 IN | BODY MASS INDEX: 36.88 KG/M2 | WEIGHT: 235 LBS

## 2025-02-14 DIAGNOSIS — Z01.818 PRE-TRANSPLANT EVALUATION FOR KIDNEY TRANSPLANT: Primary | ICD-10-CM

## 2025-02-14 NOTE — PROGRESS NOTES
Pre-Kidney Intake Questionnaire    1. Do you live in an assisted living/nursing facility? No  2. Do you have difficulty reading or writing in English? No  3. Do you have transportation to and from you medical appointments? Yes.  What type of transportation do you use? Car  4. Do you have a family member or friend who can accompany you to the appointment? Yes  5. Who is your primary care doctor?  Mike New  6. Who is your kidney doctor?  Elizabeth christian  7. Are you currently on dialysis? No  8. Do you currently have any open sores or wounds? No  9. Have you ever had an amputation? No  10. Have you ever been diagnosed with cancer? No  11. Do you have a history of heart attack or stroke? No  12. Are you currently wearing oxygen? No  13. Have you been hospitalized for any reason in the last year?  No  Comments

## 2025-02-20 ENCOUNTER — DOCUMENTATION (OUTPATIENT)
Dept: TRANSPLANT | Facility: HOSPITAL | Age: 55
End: 2025-02-20
Payer: COMMERCIAL

## 2025-02-20 NOTE — PROGRESS NOTES
Patient was reviewed at Patient Review Meeting on 02/20/25 with Dr. Wiley, Dr. Shah, and Dr. Jane and DOMINIQUE Riley, DAYANA Fang, CHAKA Cortez, and CHAKA Neri.  Per review, patient can be scheduled in a standard Tuesday or Friday clinic.    Patient will be called and scheduled in appropriate clinic per review.

## 2025-02-21 ENCOUNTER — TELEPHONE (OUTPATIENT)
Dept: TRANSPLANT | Facility: HOSPITAL | Age: 55
End: 2025-02-21

## 2025-02-25 ENCOUNTER — DOCUMENTATION (OUTPATIENT)
Dept: TRANSPLANT | Facility: HOSPITAL | Age: 55
End: 2025-02-25
Payer: COMMERCIAL

## 2025-02-25 ENCOUNTER — HOSPITAL ENCOUNTER (INPATIENT)
Age: 55
End: 2025-02-25
Payer: COMMERCIAL

## 2025-03-06 ENCOUNTER — TELEPHONE (OUTPATIENT)
Facility: HOSPITAL | Age: 55
End: 2025-03-06
Payer: COMMERCIAL

## 2025-03-07 ENCOUNTER — OFFICE VISIT (OUTPATIENT)
Facility: HOSPITAL | Age: 55
End: 2025-03-07
Payer: COMMERCIAL

## 2025-03-07 ENCOUNTER — SOCIAL WORK (OUTPATIENT)
Facility: HOSPITAL | Age: 55
End: 2025-03-07
Payer: COMMERCIAL

## 2025-03-07 ENCOUNTER — NURSE ONLY (OUTPATIENT)
Facility: HOSPITAL | Age: 55
End: 2025-03-07
Payer: COMMERCIAL

## 2025-03-07 ENCOUNTER — APPOINTMENT (OUTPATIENT)
Facility: HOSPITAL | Age: 55
End: 2025-03-07
Payer: COMMERCIAL

## 2025-03-07 ENCOUNTER — DOCUMENTATION (OUTPATIENT)
Dept: TRANSPLANT | Facility: HOSPITAL | Age: 55
End: 2025-03-07
Payer: COMMERCIAL

## 2025-03-07 VITALS
SYSTOLIC BLOOD PRESSURE: 135 MMHG | DIASTOLIC BLOOD PRESSURE: 86 MMHG | HEIGHT: 67 IN | OXYGEN SATURATION: 97 % | HEART RATE: 72 BPM | BODY MASS INDEX: 37.09 KG/M2 | WEIGHT: 236.3 LBS | TEMPERATURE: 99 F

## 2025-03-07 VITALS
BODY MASS INDEX: 37.09 KG/M2 | OXYGEN SATURATION: 97 % | SYSTOLIC BLOOD PRESSURE: 135 MMHG | DIASTOLIC BLOOD PRESSURE: 86 MMHG | TEMPERATURE: 99 F | WEIGHT: 236.3 LBS | HEIGHT: 67 IN | HEART RATE: 72 BPM

## 2025-03-07 DIAGNOSIS — Z01.818 PRE-TRANSPLANT EVALUATION FOR KIDNEY TRANSPLANT: Primary | ICD-10-CM

## 2025-03-07 DIAGNOSIS — N18.5 CKD (CHRONIC KIDNEY DISEASE), STAGE V (MULTI): Primary | ICD-10-CM

## 2025-03-07 DIAGNOSIS — I10 ESSENTIAL HYPERTENSION: ICD-10-CM

## 2025-03-07 DIAGNOSIS — E11.21 DIABETES MELLITUS WITH KIDNEY DISEASE (MULTI): ICD-10-CM

## 2025-03-07 DIAGNOSIS — N18.6 ESRD (END STAGE RENAL DISEASE) (MULTI): ICD-10-CM

## 2025-03-07 DIAGNOSIS — D63.1 ANEMIA IN ESRD (END-STAGE RENAL DISEASE) (MULTI): ICD-10-CM

## 2025-03-07 DIAGNOSIS — N18.6 ANEMIA IN ESRD (END-STAGE RENAL DISEASE) (MULTI): ICD-10-CM

## 2025-03-07 PROCEDURE — 3008F BODY MASS INDEX DOCD: CPT | Performed by: STUDENT IN AN ORGANIZED HEALTH CARE EDUCATION/TRAINING PROGRAM

## 2025-03-07 PROCEDURE — 3075F SYST BP GE 130 - 139MM HG: CPT | Performed by: STUDENT IN AN ORGANIZED HEALTH CARE EDUCATION/TRAINING PROGRAM

## 2025-03-07 PROCEDURE — 99215 OFFICE O/P EST HI 40 MIN: CPT | Performed by: INTERNAL MEDICINE

## 2025-03-07 PROCEDURE — 99215 OFFICE O/P EST HI 40 MIN: CPT | Performed by: STUDENT IN AN ORGANIZED HEALTH CARE EDUCATION/TRAINING PROGRAM

## 2025-03-07 PROCEDURE — 4010F ACE/ARB THERAPY RXD/TAKEN: CPT | Performed by: STUDENT IN AN ORGANIZED HEALTH CARE EDUCATION/TRAINING PROGRAM

## 2025-03-07 PROCEDURE — 3075F SYST BP GE 130 - 139MM HG: CPT | Performed by: INTERNAL MEDICINE

## 2025-03-07 PROCEDURE — 99245 OFF/OP CONSLTJ NEW/EST HI 55: CPT | Performed by: INTERNAL MEDICINE

## 2025-03-07 PROCEDURE — 3079F DIAST BP 80-89 MM HG: CPT | Performed by: STUDENT IN AN ORGANIZED HEALTH CARE EDUCATION/TRAINING PROGRAM

## 2025-03-07 PROCEDURE — 99205 OFFICE O/P NEW HI 60 MIN: CPT | Performed by: STUDENT IN AN ORGANIZED HEALTH CARE EDUCATION/TRAINING PROGRAM

## 2025-03-07 PROCEDURE — 3008F BODY MASS INDEX DOCD: CPT | Performed by: INTERNAL MEDICINE

## 2025-03-07 PROCEDURE — 4010F ACE/ARB THERAPY RXD/TAKEN: CPT | Performed by: INTERNAL MEDICINE

## 2025-03-07 PROCEDURE — 3079F DIAST BP 80-89 MM HG: CPT | Performed by: INTERNAL MEDICINE

## 2025-03-07 ASSESSMENT — PAIN SCALES - GENERAL
PAINLEVEL_OUTOF10: 7
PAINLEVEL_OUTOF10: 7

## 2025-03-07 NOTE — PROGRESS NOTES
Eval Clinic Note:  Patient attended evaluation appts on 3/7/25 with Dr. Narvaez and Dr. Jane.  Medications and allergies reviewed with patient.  Patient presented to appointment without a support person.  Patient ambulated independently.  History:  Patient has a history of  ESRD  Dialysis:  PREDIALYSIS    Testing completed recently: see chart  Testing needed for evaluation:  See AVS  Listing Consent:  yes scanned in chart KDPI >85%, DCD, Hep C, Hep B  Comments:  Provided patient with my contact info for questions and concerns.       LISTING EDUCATION    Patient educated regarding the following prior to placement on the transplant waiting list:   The patient's medical condition, prognosis, and treatment plan.   The expectations and patient responsibilities while on the waiting list, including:   Keeping the transplant center informed of any changes in contact information or insurance coverage   Notifying the transplant center of any changes in medical status   Required testing and/or re-evaluation appointments while awaiting transplant   An overview of the surgical procedure, including potential risks and alternatives.   Information regarding what to expect during the inpatient admission and recovery period.   A discussion regarding organ offers and types of potential donors, including potential risks that may be associated with specific types of donors that could affect the success of the transplant or the health of the patient.  The right to refuse transplantation.     Patient was given the opportunity to have questions answered. Patient was provided a copy of the informed consent for transplant listing.    Education provided by: KOLTON Cortez RN  Transplant Coordinator: KOLTON Cortez RN  Transplant Physician: Dr Narvaez    Signed listing informed consent received? YES  Patient agrees to be listed for the following:  KDPI > 85% [yes]  Donors After Circulatory Death (DCD) [yes]  Donors with a Positive Core Antibody for  Hepatitis B [no]  Donors with Hepatitis C Virus to recipients with hepatitis C [no]  Donors with Hepatitis C Virus to Negative hepatitis C recipients [no]    Patient will be discussed at an upcoming selection committee to determine eligibility to be placed on the [facility] waiting list.

## 2025-03-07 NOTE — PATIENT INSTRUCTIONS
Thank You for coming to [facility] [place].  You are currently in evaluation for kidney transplant.  In order to be eligible to be placed on the wait list you must complete certain tests and appointments.  The following tests/appointments will be scheduled for you:    []   EKG  [x]   Echocardiogram  []  Stress test  [x]  CT Abdomen and Pelvis  []  CT cardiac score  []  Chest x-ray    []  Nephrology appointment  []  Surgeon appointment  []  Social work appointment  [x]  Cardiology consultation  []   Financial counselor telephone appointment  []  Dietician appointment (optional)    Please complete the following testing with your primary care doctor:    []  Colonoscopy  []  Mammogram  []  Pap test      Please call Isis Cortez RN, Pre-Kidney Transplant Coordinator, at [phone] if you have any questions.

## 2025-03-07 NOTE — PROGRESS NOTES
Patient attended class on 03/07/25.  Presented to class alone.  Oral and written education was provided.  Patient remained attentive throughout the review session, asking appropriate questions.  Evaluation consents were signed.     PRE-TRANSPLANT EDUCATION  Patient received education regarding the following topics as part of their pre-transplant evaluation:  The evaluation process, including:   Transplant team members and roles    Required consultations and testing   Selection criteria and suitability for transplant   Listing process and receiving an organ offer   Psychosocial and financial considerations for a successful transplant   Patient responsibilities, including the necessity of adhering to a strict medical regimen  An overview of the surgical procedure   Potential medical, surgical, and psychosocial risks to transplantation, including:   Wound infection   Pneumonia   Blood clot formation   Organ rejection, failure, and possibility of re-transplantation   Lifetime immunosuppression therapy and associated risks   Arrhythmias and cardiovascular collapse   Multi-organ system failure   Death   Depression   Post-Traumatic Stress Disorder   Generalized anxiety, issues of dependence, and feelings of guilt  Available alternatives to transplantation  Donor risk factors that could affect the success of the transplant and the health of the patient, including:   Donor age   Donor medical and social history   Condition of the organ   Risk of jabari cancer, HIV, Hepatitis B, Hepatitis C, or malaria if the infection is not detectable at the time of donation  Patient?s right to withdraw consent for transplantation at any time during the process  Transplants not performed in a Medicare-approved transplant center could affect the patient?s ability to have immunosuppression medication paid for under Medicare part B.   Multiple listing options.    Patient was given the opportunity to have questions answered. Patient was  provided a copy of the informed consent for transplant evaluation.    Signed evaluation informed consent received? 03/07/25

## 2025-03-07 NOTE — PROGRESS NOTES
"    Kidney Patient Summary    Date of appointment: 2025    Name: Amanuel Riojas    : 1970    MRN: [id number]    Diagnosis: CKD stage IV secondary to diabetes and HTN, predialysis   ABO: No results found for: \"ABO\"     Phase: Referral  Status: Active    Center Waitlist Date:       Last Seen:   Referring Nephrologist: Elizabeth La MD     HD Unit: (Not currently on dialysis)   Dialysis Start:   Access:       Days:      PCP: Mike New MD       Endocrinologist:     BMI Readings from Last 1 Encounters:   25 36.81 kg/m²       Test/Consult Impression Next Scheduled Date   CXR  Needs     EKG ECG 12 Lead 2023    Narrative  Normal sinus rhythm  Nonspecific T wave abnormality  Prolonged QT  Abnormal ECG  When compared with ECG of 22-DEC-2023 19:42, (unconfirmed)  No significant change was found  Confirmed by Zeke Carbajal ([id number]) on 2023 2:15:21 PM        Echo   needs 1. Left ventricular systolic function is normal with a 55-60% estimated ejection fraction.   2. There is no evidence of mitral valve stenosis.   3. Trace mitral valve regurgitation.   4. Trace tricuspid regurgitation is visualized.   5. Aortic valve stenosis is not present.   6. The main pulmonary artery is normal in size, and position, with normal bifurcation into the left and right pulmonary arteries.    CT Cardiac Score No results found.     NM Stress Test No results found for this or any previous visit.     Cardiac MRI No results found for this or any previous visit.     Left Heart Catheterization No results found for this or any previous visit.     Cardiology last visit impression  needs    Pulmonary Function Test No results found for this or any previous visit.    CT Abd/Pelvis needs    Colonoscopy  needs    Pap No results found for requested labs within last 1825 days.  No results found for requested labs within last 1825 days.    Mammogram No results found for this or any previous visit.     Other:   "

## 2025-03-07 NOTE — PROGRESS NOTES
Chief Complaint: Patient presents for kidney transplant evaluation    History of Present Illness:  Amanuel Riojas  is a 54 y.o. male presents with CKD from DM and HTN.    He is currently preemptive to dialysis.  In 11/2024 he did have complaints of chest pain at rest.  Was given nitroglycerin as needed.  He denies having recurrent pain after this.  Of note 6/2019 he underwent left heart cath which was negative for any significant stenosis.  Able to walk up a flight of stairs.  Has a history of seizures in 2023.  Was started on Depakote at that point.  Bruna need to follow-up with neurology.    Hemodialysis: Preemptive up to dialysis   ROS: Normal urine output no urinary retention/hematuria/recurrent UTIs/nephrolithiasis.   Disease Etiology:  diabetic nephropathy and hypertensive nephropathy.   Disease Complications:  congestive heart failure, dyslipidemia and hypertension.   Prior Malignancy: No    Past Medical History:  CKD  Hypertension  Type 2 diabetes  Asthma  Eczema  Gout  Seizure  PTSD    Past Surgical History:  None    Social History:  Denies smoking or drinking    Review of Systems:  Cardiac: Denies chest pain, palpitations  : Normal urine output. Denies history of gross hematuria, nephrolithiasis, urinary retention, or recurrent UTIs.  Vascular: Denies personal or familial history of DVT/PE. No active claudication or non-healing LE wounds.  Functional Status: Can walk up 2 flights of stairs  Prior transplant: Not applicable    Family History:  Denies family history of kidney disease    Physical Exam:  Gen: A+OX3; NAD  HEENT: PERRL, sclera anicteric, MMM  Cardiac: RRR  Chest: Normal inspiratory effort  Abdomen: S/NT/ND.  Ext: No LE edema  Vascular: 2+ palpable femoral pulses  Psychiatric: Normal mood, affect    Assessment/Plan:  - The patient is a good excellent candidate for kidney transplantation.  -Note he is fairly anxious/overwhelmed in clinic today.  May need additional education  in future.  - Routine age/gender based screening  - Cardiac testing per protocol: ECHO/stress test  - Non-contrast CT scan abdomen/pelvis  - Neurology records    Transplant Education:    I had a discussion with this patient regarding 1 year graft and patient survival statistics following renal transplantation for both living and  donor allograft recipients. This data included [facility] data compared to National data readily available for review on [email or web address]. The patient also had attended the kidney transplant education class provided by the transplant institute.     The difference between allograft function was discussed comparing living donor, KDPI 0-85%, and >85% kidneys.     Further discussion included:  -The transplant selection committee process.  -The need for lifelong immunosuppressive therapy, and the side effects of these medications including the risk of infections, cancer, and lymphoma.  -The wait list time approximately is 5 years or more for  donor transplants and the statistical superiority of a living donor.     -Using identified donors with risk criteria for transmission of infection  -The possibility of utilizing  donors with known HCV antibody and/or DAVID positivity and post-transplant treatment/surveillance protocol  -Potential transmission of infectious disease from any  donors, as well as living donors.   -The possibility of transmission of tumors and infections via the transplanted organ.  -The inability to completely test for all potential harmful tumors or infectious agents.  -The possibility of listing at multiple locations.     Surgical complications including need for reoperation(s) including but not limited to:  -Bleeding.  -Repair of leaks.  -Control of infection.  -Blood clots in the transplant vessels.  -Possible kidney transplant removal.     The medical complications including but not limited  to:  -Death.  -Cardiac.  -Pulmonary.  -Infectious.  -Neurologic.  -Other Complications.     We also discussed how the kidney transplant could function:  -Non-function and possible kidney transplant removal within the first 3 to 6 months.  -Delayed graft function (dialysis needed after transplant).  -The potential of recurrence of kidney disease leading to kidney transplant graft loss.    Time Attestation:  I spent 60 minutes with the patient, over 50 minutes in counseling and education as outlined above.    Candi Narvaez MD
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
## 2025-03-13 DIAGNOSIS — E11.59 TYPE 2 DIABETES MELLITUS WITH OTHER CIRCULATORY COMPLICATIONS: ICD-10-CM

## 2025-03-13 DIAGNOSIS — I15.2 HYPERTENSION SECONDARY TO ENDOCRINE DISORDERS: ICD-10-CM

## 2025-03-13 RX ORDER — TERAZOSIN 5 MG/1
5 CAPSULE ORAL NIGHTLY
Qty: 90 CAPSULE | Refills: 3 | Status: SHIPPED | OUTPATIENT
Start: 2025-03-13

## 2025-03-13 NOTE — PROGRESS NOTES
TRANSPLANT NEPHROLOGY CONSULT :   KIDNEY TRANSPLANT RECIPIENT EVALUATION        SERVICE DATE: 03/07/2025     REASON FOR CONSULT/CHIEF COMPLAINT:    FOR KIDNEY TRANSPLANT RECIPIENT EVALUATION.    Nephrologist - Jaime Stearns    HPI:    Mr. Riojas is a 54 y.o. male with past medical history significant for :    CKD stage 4; Patient has kidney disease secondary to presumed diabetic nephropathy and hypertensive nephrosclerosis    CAD s/p SEAMUS IN 2000  Hypertension x 20 years  Type 2 diabetes x 20 years, on insulin. Retinopathy and neuropathy +ve  COPD  Hx of right hemiplegia (non cerebrovascular etiology)  HLD  Afib  Legally blind  Asthma  Eczema  Gout  Seizure - last episode 11/2024 on depakote  PTSD    BLOOD TYPE:  pending    Functional status :   good    Urine output :   Normal, not on dialysis    Potential Donor :  Not at this time    Last GFR /Creatinine:   Lab Results   Component Value Date    CREATININE 3.70 (H) 10/19/2024        Lab Results   Component Value Date    GFRMALE 26 (A) 08/25/2023    GFRMALE 28 (A) 04/11/2023    GFRMALE 31 (A) 01/21/2023       Hx of PRBC Transfusion  denied    Recent Hospitalization/ED visit:  denied    The patient is here for kidney transplant recipient evaluation. Mr. Riojas has had multiple complications from end stage severe renal disease including anemia, secondary hyperparathyroidism, and osteodystrophy. The patient is here today for an evaluation for kidney transplantation to improve quality of life and decrease the risk of cardiovascular disease, coronary artery disease and stroke.     The patient is doing well without complaints. Denied chest pain, shortness of breath, palpitation, dyspnea on exertion, dysuria, fever, nausea, vomiting, diarrhea and flu-liked symptoms. No swelling of the extremities. No recent hospitalization or ED visit.      ROS:  Review of  14 systems was performed system by system. See HPI. Otherwise, the symptoms were negative.    PAST  MEDICAL HISTORY: Please see HPI.    Past Medical History:   Diagnosis Date    Cellulitis of unspecified finger 06/30/2022    Paronychia of thumb, unspecified laterality    COVID-19 01/17/2022    COVID-19 virus infection    Encounter for follow-up examination after completed treatment for conditions other than malignant neoplasm 01/17/2022    Hospital discharge follow-up    Encounter for general adult medical examination without abnormal findings 08/15/2022    Wellness examination    Encounter for screening for malignant neoplasm of colon 03/15/2022    Screen for colon cancer    Encounter for screening for malignant neoplasm of prostate 03/15/2022    Encounter for screening for malignant neoplasm of prostate    Obesity, unspecified 06/14/2022    Class 2 obesity with body mass index (BMI) of 36.0 to 36.9 in adult    Pain in unspecified hand 10/04/2022    Hand pain    Personal history of diseases of the skin and subcutaneous tissue 06/15/2022    History of eczema    Personal history of other diseases of the musculoskeletal system and connective tissue     History of gout    Personal history of other endocrine, nutritional and metabolic disease     History of hypokalemia    Personal history of other endocrine, nutritional and metabolic disease     History of hypokalemia    Rash and other nonspecific skin eruption 03/15/2022    Rash    Stiffness of right hand, not elsewhere classified 07/06/2021    Stiffness of finger joint of right hand    Unspecified fracture of other metacarpal bone, initial encounter for closed fracture 07/06/2021    Fracture of third metacarpal bone    Unspecified fracture of third metacarpal bone, right hand, initial encounter for closed fracture 07/06/2021    Fracture of third metacarpal bone of right hand        PAST SURGICAL HISTORY: Please see HPI.    Past Surgical History:   Procedure Laterality Date    OTHER SURGICAL HISTORY  11/07/2021    Cardiac catheterization with stent placement         SOCIAL HISTORY: Please see our 's note for details.    Social History     Socioeconomic History    Marital status: Significant Other     Spouse name: Not on file    Number of children: Not on file    Years of education: Not on file    Highest education level: Not on file   Occupational History    Not on file   Tobacco Use    Smoking status: Never    Smokeless tobacco: Never   Vaping Use    Vaping status: Never Used   Substance and Sexual Activity    Alcohol use: Not Currently    Drug use: Not Currently    Sexual activity: Not on file   Other Topics Concern    Not on file   Social History Narrative    Not on file     Social Drivers of Health     Financial Resource Strain: Low Risk  (2/6/2024)    [facility]    Overall Financial Resource Strain (CARDIA)     Difficulty of Paying Living Expenses: Not hard at all   Recent Concern: Financial Resource Strain - Medium Risk (12/22/2023)    Overall Financial Resource Strain (CARDIA)     Difficulty of Paying Living Expenses: Somewhat hard   Food Insecurity: No Food Insecurity (2/6/2024)    [facility]    Hunger Vital Sign     Worried About Running Out of Food in the Last Year: Never true     Ran Out of Food in the Last Year: Never true   Transportation Needs: Unknown (2/6/2024)    [facility]    PRAPARE - Transportation     Lack of Transportation (Medical): Not on file     Lack of Transportation (Non-Medical): No   Recent Concern: Transportation Needs - Unmet Transportation Needs (12/22/2023)    PRAPARE - Transportation     Lack of Transportation (Medical): Yes     Lack of Transportation (Non-Medical): Yes   Physical Activity: Not on file   Stress: Not on file   Social Connections: Not on file   Intimate Partner Violence: Not on file   Housing  Stability: Unknown (2/6/2024)    [facility]    Housing Stability Vital Sign     Unable to Pay for Housing in the Last Year: Not on file     Number of Places Lived in the Last Year: Not on file     Unstable Housing in the Last Year: No   Recent Concern: Housing Stability - High Risk (12/22/2023)    Housing Stability Vital Sign     Unable to Pay for Housing in the Last Year: Yes     Number of Times Moved in the Last Year: Not on file     Homeless in the Last Year: Not on file       FAMILY HISTORY:  Family History   Problem Relation Name Age of Onset    Hypertension Mother      Stroke Father         MEDICATION LIST:  Current Outpatient Medications   Medication Instructions    albuterol 90 mcg/actuation inhaler 2 puffs, inhalation, Every 4 hours PRN    amLODIPine (NORVASC) 10 mg, oral, Daily    aspirin (Adult Low Dose Aspirin) 81 mg EC tablet 1 tablet, Daily    atorvastatin (LIPITOR) 80 mg, oral, Nightly    blood pressure monitor kit 1 Piece, miscellaneous, 2 times daily    clobetasol (Temovate) 0.05 % cream APPLY SPARINGLY TO AFFECTED AREA(S) TWICE DAILY AS NEEDED    clotrimazole-betamethasone (Lotrisone) cream APPLY SPARINGLY TO AFFECTED AREA 3 TIMES A DAY    colchicine 0.6 mg, oral, Daily    cyanocobalamin (VITAMIN B-12) 1,000 mcg, oral, Daily    divalproex (DEPAKOTE) 250 mg, oral, Every 12 hours scheduled, Do not crush, chew, or split.    dupilumab (DUPIXENT) 300 mg, Every 14 days    ergocalciferol (VITAMIN D-2) 50,000 Units, oral, Once Weekly    flash glucose sensor (FREESTYLE NESTOR 14 DAY SENSOR MISC) Every 14 days    flash glucose sensor kit (FreeStyle Nestor 2 Sensor) kit Use as directed to check blood sugar. Change every 14 days    flash glucose sensor kit kit Use as directed to check blood sugar. Change every 14 days.    FreeStyle Nestor reader (FreeStyle Nestor 2 Gay) misc Use as directed to check blood sugars daily    hydrocortisone 2.5  "% ointment 1 Application, 2 times daily    hydrOXYzine pamoate (VISTARIL) 50 mg, 2 times daily PRN    insulin glargine (LANTUS SOLOSTAR U-100 INSULIN) 75 Units, subcutaneous, Every morning, Take as directed per insulin instructions.    insulin lispro (HumaLOG) 100 unit/mL injection Inject 15 Units under the skin 3 times a day before meals. Increase to 20 units if blood sugar >200.    insulin lispro 0-15 Units, subcutaneous, With meals, nightly, and at 3AM, Take as directed per insulin instructions.    ketoconazole (NIZOral) 2 % shampoo 1 Application, Once Weekly    lancets 33 gauge misc Use as directed to check blood sugar before meals and at bedtime.    losartan (COZAAR) 25 mg, oral, 2 times daily    metoprolol tartrate (LOPRESSOR) 50 mg, oral, 2 times daily    mometasone-formoterol (Dulera) 200-5 mcg/actuation inhaler 1 puff, inhalation, 2 times daily RT    nitroglycerin (NITROSTAT) 0.4 mg, sublingual, Every 5 min PRN    pantoprazole (PROTONIX) 40 mg, oral, Daily, Do not crush, chew, or split    pen needle, diabetic (TechLITE Pen Needle) 31 gauge x 5/16\" needle Use to inject 1-4 times daily as directed.    potassium chloride CR (Klor-Con M20) 20 mEq ER tablet 20 mEq, oral, Daily    QUEtiapine (SEROquel) 50 mg tablet 1 tablet, Nightly    sertraline (Zoloft) 100 mg tablet 1 tablet, Daily    terazosin (HYTRIN) 5 mg, oral, Nightly    triamcinolone (Kenalog) 0.1 % cream 1 Application, 2 times daily       ALLERGY  Allergies   Allergen Reactions    Sulfamethoxazole-Trimethoprim Unknown     kidney failure       PHYSICAL EXAM:    Visit Vitals  /86   Pulse 72   Temp 37.2 °C (99 °F) (Temporal)   Ht 1.702 m (5' 7\")   Wt 107 kg (236 lb 4.8 oz)   SpO2 97%   BMI 37.01 kg/m²   Smoking Status Never   BSA 2.25 m²        General Appearance - NAD, Good speech, oriented and alert  HEENT - Supple. Not pale. No jaundice. No cervical lymphadenopathy. Pharynx and tonsils are not injected.  CVS - RRR. Normal S1/S2. No murmur, click , " rub or gallop  Lungs- clear to auscultation bilaterally  Abdomen - soft , not tender, no guarding, no rigidity. No hepatosplenomegaly. Normal bowel sounds. No masses and ascites.   Musculoskeletal /Extremities - no edema. Full ROM. No joint tenderness.   Neuro/Psych - appropriate mood and affect. Motor power V/V all extremities. CN I -XII were grossly intact.  Skin - No visible rash      LABS:    Lab Results   Component Value Date    WBC 4.0 (L) 08/03/2024    HGB 11.9 (L) 08/03/2024    HCT 37.3 (L) 08/03/2024     08/03/2024    CHOL 144 09/11/2024    TRIG 259 (H) 09/11/2024    HDL 37.7 09/11/2024    LDLDIRECT 97 11/17/2022    ALT 38 09/11/2024    AST 25 09/11/2024     10/19/2024    K 4.7 10/19/2024     (H) 10/19/2024    CREATININE 3.70 (H) 10/19/2024    BUN 33 (H) 10/19/2024    CO2 22 10/19/2024    TSH 3.75 09/11/2024    PSA 1.24 08/03/2024    INR 1.0 12/22/2023    HGBA1C 6.8 (H) 09/11/2024     par    EKG:  Reviewed    Echocardiogram:   No results found for this or any previous visit from the past 1825 days.        ASSESSMENT AND PLAN:    After completion of taking history and physical examination, the patient seems to be a reasonable candidate to proceed with the rest of transplant evaluation.    However, patient will need the following tests to determine the eligibility for kidney transplant per [facility]'s kidney transplant evaluation guideline :    - cardiac clearance, hx of CAD/SEAMUS placement in 2000  - Echo, stress test  - CT A/P  - Psychosocial clearance /support person    -Update cancer screening per age/sex  - Will need to complete the rest of work up per protocol    *For AA patients :    After reviewing  the records on [facility] , we are      Unable to find the qualified GFR for waiting time modification.         =========================================================================    The case will be presented at the selection committee at the [facility] [place] [place]  [facility].  The final decision from the committee will be sent out to notify the patient/primary care physician/ nephrologist. The above recommendations were discussed with the patient at length.     In addition, the following were also discussed:    - Risks and benefits of transplantation, both short-term and long-term    - Risk of primary graft non-function, DGF, SGF, rejection, primary disease recurrence, return to dialysis    - Risks of immunosuppression including infections, CA, CV risk    - Need for compliance with medications and medical care in general    - We reviewed the necessity of HBV vaccination and other recommended vaccines before a kidney transplant, following the Centers for Disease Control and Prevention(CDC)'s guidelines [https://www.cdc.gov/vaccines/schedules/downloads/adult/adult-combined-schedule.pdf]     Currently, the patient has received the following vaccines:    Immunization History   Administered Date(s) Administered    Pneumococcal polysaccharide vaccine, 23-valent, age 2 years and older (PNEUMOVAX 23) 06/24/2019    Tdap vaccine, age 7 year and older (BOOSTRIX, ADACEL) 01/01/2019         The patient expressed understanding of the above and wishes to proceed.  I answered all of his questions. I urged the patient to look for living donors.    - I have spent over 60 minutes with the patient, reviewing medical record, lab result , CXR result and other specialty's notes. More than 50% of the time was spent in counseling, explaining about the transplantation and answering the questions. I also reviewed the medical record, blood test results, imaging and previous studies which were obtained from the nephrologists. I also order the tests needed to complete the evaluation and I will review the results of those tests.    Thank you for this consultation. Please feel free to contact me for questions.      Becky Jane    Transplant Nephrology

## 2025-03-19 SDOH — ECONOMIC STABILITY: FOOD INSECURITY: WITHIN THE PAST 12 MONTHS, YOU WORRIED THAT YOUR FOOD WOULD RUN OUT BEFORE YOU GOT MONEY TO BUY MORE.: SOMETIMES TRUE

## 2025-03-19 SDOH — ECONOMIC STABILITY: FOOD INSECURITY: WITHIN THE PAST 12 MONTHS, THE FOOD YOU BOUGHT JUST DIDN'T LAST AND YOU DIDN'T HAVE MONEY TO GET MORE.: NEVER TRUE

## 2025-03-19 ASSESSMENT — PATIENT HEALTH QUESTIONNAIRE - PHQ9
10. IF YOU CHECKED OFF ANY PROBLEMS, HOW DIFFICULT HAVE THESE PROBLEMS MADE IT FOR YOU TO DO YOUR WORK, TAKE CARE OF THINGS AT HOME, OR GET ALONG WITH OTHER PEOPLE: SOMEWHAT DIFFICULT
1. LITTLE INTEREST OR PLEASURE IN DOING THINGS: MORE THAN HALF THE DAYS
SUM OF ALL RESPONSES TO PHQ9 QUESTIONS 1 & 2: 3
2. FEELING DOWN, DEPRESSED OR HOPELESS: SEVERAL DAYS
4. FEELING TIRED OR HAVING LITTLE ENERGY: NEARLY EVERY DAY
8. MOVING OR SPEAKING SO SLOWLY THAT OTHER PEOPLE COULD HAVE NOTICED. OR THE OPPOSITE, BEING SO FIGETY OR RESTLESS THAT YOU HAVE BEEN MOVING AROUND A LOT MORE THAN USUAL: SEVERAL DAYS
9. THOUGHTS THAT YOU WOULD BE BETTER OFF DEAD, OR OF HURTING YOURSELF: NOT AT ALL
SUM OF ALL RESPONSES TO PHQ QUESTIONS 1-9: 16
5. POOR APPETITE OR OVEREATING: MORE THAN HALF THE DAYS
3. TROUBLE FALLING OR STAYING ASLEEP OR SLEEPING TOO MUCH: NEARLY EVERY DAY
6. FEELING BAD ABOUT YOURSELF - OR THAT YOU ARE A FAILURE OR HAVE LET YOURSELF OR YOUR FAMILY DOWN: MORE THAN HALF THE DAYS
7. TROUBLE CONCENTRATING ON THINGS, SUCH AS READING THE NEWSPAPER OR WATCHING TELEVISION: MORE THAN HALF THE DAYS

## 2025-03-19 ASSESSMENT — ANXIETY QUESTIONNAIRES
3. WORRYING TOO MUCH ABOUT DIFFERENT THINGS: NEARLY EVERY DAY
7. FEELING AFRAID AS IF SOMETHING AWFUL MIGHT HAPPEN: MORE THAN HALF THE DAYS
1. FEELING NERVOUS, ANXIOUS, OR ON EDGE: MORE THAN HALF THE DAYS
4. TROUBLE RELAXING: NEARLY EVERY DAY
2. NOT BEING ABLE TO STOP OR CONTROL WORRYING: NEARLY EVERY DAY
6. BECOMING EASILY ANNOYED OR IRRITABLE: NEARLY EVERY DAY
GAD7 TOTAL SCORE: 18
IF YOU CHECKED OFF ANY PROBLEMS ON THIS QUESTIONNAIRE, HOW DIFFICULT HAVE THESE PROBLEMS MADE IT FOR YOU TO DO YOUR WORK, TAKE CARE OF THINGS AT HOME, OR GET ALONG WITH OTHER PEOPLE: SOMEWHAT DIFFICULT
5. BEING SO RESTLESS THAT IT IS HARD TO SIT STILL: MORE THAN HALF THE DAYS

## 2025-03-19 ASSESSMENT — SOCIAL DETERMINANTS OF HEALTH (SDOH): HOW HARD IS IT FOR YOU TO PAY FOR THE VERY BASICS LIKE FOOD, HOUSING, MEDICAL CARE, AND HEATING?: HARD

## 2025-03-20 ENCOUNTER — DOCUMENTATION (OUTPATIENT)
Dept: TRANSPLANT | Facility: HOSPITAL | Age: 55
End: 2025-03-20
Payer: COMMERCIAL

## 2025-03-22 NOTE — PROGRESS NOTES
Amanuel Riojas, pleasant 54 y.o. male was seen today for:      Chief Complaint   Patient presents with    Letter for School/Work     Patient is in office today requesting a letter for work. Patient has a hearing for disability and SSI. Patient is about to lose his license due to a child support order. Patient advises he needs a letter advises that he's is under care of a physician.     Med Management     Patient would like to start taking allopurinol again for gout.        Amanuel Riojas   Patient has multiple medical comorbidities but otherwise they are doing well.  He is here mainly to get a letter that he is being seen in this office and he is compliant with his medications.  We take care of his hypertension, hyperlipidemia hyperuricemia.  He has stage IV kidney disease which is stable.  Patient has had CVA in the past with hemiplegia on the right side.  Currently he takes amlodipine, losartan.  His hyperlipidemia is managed with atorvastatin.  He has diabetes as well for which she takes insulin.  His medication will be sent for refill.  I will see him back in 3 months.      MEDICAL DECISION MAKING:  - Current co morbidities have been considered.   - All pertinent labs and images were addressed as per medical necessity.    - Also reviewed relevant notes from the specialty consultants.     - Time spent before, during and after office visit, which includes coordination of care counseling was 35 minutes  - Next follow up : 3 months    TODAY'S VISIT  DX:   1. Hemiplegia of right dominant side due to noncerebrovascular etiology, unspecified hemiplegia type (Multi)  Stable at this time.      2. Hypertension associated with diabetes (Multi)  potassium chloride CR (Klor-Con M20) 20 mEq ER tablet      3. Vitamin D deficiency, unspecified  Patient takes over-the-counter vitamin D 2000 international unit      4. Paroxysmal atrial fibrillation (Multi)  Rate control strategy.  Currently just on metoprolol no  anticoagulant.      5. Alcohol dependence, in remission  Stable at this time      6. Chronic obstructive pulmonary disease, unspecified COPD type (Multi)  DuoNeb neb treatment as needed.      7. Seizure-like activity (Multi)  Currently on valproic acid.      8. Stage 4 chronic kidney disease (Multi)  Stable at this time.  Advised to avoid nephrotoxin      9. Hyperuricemia without signs inflammatory arthritis/tophaceous disease  colchicine 0.6 mg tablet      10. Vitamin D deficiency  ergocalciferol (Vitamin D-2) 1.25 MG (47506 UT) capsule      11. Gastroesophageal reflux disease with esophagitis without hemorrhage  pantoprazole (ProtoNix) 40 mg EC tablet             Visit Vitals  /80 (BP Location: Right arm, Patient Position: Sitting, BP Cuff Size: Adult) Comment (BP Location): lower   Pulse 69   Temp 37 °C (98.6 °F) (Temporal)   Ht 1.524 m (5')   Wt 107 kg (236 lb 3.2 oz)   SpO2 96% Comment: RA   BMI 46.13 kg/m²   Smoking Status Never   BSA 2.13 m²     Physical Exam  Vitals and nursing note reviewed.   Constitutional:       Appearance: Normal appearance.   HENT:      Head: Normocephalic.      Right Ear: Tympanic membrane normal.      Left Ear: Tympanic membrane normal.      Nose: Nose normal.      Mouth/Throat:      Mouth: Mucous membranes are moist.   Cardiovascular:      Rate and Rhythm: Normal rate and regular rhythm.      Pulses: Normal pulses.      Heart sounds: No murmur heard.  Pulmonary:      Effort: No respiratory distress.      Breath sounds: Normal breath sounds.   Abdominal:      Palpations: Abdomen is soft.   Musculoskeletal:      Cervical back: Neck supple.      Right lower leg: No edema.      Left lower leg: No edema.   Skin:     General: Skin is warm.      Findings: No rash.   Neurological:      General: No focal deficit present.      Mental Status: He is alert and oriented to person, place, and time.   Psychiatric:         Mood and Affect: Mood normal.         Wt Readings from Last 10  Encounters:   03/07/25 107 kg (236 lb 4.8 oz)   03/07/25 107 kg (236 lb 4.8 oz)   02/14/25 107 kg (235 lb)   01/23/25 107 kg (236 lb 3.2 oz)   11/07/24 105 kg (231 lb 9.6 oz)   08/20/24 103 kg (227 lb)   08/07/24 103 kg (227 lb 3.2 oz)   06/13/24 104 kg (230 lb)   05/08/24 102 kg (225 lb 8 oz)   01/31/24 104 kg (230 lb)         MEDICATIONS:   Current Outpatient Medications   Medication Instructions    albuterol 90 mcg/actuation inhaler 2 puffs, inhalation, Every 4 hours PRN    amLODIPine (NORVASC) 10 mg, oral, Daily    aspirin (Adult Low Dose Aspirin) 81 mg EC tablet 1 tablet, Daily    atorvastatin (LIPITOR) 80 mg, oral, Nightly    blood pressure monitor kit 1 Piece, miscellaneous, 2 times daily    clobetasol (Temovate) 0.05 % cream APPLY SPARINGLY TO AFFECTED AREA(S) TWICE DAILY AS NEEDED    clotrimazole-betamethasone (Lotrisone) cream APPLY SPARINGLY TO AFFECTED AREA 3 TIMES A DAY    colchicine 0.6 mg, oral, Daily    cyanocobalamin (VITAMIN B-12) 1,000 mcg, oral, Daily    divalproex (DEPAKOTE) 250 mg, oral, Every 12 hours scheduled, Do not crush, chew, or split.    dupilumab (DUPIXENT) 300 mg, Every 14 days    ergocalciferol (VITAMIN D-2) 50,000 Units, oral, Once Weekly    flash glucose sensor (FREESTYLE NESTOR 14 DAY SENSOR MISC) Every 14 days    flash glucose sensor kit (FreeStyle Nestor 2 Sensor) kit Use as directed to check blood sugar. Change every 14 days    flash glucose sensor kit kit Use as directed to check blood sugar. Change every 14 days.    FreeStyle Nestor reader (FreeStyle Nestor 2 Merrill) misc Use as directed to check blood sugars daily    hydrocortisone 2.5 % ointment 1 Application, 2 times daily    hydrOXYzine pamoate (VISTARIL) 50 mg, 2 times daily PRN    insulin glargine (LANTUS SOLOSTAR U-100 INSULIN) 75 Units, subcutaneous, Every morning, Take as directed per insulin instructions.    insulin lispro (HumaLOG) 100 unit/mL injection Inject 15 Units under the skin 3 times a day before meals.  "Increase to 20 units if blood sugar >200.    insulin lispro 0-15 Units, subcutaneous, With meals, nightly, and at 3AM, Take as directed per insulin instructions.    ketoconazole (NIZOral) 2 % shampoo 1 Application, Once Weekly    lancets 33 gauge misc Use as directed to check blood sugar before meals and at bedtime.    losartan (COZAAR) 25 mg, oral, 2 times daily    metoprolol tartrate (LOPRESSOR) 50 mg, oral, 2 times daily    mometasone-formoterol (Dulera) 200-5 mcg/actuation inhaler 1 puff, inhalation, 2 times daily RT    nitroglycerin (NITROSTAT) 0.4 mg, sublingual, Every 5 min PRN    pantoprazole (PROTONIX) 40 mg, oral, Daily, Do not crush, chew, or split    pen needle, diabetic (TechLITE Pen Needle) 31 gauge x 5/16\" needle Use to inject 1-4 times daily as directed.    potassium chloride CR (Klor-Con M20) 20 mEq ER tablet 20 mEq, oral, Daily    QUEtiapine (SEROquel) 50 mg tablet 1 tablet, Nightly    sertraline (Zoloft) 100 mg tablet 1 tablet, Daily    terazosin (HYTRIN) 5 mg, oral, Nightly    triamcinolone (Kenalog) 0.1 % cream 1 Application, 2 times daily       Review of Systems   Constitutional:  No activity change or fever   HENT:  Denies ringing ears or nose bleed   Respiratory:  Denies stridor. No blood in sputum   Cardiovascular:  Denies chest pain, no sudden excessive sweating   Gastrointestinal:  No sour burping, no blood in stool    Genitourinary:  Denies blood in urine    Musculoskeletal:  No joint redness or swelling    Skin:  No new spot changing color or shape or border    Neurological:  No speech difficulty, facial droop    Psychiatric/Behavioral:  No agitation, denies Hallucination     RECENT LABS:  Lab Results   Component Value Date    WBC 4.0 (L) 08/03/2024    HGB 11.9 (L) 08/03/2024    HCT 37.3 (L) 08/03/2024     08/03/2024    CHOL 144 09/11/2024    TRIG 259 (H) 09/11/2024    HDL 37.7 09/11/2024    ALT 38 09/11/2024    AST 25 09/11/2024     10/19/2024    K 4.7 10/19/2024     " (H) 10/19/2024    CREATININE 3.70 (H) 10/19/2024    BUN 33 (H) 10/19/2024    CO2 22 10/19/2024    TSH 3.75 09/11/2024    PSA 1.24 08/03/2024    INR 1.0 12/22/2023    HGBA1C 6.8 (H) 09/11/2024     Lab Results   Component Value Date    GLUCOSE 153 (H) 10/19/2024    CALCIUM 9.0 10/19/2024     10/19/2024    K 4.7 10/19/2024    CO2 22 10/19/2024     (H) 10/19/2024    BUN 33 (H) 10/19/2024    CREATININE 3.70 (H) 10/19/2024      Lab Results   Component Value Date    LDLCALC 55 09/11/2024     Lab Results   Component Value Date    HGBA1C 6.8 (H) 09/11/2024    HGBA1C 6.4 08/07/2024    HGBA1C 6.1 05/08/2024     Lab Results   Component Value Date    LDLCALC 55 09/11/2024    CREATININE 3.70 (H) 10/19/2024     Lab Results   Component Value Date    PSA 1.24 08/03/2024    PSA 0.60 06/09/2021    PSA 0.60 06/30/2020         P.S: This note was completed using Dragon voice recognition technology and may include unintended errors with respect to translation of words, typography or grammatical errors. They may not have been identified while finalizing the chart.

## 2025-04-01 ENCOUNTER — TELEPHONE (OUTPATIENT)
Facility: HOSPITAL | Age: 55
End: 2025-04-01
Payer: COMMERCIAL

## 2025-04-03 DIAGNOSIS — E11.69 HYPERLIPIDEMIA ASSOCIATED WITH TYPE 2 DIABETES MELLITUS: ICD-10-CM

## 2025-04-03 DIAGNOSIS — E78.5 HYPERLIPIDEMIA ASSOCIATED WITH TYPE 2 DIABETES MELLITUS: ICD-10-CM

## 2025-04-03 RX ORDER — ATORVASTATIN CALCIUM 80 MG/1
80 TABLET, FILM COATED ORAL NIGHTLY
Qty: 90 TABLET | Refills: 3 | Status: SHIPPED | OUTPATIENT
Start: 2025-04-03

## 2025-04-04 ENCOUNTER — TELEPHONE (OUTPATIENT)
Facility: HOSPITAL | Age: 55
End: 2025-04-04
Payer: COMMERCIAL

## 2025-04-11 ENCOUNTER — TELEPHONE (OUTPATIENT)
Dept: PRIMARY CARE | Facility: CLINIC | Age: 55
End: 2025-04-11
Payer: COMMERCIAL

## 2025-04-11 NOTE — TELEPHONE ENCOUNTER
Patient called the office today requesting to have another letter in regards to him not being able to return to work and in regards to child support. Patient advises that he was just diagnosed with stage 4 kidney failure and will be getting a kidney transplant. Please advise

## 2025-04-20 ENCOUNTER — HOSPITAL ENCOUNTER (EMERGENCY)
Facility: HOSPITAL | Age: 55
Discharge: HOME | End: 2025-04-20
Attending: EMERGENCY MEDICINE
Payer: COMMERCIAL

## 2025-04-20 VITALS
TEMPERATURE: 99 F | WEIGHT: 236 LBS | BODY MASS INDEX: 37.04 KG/M2 | HEART RATE: 64 BPM | RESPIRATION RATE: 18 BRPM | OXYGEN SATURATION: 97 % | HEIGHT: 67 IN | SYSTOLIC BLOOD PRESSURE: 206 MMHG | DIASTOLIC BLOOD PRESSURE: 93 MMHG

## 2025-04-20 DIAGNOSIS — K08.89 DENTALGIA: Primary | ICD-10-CM

## 2025-04-20 PROCEDURE — 2500000005 HC RX 250 GENERAL PHARMACY W/O HCPCS: Performed by: EMERGENCY MEDICINE

## 2025-04-20 PROCEDURE — 2500000001 HC RX 250 WO HCPCS SELF ADMINISTERED DRUGS (ALT 637 FOR MEDICARE OP): Performed by: EMERGENCY MEDICINE

## 2025-04-20 PROCEDURE — 99283 EMERGENCY DEPT VISIT LOW MDM: CPT | Performed by: EMERGENCY MEDICINE

## 2025-04-20 RX ORDER — HYDROCODONE BITARTRATE AND ACETAMINOPHEN 5; 325 MG/1; MG/1
1 TABLET ORAL EVERY 4 HOURS PRN
Qty: 18 TABLET | Refills: 0 | Status: SHIPPED | OUTPATIENT
Start: 2025-04-20 | End: 2025-04-23

## 2025-04-20 RX ORDER — IBUPROFEN 800 MG/1
800 TABLET ORAL EVERY 8 HOURS PRN
Qty: 15 TABLET | Refills: 0 | Status: SHIPPED | OUTPATIENT
Start: 2025-04-20

## 2025-04-20 RX ORDER — IBUPROFEN 400 MG/1
400 TABLET ORAL ONCE
Status: COMPLETED | OUTPATIENT
Start: 2025-04-20 | End: 2025-04-20

## 2025-04-20 RX ORDER — AMOXICILLIN 875 MG/1
875 TABLET, FILM COATED ORAL 2 TIMES DAILY
Qty: 20 TABLET | Refills: 0 | Status: SHIPPED | OUTPATIENT
Start: 2025-04-20 | End: 2025-04-30

## 2025-04-20 RX ORDER — LIDOCAINE HYDROCHLORIDE 20 MG/ML
1.25 SOLUTION OROPHARYNGEAL ONCE
Status: COMPLETED | OUTPATIENT
Start: 2025-04-20 | End: 2025-04-20

## 2025-04-20 RX ORDER — OXYCODONE AND ACETAMINOPHEN 5; 325 MG/1; MG/1
1 TABLET ORAL ONCE
Refills: 0 | Status: COMPLETED | OUTPATIENT
Start: 2025-04-20 | End: 2025-04-20

## 2025-04-20 RX ADMIN — LIDOCAINE HYDROCHLORIDE 1.25 ML: 20 SOLUTION ORAL at 12:39

## 2025-04-20 RX ADMIN — BENZOCAINE, BUTAMBEN, AND TETRACAINE HYDROCHLORIDE 1 SPRAY: .028; .004; .004 AEROSOL, SPRAY TOPICAL at 12:39

## 2025-04-20 RX ADMIN — IBUPROFEN 400 MG: 400 TABLET, FILM COATED ORAL at 12:39

## 2025-04-20 RX ADMIN — OXYCODONE HYDROCHLORIDE AND ACETAMINOPHEN 1 TABLET: 5; 325 TABLET ORAL at 12:39

## 2025-04-20 ASSESSMENT — LIFESTYLE VARIABLES
EVER FELT BAD OR GUILTY ABOUT YOUR DRINKING: NO
EVER HAD A DRINK FIRST THING IN THE MORNING TO STEADY YOUR NERVES TO GET RID OF A HANGOVER: NO
HAVE YOU EVER FELT YOU SHOULD CUT DOWN ON YOUR DRINKING: NO
TOTAL SCORE: 0
HAVE PEOPLE ANNOYED YOU BY CRITICIZING YOUR DRINKING: NO

## 2025-04-20 ASSESSMENT — PAIN SCALES - GENERAL: PAINLEVEL_OUTOF10: 10 - WORST POSSIBLE PAIN

## 2025-04-20 ASSESSMENT — PAIN - FUNCTIONAL ASSESSMENT: PAIN_FUNCTIONAL_ASSESSMENT: 0-10

## 2025-04-20 NOTE — ED PROVIDER NOTES
HPI   Chief Complaint   Patient presents with    Dental Pain         History provided by:  Patient    Chief Complaint   Patient presents with    Dental Pain       History of Present Illness:  Amanuel Riojas is a 54 y.o. male presents with tooth pain.  Patient sent is been getting worse for the past few days.  Patient has known history of issues with this tooth and is scheduled to see his dentist in May.  No fever or chills.      PMFSH:   As per HPI, otherwise nurses notes reviewed in EMR.    Past Medical History: Medical History[1]   Past Surgical History: Surgical History[2]   Family History: Family History[3]   Social History:  Social History[4]  Allergies: Allergies[5]  Current Outpatient Medications   Medication Instructions    albuterol 90 mcg/actuation inhaler 2 puffs, inhalation, Every 4 hours PRN    amLODIPine (NORVASC) 10 mg, oral, Daily    amoxicillin (AMOXIL) 875 mg, oral, 2 times daily    aspirin (Adult Low Dose Aspirin) 81 mg EC tablet 1 tablet, Daily    atorvastatin (LIPITOR) 80 mg, oral, Nightly    blood pressure monitor kit 1 Piece, miscellaneous, 2 times daily    clobetasol (Temovate) 0.05 % cream APPLY SPARINGLY TO AFFECTED AREA(S) TWICE DAILY AS NEEDED    clotrimazole-betamethasone (Lotrisone) cream APPLY SPARINGLY TO AFFECTED AREA 3 TIMES A DAY    colchicine 0.6 mg, oral, Daily    cyanocobalamin (VITAMIN B-12) 1,000 mcg, oral, Daily    divalproex (DEPAKOTE) 250 mg, oral, Every 12 hours scheduled, Do not crush, chew, or split.    dupilumab (DUPIXENT) 300 mg, Every 14 days    ergocalciferol (VITAMIN D-2) 50,000 Units, oral, Once Weekly    flash glucose sensor (FREESTYLE AMANDA 14 DAY SENSOR MISC) Every 14 days    flash glucose sensor kit (FreeStyle Amanda 2 Sensor) kit Use as directed to check blood sugar. Change every 14 days    flash glucose sensor kit kit Use as directed to check blood sugar. Change every 14 days.    FreeStyle Amanda reader (FreeStyle Amanda 2 Hope) misc Use as directed to check  "blood sugars daily    HYDROcodone-acetaminophen (Norco) 5-325 mg tablet 1 tablet, oral, Every 4 hours PRN    hydrocortisone 2.5 % ointment 1 Application, 2 times daily    hydrOXYzine pamoate (VISTARIL) 50 mg, 2 times daily PRN    ibuprofen 800 mg, oral, Every 8 hours PRN    insulin glargine (LANTUS SOLOSTAR U-100 INSULIN) 75 Units, subcutaneous, Every morning, Take as directed per insulin instructions.    insulin lispro (HumaLOG) 100 unit/mL injection Inject 15 Units under the skin 3 times a day before meals. Increase to 20 units if blood sugar >200.    insulin lispro 0-15 Units, subcutaneous, With meals, nightly, and at 3AM, Take as directed per insulin instructions.    ketoconazole (NIZOral) 2 % shampoo 1 Application, Once Weekly    lancets 33 gauge misc Use as directed to check blood sugar before meals and at bedtime.    losartan (COZAAR) 25 mg, oral, 2 times daily    metoprolol tartrate (LOPRESSOR) 50 mg, oral, 2 times daily    mometasone-formoterol (Dulera) 200-5 mcg/actuation inhaler 1 puff, inhalation, 2 times daily RT    nitroglycerin (NITROSTAT) 0.4 mg, sublingual, Every 5 min PRN    pantoprazole (PROTONIX) 40 mg, oral, Daily, Do not crush, chew, or split    pen needle, diabetic (TechLITE Pen Needle) 31 gauge x 5/16\" needle Use to inject 1-4 times daily as directed.    potassium chloride CR (Klor-Con M20) 20 mEq ER tablet 20 mEq, oral, Daily    QUEtiapine (SEROquel) 50 mg tablet 1 tablet, Nightly    sertraline (Zoloft) 100 mg tablet 1 tablet, Daily    terazosin (HYTRIN) 5 mg, oral, Nightly    triamcinolone (Kenalog) 0.1 % cream 1 Application, 2 times daily              Patient History   Medical History[6]  Surgical History[7]  Family History[8]  Social History[9]    Physical Exam   ED Triage Vitals [04/20/25 1222]   Temperature Heart Rate Respirations BP   37.2 °C (99 °F) 64 18 (!) 206/93      Pulse Ox Temp Source Heart Rate Source Patient Position   97 % Temporal Monitor --      BP Location FiO2 (%)     -- " "--       Physical Exam  Physical Exam:    ED Triage Vitals [04/20/25 1222]   Temperature Heart Rate Respirations BP   37.2 °C (99 °F) 64 18 (!) 206/93      Pulse Ox Temp Source Heart Rate Source Patient Position   97 % Temporal Monitor --      BP Location FiO2 (%)     -- --         Patient Vitals for the past 24 hrs:   BP Temp Temp src Pulse Resp SpO2 Height Weight   04/20/25 1222 (!) 206/93 37.2 °C (99 °F) Temporal 64 18 97 % 1.702 m (5' 7\") 107 kg (236 lb)       Constitutional: Vital signs per nursing notes.  Well developed, well nourished.  No acute distress.    Psychiatric: alert and oriented to person, place, and time; no abnormalities of mood or affect; memory intact  Eyes: PERRL; conjunctivae and lids normal; EOMI  ENT: otoscopic exam of external canal and TM´s normal; nasal mucosa, turbinates, and septum normal; mouth, tongue, and pharynx normal; pharynx without edema, exudate, or injection; except right upper tooth with tenderness to palpation  Neurological: normal speech; CN II-XII grossly intact; normal motor and sensory function; no nystagmus  Lymphatic: no adenopathy of neck  Musculoskeletal: normal gait and station; normal digits and nails; no gross tendon or ligament injury; normal to palpation; normal strength/tone; neurovascular status intact; (-) Bubba´s sign  Skin: normal to inspection; normal to palpation; no rash  GCS: 15      ED Course & MDM   Diagnoses as of 04/20/25 1236   Dentalgia                 No data recorded                                 Medical Decision Making  Medical Decision Making:    EKG:    Labs: Labs Reviewed - No data to display    Diagnostic Imaging:   No orders to display       ED Medication Administration:   Medications   ibuprofen tablet 400 mg (has no administration in time range)   oxyCODONE-acetaminophen (Percocet) 5-325 mg per tablet 1 tablet (has no administration in time range)   butamben-tetracaine-benzocaine (Cetacaine) spray 1 spray (has no administration in " time range)   lidocaine (Xylocaine) 2 % mouth solution 1.25 mL (has no administration in time range)       ED Course:     Amanuel Riojas is a 54 y.o. male presents with tooth pain   .    Differential Diagnoses Considered:  Dentalgia, dental abscess      Patient presents with dental pain.     Considered lab work (CBC/CMP) but not indicated as I considered but do not suspect severe anemia, electrolyte abnormality or grossly abnormal renal function.    Considered CT neck/face, but not indicated as I considered but do not suspect infectious etiology/abscess/Chris's angina.          Diagnoses as of 04/20/25 1236   Dentalgia       Abnormal Labs Reviewed - No data to display    BP (!) 206/93 (04/20/25 1222)    Temp 37.2 °C (99 °F) (04/20/25 1222)    Pulse 64 (04/20/25 1222)   Resp 18 (04/20/25 1222)    SpO2 97 % (04/20/25 1222)        The patient will be treated with dental rolls with Cetacaine and viscous lidocaine.  He was also treated with ibuprofen and Percocet.    Medications administered improved the patient's condition.    The patient will be discharged home with medications for his symptoms.  I recommended short-term follow-up with his dentist.    I discussed the results and discharge plan with the patient and/or family/friend if present.  I emphasized the importance of follow up with the physician I referred them to in the timeframe recommended.  I explained reasons for the patient to return to the Emergency Department.  Questions were addressed.  The patient and/or family/friend expressed understanding.      Patient was instructed to have follow up with dentist within 1-3 days.      Shared decision making made with patient, and/or family, who agrees with plan.      Prescription Medication Consideration/Given:   ED Prescriptions       Medication Sig Dispense Start Date End Date Auth. Provider    amoxicillin (Amoxil) 875 mg tablet Take 1 tablet (875 mg) by mouth 2 times a day for 10 days. 20 tablet 4/20/2025  4/30/2025 Hunter ROWLAND MD    ibuprofen 800 mg tablet Take 1 tablet (800 mg) by mouth every 8 hours if needed for mild pain (1 - 3) or moderate pain (4 - 6). 15 tablet 4/20/2025 -- Hunter ROWLAND MD    HYDROcodone-acetaminophen (Norco) 5-325 mg tablet Take 1 tablet by mouth every 4 hours if needed for severe pain (7 - 10) for up to 3 days. 18 tablet 4/20/2025 4/23/2025 Hunter ROWLAND MD            Diagnosis:   1. Dentalgia                  Procedure  Procedures       [1]   Past Medical History:  Diagnosis Date    Cellulitis of unspecified finger 06/30/2022    Paronychia of thumb, unspecified laterality    COVID-19 01/17/2022    COVID-19 virus infection    Encounter for follow-up examination after completed treatment for conditions other than malignant neoplasm 01/17/2022    Hospital discharge follow-up    Encounter for general adult medical examination without abnormal findings 08/15/2022    Wellness examination    Encounter for screening for malignant neoplasm of colon 03/15/2022    Screen for colon cancer    Encounter for screening for malignant neoplasm of prostate 03/15/2022    Encounter for screening for malignant neoplasm of prostate    Obesity, unspecified 06/14/2022    Class 2 obesity with body mass index (BMI) of 36.0 to 36.9 in adult    Pain in unspecified hand 10/04/2022    Hand pain    Personal history of diseases of the skin and subcutaneous tissue 06/15/2022    History of eczema    Personal history of other diseases of the musculoskeletal system and connective tissue     History of gout    Personal history of other endocrine, nutritional and metabolic disease     History of hypokalemia    Personal history of other endocrine, nutritional and metabolic disease     History of hypokalemia    Rash and other nonspecific skin eruption 03/15/2022    Rash    Stiffness of right hand, not elsewhere classified 07/06/2021    Stiffness of finger joint of right hand    Unspecified fracture of other  metacarpal bone, initial encounter for closed fracture 07/06/2021    Fracture of third metacarpal bone    Unspecified fracture of third metacarpal bone, right hand, initial encounter for closed fracture 07/06/2021    Fracture of third metacarpal bone of right hand   [2]   Past Surgical History:  Procedure Laterality Date    OTHER SURGICAL HISTORY  11/07/2021    Cardiac catheterization with stent placement   [3]   Family History  Problem Relation Name Age of Onset    Hypertension Mother      Stroke Father     [4]   Social History  Tobacco Use    Smoking status: Never    Smokeless tobacco: Never   Vaping Use    Vaping status: Never Used   Substance Use Topics    Alcohol use: Not Currently    Drug use: Not Currently   [5]   Allergies  Allergen Reactions    Sulfamethoxazole-Trimethoprim Unknown     kidney failure   [6]   Past Medical History:  Diagnosis Date    Cellulitis of unspecified finger 06/30/2022    Paronychia of thumb, unspecified laterality    COVID-19 01/17/2022    COVID-19 virus infection    Encounter for follow-up examination after completed treatment for conditions other than malignant neoplasm 01/17/2022    Hospital discharge follow-up    Encounter for general adult medical examination without abnormal findings 08/15/2022    Wellness examination    Encounter for screening for malignant neoplasm of colon 03/15/2022    Screen for colon cancer    Encounter for screening for malignant neoplasm of prostate 03/15/2022    Encounter for screening for malignant neoplasm of prostate    Obesity, unspecified 06/14/2022    Class 2 obesity with body mass index (BMI) of 36.0 to 36.9 in adult    Pain in unspecified hand 10/04/2022    Hand pain    Personal history of diseases of the skin and subcutaneous tissue 06/15/2022    History of eczema    Personal history of other diseases of the musculoskeletal system and connective tissue     History of gout    Personal history of other endocrine, nutritional and metabolic  disease     History of hypokalemia    Personal history of other endocrine, nutritional and metabolic disease     History of hypokalemia    Rash and other nonspecific skin eruption 03/15/2022    Rash    Stiffness of right hand, not elsewhere classified 07/06/2021    Stiffness of finger joint of right hand    Unspecified fracture of other metacarpal bone, initial encounter for closed fracture 07/06/2021    Fracture of third metacarpal bone    Unspecified fracture of third metacarpal bone, right hand, initial encounter for closed fracture 07/06/2021    Fracture of third metacarpal bone of right hand   [7]   Past Surgical History:  Procedure Laterality Date    OTHER SURGICAL HISTORY  11/07/2021    Cardiac catheterization with stent placement   [8]   Family History  Problem Relation Name Age of Onset    Hypertension Mother      Stroke Father     [9]   Social History  Tobacco Use    Smoking status: Never    Smokeless tobacco: Never   Vaping Use    Vaping status: Never Used   Substance Use Topics    Alcohol use: Not Currently    Drug use: Not Currently        Hunter ROWLAND MD  04/20/25 8191

## 2025-04-23 ENCOUNTER — APPOINTMENT (OUTPATIENT)
Dept: PRIMARY CARE | Facility: CLINIC | Age: 55
End: 2025-04-23
Payer: COMMERCIAL

## 2025-04-23 VITALS
HEART RATE: 58 BPM | SYSTOLIC BLOOD PRESSURE: 180 MMHG | DIASTOLIC BLOOD PRESSURE: 90 MMHG | OXYGEN SATURATION: 97 % | TEMPERATURE: 98.8 F | WEIGHT: 241.2 LBS | BODY MASS INDEX: 37.86 KG/M2 | HEIGHT: 67 IN

## 2025-04-23 DIAGNOSIS — E11.69 TYPE 2 DIABETES MELLITUS WITH OTHER SPECIFIED COMPLICATION, WITH LONG-TERM CURRENT USE OF INSULIN: ICD-10-CM

## 2025-04-23 DIAGNOSIS — J45.909 ASTHMA, UNSPECIFIED ASTHMA SEVERITY, UNSPECIFIED WHETHER COMPLICATED, UNSPECIFIED WHETHER PERSISTENT (HHS-HCC): ICD-10-CM

## 2025-04-23 DIAGNOSIS — N18.4 STAGE 4 CHRONIC KIDNEY DISEASE (MULTI): Primary | ICD-10-CM

## 2025-04-23 DIAGNOSIS — I15.2 HYPERTENSION ASSOCIATED WITH DIABETES: ICD-10-CM

## 2025-04-23 DIAGNOSIS — Z79.4 TYPE 2 DIABETES MELLITUS WITH OTHER SPECIFIED COMPLICATION, WITH LONG-TERM CURRENT USE OF INSULIN: ICD-10-CM

## 2025-04-23 DIAGNOSIS — E11.59 HYPERTENSION ASSOCIATED WITH DIABETES: ICD-10-CM

## 2025-04-23 LAB — POC HEMOGLOBIN A1C: 7.6 % (ref 4.2–6.5)

## 2025-04-23 PROCEDURE — 3008F BODY MASS INDEX DOCD: CPT | Performed by: INTERNAL MEDICINE

## 2025-04-23 PROCEDURE — 99214 OFFICE O/P EST MOD 30 MIN: CPT | Performed by: INTERNAL MEDICINE

## 2025-04-23 PROCEDURE — 3051F HG A1C>EQUAL 7.0%<8.0%: CPT | Performed by: INTERNAL MEDICINE

## 2025-04-23 PROCEDURE — 83036 HEMOGLOBIN GLYCOSYLATED A1C: CPT | Performed by: INTERNAL MEDICINE

## 2025-04-23 PROCEDURE — 3080F DIAST BP >= 90 MM HG: CPT | Performed by: INTERNAL MEDICINE

## 2025-04-23 PROCEDURE — 3077F SYST BP >= 140 MM HG: CPT | Performed by: INTERNAL MEDICINE

## 2025-04-23 RX ORDER — SERTRALINE HYDROCHLORIDE 50 MG/1
TABLET, FILM COATED ORAL DAILY
COMMUNITY
Start: 2025-02-13

## 2025-04-23 RX ORDER — AMLODIPINE BESYLATE 10 MG/1
10 TABLET ORAL DAILY
Qty: 90 TABLET | Refills: 3 | Status: SHIPPED | OUTPATIENT
Start: 2025-04-23

## 2025-04-23 RX ORDER — DUPILUMAB 300 MG/2ML
300 INJECTION, SOLUTION SUBCUTANEOUS
COMMUNITY
Start: 2025-04-15

## 2025-04-23 RX ORDER — CICLOPIROX 80 MG/ML
1 SOLUTION TOPICAL NIGHTLY
COMMUNITY
Start: 2025-04-16

## 2025-04-23 RX ORDER — FLASH GLUCOSE SENSOR
KIT MISCELLANEOUS DAILY
Qty: 6 EACH | Refills: 3 | Status: SHIPPED | OUTPATIENT
Start: 2025-04-23

## 2025-04-23 RX ORDER — TRAZODONE HYDROCHLORIDE 50 MG/1
50 TABLET ORAL NIGHTLY
COMMUNITY
Start: 2025-02-13

## 2025-04-23 RX ORDER — ALBUTEROL SULFATE 90 UG/1
2 INHALANT RESPIRATORY (INHALATION) EVERY 4 HOURS PRN
Qty: 18 G | Refills: 3 | Status: SHIPPED | OUTPATIENT
Start: 2025-04-23

## 2025-04-23 RX ORDER — MOMETASONE FUROATE AND FORMOTEROL FUMARATE DIHYDRATE 200; 5 UG/1; UG/1
1 AEROSOL RESPIRATORY (INHALATION)
Qty: 13 G | Refills: 3 | Status: SHIPPED | OUTPATIENT
Start: 2025-04-23

## 2025-04-23 RX ORDER — PEN NEEDLE, DIABETIC 32GX 5/32"
1 NEEDLE, DISPOSABLE MISCELLANEOUS 4 TIMES DAILY
COMMUNITY
Start: 2025-04-15

## 2025-04-23 RX ORDER — TALC
3 POWDER (GRAM) TOPICAL NIGHTLY PRN
COMMUNITY
Start: 2025-03-21

## 2025-04-24 NOTE — PROGRESS NOTES
"Subjective     Patient ID: Amanuel Riojas is a 54 y.o. male who presents for Letter for Child Support.  History of Present Illness  Amanuel Riojas is a 54 year old male with hypertension and chronic kidney disease who presents for medication review and documentation for a child support hearing.    He has significantly elevated blood pressure today, with a reading of 210/102 mmHg. His current medication regimen includes metoprolol taken twice daily, losartan, Zoloft, trazodone, and Seroquel. Losartan is contraindicated due to his kidney condition.    He is undergoing evaluation for a kidney transplant and is on the transplant list but has not started dialysis. He faces challenges in coordinating care between different medical systems and is attempting to transfer his care to a local nephrologist. Frequent testing is required, which is difficult due to transportation issues.    He is dealing with legal issues related to child support and requires documentation of his medical condition for court. He is not currently working and has had a hearing for SSI. He is concerned about his ability to work due to his health conditions.    His A1c level has increased from 6.8 to 7.6, indicating poor diabetes control. He is experiencing stress from multiple life issues, including legal and health challenges, which may be impacting his health management.    Objective   Vitals:    04/23/25 1228   BP: 180/90   BP Location: Left arm   Patient Position: Sitting   BP Cuff Size: Adult   Pulse: 58   Temp: 37.1 °C (98.8 °F)   TempSrc: Temporal   SpO2: 97%   Weight: 109 kg (241 lb 3.2 oz)   Height: 1.702 m (5' 7\")     Wt Readings from Last 10 Encounters:   04/23/25 109 kg (241 lb 3.2 oz)   04/20/25 107 kg (236 lb)   03/07/25 107 kg (236 lb 4.8 oz)   03/07/25 107 kg (236 lb 4.8 oz)   02/14/25 107 kg (235 lb)   01/23/25 107 kg (236 lb 3.2 oz)   11/07/24 105 kg (231 lb 9.6 oz)   08/20/24 103 kg (227 lb)   08/07/24 103 kg (227 lb 3.2 oz) " "  06/13/24 104 kg (230 lb)       Medication:  Current Outpatient Medications   Medication Instructions    albuterol 90 mcg/actuation inhaler 2 puffs, inhalation, Every 4 hours PRN    amLODIPine (NORVASC) 10 mg, oral, Daily    amoxicillin (AMOXIL) 875 mg, oral, 2 times daily    aspirin (Adult Low Dose Aspirin) 81 mg EC tablet 1 tablet, Daily    atorvastatin (LIPITOR) 80 mg, oral, Nightly    BD Rosaline 2nd Gen Pen Needle 32 gauge x 5/32\" needle 1 Pen needle, 4 times daily    blood pressure monitor kit 1 Piece, miscellaneous, 2 times daily    ciclopirox (Penlac) 8 % solution 1 Application, Nightly    clobetasol (Temovate) 0.05 % cream APPLY SPARINGLY TO AFFECTED AREA(S) TWICE DAILY AS NEEDED    clotrimazole-betamethasone (Lotrisone) cream APPLY SPARINGLY TO AFFECTED AREA 3 TIMES A DAY    colchicine 0.6 mg, oral, Daily    cyanocobalamin (VITAMIN B-12) 1,000 mcg, oral, Daily    divalproex (DEPAKOTE) 250 mg, oral, Every 12 hours scheduled, Do not crush, chew, or split.    Dupixent Pen 300 mg, Every 14 days    ergocalciferol (VITAMIN D-2) 50,000 Units, oral, Once Weekly    flash glucose sensor (FREESTYLE NESTOR 14 DAY SENSOR MISC) Every 14 days    flash glucose sensor kit (FreeStyle Nestor 2 Sensor) kit Use as directed to check blood sugar. Change every 14 days    flash glucose sensor kit kit Use as directed to check blood sugar. Change every 14 days.    FreeStyle Nestor reader (FreeStyle Nestor 2 Daisy) misc Use as directed to check blood sugars daily    hydrocortisone 2.5 % ointment 1 Application, 2 times daily    hydrOXYzine pamoate (VISTARIL) 50 mg, 2 times daily PRN    ibuprofen 800 mg, oral, Every 8 hours PRN    insulin glargine (LANTUS SOLOSTAR U-100 INSULIN) 75 Units, subcutaneous, Every morning, Take as directed per insulin instructions.    insulin lispro (HumaLOG) 100 unit/mL injection Inject 15 Units under the skin 3 times a day before meals. Increase to 20 units if blood sugar >200.    ketoconazole (NIZOral) 2 % " "shampoo 1 Application, Once Weekly    lancets 33 gauge misc Use as directed to check blood sugar before meals and at bedtime.    melatonin 3 mg, Nightly PRN    metoprolol tartrate (LOPRESSOR) 50 mg, oral, 2 times daily    mometasone-formoterol (Dulera) 200-5 mcg/actuation inhaler 1 puff, inhalation, 2 times daily RT    nitroglycerin (NITROSTAT) 0.4 mg, sublingual, Every 5 min PRN    pantoprazole (PROTONIX) 40 mg, oral, Daily, Do not crush, chew, or split    pen needle, diabetic (TechLITE Pen Needle) 31 gauge x 5/16\" needle Use to inject 1-4 times daily as directed.    potassium chloride CR (Klor-Con M20) 20 mEq ER tablet 20 mEq, oral, Daily    QUEtiapine (SEROquel) 50 mg tablet 1 tablet, Nightly    sertraline (Zoloft) 50 mg tablet Daily    terazosin (HYTRIN) 5 mg, oral, Nightly    traZODone (DESYREL) 50 mg, Nightly    triamcinolone (Kenalog) 0.1 % cream 1 Application, 2 times daily       Physical Exam  Gen: Alert, awake, Oriented X 3. Not in any acute distress   HEENT:  Atraumatic, PERRL.  Conjunctivae clear.   Moist nasal mucous membranes. oropharynx non erythematous,   Neck:  Supple without thyromegaly or lymphadenopathy.  Lungs:  Clear to auscultation without rales, rhonchi, or rub.  Heart:  RRR, S1, S2, without M.  Abdomen:  Soft, non tender, no organ enlargement, bruit. Bowel sounds present . No CVA tenderness.  Extremities:  No edema. No calf swelling or tenderness.    Skin:  No rash, ecchymosis or erythema.      Review of Systems:  Constitutional:  No activity change or fever   HENT:  Denies ringing ears or nose bleed   Respiratory:  Denies stridor. No blood in sputum   Cardiovascular:  Denies chest pain, no sudden excessive sweating   Gastrointestinal:  No sour burping, no blood in stool    Genitourinary:  Denies blood in urine    Musculoskeletal:  No joint redness or swelling    Skin:  No new spot changing color or shape or border    Neurological:  No speech difficulty, facial droop  "   Psychiatric/Behavioral:  No agitation, denies Hallucination     Recent Labs:   Lab Results   Component Value Date    WBC 4.0 (L) 08/03/2024    HGB 11.9 (L) 08/03/2024    HCT 37.3 (L) 08/03/2024     08/03/2024    CHOL 144 09/11/2024    TRIG 259 (H) 09/11/2024    HDL 37.7 09/11/2024    ALT 38 09/11/2024    AST 25 09/11/2024     10/19/2024    K 4.7 10/19/2024     (H) 10/19/2024    CREATININE 3.70 (H) 10/19/2024    BUN 33 (H) 10/19/2024    CO2 22 10/19/2024    TSH 3.75 09/11/2024    PSA 1.24 08/03/2024    INR 1.0 12/22/2023    HGBA1C 6.8 (H) 09/11/2024     Lab Results   Component Value Date    GLUCOSE 153 (H) 10/19/2024    CALCIUM 9.0 10/19/2024     10/19/2024    K 4.7 10/19/2024    CO2 22 10/19/2024     (H) 10/19/2024    BUN 33 (H) 10/19/2024    CREATININE 3.70 (H) 10/19/2024      Lab Results   Component Value Date    LDLCALC 55 09/11/2024     Lab Results   Component Value Date    HGBA1C 6.8 (H) 09/11/2024    HGBA1C 6.4 08/07/2024    HGBA1C 6.1 05/08/2024     Lab Results   Component Value Date    LDLCALC 55 09/11/2024    CREATININE 3.70 (H) 10/19/2024     Lab Results   Component Value Date    PSA 1.24 08/03/2024    PSA 0.60 06/09/2021    PSA 0.60 06/30/2020       Diagnosis and Orders:     Stage 4 chronic kidney disease (Multi)  -     Referral to Occupational Therapy; Future  Type 2 diabetes mellitus with other specified complication, with long-term current use of insulin  -     flash glucose sensor kit (FreeStyle Amanda 2 Sensor) kit; Use as directed to check blood sugar. Change every 14 days  -     POCT glycosylated hemoglobin (Hb A1C) manually resulted  -     Referral to Occupational Therapy; Future  Hypertension associated with diabetes  -     amLODIPine (Norvasc) 10 mg tablet; Take 1 tablet (10 mg) by mouth once daily.  -     Referral to Occupational Therapy; Future  Asthma, unspecified asthma severity, unspecified whether complicated, unspecified whether persistent ([facility])  -      mometasone-formoterol (Dulera) 200-5 mcg/actuation inhaler; Inhale 1 puff 2 times a day.  -     albuterol 90 mcg/actuation inhaler; Inhale 2 puffs every 4 hours if needed for shortness of breath.  -     Referral to Occupational Therapy; Future         Assessment & Plan  Diabetes with renal complications  Poorly controlled diabetes with increased A1c from 6.8 to 7.6, contributing to renal complications and worsening kidney function. Stress may impact management.  - Bring all pill boxes and insulin to the next appointment for medication reconciliation.  - Review and adjust diabetes management plan at the next visit.    Stage 4 chronic kidney disease  Stage 4 CKD present, evaluated for kidney transplant. Not on dialysis. Losartan contraindicated due to potential kidney function worsening. Coordination with local nephrologist needed for transplant evaluation and testing.  - Discontinue losartan due to its adverse effects on kidney function.  - Coordinate with a local nephrologist for transplant evaluation and testing.    Hypertension  Poorly controlled hypertension with /102 mmHg, risk for stroke. On multiple antihypertensives including metoprolol and losartan. Losartan contraindicated due to kidney issues. Stress and improper medication management may exacerbate hypertension.  - Discontinue losartan due to its adverse effects on kidney function.  - Bring all pill boxes to the next appointment for medication reconciliation.  - Consider adjusting antihypertensive regimen after reviewing current medications.      VISIT SUMMARY:  You came in today for a medication review and to get documentation for a child support hearing. Your blood pressure was very high, and we discussed your current medications and health conditions, including hypertension, chronic kidney disease, and diabetes. We also talked about the stress you are experiencing from legal and health issues, which may be affecting your health  management.    YOUR PLAN:  -DIABETES WITH RENAL COMPLICATIONS: Your diabetes is not well controlled, as indicated by your increased A1c level from 6.8 to 7.6. This can worsen your kidney function. Please bring all your pill boxes and insulin to the next appointment so we can review and adjust your diabetes management plan.    -STAGE 4 CHRONIC KIDNEY DISEASE: You have advanced kidney disease and are being evaluated for a kidney transplant. Losartan, one of your current medications, can worsen your kidney function, so we need to stop it. We will coordinate with a local kidney specialist to continue your transplant evaluation and necessary testing.    -HYPERTENSION: Your blood pressure is very high, which increases your risk for serious issues like a stroke. Losartan, one of your current medications, is not suitable for you due to your kidney condition, so we need to stop it. Please bring all your pill boxes to the next appointment so we can review and possibly adjust your blood pressure medications.    INSTRUCTIONS:  Please bring all your pill boxes and insulin to your next appointment for a thorough review of your medications. We will also discuss adjusting your diabetes and blood pressure management plans. Additionally, we will coordinate with a local nephrologist for your kidney transplant evaluation and necessary testing.        This medical note was created with the assistance of artificial intelligence (AI) for documentation purposes. The content has been reviewed and confirmed by the healthcare provider for accuracy and completeness. Patient consented to the use of audio recording and use of AI during their visit.

## 2025-05-05 ENCOUNTER — TELEPHONE (OUTPATIENT)
Facility: HOSPITAL | Age: 55
End: 2025-05-05
Payer: COMMERCIAL

## 2025-05-07 ENCOUNTER — APPOINTMENT (OUTPATIENT)
Dept: PRIMARY CARE | Facility: CLINIC | Age: 55
End: 2025-05-07
Payer: COMMERCIAL

## 2025-05-07 VITALS
DIASTOLIC BLOOD PRESSURE: 82 MMHG | HEART RATE: 67 BPM | OXYGEN SATURATION: 95 % | WEIGHT: 235.6 LBS | TEMPERATURE: 99.1 F | BODY MASS INDEX: 36.98 KG/M2 | HEIGHT: 67 IN | SYSTOLIC BLOOD PRESSURE: 139 MMHG

## 2025-05-07 DIAGNOSIS — E11.69 TYPE 2 DIABETES MELLITUS WITH OTHER SPECIFIED COMPLICATION, WITHOUT LONG-TERM CURRENT USE OF INSULIN: ICD-10-CM

## 2025-05-07 DIAGNOSIS — E78.5 HYPERLIPIDEMIA ASSOCIATED WITH TYPE 2 DIABETES MELLITUS: ICD-10-CM

## 2025-05-07 DIAGNOSIS — J44.9 CHRONIC OBSTRUCTIVE PULMONARY DISEASE, UNSPECIFIED COPD TYPE (MULTI): ICD-10-CM

## 2025-05-07 DIAGNOSIS — E53.8 VITAMIN B12 DEFICIENCY: ICD-10-CM

## 2025-05-07 DIAGNOSIS — E11.69 HYPERLIPIDEMIA ASSOCIATED WITH TYPE 2 DIABETES MELLITUS: ICD-10-CM

## 2025-05-07 DIAGNOSIS — Z00.00 WELLNESS EXAMINATION: Primary | ICD-10-CM

## 2025-05-07 DIAGNOSIS — N18.4 STAGE 4 CHRONIC KIDNEY DISEASE (MULTI): ICD-10-CM

## 2025-05-07 PROCEDURE — 3075F SYST BP GE 130 - 139MM HG: CPT | Performed by: INTERNAL MEDICINE

## 2025-05-07 PROCEDURE — 1036F TOBACCO NON-USER: CPT | Performed by: INTERNAL MEDICINE

## 2025-05-07 PROCEDURE — 99396 PREV VISIT EST AGE 40-64: CPT | Performed by: INTERNAL MEDICINE

## 2025-05-07 PROCEDURE — 3079F DIAST BP 80-89 MM HG: CPT | Performed by: INTERNAL MEDICINE

## 2025-05-07 PROCEDURE — 3051F HG A1C>EQUAL 7.0%<8.0%: CPT | Performed by: INTERNAL MEDICINE

## 2025-05-07 PROCEDURE — 3008F BODY MASS INDEX DOCD: CPT | Performed by: INTERNAL MEDICINE

## 2025-05-07 RX ORDER — LANOLIN ALCOHOL/MO/W.PET/CERES
1000 CREAM (GRAM) TOPICAL DAILY
Qty: 90 TABLET | Refills: 3 | Status: SHIPPED | OUTPATIENT
Start: 2025-05-07

## 2025-05-07 RX ORDER — DIVALPROEX SODIUM 250 MG/1
250 TABLET, DELAYED RELEASE ORAL EVERY 12 HOURS SCHEDULED
Refills: 3 | Status: CANCELLED | OUTPATIENT
Start: 2025-05-07 | End: 2025-06-06

## 2025-05-08 NOTE — PROGRESS NOTES
Subjective     Patient ID: Amanuel Riojas is a 54 y.o. male who presents for Annual Exam.  History of Present Illness  Amanuel Riojas is a 54 year old male who presents for an annual physical exam.  Comes today for annual medical check up.  Overall doing well. Chronic medical conditions are stable with current medical management. Memory and cognitive function are assessed with simple verbal cue and interview. Preventative Immunizations are age appropriate. Current chronic medications have been reconciled. The healthy lifestyle has been reinforced. Encouraged continued avoidance of tobacco, alcohol, poly pharmacy and substances. Functional capacity has been assessed with quick balance and gait tests. Discussed about fall risk and safety measures at home and outside. Age appropriate cancer screening tests were recommended and ordered. Discussed in details about code status and health care Power of  (POA). Discussed about mental health and wellbeing. The depression screening questionnaire  (PHQ 9) was discussed for 5 mins. Another 5 minute was spent on SDOH screening, specifically housing, food insecurity, utilities and transportation.  Overall needs were evaluated, identified appropriately addressed.     Today's office vital signs, recent lab results, imaging studies were reviewed. At expressed concern about hypertension, uncontrolled diabetes and questionable seizure disorder.  So a complete physical exams was performed to evaluate and address.       In December 2023, he experienced two seizure episodes while in the ICU, one occurring during an MRI with right-sided jerking and eye movement followed by a postictal state. An MRI of the brain showed no acute infection, and a repeat EEG on December 24 was negative. He has been driving despite being advised not to drive for six weeks. He was treated for possible migraines and headaches, which have improved.    He is currently taking Zoloft, Seroquel,  "trazodone, nitroglycerin, potassium supplements, and Dulera. He left his Seroquel and trazodone at home but has his nitroglycerin with him. He takes potassium supplements as he does not consume potassium-rich foods.    He has a history of chronic kidney disease, which is being monitored. He reports high blood pressure readings, with a recent measurement of 174/102. He does not have a blood pressure machine at home. His kidney function is closely monitored due to diabetes, which is managed by another doctor.    He experiences sharp pains in his nose radiating to his forehead, which he attributes to a dental issue. He recently had blood work done at the [facility] in [place] but has not seen the results yet. He is in the process of getting lab work done for a potential kidney transplant, which has not yet been completed.    No memory problems, stating 'I'm crazy' in response to questions about his mental state. He reports not sleeping well despite taking multiple medications for sleep.    Objective   Vitals:    05/07/25 1203   BP: 139/82   BP Location: Left arm   Patient Position: Sitting   BP Cuff Size: Adult   Pulse: 67   Temp: 37.3 °C (99.1 °F)   TempSrc: Temporal   SpO2: 95%   Weight: 107 kg (235 lb 9.6 oz)   Height: 1.702 m (5' 7\")     Wt Readings from Last 10 Encounters:   05/07/25 107 kg (235 lb 9.6 oz)   04/23/25 109 kg (241 lb 3.2 oz)   04/20/25 107 kg (236 lb)   03/07/25 107 kg (236 lb 4.8 oz)   03/07/25 107 kg (236 lb 4.8 oz)   02/14/25 107 kg (235 lb)   01/23/25 107 kg (236 lb 3.2 oz)   11/07/24 105 kg (231 lb 9.6 oz)   08/20/24 103 kg (227 lb)   08/07/24 103 kg (227 lb 3.2 oz)       Medication:  Current Outpatient Medications   Medication Instructions    albuterol 90 mcg/actuation inhaler 2 puffs, inhalation, Every 4 hours PRN    amLODIPine (NORVASC) 10 mg, oral, Daily    aspirin (Adult Low Dose Aspirin) 81 mg EC tablet 1 tablet, Daily    atorvastatin (LIPITOR) 80 mg, oral, Nightly    BD Rosaline 2nd Gen " "Pen Needle 32 gauge x 5/32\" needle 1 Pen needle, 4 times daily    blood pressure monitor kit 1 Piece, miscellaneous, 2 times daily    ciclopirox (Penlac) 8 % solution 1 Application, Nightly    clobetasol (Temovate) 0.05 % cream APPLY SPARINGLY TO AFFECTED AREA(S) TWICE DAILY AS NEEDED    clotrimazole-betamethasone (Lotrisone) cream APPLY SPARINGLY TO AFFECTED AREA 3 TIMES A DAY    colchicine 0.6 mg, oral, Daily    cyanocobalamin (VITAMIN B-12) 1,000 mcg, oral, Daily    Dupixent Pen 300 mg, Every 14 days    ergocalciferol (VITAMIN D-2) 50,000 Units, oral, Once Weekly    flash glucose sensor (FREESTYLE NESTOR 14 DAY SENSOR MISC) Every 14 days    flash glucose sensor kit (FreeStyle Nestor 2 Sensor) kit Use as directed to check blood sugar. Change every 14 days    flash glucose sensor kit kit Use as directed to check blood sugar. Change every 14 days.    FreeStyle Nestor reader (FreeStyle Nestor 2 [place]) misc Use as directed to check blood sugars daily    hydrocortisone 2.5 % ointment 1 Application, 2 times daily    insulin glargine (LANTUS SOLOSTAR U-100 INSULIN) 75 Units, subcutaneous, Every morning, Take as directed per insulin instructions.    insulin lispro (HumaLOG) 100 unit/mL injection Inject 15 Units under the skin 3 times a day before meals. Increase to 20 units if blood sugar >200.    ketoconazole (NIZOral) 2 % shampoo 1 Application, Once Weekly    lancets 33 gauge misc Use as directed to check blood sugar before meals and at bedtime.    melatonin 3 mg, Nightly PRN    metoprolol tartrate (LOPRESSOR) 50 mg, oral, 2 times daily    mometasone-formoterol (Dulera) 200-5 mcg/actuation inhaler 1 puff, inhalation, 2 times daily RT    nitroglycerin (NITROSTAT) 0.4 mg, sublingual, Every 5 min PRN    pantoprazole (PROTONIX) 40 mg, oral, Daily, Do not crush, chew, or split    pen needle, diabetic (TechLITE Pen Needle) 31 gauge x 5/16\" needle Use to inject 1-4 times daily as directed.    potassium chloride CR (Klor-Con M20) " 20 mEq ER tablet 20 mEq, oral, Daily    QUEtiapine (SEROquel) 50 mg tablet 1 tablet, Nightly    sertraline (Zoloft) 50 mg tablet Daily    terazosin (HYTRIN) 5 mg, oral, Nightly    traZODone (DESYREL) 50 mg, Nightly    triamcinolone (Kenalog) 0.1 % cream 1 Application, 2 times daily       Physical Exam  Gen: Alert, awake, Oriented X 3. Not in any acute distress   HEENT:  Atraumatic, PERRL.  Conjunctivae clear.   Moist nasal mucous membranes. oropharynx non erythematous,   Neck:  Supple without thyromegaly or lymphadenopathy.  Lungs:  Clear to auscultation without rales, rhonchi, or rub.  Heart:  RRR, S1, S2, without M.  Abdomen:  Soft, non tender, no organ enlargement, bruit. Bowel sounds present . No CVA tenderness.  Extremities:  No edema. No calf swelling or tenderness.    Skin:  No rash, ecchymosis or erythema.      Review of Systems:  Constitutional:  No activity change or fever   HENT:  Denies ringing ears or nose bleed   Respiratory:  Denies stridor. No blood in sputum   Cardiovascular:  Denies chest pain, no sudden excessive sweating   Gastrointestinal:  No sour burping, no blood in stool    Genitourinary:  Denies blood in urine    Musculoskeletal:  No joint redness or swelling    Skin:  No new spot changing color or shape or border    Neurological:  No speech difficulty, facial droop    Psychiatric/Behavioral:  No agitation, denies Hallucination     Recent Labs:   Lab Results   Component Value Date    WBC 4.0 (L) 08/03/2024    HGB 11.9 (L) 08/03/2024    HCT 37.3 (L) 08/03/2024     08/03/2024    CHOL 144 09/11/2024    TRIG 259 (H) 09/11/2024    HDL 37.7 09/11/2024    ALT 38 09/11/2024    AST 25 09/11/2024     10/19/2024    K 4.7 10/19/2024     (H) 10/19/2024    CREATININE 3.70 (H) 10/19/2024    BUN 33 (H) 10/19/2024    CO2 22 10/19/2024    TSH 3.75 09/11/2024    PSA 1.24 08/03/2024    INR 1.0 12/22/2023    HGBA1C 7.6 (A) 04/23/2025     Lab Results   Component Value Date    GLUCOSE 153 (H)  10/19/2024    CALCIUM 9.0 10/19/2024     10/19/2024    K 4.7 10/19/2024    CO2 22 10/19/2024     (H) 10/19/2024    BUN 33 (H) 10/19/2024    CREATININE 3.70 (H) 10/19/2024      Lab Results   Component Value Date    LDLCALC 55 09/11/2024     Lab Results   Component Value Date    HGBA1C 7.6 (A) 04/23/2025    HGBA1C 6.8 (H) 09/11/2024    HGBA1C 6.4 08/07/2024     Lab Results   Component Value Date    LDLCALC 55 09/11/2024    CREATININE 3.70 (H) 10/19/2024     Lab Results   Component Value Date    PSA 1.24 08/03/2024    PSA 0.60 06/09/2021    PSA 0.60 06/30/2020       Diagnosis and Orders:   Wellness examination  Vitamin B12 deficiency  -     cyanocobalamin (Vitamin B-12) 1,000 mcg tablet; Take 1 tablet (1,000 mcg) by mouth once daily.  Stage 4 chronic kidney disease (Multi)  -     Disability Placard  Chronic obstructive pulmonary disease, unspecified COPD type (Multi)  -     Disability Placard  Type 2 diabetes mellitus with other specified complication, without long-term current use of insulin  Hyperlipidemia associated with type 2 diabetes mellitus         Assessment & Plan  Type 2 diabetes mellitus with other specified complication  Diabetes management under Dr. Colindres with ongoing glucose monitoring. Pending blood work from May 1, 2025.  - Continue monitoring blood glucose with flash glucose sensor.  - Review blood work results once available.    Hypertension associated with diabetes  Blood pressure elevated with recent readings of 174/102 mmHg and 139/90 mmHg. Target is 130/80 mmHg.  - Monitor blood pressure regularly at home.  - Consider obtaining a home blood pressure monitor.    Stage 4 chronic kidney disease  Monitored by nephrologist. On transplant list with pending lab work for evaluation. Advised increased water intake before blood draws.  - Complete pending lab work for kidney transplant evaluation at [place].  - Discuss lab results with nephrologist once available.  - Increase water intake before  blood draws.    Seizures due to hypoglycemia  Seizures in December 2023 likely due to hypoglycemia. Neurology consultation determined non-epileptic. Depakote no longer necessary.  - Discontinue Depakote.  - Avoid unnecessary medications affecting kidney function.    COPD  Management includes inhaler use. No smoking history. Emphasis on adherence to inhaler regimen.  - Continue current inhaler regimen.    Wellness Visit  Annual physical conducted. Discussed medication management and potential discontinuation. Provided disability placard valid until January 2025.  - Schedule follow-up appointment in November for routine check-up.  - Renew disability placard before January 2025.      VISIT SUMMARY:  You had your annual physical exam today. We reviewed your recent health issues, including your seizures, diabetes, high blood pressure, chronic kidney disease, and COPD. We also discussed your current medications and the need for some adjustments. Additionally, we talked about your recent lab work and the steps needed for your potential kidney transplant.    YOUR PLAN:  -TYPE 2 DIABETES MELLITUS WITH OTHER SPECIFIED COMPLICATION: Type 2 diabetes is a condition where your body does not use insulin properly, leading to high blood sugar levels. You should continue to monitor your blood glucose with your flash glucose sensor and review your blood work results once they are available.    -HYPERTENSION ASSOCIATED WITH DIABETES: Hypertension, or high blood pressure, is often associated with diabetes and can lead to other health issues. Your recent blood pressure readings are high, and the target is 130/80 mmHg. You should monitor your blood pressure regularly at home and consider getting a home blood pressure monitor.    -STAGE 4 CHRONIC KIDNEY DISEASE: Chronic kidney disease is a condition where your kidneys gradually lose function. You are on the transplant list and need to complete pending lab work for evaluation. Increase your  water intake before blood draws and discuss the lab results with your nephrologist once available.    -SEIZURES DUE TO HYPOGLYCEMIA: Your seizures in December 2023 were likely due to low blood sugar levels. Since they were determined to be non-epileptic, you can discontinue Depakote and avoid unnecessary medications that could affect your kidney function.    -COPD: Chronic Obstructive Pulmonary Disease (COPD) is a lung condition that makes it hard to breathe. You should continue using your inhaler as prescribed.    -WELLNESS VISIT: During your annual physical, we discussed your medication management and the potential discontinuation of some medications. You were provided with a disability placard valid until January 2025.    INSTRUCTIONS:  Please schedule a follow-up appointment in November for a routine check-up. Also, remember to renew your disability placard before January 2025.        This medical note was created with the assistance of artificial intelligence (AI) for documentation purposes. The content has been reviewed and confirmed by the healthcare provider for accuracy and completeness. Patient consented to the use of audio recording and use of AI during their visit.

## 2025-05-09 ENCOUNTER — DOCUMENTATION (OUTPATIENT)
Dept: TRANSPLANT | Facility: HOSPITAL | Age: 55
End: 2025-05-09
Payer: COMMERCIAL

## 2025-05-16 ENCOUNTER — COMMITTEE REVIEW (OUTPATIENT)
Facility: HOSPITAL | Age: 55
End: 2025-05-16
Payer: COMMERCIAL

## 2025-05-16 NOTE — COMMITTEE REVIEW
Evaluation Date: 3/7/2025   Committee Review Date: 5/8/2025   Organ being evaluated for: Kidney     Transplant Phase:  Evaluation   Transplant Status: Active     Referring Physician:     Transplant Physician: Mike Zhong     Primary Diagnosis:      Committee Members:    Eligibility:  None     Relative contraindications:  None     Absolute contraindications:  None         Committee Review Decision:    The candidate's evaluation was presented and discussed at the Transplant Multidisciplinary Selection Conference. After review of the candidate's diagnosis and the evaluations of the multidisciplinary team members, the committee made the following recommendations:     Close eval due to no response from patient.  Per SW also minimally acceptable candidate at this

## 2025-05-30 ENCOUNTER — HOSPITAL ENCOUNTER (EMERGENCY)
Facility: HOSPITAL | Age: 55
Discharge: HOME | End: 2025-05-30
Payer: COMMERCIAL

## 2025-05-30 VITALS
SYSTOLIC BLOOD PRESSURE: 176 MMHG | RESPIRATION RATE: 18 BRPM | WEIGHT: 235 LBS | DIASTOLIC BLOOD PRESSURE: 90 MMHG | HEIGHT: 67 IN | BODY MASS INDEX: 36.88 KG/M2 | OXYGEN SATURATION: 97 % | TEMPERATURE: 98.1 F | HEART RATE: 56 BPM

## 2025-05-30 DIAGNOSIS — K02.9 PAIN DUE TO DENTAL CARIES: Primary | ICD-10-CM

## 2025-05-30 PROCEDURE — 2500000004 HC RX 250 GENERAL PHARMACY W/ HCPCS (ALT 636 FOR OP/ED): Performed by: PHYSICIAN ASSISTANT

## 2025-05-30 PROCEDURE — 2500000001 HC RX 250 WO HCPCS SELF ADMINISTERED DRUGS (ALT 637 FOR MEDICARE OP): Performed by: PHYSICIAN ASSISTANT

## 2025-05-30 PROCEDURE — 99283 EMERGENCY DEPT VISIT LOW MDM: CPT

## 2025-05-30 PROCEDURE — 2500000002 HC RX 250 W HCPCS SELF ADMINISTERED DRUGS (ALT 637 FOR MEDICARE OP, ALT 636 FOR OP/ED): Performed by: PHYSICIAN ASSISTANT

## 2025-05-30 RX ORDER — PENICILLIN V POTASSIUM 250 MG/1
500 TABLET, FILM COATED ORAL ONCE
Status: COMPLETED | OUTPATIENT
Start: 2025-05-30 | End: 2025-05-30

## 2025-05-30 RX ORDER — ONDANSETRON 4 MG/1
4 TABLET, ORALLY DISINTEGRATING ORAL ONCE
Status: COMPLETED | OUTPATIENT
Start: 2025-05-30 | End: 2025-05-30

## 2025-05-30 RX ORDER — NAPROXEN 500 MG/1
500 TABLET ORAL
Qty: 30 TABLET | Refills: 0 | Status: SHIPPED | OUTPATIENT
Start: 2025-05-30 | End: 2025-06-14

## 2025-05-30 RX ORDER — CHLORHEXIDINE GLUCONATE ORAL RINSE 1.2 MG/ML
SOLUTION DENTAL
Qty: 473 ML | Refills: 0 | Status: SHIPPED | OUTPATIENT
Start: 2025-05-30

## 2025-05-30 RX ORDER — OXYCODONE AND ACETAMINOPHEN 5; 325 MG/1; MG/1
1 TABLET ORAL EVERY 6 HOURS PRN
Qty: 9 TABLET | Refills: 0 | Status: SHIPPED | OUTPATIENT
Start: 2025-05-30 | End: 2025-06-02

## 2025-05-30 RX ORDER — PENICILLIN V POTASSIUM 500 MG/1
500 TABLET, FILM COATED ORAL 4 TIMES DAILY
Qty: 40 TABLET | Refills: 0 | Status: SHIPPED | OUTPATIENT
Start: 2025-05-30 | End: 2025-06-09

## 2025-05-30 RX ORDER — OXYCODONE HYDROCHLORIDE 5 MG/1
5 TABLET ORAL ONCE
Refills: 0 | Status: COMPLETED | OUTPATIENT
Start: 2025-05-30 | End: 2025-05-30

## 2025-05-30 RX ADMIN — OXYCODONE 5 MG: 5 TABLET ORAL at 01:57

## 2025-05-30 RX ADMIN — PENICILLIN V POTASSIUM 500 MG: 250 TABLET, FILM COATED ORAL at 01:57

## 2025-05-30 RX ADMIN — ONDANSETRON 4 MG: 4 TABLET, ORALLY DISINTEGRATING ORAL at 02:09

## 2025-05-30 RX ADMIN — ONDANSETRON 4 MG: 4 TABLET, ORALLY DISINTEGRATING ORAL at 01:57

## 2025-05-30 ASSESSMENT — PAIN DESCRIPTION - ORIENTATION: ORIENTATION: RIGHT

## 2025-05-30 ASSESSMENT — PAIN DESCRIPTION - PAIN TYPE: TYPE: ACUTE PAIN

## 2025-05-30 ASSESSMENT — PAIN - FUNCTIONAL ASSESSMENT: PAIN_FUNCTIONAL_ASSESSMENT: 0-10

## 2025-05-30 ASSESSMENT — PAIN SCALES - GENERAL
PAINLEVEL_OUTOF10: 10 - WORST POSSIBLE PAIN
PAINLEVEL_OUTOF10: 10 - WORST POSSIBLE PAIN

## 2025-05-30 ASSESSMENT — PAIN DESCRIPTION - LOCATION: LOCATION: JAW

## 2025-05-30 ASSESSMENT — COLUMBIA-SUICIDE SEVERITY RATING SCALE - C-SSRS
6. HAVE YOU EVER DONE ANYTHING, STARTED TO DO ANYTHING, OR PREPARED TO DO ANYTHING TO END YOUR LIFE?: NO
2. HAVE YOU ACTUALLY HAD ANY THOUGHTS OF KILLING YOURSELF?: NO
1. IN THE PAST MONTH, HAVE YOU WISHED YOU WERE DEAD OR WISHED YOU COULD GO TO SLEEP AND NOT WAKE UP?: NO

## 2025-05-30 ASSESSMENT — PAIN DESCRIPTION - DESCRIPTORS: DESCRIPTORS: SHARP

## 2025-05-30 NOTE — Clinical Note
Amanuel Riojas was seen and treated in our emergency department on 5/30/2025.  He may return to work on 05/31/2025.       If you have any questions or concerns, please don't hesitate to call.      Dread Smith PA-C

## 2025-05-30 NOTE — ED PROVIDER NOTES
HPI   Chief Complaint   Patient presents with    Dental Pain     Right jaw swollen, pt states that he just had a filling done on may 27th on a upper right tooth and didn't know if he hit a nerve or what. States that the pain was too much and couldn't take the pain anymore       This is a 55-year-old male who presents emergency room with chief complaint of dental pain with swelling to his face.  The patient states that he was seen back on April 27 by his dentist and has some dental work done.  He was discharged on Augmentin and hydrocodone.  He is scheduled to see his dentist again in the morning.  The patient states that the pain is just getting worse he is out of his pain medication.  He denies any sore throat, fever, chills, dysphagia, neck pain or neck stiffness.  The dental pain is sharp and stabbing.  It is worse with chewing food.  He denies any other complaints.      History provided by:  Patient and medical records          Patient History   Medical History[1]  Surgical History[2]  Family History[3]  Social History[4]    Physical Exam   ED Triage Vitals [05/30/25 0104]   Temperature Heart Rate Respirations BP   36.7 °C (98.1 °F) 55 18 (!) 195/96      Pulse Ox Temp Source Heart Rate Source Patient Position   97 % Temporal -- Sitting      BP Location FiO2 (%)     Right arm --       Physical Exam  Vitals and nursing note reviewed.   Constitutional:       General: He is awake. He is not in acute distress.     Appearance: Normal appearance. He is well-developed, well-groomed and normal weight. He is not ill-appearing, toxic-appearing or diaphoretic.   HENT:      Head: Normocephalic and atraumatic.      Jaw: There is normal jaw occlusion. No trismus, tenderness, swelling, pain on movement or malocclusion.      Right Ear: Tympanic membrane, ear canal and external ear normal.      Left Ear: Tympanic membrane, ear canal and external ear normal.      Nose: Nose normal.      Mouth/Throat:      Lips: Pink.      Mouth:  Mucous membranes are moist.      Dentition: Dental tenderness present.      Pharynx: Oropharynx is clear.        Comments: Internal tenderness, no sign of any dental abscess or dental caries, no sign of Chris's angina or acute necrotizing ulcerative gingivitis. There is no drooling stridor or trismus.  Eyes:      Extraocular Movements: Extraocular movements intact.      Conjunctiva/sclera: Conjunctivae normal.      Pupils: Pupils are equal, round, and reactive to light.   Cardiovascular:      Rate and Rhythm: Normal rate and regular rhythm.      Pulses: Normal pulses.      Heart sounds: Normal heart sounds.   Pulmonary:      Effort: Pulmonary effort is normal.      Breath sounds: Normal breath sounds. No wheezing, rhonchi or rales.   Abdominal:      General: Bowel sounds are normal.      Palpations: Abdomen is soft. There is no mass.      Tenderness: There is no abdominal tenderness. There is no guarding.   Musculoskeletal:         General: No swelling or tenderness. Normal range of motion.      Cervical back: Normal range of motion and neck supple.   Lymphadenopathy:      Cervical: No cervical adenopathy.   Skin:     General: Skin is warm and dry.      Capillary Refill: Capillary refill takes less than 2 seconds.      Findings: No rash.   Neurological:      General: No focal deficit present.      Mental Status: He is alert and oriented to person, place, and time. Mental status is at baseline.   Psychiatric:         Mood and Affect: Mood normal.         Behavior: Behavior normal. Behavior is cooperative.         Thought Content: Thought content normal.         Judgment: Judgment normal.           ED Course & MDM   Diagnoses as of 05/30/25 0120   Pain due to dental caries                 No data recorded     [place] Coma Scale Score: 15 (05/30/25 0111 : Alicia Boggs RN)                           Medical Decision Making  This is a 55-year-old male who presents emergency room with chief complaint of dental pain  with swelling to his face.  The patient states that he was seen back on April 27 by his dentist and has some dental work done.  He was discharged on Augmentin and hydrocodone.  He is scheduled to see his dentist again in the morning.  The patient states that the pain is just getting worse he is out of his pain medication.  He denies any sore throat, fever, chills, dysphagia, neck pain or neck stiffness.  The dental pain is sharp and stabbing.  It is worse with chewing food.  He denies any other complaints.  Temperature is 36 7 heart rate 55 respirations 18 blood pressure 195/96 pulse ox is 97% on room air  Patient was given penicillin  mg p.o. Zofran 4 mg ODT and 1 oxycodone 5 mg p.o.  He was discharged home with prescription for naproxen, Percocet, Zofran and penicillin VK as well as Peridex oral solution.  I encouraged him to keep his appoint with his dentist which is scheduled to start in 7 hours.  He was encouraged to return back to the ER sooner should he have any concerns or worsening of any symptoms.  All questions answered prior to discharge        Procedure  Procedures       [1]   Past Medical History:  Diagnosis Date    Cellulitis of unspecified finger 06/30/2022    Paronychia of thumb, unspecified laterality    COVID-19 01/17/2022    COVID-19 virus infection    Encounter for follow-up examination after completed treatment for conditions other than malignant neoplasm 01/17/2022    Hospital discharge follow-up    Encounter for general adult medical examination without abnormal findings 08/15/2022    Wellness examination    Encounter for screening for malignant neoplasm of colon 03/15/2022    Screen for colon cancer    Encounter for screening for malignant neoplasm of prostate 03/15/2022    Encounter for screening for malignant neoplasm of prostate    Obesity, unspecified 06/14/2022    Class 2 obesity with body mass index (BMI) of 36.0 to 36.9 in adult    Pain in unspecified hand 10/04/2022    Hand  pain    Personal history of diseases of the skin and subcutaneous tissue 06/15/2022    History of eczema    Personal history of other diseases of the musculoskeletal system and connective tissue     History of gout    Personal history of other endocrine, nutritional and metabolic disease     History of hypokalemia    Personal history of other endocrine, nutritional and metabolic disease     History of hypokalemia    Rash and other nonspecific skin eruption 03/15/2022    Rash    Stiffness of right hand, not elsewhere classified 07/06/2021    Stiffness of finger joint of right hand    Unspecified fracture of other metacarpal bone, initial encounter for closed fracture 07/06/2021    Fracture of third metacarpal bone    Unspecified fracture of third metacarpal bone, right hand, initial encounter for closed fracture 07/06/2021    Fracture of third metacarpal bone of right hand   [2]   Past Surgical History:  Procedure Laterality Date    OTHER SURGICAL HISTORY  11/07/2021    Cardiac catheterization with stent placement   [3]   Family History  Problem Relation Name Age of Onset    Hypertension Mother      Stroke Father     [4]   Social History  Tobacco Use    Smoking status: Never     Passive exposure: Never    Smokeless tobacco: Never   Vaping Use    Vaping status: Never Used   Substance Use Topics    Alcohol use: Not Currently    Drug use: Not Currently        Dread Smith PA-C  05/30/25 0122

## 2025-06-09 DIAGNOSIS — E11.59 HYPERTENSION ASSOCIATED WITH DIABETES: ICD-10-CM

## 2025-06-09 DIAGNOSIS — I15.2 HYPERTENSION ASSOCIATED WITH DIABETES: ICD-10-CM

## 2025-06-10 RX ORDER — METOPROLOL TARTRATE 50 MG/1
50 TABLET ORAL 2 TIMES DAILY
Qty: 180 TABLET | Refills: 3 | Status: SHIPPED | OUTPATIENT
Start: 2025-06-10

## 2025-06-26 DIAGNOSIS — J45.909 ASTHMA, UNSPECIFIED ASTHMA SEVERITY, UNSPECIFIED WHETHER COMPLICATED, UNSPECIFIED WHETHER PERSISTENT (HHS-HCC): ICD-10-CM

## 2025-06-27 RX ORDER — ALBUTEROL SULFATE 90 UG/1
2 INHALANT RESPIRATORY (INHALATION) EVERY 4 HOURS PRN
Qty: 18 G | Refills: 3 | Status: SHIPPED | OUTPATIENT
Start: 2025-06-27

## 2025-07-29 DIAGNOSIS — E79.0 HYPERURICEMIA WITHOUT SIGNS INFLAMMATORY ARTHRITIS/TOPHACEOUS DISEASE: ICD-10-CM

## 2025-07-29 NOTE — TELEPHONE ENCOUNTER
Pharmacy requesting refills   Visit date not found  Future Appointments   Date Time Provider Department Center   11/7/2025  9:30 AM [facility]

## 2025-07-30 RX ORDER — COLCHICINE 0.6 MG/1
0.6 TABLET ORAL DAILY
Qty: 90 TABLET | Refills: 3 | Status: SHIPPED | OUTPATIENT
Start: 2025-07-30 | End: 2026-07-30

## 2025-11-07 ENCOUNTER — APPOINTMENT (OUTPATIENT)
Dept: PRIMARY CARE | Facility: CLINIC | Age: 55
End: 2025-11-07
Payer: COMMERCIAL